# Patient Record
Sex: MALE | Employment: OTHER | URBAN - METROPOLITAN AREA
[De-identification: names, ages, dates, MRNs, and addresses within clinical notes are randomized per-mention and may not be internally consistent; named-entity substitution may affect disease eponyms.]

---

## 2017-01-04 ENCOUNTER — HOSPITAL ENCOUNTER (OUTPATIENT)
Dept: WOUND CARE | Age: 72
Discharge: HOME | End: 2017-01-04
Payer: MEDICARE

## 2017-01-04 PROCEDURE — 15275 SKIN SUB GRAFT FACE/NK/HF/G: CPT

## 2017-01-04 PROCEDURE — 15276 SKIN SUB GRAFT F/N/HF/G ADDL: CPT

## 2017-01-10 PROCEDURE — 97602 WOUND(S) CARE NON-SELECTIVE: CPT

## 2017-01-18 PROCEDURE — 97597 DBRDMT OPN WND 1ST 20 CM/<: CPT

## 2017-01-18 PROCEDURE — 15271 SKIN SUB GRAFT TRNK/ARM/LEG: CPT

## 2017-01-24 PROCEDURE — 99213 OFFICE O/P EST LOW 20 MIN: CPT

## 2017-01-24 PROCEDURE — 97602 WOUND(S) CARE NON-SELECTIVE: CPT

## 2017-02-01 ENCOUNTER — HOSPITAL ENCOUNTER (OUTPATIENT)
Dept: WOUND CARE | Age: 72
Discharge: HOME | End: 2017-02-01
Payer: MEDICARE

## 2017-02-01 PROCEDURE — 15272 SKIN SUB GRAFT T/A/L ADD-ON: CPT

## 2017-02-01 PROCEDURE — 15271 SKIN SUB GRAFT TRNK/ARM/LEG: CPT

## 2017-02-07 PROCEDURE — 97602 WOUND(S) CARE NON-SELECTIVE: CPT

## 2017-02-21 PROCEDURE — 97602 WOUND(S) CARE NON-SELECTIVE: CPT

## 2017-03-01 ENCOUNTER — HOSPITAL ENCOUNTER (OUTPATIENT)
Dept: WOUND CARE | Age: 72
Discharge: HOME | End: 2017-03-01
Payer: MEDICARE

## 2017-03-01 PROCEDURE — 11042 DBRDMT SUBQ TIS 1ST 20SQCM/<: CPT

## 2017-03-01 PROCEDURE — 15272 SKIN SUB GRAFT T/A/L ADD-ON: CPT

## 2017-03-01 PROCEDURE — 15271 SKIN SUB GRAFT TRNK/ARM/LEG: CPT

## 2017-03-01 PROCEDURE — 99214 OFFICE O/P EST MOD 30 MIN: CPT

## 2017-03-07 PROCEDURE — 97602 WOUND(S) CARE NON-SELECTIVE: CPT

## 2017-03-15 PROCEDURE — 11042 DBRDMT SUBQ TIS 1ST 20SQCM/<: CPT

## 2017-03-21 PROCEDURE — 97602 WOUND(S) CARE NON-SELECTIVE: CPT

## 2017-04-06 ENCOUNTER — HOSPITAL ENCOUNTER (OUTPATIENT)
Dept: WOUND CARE | Age: 72
Discharge: HOME | End: 2017-04-06
Payer: MEDICARE

## 2017-04-06 PROCEDURE — 97602 WOUND(S) CARE NON-SELECTIVE: CPT

## 2017-04-14 PROCEDURE — 97602 WOUND(S) CARE NON-SELECTIVE: CPT

## 2017-04-19 PROCEDURE — 99212 OFFICE O/P EST SF 10 MIN: CPT

## 2017-04-19 PROCEDURE — 29580 STRAPPING UNNA BOOT: CPT

## 2017-04-19 PROCEDURE — 97597 DBRDMT OPN WND 1ST 20 CM/<: CPT

## 2017-04-19 PROCEDURE — 97598 DBRDMT OPN WND ADDL 20CM/<: CPT

## 2017-04-24 PROCEDURE — 97602 WOUND(S) CARE NON-SELECTIVE: CPT

## 2017-05-03 ENCOUNTER — HOSPITAL ENCOUNTER (OUTPATIENT)
Dept: WOUND CARE | Age: 72
Discharge: HOME | End: 2017-05-03
Payer: MEDICARE

## 2017-05-03 PROCEDURE — 11042 DBRDMT SUBQ TIS 1ST 20SQCM/<: CPT

## 2017-05-09 ENCOUNTER — OFFICE VISIT (OUTPATIENT)
Dept: VASCULAR SURGERY | Age: 72
End: 2017-05-09

## 2017-05-09 VITALS
HEART RATE: 90 BPM | RESPIRATION RATE: 20 BRPM | WEIGHT: 150 LBS | BODY MASS INDEX: 19.25 KG/M2 | DIASTOLIC BLOOD PRESSURE: 58 MMHG | HEIGHT: 74 IN | SYSTOLIC BLOOD PRESSURE: 110 MMHG

## 2017-05-09 DIAGNOSIS — I25.119 CORONARY ARTERY DISEASE INVOLVING NATIVE CORONARY ARTERY OF NATIVE HEART WITH ANGINA PECTORIS (HCC): ICD-10-CM

## 2017-05-09 DIAGNOSIS — L97.911 LEG ULCER, RIGHT, LIMITED TO BREAKDOWN OF SKIN (HCC): ICD-10-CM

## 2017-05-09 DIAGNOSIS — I87.2 VENOUS REFLUX: ICD-10-CM

## 2017-05-09 DIAGNOSIS — L97.921 LEG ULCER, LEFT, LIMITED TO BREAKDOWN OF SKIN (HCC): ICD-10-CM

## 2017-05-09 DIAGNOSIS — I73.9 PAD (PERIPHERAL ARTERY DISEASE) (HCC): Primary | ICD-10-CM

## 2017-05-09 PROBLEM — L97.929 LEG ULCER, LEFT (HCC): Status: ACTIVE | Noted: 2017-05-09

## 2017-05-09 PROBLEM — L97.909 LEG ULCER (HCC): Status: ACTIVE | Noted: 2017-05-09

## 2017-05-09 PROCEDURE — 29581 APPL MULTLAYER CMPRN SYS LEG: CPT

## 2017-05-09 RX ORDER — CYANOCOBALAMIN 1000 UG/ML
1000 INJECTION, SOLUTION INTRAMUSCULAR; SUBCUTANEOUS
COMMUNITY
End: 2018-05-16

## 2017-05-09 RX ORDER — POTASSIUM CITRATE 10 MEQ/1
TABLET, EXTENDED RELEASE ORAL
COMMUNITY
End: 2017-05-19

## 2017-05-09 RX ORDER — INSULIN ASPART 100 [IU]/ML
5 INJECTION, SOLUTION INTRAVENOUS; SUBCUTANEOUS DAILY
COMMUNITY

## 2017-05-09 RX ORDER — ALBUTEROL SULFATE 90 UG/1
AEROSOL, METERED RESPIRATORY (INHALATION)
COMMUNITY
End: 2018-05-16

## 2017-05-09 RX ORDER — METFORMIN HYDROCHLORIDE 1000 MG/1
1000 TABLET ORAL 2 TIMES DAILY WITH MEALS
COMMUNITY

## 2017-05-09 RX ORDER — LISINOPRIL 2.5 MG/1
TABLET ORAL DAILY
COMMUNITY

## 2017-05-09 RX ORDER — ATORVASTATIN CALCIUM 20 MG/1
TABLET, FILM COATED ORAL
COMMUNITY

## 2017-05-09 RX ORDER — CLOPIDOGREL BISULFATE 75 MG/1
75 TABLET ORAL DAILY
COMMUNITY

## 2017-05-09 RX ORDER — PRIMIDONE 50 MG/1
TABLET ORAL 3 TIMES DAILY
Status: ON HOLD | COMMUNITY
End: 2017-05-22

## 2017-05-09 NOTE — PROGRESS NOTES
Kenny Jameson    Chief Complaint   Patient presents with    Wound Check       History and Physical    Mr. Teodoro Nunez is a 66year old gentleman referred to our office for bilateral nonhealing leg wounds. The patient is being followed by Dr. Carol Ann Cervantes and the wound care team.  Previously, Mr. Teodoro Nunez was being seen by the previous vascular surgeons at [facility] and he has undergone a right SFA atherectomy in 2015. He has not had any additional procedures since that time on his arteries and has not had any imaging done since 2016. He has also undergone a venous ablation in the past.  I am not sure which leg this was done one. In talking to Mr. Hough, it sounds like his left foot wounds had almost healed previously but it does not appear than he has ever truly been wound free since at least 2015. He also states that when he wakes up in the morning his right foot aches. He does not experience this pain while he is in bed reading his books. He has not tried sleeping in a recliner. He has Unna boots and gets them changed weekly. Past Medical History:   Diagnosis Date    Chronic obstructive pulmonary disease ([address]     Diabetes ([address]     GERD (gastroesophageal reflux disease)     Hypercholesterolemia     Hypertension     Thyroid disease      Patient Active Problem List   Diagnosis Code    PAD (peripheral artery disease) ([facility]) I73.9    Venous reflux I87.2    Leg ulcer, left ([address] L97.929    Leg ulcer ([address] L97.909     Past Surgical History:   Procedure Laterality Date    ABDOMEN SURGERY PROC UNLISTED      HX COLONOSCOPY      HX HEENT      HX ORTHOPAEDIC      HX UROLOGICAL      VASCULAR SURGERY PROCEDURE UNLIST       Current Outpatient Prescriptions   Medication Sig Dispense Refill    insulin aspart (NOVOLOG FLEXPEN) 100 unit/mL inpn by SubCUTAneous route.  metFORMIN (GLUCOPHAGE) 1,000 mg tablet Take 1,000 mg by mouth two (2) times daily (with meals).       atorvastatin (LIPITOR) 20 mg tablet Take  by mouth daily.  lisinopril (PRINIVIL, ZESTRIL) 2.5 mg tablet Take  by mouth daily.  albuterol (PROAIR HFA) 90 mcg/actuation inhaler Take  by inhalation.  clopidogrel (PLAVIX) 75 mg tab Take  by mouth.  primidone (MYSOLINE) 50 mg tablet Take  by mouth three (3) times daily.  IRON PS CMPLX/VIT B12/FA (FERREX 150 FORTE PO) Take  by mouth.  potassium citrate (UROCIT-K10) 10 mEq (1,080 mg) TbER Take  by mouth.  cyanocobalamin (VITAMIN B-12) 1,000 mcg/mL injection 1,000 mcg by IntraMUSCular route once.  aspirin 81 mg tablet Take 81 mg by mouth daily.  insulin glargine (LANTUS) 100 unit/mL injection 68 Units by SubCUTAneous route nightly.  esomeprazole (NEXIUM) 40 mg capsule Take  by mouth daily.  furosemide (LASIX) 40 mg tablet Take 40 mg by mouth daily.  diltiazem (TIAZAC) 360 mg SR capsule Take 360 mg by mouth daily.  tiotropium (SPIRIVA WITH HANDIHALER) 18 mcg inhalation capsule Take 1 Cap by inhalation daily.  cilostazol (PLETAL) 100 mg tablet Take 100 mg by mouth two (2) times a day.  loratadine (CLARITIN) 10 mg tablet Take 10 mg by mouth daily.  fluticasone (FLONASE) 50 mcg/actuation nasal spray 2 Sprays by Both Nostrils route daily.  traMADol (ULTRAM) 50 mg tablet Take 50 mg by mouth two (2) times a day.  gabapentin (NEURONTIN) 300 mg capsule Take 300 mg by mouth three (3) times daily.  multivitamin (ONE A DAY) tablet Take 1 Tab by mouth daily.  LACTASE ENZYME PO Take 1 Cap by mouth daily.  levothyroxine (SYNTHROID) 25 mcg tablet Take 100 mcg by mouth Daily (before breakfast).  fluticasone-salmeterol (ADVAIR DISKUS) 250-50 mcg/dose diskus inhaler Take 1 Puff by inhalation every twelve (12) hours.  betamethasone dipropionate (DIPROSONE) 0.05 % topical cream Apply  to affected area two (2) times a day.       glyBURIDE-metFORMIN (GLUCOVANCE) 5-500 mg per tablet Take 2 Tabs by mouth two (2) times daily (with meals). Breakfast and dinner only      losartan (COZAAR) 100 mg tablet Take 100 mg by mouth daily.  meloxicam (MOBIC) 15 mg tablet Take 15 mg by mouth daily.  potassium chloride (KLOR-CON) 20 mEq packet Take 20 mEq by mouth daily.  tolterodine (DETROL) 2 mg tablet Take 2 mg by mouth nightly.  vit B Cmplx 3-FA-Vit C-Biotin (NEPHRO TEN RX) 1- mg-mg-mcg tablet Take 1 Tab by mouth daily.  B.infantis-B.ani-B.long-B.bifi (PROBIOTIC 4X) 10-15 mg TbEC Take 1 Cap by mouth daily (with breakfast).  omega-3 fatty acids-vitamin e (FISH OIL) 1,000 mg cap Take 1 Cap by mouth two (2) times a day.  glucosamine (GLUCOSAMINE RELIEF) 1,000 mg Tab Take 2 Caps by mouth daily (with breakfast).  metoprolol-XL (TOPROL XL) 25 mg XL tablet Take 12.5 mg by mouth daily.  simvastatin (ZOCOR) 10 mg tablet Take 10 mg by mouth nightly.  nicotinic acid (NIACIN) 500 mg tablet Take 500 mg by mouth Daily (before breakfast). Allergies   Allergen Reactions    Latex Contact Dermatitis    Neosporin [Neomycin-Bacitracin-Polymyxin] Rash     Social History     Social History    Marital status:      Spouse name: N/A    Number of children: N/A    Years of education: N/A     Occupational History    Not on file. Social History Main Topics    Smoking status: Former Smoker     Types: Cigarettes     Quit date: 1/1/1997    Smokeless tobacco: Never Used    Alcohol use 0.6 oz/week     1 Glasses of wine per week    Drug use: No    Sexual activity: Not on file     Other Topics Concern    Not on file     Social History Narrative      Family History   Problem Relation Age of Onset    Cancer Mother     Diabetes Father     Heart Disease Sister     Cancer Brother     Diabetes Brother     Hypertension Brother        Review of Systems    Review of Systems   Constitutional: Positive for malaise/fatigue. Negative for chills, diaphoresis, fever and weight loss.    HENT: Negative for hearing loss and sore throat. Eyes: Negative for blurred vision, photophobia and redness. Respiratory: Negative for cough, hemoptysis, shortness of breath and wheezing. Cardiovascular: Positive for leg swelling. Negative for chest pain, palpitations and orthopnea. Gastrointestinal: Negative for abdominal pain, blood in stool, constipation, diarrhea, heartburn, nausea and vomiting. Genitourinary: Negative for dysuria, frequency, hematuria and urgency. Musculoskeletal: Positive for falls. Negative for back pain and myalgias. Skin: Negative for itching and rash. Neurological: Positive for weakness. Negative for dizziness, speech change, focal weakness and headaches. Endo/Heme/Allergies: Does not bruise/bleed easily. Psychiatric/Behavioral: Negative for depression and suicidal ideas. Physical Exam:    Visit Vitals    /58    Pulse 90    Resp 20    Ht 6' 2\" (1.88 m)    Wt 150 lb (68 kg)    BMI 19.26 kg/m2      Physical Examination: General appearance - oriented to person, place, and time  Mental status - normal mood, behavior, speech, dress, motor activity, and thought processes  Eyes - sclera anicteric, left eye normal, right eye normal  Ears - right ear normal, left ear normal  Nose - normal and patent, no erythema, discharge or polyps  Mouth - mucous membranes moist, pharynx normal without lesions  Neck - supple, no significant adenopathy  Lymphatics - no palpable lymphadenopathy  Chest - clear to auscultation, no wheezes, rales or rhonchi, symmetric air entry  Heart - normal rate and regular rhythm  Abdomen - soft, nontender, nondistended, no masses or organomegaly  Extremities - Faint femoral pulse bilaterally, faint left popliteal pulse, unable to palpate right popliteal pulse. Biphasic left popliteal signal with monophasic right popliteal signal.  Bilateral lower extremities in Unna boots. Impression and Plan:    ICD-10-CM ICD-9-CM    1.  PAD (peripheral artery disease) ([facility]) I73.9 443.9 DUPLEX LOWER EXT ARTERY BILAT   2. Venous reflux I87.2 459.81    3. Leg ulcer, left, limited to breakdown of skin L97.921 707.10 DUPLEX LOWER EXT ARTERY BILAT   4. Leg ulcer, right, limited to breakdown of skin L97.911 707.10 DUPLEX LOWER EXT ARTERY BILAT   5. Coronary artery disease involving native coronary artery of native heart with angina pectoris ([facility]) I25.119 414.01      413.9      Orders Placed This Encounter    DUPLEX LOWER EXT ARTERY BILAT    insulin aspart (NOVOLOG FLEXPEN) 100 unit/mL inpn    metFORMIN (GLUCOPHAGE) 1,000 mg tablet    atorvastatin (LIPITOR) 20 mg tablet    lisinopril (PRINIVIL, ZESTRIL) 2.5 mg tablet    albuterol (PROAIR HFA) 90 mcg/actuation inhaler    clopidogrel (PLAVIX) 75 mg tab    primidone (MYSOLINE) 50 mg tablet    IRON PS CMPLX/VIT B12/FA (FERREX 150 FORTE PO)    potassium citrate (UROCIT-K10) 10 mEq (1,080 mg) TbER    cyanocobalamin (VITAMIN B-12) 1,000 mcg/mL injection     I told Mr. Hough he may have both venous and arterial disease. I will obtain bilateral arterial duplex studies with plans on a diagnostic angiogram as soon as feasible. I will start with his right leg given his possible rest pain. We will then take a look at the left. Follow-up Disposition:  Return in about 3 weeks (around 5/30/2017) for post angiogram.      The treatment plan was reviewed with the patient in detail. The patient voiced understanding of this plan and all questions and concerns were addressed. The patient agrees with this plan. We discussed the signs and symptoms that would require earlier attention or intervention. The patient was given educational material related to his/her visit and the patient has voiced understanding of the material.     I appreciate the opportunity to participate in the care of your patient. I will be sure to keep you informed of any subsequent changes in the treatment plan.   If you have any questions or concerns, please feel free to contact me. Chrissy Ann MD    PLEASE NOTE:  This document has been produced using voice recognition software. Unrecognized errors in transcription may be present.

## 2017-05-09 NOTE — MR AVS SNAPSHOT
Visit Information Date & Time Provider Department Dept. Phone Encounter #  
 5/9/2017  1:15 PM Khushboo Loza MD [facility] [address] [id number] Upcoming Health Maintenance Date Due DTaP/Tdap/Td series (1 - Tdap) 5/16/1964 FOBT Q 1 YEAR AGE 50-75 5/16/1993 ZOSTER VACCINE AGE 60> 5/16/2003 GLAUCOMA SCREENING Q2Y 5/16/2008 Pneumococcal 65+ Low/Medium Risk (1 of 2 - PCV13) 5/16/2008 MEDICARE YEARLY EXAM 5/16/2008 INFLUENZA AGE 9 TO ADULT 8/1/2017 Allergies as of 5/9/2017  Review Complete On: 5/9/2017 By: Sue Elkins RN Severity Noted Reaction Type Reactions Latex  05/28/2013    Contact Dermatitis Neosporin [Neomycin-bacitracin-polymyxin] High 05/09/2017   Systemic Rash Current Immunizations  Never Reviewed No immunizations on file. Not reviewed this visit You Were Diagnosed With   
  
 Codes Comments PAD (peripheral artery disease) ([facility])    -  Primary ICD-10-CM: I73.9 ICD-9-CM: 443.9 Venous reflux     ICD-10-CM: I87.2 ICD-9-CM: 459.81 Leg ulcer, left, limited to breakdown of skin     ICD-10-CM: L97.921 ICD-9-CM: 707.10 Leg ulcer, right, limited to breakdown of skin     ICD-10-CM: L97.911 ICD-9-CM: 707.10 Vitals BP Pulse Resp Height(growth percentile) Weight(growth percentile) BMI  
 110/58 90 20 6' 2\" (1.88 m) 150 lb (68 kg) 19.26 kg/m2 Smoking Status Former Smoker Vitals History BMI and BSA Data Body Mass Index Body Surface Area  
 19.26 kg/m 2 1.88 m 2 Your Updated Medication List  
  
   
This list is accurate as of: 5/9/17  3:25 PM.  Always use your most recent med list.  
  
  
  
  
 Alexandra Winsted 250-50 mcg/dose diskus inhaler Generic drug:  fluticasone-salmeterol Take 1 Puff by inhalation every twelve (12) hours. aspirin 81 mg tablet Take 81 mg by mouth daily. atorvastatin 20 mg tablet Commonly known as:  LIPITOR Take  by mouth daily. betamethasone dipropionate 0.05 % topical cream  
Commonly known as:  Kathleen [place] Apply  to affected area two (2) times a day. cilostazol 100 mg tablet Commonly known as:  PLETAL Take 100 mg by mouth two (2) times a day. CLARITIN 10 mg tablet Generic drug:  loratadine Take 10 mg by mouth daily. DETROL 2 mg tablet Generic drug:  tolterodine Take 2 mg by mouth nightly. FERREX 150 FORTE PO Take  by mouth. FISH OIL 1,000 mg Cap Generic drug:  omega-3 fatty acids-vitamin e Take 1 Cap by mouth two (2) times a day. FLONASE 50 mcg/actuation nasal spray Generic drug:  fluticasone 2 Sprays by Both Nostrils route daily. gabapentin 300 mg capsule Commonly known as:  NEURONTIN Take 300 mg by mouth three (3) times daily. GLUCOSAMINE RELIEF 1,000 mg Tab Generic drug:  glucosamine Take 2 Caps by mouth daily (with breakfast). GLUCOVANCE 5-500 mg per tablet Generic drug:  glyBURIDE-metFORMIN Take 2 Tabs by mouth two (2) times daily (with meals). Breakfast and dinner only KLOR-CON 20 mEq packet Generic drug:  potassium chloride Take 20 mEq by mouth daily. LACTASE ENZYME PO Take 1 Cap by mouth daily. LANTUS 100 unit/mL injection Generic drug:  insulin glargine 68 Units by SubCUTAneous route nightly. LASIX 40 mg tablet Generic drug:  furosemide Take 40 mg by mouth daily. levothyroxine 25 mcg tablet Commonly known as:  SYNTHROID Take 100 mcg by mouth Daily (before breakfast). lisinopril 2.5 mg tablet Commonly known as:  Roxianne Daughters Take  by mouth daily. losartan 100 mg tablet Commonly known as:  COZAAR Take 100 mg by mouth daily. metFORMIN 1,000 mg tablet Commonly known as:  GLUCOPHAGE Take 1,000 mg by mouth two (2) times daily (with meals). MOBIC 15 mg tablet Generic drug:  meloxicam  
Take 15 mg by mouth daily. multivitamin tablet Commonly known as:  ONE A DAY Take 1 Tab by mouth daily. NexIUM 40 mg capsule Generic drug:  esomeprazole Take  by mouth daily. nicotinic acid 500 mg tablet Commonly known as:  NIACIN Take 500 mg by mouth Daily (before breakfast). NovoLOG Flexpen 100 unit/mL Inpn Generic drug:  insulin aspart  
by SubCUTAneous route. PLAVIX 75 mg Tab Generic drug:  clopidogrel Take  by mouth.  
  
 potassium citrate 10 mEq (1,080 mg) Healy Audelia Commonly known as:  [place] Take  by mouth.  
  
 primidone 50 mg tablet Commonly known as: MYSOLINE Take  by mouth three (3) times daily. PROAIR HFA 90 mcg/actuation inhaler Generic drug:  albuterol Take  by inhalation. PROBIOTIC 4X 10-15 mg Tbec Generic drug:  B.infantis-B.ani-B.long-B.bifi Take 1 Cap by mouth daily (with breakfast). simvastatin 10 mg tablet Commonly known as:  ZOCOR Take 10 mg by mouth nightly. SPIRIVA WITH HANDIHALER 18 mcg inhalation capsule Generic drug:  tiotropium Take 1 Cap by inhalation daily. TIAZAC 360 mg SR capsule Generic drug:  dilTIAZem Take 360 mg by mouth daily. TOPROL XL 25 mg XL tablet Generic drug:  metoprolol succinate Take 12.5 mg by mouth daily. traMADol 50 mg tablet Commonly known as:  ULTRAM  
Take 50 mg by mouth two (2) times a day. vit B Cmplx 3-FA-Vit C-Biotin 1- mg-mg-mcg tablet Commonly known as:  NEPHRO TEN RX Take 1 Tab by mouth daily. VITAMIN B-12 1,000 mcg/mL injection Generic drug:  cyanocobalamin  
1,000 mcg by IntraMUSCular route once. To-Do List   
 05/17/2017 Imaging:  DUPLEX LOWER EXT ARTERY BILAT   
  
 05/17/2017 11:00 AM  
  Appointment with [facility] at [facility] [address] [place] [address] ([phone]) There are no restrictions for this study. The patient can eat breakfast and take their medications.   This study requires a written physician's order which may be either hand-carried by the patient or faxed to Central Scheduling at [phone]. Introducing [facility]! [place] introduces [employer] patient portal. Now you can access parts of your medical record, email your doctor's office, and request medication refills online. 1. In your internet browser, go to [email or web address] 2. Click on the First Time User? Click Here link in the Sign In box. You will see the New Member Sign Up page. 3. Enter your [employer] Access Code exactly as it appears below. You will not need to use this code after youve completed the sign-up process. If you do not sign up before the expiration date, you must request a new code. · [employer] Access Code: [email or web address] Expires: 6/28/2017  3:00 PM 
 
4. Enter the last four digits of your Social Security Number (xxxx) and Date of Birth (mm/dd/yyyy) as indicated and click Submit. You will be taken to the next sign-up page. 5. Create a [employer] ID. This will be your [employer] login ID and cannot be changed, so think of one that is secure and easy to remember. 6. Create a [employer] password. You can change your password at any time. 7. Enter your Password Reset Question and Answer. This can be used at a later time if you forget your password. 8. Enter your e-mail address. You will receive e-mail notification when new information is available in [address] 9. Click Sign Up. You can now view and download portions of your medical record. 10. Click the Download Summary menu link to download a portable copy of your medical information. If you have questions, please visit the Frequently Asked Questions section of the [employer] website. Remember, [employer] is NOT to be used for urgent needs. For medical emergencies, dial 911. Now available from your iPhone and Android! Please provide this summary of care documentation to your next provider. Your primary care clinician is listed as Claudio Lara. If you have any questions after today's visit, please call [phone].

## 2017-05-15 NOTE — PROGRESS NOTES
PT aware of NPO status. PT aware of need to hold anticoagulants per protocol. PT aware of potential for sedation administration and need for  at discharge. PT aware of arrival time pre procedure. Pt states no questions at this time.

## 2017-05-17 ENCOUNTER — HOSPITAL ENCOUNTER (OUTPATIENT)
Dept: VASCULAR SURGERY | Age: 72
Discharge: HOME | End: 2017-05-17
Attending: SURGERY
Payer: MEDICARE

## 2017-05-17 DIAGNOSIS — L97.921 LEG ULCER, LEFT, LIMITED TO BREAKDOWN OF SKIN (HCC): ICD-10-CM

## 2017-05-17 DIAGNOSIS — I73.9 PAD (PERIPHERAL ARTERY DISEASE) (HCC): ICD-10-CM

## 2017-05-17 DIAGNOSIS — L97.911 LEG ULCER, RIGHT, LIMITED TO BREAKDOWN OF SKIN (HCC): ICD-10-CM

## 2017-05-17 PROCEDURE — 93925 LOWER EXTREMITY STUDY: CPT

## 2017-05-17 PROCEDURE — 11042 DBRDMT SUBQ TIS 1ST 20SQCM/<: CPT

## 2017-05-17 NOTE — PROCEDURES
[facility]  *** FINAL REPORT ***    Name: Sallie Espinoza  MRN: [id number]    Outpatient  : 16 May 1945  HIS Order #: [id number]  [address] [facility] Visit #: [id number]  Date: 17 May 2017    TYPE OF TEST: Extremity Arterial Duplex    REASON FOR TEST  Extremity ulceration (right side)                            Right                     Left  Artery               PSV   Finding             PSV   Finding  ------------------  -----  ---------------    -----  ---------------  External iliac:  Common femoral:     289.0                     274.0  Profunda femoris:   166.0                     207.0  Proximal SFA:       209.0                     218.0  Mid SFA:             94.0  >75% stenosis      101.0  >75% stenosis  Distal SFA:         334.0                     390.0  Popliteal:          111.0                      86.0  Anterior tibial:     42.0                      68.0  Posterior tibial:    84.0                      88.0    Pressures               Right     Left               -----     -----     Brachial:           DP:           PT:            SUSANNAH:            Toe: INTERPRETATION/FINDINGS  Duplex images were obtained using 2-D gray scale, color flow, and  spectral Doppler analysis. Right leg :  1. Greater than 75% stenosis of the superficial femoral artery at mid  to distal segment of the vessel. Left leg :  1. Greater than 75% stenosis of the distal superficial femoral artery. Bilateral superficial femoral arteries are highly calcified    ADDITIONAL COMMENTS    I have personally reviewed the data relevant to the interpretation of  this  study.     TECHNOLOGIST: Neeta Sampson  Signed: 2017 04:55 PM    PHYSICIAN: Antonio Orellana MD  Signed: 2017 03:06 PM

## 2017-05-19 ENCOUNTER — APPOINTMENT (OUTPATIENT)
Dept: GENERAL RADIOLOGY | Age: 72
End: 2017-05-19
Attending: PHYSICIAN ASSISTANT
Payer: MEDICARE

## 2017-05-19 ENCOUNTER — HOSPITAL ENCOUNTER (EMERGENCY)
Age: 72
Discharge: HOME | End: 2017-05-19
Attending: EMERGENCY MEDICINE | Admitting: EMERGENCY MEDICINE
Payer: MEDICARE

## 2017-05-19 VITALS
BODY MASS INDEX: 20.53 KG/M2 | SYSTOLIC BLOOD PRESSURE: 143 MMHG | TEMPERATURE: 98.3 F | DIASTOLIC BLOOD PRESSURE: 65 MMHG | RESPIRATION RATE: 16 BRPM | OXYGEN SATURATION: 97 % | HEART RATE: 99 BPM | WEIGHT: 160 LBS | HEIGHT: 74 IN

## 2017-05-19 DIAGNOSIS — M25.572 ACUTE LEFT ANKLE PAIN: Primary | ICD-10-CM

## 2017-05-19 PROCEDURE — 99282 EMERGENCY DEPT VISIT SF MDM: CPT

## 2017-05-19 PROCEDURE — 73610 X-RAY EXAM OF ANKLE: CPT

## 2017-05-19 RX ORDER — CEPHALEXIN 500 MG/1
500 CAPSULE ORAL 4 TIMES DAILY
Qty: 28 CAP | Refills: 0 | Status: SHIPPED | OUTPATIENT
Start: 2017-05-19 | End: 2017-05-26

## 2017-05-19 RX ORDER — LIDOCAINE HYDROCHLORIDE 10 MG/ML
10 INJECTION INFILTRATION; PERINEURAL
Status: DISCONTINUED | OUTPATIENT
Start: 2017-05-19 | End: 2017-05-19 | Stop reason: HOSPADM

## 2017-05-19 NOTE — ED PROVIDER NOTES
HPI Comments: 5:25 PM    Elizabeth Matias is a 74 y.o. male with pertinent PMHx of DM (with diabetic ulcers x last 10 years), HTN and thyroid disease presenting ambulatory to the ED c/o 4/10 lateral left ankle pain x 3 days. Pt states that he was showering 5 days ago when he tripped as he was sitting down on the shower seat. Pt states \"it pushed my ankle up against the shower door\". Pt states that his left ankle hurt momentarily after the injury, but that the pain did not become intense until 3 days ago. Pt notes that he has not frequently put weight on his left ankle. Pt states that he called his PCP, who advised him to come into the ED. Pt notes that he is currently being followed by wound care, due to diabetic ulcers. Pt states that he had an arterial ultrasound of her left leg 3 days ago, which did not show a DVT. Pt specifically denies any fever/chills, SOB or chest pain. PCP: Corinne Matthew MD  Social Hx: - tobacco use, + alcohol use, - illicit drug use    There are no other complaints, changes, or physical findings at this time. The history is provided by the patient. No  was used.         Past Medical History:   Diagnosis Date    Acid reflux     Anemia     CAD (coronary artery disease)     Chronic obstructive pulmonary disease (HCC)     Diabetes (HCC)     GERD (gastroesophageal reflux disease)     H/O seasonal allergies     Hypercholesterolemia     Hypertension     Ill-defined condition     hand tremors    Thyroid disease        Past Surgical History:   Procedure Laterality Date    ABDOMEN SURGERY PROC UNLISTED      HX COLONOSCOPY      HX HEENT      HX ORTHOPAEDIC      HX UROLOGICAL      VASCULAR SURGERY PROCEDURE UNLIST           Family History:   Problem Relation Age of Onset    Cancer Mother     Diabetes Father     Heart Disease Sister     Cancer Brother     Diabetes Brother     Hypertension Brother        Social History     Social History    Marital status:      Spouse name: N/A    Number of children: N/A    Years of education: N/A     Occupational History    Not on file. Social History Main Topics    Smoking status: Former Smoker     Types: Cigarettes     Quit date: 1/1/1997    Smokeless tobacco: Never Used    Alcohol use 0.6 oz/week     1 Glasses of wine per week    Drug use: No    Sexual activity: Not on file     Other Topics Concern    Not on file     Social History Narrative         ALLERGIES: Latex and Neosporin [neomycin-bacitracin-polymyxin]    Review of Systems   Constitutional: Negative for chills and fever. Respiratory: Negative for shortness of breath. Cardiovascular: Negative for chest pain. Musculoskeletal: Positive for arthralgias (left ankle). All other systems reviewed and are negative. Vitals:    05/19/17 1720 05/19/17 1849   BP: 152/56 143/65   Pulse: (!) 105 99   Resp: 16 16   Temp: 98.3 °F (36.8 °C)    SpO2: 96% 97%   Weight: 72.6 kg (160 lb)    Height: 6' 2\" (1.88 m)             Physical Exam   Constitutional: He is oriented to person, place, and time. He appears well-developed and well-nourished. No distress. Alert, well appearing, NAD, non toxic    HENT:   Head: Normocephalic and atraumatic. Neck: Normal range of motion. Neck supple. Cardiovascular: Normal rate, regular rhythm, normal heart sounds and intact distal pulses. No murmur heard. Pulmonary/Chest: Effort normal and breath sounds normal. No respiratory distress. He has no wheezes. He has no rales. Abdominal: Soft. Bowel sounds are normal. There is no tenderness. Musculoskeletal:        Left ankle: Tenderness. Lateral malleolus tenderness found. No posterior TFL, no head of 5th metatarsal and no proximal fibula tenderness found.  Achilles tendon normal.   Bandages applied by wound clinic to BLE  LLE:  TTP over the lateral malleolus  Dressing removed, chronic erythema c/w baseline per pt and wife, slight swelling over the lateral malleolus pulses 2+ for DP and PT, brisk cap refill    Neurological: He is alert and oriented to person, place, and time. Skin: There is erythema. Psychiatric: He has a normal mood and affect. Judgment normal.   Nursing note and vitals reviewed. RESULTS:    PULSE OXIMETRY NOTE:  Pulse-ox is 96% on room air  Interpretation: NML       XR ANKLE LT MIN 3 V    (Results Pending)      6:55 PM  RADIOLOGY FINDINGS  Left ankle X-ray shows no acute process. Arthritic changes. Pending review by Radiologist  Recorded by Jose Gutierrez, ED Scribe, as dictated by Stephan Martinez PA-C. Labs Reviewed - No data to display    No results found for this or any previous visit (from the past 12 hour(s)). MDM  Number of Diagnoses or Management Options  Acute left ankle pain:   Diagnosis management comments: Recent injury to ankle, chronic wounds to BLE followed by wound clinic, suspect contusion, pt has had arterial US on leg since onset of pain that was normal, pt family reports erythema appears baseline but ? Increased swelling over the lateral malleolus, will cover for possible cellulitis with Keflex and close f/u with wound clinic and and Dr. Bucky Hilario and/or Complexity of Data Reviewed  Tests in the radiology section of CPT®: ordered and reviewed (XR left ankle)  Review and summarize past medical records: yes  Independent visualization of images, tracings, or specimens: yes (XR left ankle)      ED Course     Medications - No data to display     Procedures  Progress Note:  7:17 PM  Discussed pt's hx and available diagnostic and imaging results with Araceli Araiza MD, the pt's attending. Reviewed care plans and Araceli Araiza MD is in agreement with the plan of care. .  Written by Cat Carter, ED Scribe, as dictated by Stephan Martinez PA-C. Jonah Mckeon DISCHARGE NOTE:  7:24 PM   [address] [place] [address] Mariola's  results have been reviewed with him.   He has been counseled regarding his diagnosis, treatment, and plan.  He verbally conveys understanding and agreement of the signs, symptoms, diagnosis, treatment and prognosis and additionally agrees to follow up as discussed. He also agrees with the care-plan and conveys that all of his questions have been answered. I have also provided discharge instructions for him that include: educational information regarding their diagnosis and treatment, and list of reasons why they would want to return to the ED prior to their follow-up appointment, should his condition change. CLINICAL IMPRESSION:    1. Acute left ankle pain        PLAN:  1. D/C Home  2. Discharge Medication List as of 5/19/2017  7:23 PM      START taking these medications    Details   cephALEXin (KEFLEX) 500 mg capsule Take 1 Cap by mouth four (4) times daily for 7 days. , Normal, Disp-28 Cap, R-0         CONTINUE these medications which have NOT CHANGED    Details   iron aspgly,[id number]-Ca-suc (FERREX 150 FORTE PLUS) 169-82-07-0 mg-mg-mcg-mg cap cap Take 1 Cap by mouth daily. , Historical Med      insulin aspart (NOVOLOG FLEXPEN) 100 unit/mL inpn by SubCUTAneous route., Historical Med      metFORMIN (GLUCOPHAGE) 1,000 mg tablet Take 1,000 mg by mouth two (2) times daily (with meals). , Historical Med      atorvastatin (LIPITOR) 20 mg tablet Take  by mouth daily. , Historical Med      lisinopril (PRINIVIL, ZESTRIL) 2.5 mg tablet Take  by mouth daily. , Historical Med      albuterol (PROAIR HFA) 90 mcg/actuation inhaler Take  by inhalation. , Historical Med      clopidogrel (PLAVIX) 75 mg tab Take  by mouth., Historical Med      primidone (MYSOLINE) 50 mg tablet Take  by mouth three (3) times daily. , Historical Med      cyanocobalamin (VITAMIN B-12) 1,000 mcg/mL injection 1,000 mcg by IntraMUSCular route once., Historical Med      aspirin 81 mg tablet Take 81 mg by mouth daily. , Historical Med      insulin glargine (LANTUS) 100 unit/mL injection 68 Units by SubCUTAneous route nightly., Historical Med esomeprazole (NEXIUM) 40 mg capsule Take  by mouth daily. , Historical Med      furosemide (LASIX) 40 mg tablet Take 40 mg by mouth daily. , Historical Med      diltiazem (TIAZAC) 360 mg SR capsule Take 360 mg by mouth daily. , Historical Med      tiotropium (SPIRIVA WITH HANDIHALER) 18 mcg inhalation capsule Take 1 Cap by inhalation daily. , Historical Med      potassium chloride (KLOR-CON) 20 mEq packet Take 20 mEq by mouth daily. , Historical Med      tolterodine (DETROL) 2 mg tablet Take 2 mg by mouth nightly., Historical Med      loratadine (CLARITIN) 10 mg tablet Take 10 mg by mouth daily. , Historical Med      fluticasone (FLONASE) 50 mcg/actuation nasal spray 2 Sprays by Both Nostrils route daily. , Historical Med      traMADol (ULTRAM) 50 mg tablet Take 50 mg by mouth two (2) times a day., Historical Med      gabapentin (NEURONTIN) 300 mg capsule Take 300 mg by mouth three (3) times daily. , Historical Med      multivitamin (ONE A DAY) tablet Take 1 Tab by mouth daily. , Historical Med      LACTASE ENZYME PO Take 1 Cap by mouth daily. , Historical Med      simvastatin (ZOCOR) 10 mg tablet Take 10 mg by mouth nightly.  , Historical Med      levothyroxine (SYNTHROID) 25 mcg tablet Take 100 mcg by mouth Daily (before breakfast). , Historical Med      fluticasone-salmeterol (ADVAIR DISKUS) 250-50 mcg/dose diskus inhaler Take 1 Puff by inhalation every twelve (12) hours. , Historical Med           3. Follow-up Information     Follow up With Details Comments Jamil Davenport MD Schedule an appointment as soon as possible for a visit in 3 days for PCP follow up [address] [facility] [address] [facility] EMERGENCY DEPT  As needed, If symptoms worsen [address] [place] [id number]  [phone]         Attestation: This note is prepared by Gordon Buerger. [facility], acting as Scribe for Cathy Bowser.     Johnnie Lamb PA-C: The scribe's documentation has been prepared under my direction and personally reviewed by me in its entirety. I confirm that the note above accurately reflects all work, treatment, procedures, and medical decision making performed by me.

## 2017-05-19 NOTE — ED TRIAGE NOTES
C/o left ankle pain since Tuesday, states he fell in the shower last Sunday, denies other injuries or hitting his head, states he called 911 to help him get up but wasn't transferred to hospital, states his left ankle started hurting him on Tuesday, more painful when he stands. Pt [facility].

## 2017-05-19 NOTE — DISCHARGE INSTRUCTIONS
Cellulitis: Care Instructions  Your Care Instructions    Cellulitis is a skin infection. It often occurs after a break in the skin from a scrape, cut, bite, or puncture, or after a rash. The doctor has checked you carefully, but problems can develop later. If you notice any problems or new symptoms, get medical treatment right away. Follow-up care is a key part of your treatment and safety. Be sure to make and go to all appointments, and call your doctor if you are having problems. It's also a good idea to know your test results and keep a list of the medicines you take. How can you care for yourself at home? · Take your antibiotics as directed. Do not stop taking them just because you feel better. You need to take the full course of antibiotics. · Prop up the infected area on pillows to reduce pain and swelling. Try to keep the area above the level of your heart as often as you can. · If your doctor told you how to care for your wound, follow your doctor's instructions. If you did not get instructions, follow this general advice:  ¨ Wash the wound with clean water 2 times a day. Don't use hydrogen peroxide or alcohol, which can slow healing. ¨ You may cover the wound with a thin layer of petroleum jelly, such as Vaseline, and a nonstick bandage. ¨ Apply more petroleum jelly and replace the bandage as needed. · Be safe with medicines. Take pain medicines exactly as directed. ¨ If the doctor gave you a prescription medicine for pain, take it as prescribed. ¨ If you are not taking a prescription pain medicine, ask your doctor if you can take an over-the-counter medicine. To prevent cellulitis in the future  · Try to prevent cuts, scrapes, or other injuries to your skin. Cellulitis most often occurs where there is a break in the skin. · If you get a scrape, cut, mild burn, or bite, wash the wound with clean water as soon as you can to help avoid infection.  Don't use hydrogen peroxide or alcohol, which can slow healing. · If you have swelling in your legs (edema), support stockings and good skin care may help prevent leg sores and cellulitis. · Take care of your feet, especially if you have diabetes or other conditions that increase the risk of infection. Wear shoes and socks. Do not go barefoot. If you have athlete's foot or other skin problems on your feet, talk to your doctor about how to treat them. When should you call for help? Call your doctor now or seek immediate medical care if:  · You have signs that your infection is getting worse, such as:  ¨ Increased pain, swelling, warmth, or redness. ¨ Red streaks leading from the area. ¨ Pus draining from the area. ¨ A fever. · You get a rash. Watch closely for changes in your health, and be sure to contact your doctor if:  · You are not getting better after 1 day (24 hours). · You do not get better as expected. Where can you learn more? Go to http://mariella-kenyon.info/. Farhad Perry in the search box to learn more about \"Cellulitis: Care Instructions. \"  Current as of: October 13, 2016  Content Version: 11.2  © 5209-1312 [employer]. Care instructions adapted under license by [employer] (which disclaims liability or warranty for this information). If you have questions about a medical condition or this instruction, always ask your healthcare professional. [place] [address] any warranty or liability for your use of this information. Ankle Sprain: Care Instructions  Your Care Instructions    An ankle sprain can happen when you twist your ankle. The ligaments that support the ankle can get stretched and torn. Often the ankle is swollen and painful.   Ankle sprains may take from several weeks to several months to heal. Usually, the more pain and swelling you have, the more severe your ankle sprain is and the longer it will take to heal. You can heal faster and regain strength in your ankle with good home treatment. It is very important to give your ankle time to heal completely, so that you do not easily hurt your ankle again. Follow-up care is a key part of your treatment and safety. Be sure to make and go to all appointments, and call your doctor if you are having problems. It's also a good idea to know your test results and keep a list of the medicines you take. How can you care for yourself at home? · Prop up your foot on pillows as much as possible for the next 3 days. Try to keep your ankle above the level of your heart. This will help reduce the swelling. · Follow your doctor's directions for wearing a splint or elastic bandage. Wrapping the ankle may help reduce or prevent swelling. · Your doctor may give you a splint, a brace, an air stirrup, or another form of ankle support to protect your ankle until it is healed. Wear it as directed while your ankle is healing. Do not remove it unless your doctor tells you to. After your ankle has healed, ask your doctor whether you should wear the brace when you exercise. · Put ice or cold packs on your injured ankle for 10 to 20 minutes at a time. Try to do this every 1 to 2 hours for the next 3 days (when you are awake) or until the swelling goes down. Put a thin cloth between the ice and your skin. · You may need to use crutches until you can walk without pain. If you do use crutches, try to bear some weight on your injured ankle if you can do so without pain. This helps the ankle heal.  · Take pain medicines exactly as directed. ¨ If the doctor gave you a prescription medicine for pain, take it as prescribed. ¨ If you are not taking a prescription pain medicine, ask your doctor if you can take an over-the-counter medicine. · If you have been given ankle exercises to do at home, do them exactly as instructed. These can promote healing and help prevent lasting weakness. When should you call for help?   Call your doctor now or seek immediate medical care if:  · Your pain is getting worse. · Your swelling is getting worse. · Your splint feels too tight or you are unable to loosen it. Watch closely for changes in your health, and be sure to contact your doctor if:  · You are not getting better after 1 week. Where can you learn more? Go to http://mariella-kenyon.info/. Enter V110 in the search box to learn more about \"Ankle Sprain: Care Instructions. \"  Current as of: May 23, 2016  Content Version: 11.2  © 0316-4803 [employer]. Care instructions adapted under license by [employer] (which disclaims liability or warranty for this information). If you have questions about a medical condition or this instruction, always ask your healthcare professional. [address] any warranty or liability for your use of this information.

## 2017-05-22 ENCOUNTER — HOSPITAL ENCOUNTER (OUTPATIENT)
Dept: INTERVENTIONAL RADIOLOGY/VASCULAR | Age: 72
Discharge: HOME | End: 2017-05-22
Attending: SURGERY | Admitting: SURGERY
Payer: MEDICARE

## 2017-05-22 VITALS
TEMPERATURE: 97.7 F | SYSTOLIC BLOOD PRESSURE: 128 MMHG | WEIGHT: 160 LBS | DIASTOLIC BLOOD PRESSURE: 64 MMHG | HEIGHT: 74 IN | RESPIRATION RATE: 16 BRPM | OXYGEN SATURATION: 94 % | BODY MASS INDEX: 20.53 KG/M2 | HEART RATE: 96 BPM

## 2017-05-22 DIAGNOSIS — I73.9 PAD (PERIPHERAL ARTERY DISEASE) (HCC): ICD-10-CM

## 2017-05-22 LAB
ANION GAP BLD CALC-SCNC: 8 MMOL/L (ref 3–18)
BASOPHILS # BLD AUTO: 0.1 K/UL (ref 0–0.06)
BASOPHILS # BLD: 1 % (ref 0–2)
BUN SERPL-MCNC: 8 MG/DL (ref 7–18)
BUN/CREAT SERPL: 11 (ref 12–20)
CALCIUM SERPL-MCNC: 8.4 MG/DL (ref 8.5–10.1)
CHLORIDE SERPL-SCNC: 96 MMOL/L (ref 100–108)
CO2 SERPL-SCNC: 29 MMOL/L (ref 21–32)
CREAT SERPL-MCNC: 0.73 MG/DL (ref 0.6–1.3)
DIFFERENTIAL METHOD BLD: ABNORMAL
EOSINOPHIL # BLD: 1.2 K/UL (ref 0–0.4)
EOSINOPHIL NFR BLD: 19 % (ref 0–5)
ERYTHROCYTE [DISTWIDTH] IN BLOOD BY AUTOMATED COUNT: 15.2 % (ref 11.6–14.5)
GLUCOSE SERPL-MCNC: 128 MG/DL (ref 74–99)
HCT VFR BLD AUTO: 31.6 % (ref 36–48)
HGB BLD-MCNC: 10.9 G/DL (ref 13–16)
INR PPP: 1.1 (ref 0.8–1.2)
LYMPHOCYTES # BLD AUTO: 17 % (ref 21–52)
LYMPHOCYTES # BLD: 1 K/UL (ref 0.9–3.6)
MCH RBC QN AUTO: 29.9 PG (ref 24–34)
MCHC RBC AUTO-ENTMCNC: 34.5 G/DL (ref 31–37)
MCV RBC AUTO: 86.6 FL (ref 74–97)
MONOCYTES # BLD: 0.5 K/UL (ref 0.05–1.2)
MONOCYTES NFR BLD AUTO: 8 % (ref 3–10)
NEUTS SEG # BLD: 3.5 K/UL (ref 1.8–8)
NEUTS SEG NFR BLD AUTO: 55 % (ref 40–73)
PLATELET # BLD AUTO: 179 K/UL (ref 135–420)
PMV BLD AUTO: 8.9 FL (ref 9.2–11.8)
POTASSIUM SERPL-SCNC: 3.9 MMOL/L (ref 3.5–5.5)
PROTHROMBIN TIME: 14.2 SEC (ref 11.5–15.2)
RBC # BLD AUTO: 3.65 M/UL (ref 4.7–5.5)
SODIUM SERPL-SCNC: 133 MMOL/L (ref 136–145)
WBC # BLD AUTO: 6.3 K/UL (ref 4.6–13.2)

## 2017-05-22 PROCEDURE — 80048 BASIC METABOLIC PNL TOTAL CA: CPT | Performed by: SURGERY

## 2017-05-22 PROCEDURE — C1769 GUIDE WIRE: HCPCS

## 2017-05-22 PROCEDURE — 74011000250 HC RX REV CODE- 250: Performed by: SURGERY

## 2017-05-22 PROCEDURE — 99152 MOD SED SAME PHYS/QHP 5/>YRS: CPT

## 2017-05-22 PROCEDURE — 97602 WOUND(S) CARE NON-SELECTIVE: CPT

## 2017-05-22 PROCEDURE — 74011250636 HC RX REV CODE- 250/636: Performed by: SURGERY

## 2017-05-22 PROCEDURE — 75625 CONTRAST EXAM ABDOMINL AORTA: CPT

## 2017-05-22 PROCEDURE — 85610 PROTHROMBIN TIME: CPT | Performed by: SURGERY

## 2017-05-22 PROCEDURE — 99153 MOD SED SAME PHYS/QHP EA: CPT

## 2017-05-22 PROCEDURE — 74011636320 HC RX REV CODE- 636/320: Performed by: SURGERY

## 2017-05-22 PROCEDURE — 85025 COMPLETE CBC W/AUTO DIFF WBC: CPT | Performed by: SURGERY

## 2017-05-22 RX ORDER — CILOSTAZOL 100 MG/1
100 TABLET ORAL
Status: ON HOLD | COMMUNITY
End: 2017-11-05 | Stop reason: DRUGHIGH

## 2017-05-22 RX ORDER — LIDOCAINE HYDROCHLORIDE 10 MG/ML
1-10 INJECTION, SOLUTION EPIDURAL; INFILTRATION; INTRACAUDAL; PERINEURAL
Status: COMPLETED | OUTPATIENT
Start: 2017-05-22 | End: 2017-05-22

## 2017-05-22 RX ORDER — SODIUM CHLORIDE 9 MG/ML
25 INJECTION, SOLUTION INTRAVENOUS CONTINUOUS
Status: DISCONTINUED | OUTPATIENT
Start: 2017-05-22 | End: 2017-05-22 | Stop reason: HOSPADM

## 2017-05-22 RX ORDER — POTASSIUM CITRATE 10 MEQ/1
10 TABLET, EXTENDED RELEASE ORAL 2 TIMES DAILY
COMMUNITY

## 2017-05-22 RX ORDER — HEPARIN SODIUM 200 [USP'U]/100ML
1000 INJECTION, SOLUTION INTRAVENOUS
Status: COMPLETED | OUTPATIENT
Start: 2017-05-22 | End: 2017-05-22

## 2017-05-22 RX ORDER — ACETAMINOPHEN 500 MG
500 TABLET ORAL
COMMUNITY
End: 2018-05-16

## 2017-05-22 RX ORDER — OXYCODONE AND ACETAMINOPHEN 5; 325 MG/1; MG/1
1 TABLET ORAL
COMMUNITY
End: 2017-11-07

## 2017-05-22 RX ORDER — FLUMAZENIL 0.1 MG/ML
0.2 INJECTION INTRAVENOUS
Status: DISCONTINUED | OUTPATIENT
Start: 2017-05-22 | End: 2017-05-22 | Stop reason: HOSPADM

## 2017-05-22 RX ORDER — NALOXONE HYDROCHLORIDE 0.4 MG/ML
0.2 INJECTION, SOLUTION INTRAMUSCULAR; INTRAVENOUS; SUBCUTANEOUS AS NEEDED
Status: DISCONTINUED | OUTPATIENT
Start: 2017-05-22 | End: 2017-05-22 | Stop reason: HOSPADM

## 2017-05-22 RX ORDER — HEPARIN SODIUM 1000 [USP'U]/ML
10000 INJECTION, SOLUTION INTRAVENOUS; SUBCUTANEOUS
Status: DISCONTINUED | OUTPATIENT
Start: 2017-05-22 | End: 2017-05-22 | Stop reason: HOSPADM

## 2017-05-22 RX ORDER — MIDAZOLAM HYDROCHLORIDE 1 MG/ML
.5-4 INJECTION, SOLUTION INTRAMUSCULAR; INTRAVENOUS
Status: DISCONTINUED | OUTPATIENT
Start: 2017-05-22 | End: 2017-05-22 | Stop reason: HOSPADM

## 2017-05-22 RX ORDER — FENTANYL CITRATE 50 UG/ML
25-200 INJECTION, SOLUTION INTRAMUSCULAR; INTRAVENOUS
Status: DISCONTINUED | OUTPATIENT
Start: 2017-05-22 | End: 2017-05-22 | Stop reason: HOSPADM

## 2017-05-22 RX ADMIN — FENTANYL CITRATE 25 MCG: 50 INJECTION, SOLUTION INTRAMUSCULAR; INTRAVENOUS at 08:23

## 2017-05-22 RX ADMIN — SODIUM CHLORIDE 25 ML/HR: 900 INJECTION, SOLUTION INTRAVENOUS at 07:27

## 2017-05-22 RX ADMIN — MIDAZOLAM HYDROCHLORIDE 0.5 MG: 1 INJECTION, SOLUTION INTRAMUSCULAR; INTRAVENOUS at 08:44

## 2017-05-22 RX ADMIN — IOPAMIDOL 75 ML: 510 INJECTION, SOLUTION INTRAVASCULAR at 08:15

## 2017-05-22 RX ADMIN — LIDOCAINE HYDROCHLORIDE 3 ML: 10 INJECTION, SOLUTION EPIDURAL; INFILTRATION; INTRACAUDAL; PERINEURAL at 08:05

## 2017-05-22 RX ADMIN — MIDAZOLAM HYDROCHLORIDE 1 MG: 1 INJECTION, SOLUTION INTRAMUSCULAR; INTRAVENOUS at 08:00

## 2017-05-22 RX ADMIN — SODIUM CHLORIDE 25 ML/HR: 900 INJECTION, SOLUTION INTRAVENOUS at 10:12

## 2017-05-22 RX ADMIN — HEPARIN SODIUM 5000 UNITS: 1000 INJECTION, SOLUTION INTRAVENOUS; SUBCUTANEOUS at 08:25

## 2017-05-22 RX ADMIN — FENTANYL CITRATE 25 MCG: 50 INJECTION, SOLUTION INTRAMUSCULAR; INTRAVENOUS at 08:00

## 2017-05-22 RX ADMIN — HEPARIN SODIUM 2000 UNITS: 200 INJECTION, SOLUTION INTRAVENOUS at 08:06

## 2017-05-22 NOTE — PROGRESS NOTES
TRANSFER - OUT REPORT:    Verbal report given to [id number] Dai RN(name) on [address]  being transferred to Care Unit(unit) for routine progression of care       Report consisted of patients Situation, Background, Assessment and   Recommendations(SBAR). Information from the following report(s) SBAR, Kardex and MAR was reviewed with the receiving nurse. Lines:   Peripheral IV 05/22/17 Right Forearm (Active)   Site Assessment Clean, dry, & intact 5/22/2017  7:27 AM   Phlebitis Assessment 0 5/22/2017  7:27 AM   Infiltration Assessment 0 5/22/2017  7:27 AM   Dressing Status Clean, dry, & intact 5/22/2017  7:27 AM   Dressing Type Transparent 5/22/2017  7:27 AM   Hub Color/Line Status Yellow 5/22/2017  7:27 AM        Opportunity for questions and clarification was provided.       Patient transported with:   Registered Nurse

## 2017-05-22 NOTE — H&P (VIEW-ONLY)
Zakia Landon    Chief Complaint   Patient presents with    Wound Check       History and Physical    Mr. Darren Domínguez is a 66year old gentleman referred to our office for bilateral nonhealing leg wounds. The patient is being followed by Dr. Yamilka Cabello and the wound care team.  Previously, Mr. Darren Domínguez was being seen by the previous vascular surgeons at [facility] and he has undergone a right SFA atherectomy in 2015. He has not had any additional procedures since that time on his arteries and has not had any imaging done since 2016. He has also undergone a venous ablation in the past.  I am not sure which leg this was done one. In talking to Mr. Hough, it sounds like his left foot wounds had almost healed previously but it does not appear than he has ever truly been wound free since at least 2015. He also states that when he wakes up in the morning his right foot aches. He does not experience this pain while he is in bed reading his books. He has not tried sleeping in a recliner. He has Unna boots and gets them changed weekly. Past Medical History:   Diagnosis Date    Chronic obstructive pulmonary disease ([address]     Diabetes ([address]     GERD (gastroesophageal reflux disease)     Hypercholesterolemia     Hypertension     Thyroid disease      Patient Active Problem List   Diagnosis Code    PAD (peripheral artery disease) ([facility]) I73.9    Venous reflux I87.2    Leg ulcer, left ([address] L97.929    Leg ulcer ([address] L97.909     Past Surgical History:   Procedure Laterality Date    ABDOMEN SURGERY PROC UNLISTED      HX COLONOSCOPY      HX HEENT      HX ORTHOPAEDIC      HX UROLOGICAL      VASCULAR SURGERY PROCEDURE UNLIST       Current Outpatient Prescriptions   Medication Sig Dispense Refill    insulin aspart (NOVOLOG FLEXPEN) 100 unit/mL inpn by SubCUTAneous route.  metFORMIN (GLUCOPHAGE) 1,000 mg tablet Take 1,000 mg by mouth two (2) times daily (with meals).       atorvastatin (LIPITOR) 20 mg tablet Take  by mouth daily.  lisinopril (PRINIVIL, ZESTRIL) 2.5 mg tablet Take  by mouth daily.  albuterol (PROAIR HFA) 90 mcg/actuation inhaler Take  by inhalation.  clopidogrel (PLAVIX) 75 mg tab Take  by mouth.  primidone (MYSOLINE) 50 mg tablet Take  by mouth three (3) times daily.  IRON PS CMPLX/VIT B12/FA (FERREX 150 FORTE PO) Take  by mouth.  potassium citrate (UROCIT-K10) 10 mEq (1,080 mg) TbER Take  by mouth.  cyanocobalamin (VITAMIN B-12) 1,000 mcg/mL injection 1,000 mcg by IntraMUSCular route once.  aspirin 81 mg tablet Take 81 mg by mouth daily.  insulin glargine (LANTUS) 100 unit/mL injection 68 Units by SubCUTAneous route nightly.  esomeprazole (NEXIUM) 40 mg capsule Take  by mouth daily.  furosemide (LASIX) 40 mg tablet Take 40 mg by mouth daily.  diltiazem (TIAZAC) 360 mg SR capsule Take 360 mg by mouth daily.  tiotropium (SPIRIVA WITH HANDIHALER) 18 mcg inhalation capsule Take 1 Cap by inhalation daily.  cilostazol (PLETAL) 100 mg tablet Take 100 mg by mouth two (2) times a day.  loratadine (CLARITIN) 10 mg tablet Take 10 mg by mouth daily.  fluticasone (FLONASE) 50 mcg/actuation nasal spray 2 Sprays by Both Nostrils route daily.  traMADol (ULTRAM) 50 mg tablet Take 50 mg by mouth two (2) times a day.  gabapentin (NEURONTIN) 300 mg capsule Take 300 mg by mouth three (3) times daily.  multivitamin (ONE A DAY) tablet Take 1 Tab by mouth daily.  LACTASE ENZYME PO Take 1 Cap by mouth daily.  levothyroxine (SYNTHROID) 25 mcg tablet Take 100 mcg by mouth Daily (before breakfast).  fluticasone-salmeterol (ADVAIR DISKUS) 250-50 mcg/dose diskus inhaler Take 1 Puff by inhalation every twelve (12) hours.  betamethasone dipropionate (DIPROSONE) 0.05 % topical cream Apply  to affected area two (2) times a day.       glyBURIDE-metFORMIN (GLUCOVANCE) 5-500 mg per tablet Take 2 Tabs by mouth two (2) times daily (with meals). Breakfast and dinner only      losartan (COZAAR) 100 mg tablet Take 100 mg by mouth daily.  meloxicam (MOBIC) 15 mg tablet Take 15 mg by mouth daily.  potassium chloride (KLOR-CON) 20 mEq packet Take 20 mEq by mouth daily.  tolterodine (DETROL) 2 mg tablet Take 2 mg by mouth nightly.  vit B Cmplx 3-FA-Vit C-Biotin (NEPHRO TEN RX) 1- mg-mg-mcg tablet Take 1 Tab by mouth daily.  B.infantis-B.ani-B.long-B.bifi (PROBIOTIC 4X) 10-15 mg TbEC Take 1 Cap by mouth daily (with breakfast).  omega-3 fatty acids-vitamin e (FISH OIL) 1,000 mg cap Take 1 Cap by mouth two (2) times a day.  glucosamine (GLUCOSAMINE RELIEF) 1,000 mg Tab Take 2 Caps by mouth daily (with breakfast).  metoprolol-XL (TOPROL XL) 25 mg XL tablet Take 12.5 mg by mouth daily.  simvastatin (ZOCOR) 10 mg tablet Take 10 mg by mouth nightly.  nicotinic acid (NIACIN) 500 mg tablet Take 500 mg by mouth Daily (before breakfast). Allergies   Allergen Reactions    Latex Contact Dermatitis    Neosporin [Neomycin-Bacitracin-Polymyxin] Rash     Social History     Social History    Marital status:      Spouse name: N/A    Number of children: N/A    Years of education: N/A     Occupational History    Not on file. Social History Main Topics    Smoking status: Former Smoker     Types: Cigarettes     Quit date: 1/1/1997    Smokeless tobacco: Never Used    Alcohol use 0.6 oz/week     1 Glasses of wine per week    Drug use: No    Sexual activity: Not on file     Other Topics Concern    Not on file     Social History Narrative      Family History   Problem Relation Age of Onset    Cancer Mother     Diabetes Father     Heart Disease Sister     Cancer Brother     Diabetes Brother     Hypertension Brother        Review of Systems    Review of Systems   Constitutional: Positive for malaise/fatigue. Negative for chills, diaphoresis, fever and weight loss.    HENT: Negative for hearing loss and sore throat. Eyes: Negative for blurred vision, photophobia and redness. Respiratory: Negative for cough, hemoptysis, shortness of breath and wheezing. Cardiovascular: Positive for leg swelling. Negative for chest pain, palpitations and orthopnea. Gastrointestinal: Negative for abdominal pain, blood in stool, constipation, diarrhea, heartburn, nausea and vomiting. Genitourinary: Negative for dysuria, frequency, hematuria and urgency. Musculoskeletal: Positive for falls. Negative for back pain and myalgias. Skin: Negative for itching and rash. Neurological: Positive for weakness. Negative for dizziness, speech change, focal weakness and headaches. Endo/Heme/Allergies: Does not bruise/bleed easily. Psychiatric/Behavioral: Negative for depression and suicidal ideas. Physical Exam:    Visit Vitals    /58    Pulse 90    Resp 20    Ht 6' 2\" (1.88 m)    Wt 150 lb (68 kg)    BMI 19.26 kg/m2      Physical Examination: General appearance - oriented to person, place, and time  Mental status - normal mood, behavior, speech, dress, motor activity, and thought processes  Eyes - sclera anicteric, left eye normal, right eye normal  Ears - right ear normal, left ear normal  Nose - normal and patent, no erythema, discharge or polyps  Mouth - mucous membranes moist, pharynx normal without lesions  Neck - supple, no significant adenopathy  Lymphatics - no palpable lymphadenopathy  Chest - clear to auscultation, no wheezes, rales or rhonchi, symmetric air entry  Heart - normal rate and regular rhythm  Abdomen - soft, nontender, nondistended, no masses or organomegaly  Extremities - Faint femoral pulse bilaterally, faint left popliteal pulse, unable to palpate right popliteal pulse. Biphasic left popliteal signal with monophasic right popliteal signal.  Bilateral lower extremities in [employer] boots. Impression and Plan:    ICD-10-CM ICD-9-CM    1.  PAD (peripheral artery disease) ([facility]) I73.9 443.9 DUPLEX LOWER EXT ARTERY BILAT   2. Venous reflux I87.2 459.81    3. Leg ulcer, left, limited to breakdown of skin L97.921 707.10 DUPLEX LOWER EXT ARTERY BILAT   4. Leg ulcer, right, limited to breakdown of skin L97.911 707.10 DUPLEX LOWER EXT ARTERY BILAT   5. Coronary artery disease involving native coronary artery of native heart with angina pectoris ([facility]) I25.119 414.01      413.9      Orders Placed This Encounter    DUPLEX LOWER EXT ARTERY BILAT    insulin aspart (NOVOLOG FLEXPEN) 100 unit/mL inpn    metFORMIN (GLUCOPHAGE) 1,000 mg tablet    atorvastatin (LIPITOR) 20 mg tablet    lisinopril (PRINIVIL, ZESTRIL) 2.5 mg tablet    albuterol (PROAIR HFA) 90 mcg/actuation inhaler    clopidogrel (PLAVIX) 75 mg tab    primidone (MYSOLINE) 50 mg tablet    IRON PS CMPLX/VIT B12/FA (FERREX 150 FORTE PO)    potassium citrate (UROCIT-K10) 10 mEq (1,080 mg) TbER    cyanocobalamin (VITAMIN B-12) 1,000 mcg/mL injection     I told Mr. Hough he may have both venous and arterial disease. I will obtain bilateral arterial duplex studies with plans on a diagnostic angiogram as soon as feasible. I will start with his right leg given his possible rest pain. We will then take a look at the left. Follow-up Disposition:  Return in about 3 weeks (around 5/30/2017) for post angiogram.      The treatment plan was reviewed with the patient in detail. The patient voiced understanding of this plan and all questions and concerns were addressed. The patient agrees with this plan. We discussed the signs and symptoms that would require earlier attention or intervention. The patient was given educational material related to his/her visit and the patient has voiced understanding of the material.     I appreciate the opportunity to participate in the care of your patient. I will be sure to keep you informed of any subsequent changes in the treatment plan.   If you have any questions or concerns, please feel free to contact me. Lettie Aase, MD    PLEASE NOTE:  This document has been produced using voice recognition software. Unrecognized errors in transcription may be present.

## 2017-05-22 NOTE — IP AVS SNAPSHOT
[address] [address] 
[phone] Patient: Cyrus Urrutia MRN: [id number] ACP:1/32/[id number] You are allergic to the following Allergen Reactions Latex Contact Dermatitis Neosporin (Neomycin-Bacitracin-Polymyxin) Rash Recent Documentation Height Weight BMI Smoking Status 1.88 m 72.6 kg 20.54 kg/m2 Former Smoker Emergency Contacts Name Discharge Info Relation Home Work Mobile [address] [facility] A  Spouse [phone] About your hospitalization You were admitted on:  May 22, 2017 You last received care in the:  [address] You were discharged on:  May 22, 2017 Unit phone number:  [phone] Why you were hospitalized Your primary diagnosis was:  Not on File Providers Seen During Your Hospitalizations Provider Role Specialty Primary office phone Domingo Camp MD Attending Provider Vascular Surgery [phone] Your Primary Care Physician (PCP) Primary Care Physician Office Phone Office Fax Rodney Melendrez [phone] Follow-up Information Follow up With Details Comments Contact Info Domingo Camp MD   [address] [place] [address] 
[phone] Marissa Daniel MD   [facility] [address] [facility] [address] 
[phone] Domingo Camp MD Schedule an appointment as soon as possible for a visit in 2 weeks  [address] [place] [address] [address] [facility] 
[phone] Your Appointments Tuesday June 06, 2017  1:30 PM EDT Follow Up with Domingo Camp MD  
BS Vein/Vascular Spec [facility] ([address])  
 [address] [facility]  
[phone] Current Discharge Medication List  
  
CONTINUE these medications which have NOT CHANGED Dose & Instructions Dispensing Information Comments Morning Noon Evening Bedtime ADVAIR DISKUS 250-50 mcg/dose diskus inhaler Generic drug:  fluticasone-salmeterol Your last dose was: Your next dose is:    
   
   
 Dose:  1 Puff Take 1 Puff by inhalation every twelve (12) hours. Refills:  0  
     
   
   
   
  
 aspirin 81 mg tablet Your last dose was: Your next dose is:    
   
   
 Dose:  81 mg Take 81 mg by mouth daily. Refills:  0  
     
   
   
   
  
 atorvastatin 20 mg tablet Commonly known as:  LIPITOR Your last dose was: Your next dose is: Take  by mouth daily. Refills:  0  
     
   
   
   
  
 cephALEXin 500 mg capsule Commonly known as:  [place] Curly Your last dose was: Your next dose is:    
   
   
 Dose:  500 mg Take 1 Cap by mouth four (4) times daily for 7 days. Quantity:  28 Cap Refills:  0  
     
   
   
   
  
 cilostazol 100 mg tablet Commonly known as:  PLETAL Your last dose was: Your next dose is:    
   
   
 Dose:  100 mg Take 100 mg by mouth Before breakfast and dinner. Refills:  0 CLARITIN 10 mg tablet Generic drug:  loratadine Your last dose was: Your next dose is:    
   
   
 Dose:  10 mg Take 10 mg by mouth daily. Refills:  0  
     
   
   
   
  
 FLONASE 50 mcg/actuation nasal spray Generic drug:  fluticasone Your last dose was: Your next dose is:    
   
   
 Dose:  2 Spray 2 Sprays by Both Nostrils route daily. Refills:  0  
     
   
   
   
  
 gabapentin 300 mg capsule Commonly known as:  NEURONTIN Your last dose was: Your next dose is:    
   
   
 Dose:  300 mg Take 300 mg by mouth three (3) times daily. Refills:  0  
     
   
   
   
  
 iron aspgly,[id number]-Ca-suc 896-20-93-4 mg-mg-mcg-mg Cap cap Commonly known as:  FERREX [address] Your last dose was: Your next dose is: Dose:  1 Cap Take 1 Cap by mouth daily. Refills:  0 LACTASE ENZYME PO Your last dose was: Your next dose is:    
   
   
 Dose:  1 Cap Take 1 Cap by mouth daily. Refills:  0  
     
   
   
   
  
 LANTUS 100 unit/mL injection Generic drug:  insulin glargine Your last dose was: Your next dose is:    
   
   
 Dose:  68 Units 68 Units by SubCUTAneous route nightly. Refills:  0  
     
   
   
   
  
 LASIX 40 mg tablet Generic drug:  furosemide Your last dose was: Your next dose is:    
   
   
 Dose:  40 mg Take 40 mg by mouth daily. Refills:  0  
     
   
   
   
  
 levothyroxine 25 mcg tablet Commonly known as:  SYNTHROID Your last dose was: Your next dose is:    
   
   
 Dose:  100 mcg Take 100 mcg by mouth Daily (before breakfast). Refills:  0  
     
   
   
   
  
 lisinopril 2.5 mg tablet Commonly known as:  Liane Gaster Your last dose was: Your next dose is: Take  by mouth daily. Refills:  0  
     
   
   
   
  
 metFORMIN 1,000 mg tablet Commonly known as:  GLUCOPHAGE Your last dose was: Your next dose is:    
   
   
 Dose:  1000 mg Take 1,000 mg by mouth two (2) times daily (with meals). Refills:  0  
     
   
   
   
  
 multivitamin, tx-iron-ca-min 9 mg iron-400 mcg Tab tablet Commonly known as:  THERA-M w/ IRON Your last dose was: Your next dose is:    
   
   
 Dose:  1 Tab Take 1 Tab by mouth daily. Refills:  0 NexIUM 40 mg capsule Generic drug:  esomeprazole Your last dose was: Your next dose is: Take  by mouth daily. Refills:  0 NovoLOG Flexpen 100 unit/mL Inpn Generic drug:  insulin aspart Your last dose was: Your next dose is:    
   
   
 by SubCUTAneous route. Refills:  0 PERCOCET 5-325 mg per tablet Generic drug:  oxyCODONE-acetaminophen Your last dose was: Your next dose is:    
   
   
 Dose:  1 Tab Take 1 Tab by mouth every four (4) hours as needed for Pain. Refills:  0 PLAVIX 75 mg Tab Generic drug:  clopidogrel Your last dose was: Your next dose is: Take  by mouth. Refills:  0  
     
   
   
   
  
 potassium citrate 10 mEq (1,080 mg) Sherlyn Billet Commonly known as:  [place] Your last dose was: Your next dose is:    
   
   
 Dose:  10 mEq Take 10 mEq by mouth two (2) times a day. Refills:  0 PROAIR HFA 90 mcg/actuation inhaler Generic drug:  albuterol Your last dose was: Your next dose is: Take  by inhalation. Refills:  0  
     
   
   
   
  
 simvastatin 10 mg tablet Commonly known as:  ZOCOR Your last dose was: Your next dose is:    
   
   
 Dose:  10 mg Take 10 mg by mouth nightly. Refills:  0 SPIRIVA WITH HANDIHALER 18 mcg inhalation capsule Generic drug:  tiotropium Your last dose was: Your next dose is:    
   
   
 Dose:  1 Cap Take 1 Cap by inhalation daily. Refills:  0  
     
   
   
   
  
 TIAZAC 360 mg SR capsule Generic drug:  dilTIAZem Your last dose was: Your next dose is:    
   
   
 Dose:  360 mg Take 360 mg by mouth daily. Refills:  0  
     
   
   
   
  
 traMADol 50 mg tablet Commonly known as:  ULTRAM  
   
Your last dose was: Your next dose is:    
   
   
 Dose:  50 mg Take 50 mg by mouth two (2) times a day. Refills:  0  
     
   
   
   
  
 TYLENOL EXTRA STRENGTH 500 mg tablet Generic drug:  acetaminophen Your last dose was: Your next dose is:    
   
   
 Dose:  500 mg Take 500 mg by mouth every six (6) hours as needed for Pain. Refills:  0 VITAMIN B-12 1,000 mcg/mL injection Generic drug:  cyanocobalamin Your last dose was: Your next dose is:    
   
   
 Dose:  1000 mcg  
1,000 mcg by IntraMUSCular route once. Refills:  0 Discharge Instructions General Information: This test is done to evaluate circulation in the extremities. In the case of a stenosis (narrowing) of the arteries, we can sometimes fix the problem either with a balloon, stent, or a combination. If there is a stenosis that we cannot fix or if there is no problem seen on the angiography study, then you will be able to go home today. If there is something that we can fix, you will be spending the night in the hospital.  If we do find a stenosis that we are not able to correct, then the study will be forwarded to the vascular surgeon, who can take you to the operating room to improve the circulation. You will be asked to lie flat on your back for 3-6 hours after the procedure to prevent bleeding complications. Sometimes the physician will use an arterial closure device that will decrease your recovery time. If this is used, your nurse will explain the difference in recovery. We have no way to determine if we can use a closure device until the procedure is started. We will let you know when you wake up after the procedure. Home Care Instructions: You can resume your regular diet. Do not shower, bathe, swim, drink alcohol, or make any important legal decisions in the next 48 hours. You may drive after 24 hours. Do not lift anything heavier than a gallon of milk for 5 days. Watch the site carefully for signs of infection, like fever, drainage, redness, and/or swelling. If you take Glucophage (Metformin) for diabetes, do not take it for the next 48 hours. If you were asked to hold any blood thinners prior to the procedure, you may restart that medicine the day after the procedure is completed.   Recline your car seat for the ride home. Call If: You should call your Physician and/or the Radiology Nurse if you have any signs of infection like fever, drainage, redness, and/or swelling. If the puncture site should ooze, please call. Also call if you have any pain, decreased sensation, numbness, tingling, swelling, or change in color to the affected extremity. SEEK IMMEDIATE MEDICAL CARE IF YOUR PUNCTURE SITE STARTS TO BLEED. APPLY ENOUGH FIRM PRESSURE TO THE SITE WITH THE TIPS OF YOUR FINGERS TO STOP THE BLEEDING. Arterial bleeding is a medical emergency and should be evaluated immediately. PATIENT INSTRUCTIONS: 
 
 
F-face looks uneven A-arms unable to move or move unevenly S-speech slurred or non-existent T-time-call 911 as soon as signs and symptoms begin-DO NOT go Back to bed or wait to see if you get better-TIME IS BRAIN. Warning Signs of HEART ATTACK Call 911 if you have these symptoms: 
? Chest discomfort. Most heart attacks involve discomfort in the center of the chest that lasts more than a few minutes, or that goes away and comes back. It can feel like uncomfortable pressure, squeezing, fullness, or pain. ? Discomfort in other areas of the upper body. Symptoms can include pain or discomfort in one or both arms, the back, neck, jaw, or stomach. ? Shortness of breath with or without chest discomfort. ? Other signs may include breaking out in a cold sweat, nausea, or lightheadedness. Don't wait more than five minutes to call [address]! Fast action can save your life. Calling 911 is almost always the fastest way to get lifesaving treatment. Emergency Medical Services staff can begin treatment when they arrive  up to an hour sooner than if someone gets to the hospital by car. The discharge information has been reviewed with the patient.   The patient verbalized understanding. Discharge medications reviewed with the patient and appropriate educational materials and side effects teaching were provided. Peripheral Arterial Disease of the Leg: Care Instructions Your Care Instructions Peripheral arterial disease (PAD) occurs when the blood vessels (arteries) that supply blood to the legs, belly, pelvis, arms, or neck get too narrow. This reduces blood flow to that area. The legs are affected most often. Fatty buildup (plaque) in the arteries usually is the cause of PAD. This buildup is also called \"hardening\" of the arteries. Your risk of PAD increases if you smoke or have high cholesterol, high blood pressure, diabetes, or a family history of PAD. One of the main symptoms of PAD is intermittent claudication. This is a tight, aching, or squeezing pain in the calf, foot, thigh, or buttock that occurs during exercise. The pain usually gets worse during exercise and goes away when you rest. But as PAD gets worse, you may feel pain even at rest. 
Medicines and lifestyle changes may help your symptoms and lower your risk of heart attack and stroke. In some cases, surgery or other treatment is needed. It is important that you follow up with your doctor. Follow-up care is a key part of your treatment and safety. Be sure to make and go to all appointments, and call your doctor if you are having problems. It's also a good idea to know your test results and keep a list of the medicines you take. How can you care for yourself at home? · Do not smoke. Smoking can make PAD worse. If you need help quitting, talk to your doctor about stop-smoking programs and medicines. These can increase your chances of quitting for good. · Take your medicines exactly as prescribed. Call your doctor if you think you are having a problem with your medicine.  
· If you take a blood thinner, such as aspirin, be sure to get instructions about how to take your medicine safely. Blood thinners can cause serious bleeding problems. · Ask your doctor if a cardiac rehab program is right for you. Cardiac rehab can help you make lifestyle changes. In cardiac rehab, a team of health professionals provides education and support to help you make new, healthy habits. · Eat heart-healthy foods such as fruits, vegetables, whole grains, fish, lean meats, and low-fat or nonfat dairy foods. Limit sodium, sugar, and alcohol. · If your doctor recommends it, get more exercise. Walking is a good choice. Bit by bit, increase the amount you walk every day. Try for at least 30 minutes on most days of the week. · Stay at a healthy weight. Lose weight if you need to. · Take good care of your feet. ¨ Treat cuts and scrapes on your legs right away. Poor blood flow prevents (or slows) quick healing of even small cuts or scrapes. This is even more important if you have diabetes. ¨ Avoid shoes that are too tight or that rub your feet. Shoes should be comfortable and fit well. ¨ Avoid socks or stockings that are tight enough to leave elastic-band marks on your legs. Tight socks can make circulation problems worse. ¨ Keep your feet clean and moisturized to prevent drying and cracking. Place cotton or lamb's wool between your toes to prevent rubbing and to absorb moisture. ¨ If you have a sore on your leg or foot, keep it dry and cover it with a nonstick bandage until you see your doctor. When should you call for help? Call 911 anytime you think you may need emergency care. For example, call if: 
· You have symptoms of a heart attack. These may include: ¨ Chest pain or pressure, or a strange feeling in the chest. 
¨ Sweating. ¨ Shortness of breath. ¨ Nausea or vomiting. ¨ Pain, pressure, or a strange feeling in the back, neck, jaw, or upper belly or in one or both shoulders or arms. ¨ Lightheadedness or sudden weakness. ¨ A fast or irregular heartbeat. After you call 911, the  may tell you to chew 1 adult-strength or 2 to 4 low-dose aspirin. Wait for an ambulance. Do not try to drive yourself. · You have sudden, severe leg pain, and your leg is cool and pale. · You have symptoms of a stroke. These may include: 
¨ Sudden numbness, tingling, weakness, or loss of movement in your face, arm, or leg, especially on only one side of your body. ¨ Sudden vision changes. ¨ Sudden trouble speaking. ¨ Sudden confusion or trouble understanding simple statements. ¨ Sudden problems with walking or balance. ¨ A sudden, severe headache that is different from past headaches. Call your doctor now or seek immediate medical care if: 
· You have leg pain that does not go away even if you rest. 
· Your leg pain changes or gets worse. For example, if you have more pain with normal activity or the same pain with decreased activity, you should call. · You have an open sore on your leg or foot that is infected. Signs of infection include: 
¨ Increased pain, swelling, warmth, or redness. ¨ Red streaks leading from the sore. ¨ Pus draining from the sore. ¨ A fever. Watch closely for changes in your health, and be sure to contact your doctor if you have any problems. Where can you learn more? Go to http://mariella-kenyon.info/. Enter [id number] [id number] in the search box to learn more about \"Peripheral Arterial Disease of the Leg: Care Instructions. \" Current as of: July 19, 2016 Content Version: 11.2 © 0559-1079 [employer]. Care instructions adapted under license by [employer] (which disclaims liability or warranty for this information). If you have questions about a medical condition or this instruction, always ask your healthcare professional. [place] [address] any warranty or liability for your use of this information. Discharge Orders None Introducing [facility] SERVICES! [address] [facility] introduces Sprout patient portal. Now you can access parts of your medical record, email your doctor's office, and request medication refills online. 1. In your internet browser, go to [email or web address] 2. Click on the First Time User? Click Here link in the Sign In box. You will see the New Member Sign Up page. 3. Enter your Sprout Access Code exactly as it appears below. You will not need to use this code after youve completed the sign-up process. If you do not sign up before the expiration date, you must request a new code. · Sprout Access Code: [email or web address] Expires: 6/28/2017  3:00 PM 
 
4. Enter the last four digits of your Social Security Number (xxxx) and Date of Birth (mm/dd/yyyy) as indicated and click Submit. You will be taken to the next sign-up page. 5. Create a Sprout ID. This will be your Sprout login ID and cannot be changed, so think of one that is secure and easy to remember. 6. Create a Sprout password. You can change your password at any time. 7. Enter your Password Reset Question and Answer. This can be used at a later time if you forget your password. 8. Enter your e-mail address. You will receive e-mail notification when new information is available in [address] 9. Click Sign Up. You can now view and download portions of your medical record. 10. Click the Download Summary menu link to download a portable copy of your medical information. If you have questions, please visit the Frequently Asked Questions section of the Sprout website. Remember, Sprout is NOT to be used for urgent needs. For medical emergencies, dial 911. Now available from your iPhone and Android! General Information Please provide this summary of care documentation to your next provider. Patient Signature:  ____________________________________________________________ Date:  ____________________________________________________________  
  
Silverio Breath Provider Signature:  ____________________________________________________________ Date:  ____________________________________________________________

## 2017-05-22 NOTE — BRIEF OP NOTE
BRIEF OPERATIVE NOTE    Date of Procedure: 5/22/2017  Preoperative Diagnosis: Nonhealing ulcer bilateral lower extremities, peripheral arterial disease  Postoperative Diagnosis: Nonhealing ulcer bilateral lower extremities, peripheral arterial disease    Procedure:  Aortogram, bilateral lower extremity runoff, atherectomy of right fem-pop, PTA   Surgeon: Alexander Deng MD  Anesthesia: Moderate sedation   Estimated Blood Loss: Minimal  Specimens: None  Findings: High grade fem-pop stenosis, satisfactory appearance after atherectomy and angioplasty   Complications: None  Implants: * No surgery found *

## 2017-05-22 NOTE — INTERVAL H&P NOTE
H&P Update:  [address] was seen and examined. History and physical has been reviewed. The patient has been examined.  There have been no significant clinical changes since the completion of the originally dated History and Physical.    Signed By: Ruiz Douglas MD     May 22, 2017 7:21 AM

## 2017-05-22 NOTE — DISCHARGE INSTRUCTIONS
General Information: This test is done to evaluate circulation in the extremities. In the case of a stenosis (narrowing) of the arteries, we can sometimes fix the problem either with a balloon, stent, or a combination. If there is a stenosis that we cannot fix or if there is no problem seen on the angiography study, then you will be able to go home today. If there is something that we can fix, you will be spending the night in the hospital.  If we do find a stenosis that we are not able to correct, then the study will be forwarded to the vascular surgeon, who can take you to the operating room to improve the circulation. You will be asked to lie flat on your back for 3-6 hours after the procedure to prevent bleeding complications. Sometimes the physician will use an arterial closure device that will decrease your recovery time. If this is used, your nurse will explain the difference in recovery. We have no way to determine if we can use a closure device until the procedure is started. We will let you know when you wake up after the procedure. Home Care Instructions: You can resume your regular diet. Do not shower, bathe, swim, drink alcohol, or make any important legal decisions in the next 48 hours. You may drive after 24 hours. Do not lift anything heavier than a gallon of milk for 5 days. Watch the site carefully for signs of infection, like fever, drainage, redness, and/or swelling. If you take Glucophage (Metformin) for diabetes, do not take it for the next 48 hours. If you were asked to hold any blood thinners prior to the procedure, you may restart that medicine the day after the procedure is completed. Recline your car seat for the ride home. Call If: You should call your Physician and/or the Radiology Nurse if you have any signs of infection like fever, drainage, redness, and/or swelling. If the puncture site should ooze, please call.   Also call if you have any pain, decreased sensation, numbness, tingling, swelling, or change in color to the affected extremity. SEEK IMMEDIATE MEDICAL CARE IF YOUR PUNCTURE SITE STARTS TO BLEED. APPLY ENOUGH FIRM PRESSURE TO THE SITE WITH THE TIPS OF YOUR FINGERS TO STOP THE BLEEDING. Arterial bleeding is a medical emergency and should be evaluated immediately. PATIENT INSTRUCTIONS:    After general anesthesia or intravenous sedation, for 24 hours or while taking prescription Narcotics:  · Limit your activities  · Do not drive and operate hazardous machinery  · Do not make important personal or business decisions  · Do  not drink alcoholic beverages  · If you have not urinated within 8 hours after discharge, please contact your surgeon on call. Report the following to your surgeon:  · Excessive pain, swelling, redness or odor of or around the surgical area  · Temperature over 100.5  · Nausea and vomiting lasting longer than 4 hours or if unable to take medications  · Any signs of decreased circulation or nerve impairment to extremity: change in color, persistent  numbness, tingling, coldness or increase pain  · Any questions      Recommended activity: Activity as tolerated and no driving for today,       *  Please give a list of your current medications to your Primary Care Provider. *  Please update this list whenever your medications are discontinued, doses are      changed, or new medications (including over-the-counter products) are added. *  Please carry medication information at all times in case of emergency situations. These are general instructions for a healthy lifestyle:    No smoking/ No tobacco products/ Avoid exposure to second hand smoke    Surgeon General's Warning:  Quitting smoking now greatly reduces serious risk to your health.     Obesity, smoking, and sedentary lifestyle greatly increases your risk for illness    A healthy diet, regular physical exercise & weight monitoring are important for maintaining a healthy lifestyle    You may be retaining fluid if you have a history of heart failure or if you experience any of the following symptoms:  Weight gain of 3 pounds or more overnight or 5 pounds in a week, increased swelling in our hands or feet or shortness of breath while lying flat in bed. Please call your doctor as soon as you notice any of these symptoms; do not wait until your next office visit. Recognize signs and symptoms of STROKE:    F-face looks uneven    A-arms unable to move or move unevenly    S-speech slurred or non-existent    T-time-call 911 as soon as signs and symptoms begin-DO NOT go       Back to bed or wait to see if you get better-TIME IS BRAIN. Warning Signs of HEART ATTACK     Call 911 if you have these symptoms:   Chest discomfort. Most heart attacks involve discomfort in the center of the chest that lasts more than a few minutes, or that goes away and comes back. It can feel like uncomfortable pressure, squeezing, fullness, or pain.  Discomfort in other areas of the upper body. Symptoms can include pain or discomfort in one or both arms, the back, neck, jaw, or stomach.  Shortness of breath with or without chest discomfort.  Other signs may include breaking out in a cold sweat, nausea, or lightheadedness. Don't wait more than five minutes to call 911 - MINUTES MATTER! Fast action can save your life. Calling 911 is almost always the fastest way to get lifesaving treatment. Emergency Medical Services staff can begin treatment when they arrive -- up to an hour sooner than if someone gets to the hospital by car. The discharge information has been reviewed with the patient. The patient verbalized understanding. Discharge medications reviewed with the patient and appropriate educational materials and side effects teaching were provided.        Peripheral Arterial Disease of the Leg: Care Instructions  Your Care Instructions  Peripheral arterial disease (PAD) occurs when the blood vessels (arteries) that supply blood to the legs, belly, pelvis, arms, or neck get too narrow. This reduces blood flow to that area. The legs are affected most often. Fatty buildup (plaque) in the arteries usually is the cause of PAD. This buildup is also called \"hardening\" of the arteries. Your risk of PAD increases if you smoke or have high cholesterol, high blood pressure, diabetes, or a family history of PAD. One of the main symptoms of PAD is intermittent claudication. This is a tight, aching, or squeezing pain in the calf, foot, thigh, or buttock that occurs during exercise. The pain usually gets worse during exercise and goes away when you rest. But as PAD gets worse, you may feel pain even at rest.  Medicines and lifestyle changes may help your symptoms and lower your risk of heart attack and stroke. In some cases, surgery or other treatment is needed. It is important that you follow up with your doctor. Follow-up care is a key part of your treatment and safety. Be sure to make and go to all appointments, and call your doctor if you are having problems. It's also a good idea to know your test results and keep a list of the medicines you take. How can you care for yourself at home? · Do not smoke. Smoking can make PAD worse. If you need help quitting, talk to your doctor about stop-smoking programs and medicines. These can increase your chances of quitting for good. · Take your medicines exactly as prescribed. Call your doctor if you think you are having a problem with your medicine. · If you take a blood thinner, such as aspirin, be sure to get instructions about how to take your medicine safely. Blood thinners can cause serious bleeding problems. · Ask your doctor if a cardiac rehab program is right for you. Cardiac rehab can help you make lifestyle changes.  In cardiac rehab, a team of health professionals provides education and support to help you make new, healthy habits. · Eat heart-healthy foods such as fruits, vegetables, whole grains, fish, lean meats, and low-fat or nonfat dairy foods. Limit sodium, sugar, and alcohol. · If your doctor recommends it, get more exercise. Walking is a good choice. Bit by bit, increase the amount you walk every day. Try for at least 30 minutes on most days of the week. · Stay at a healthy weight. Lose weight if you need to. · Take good care of your feet. ¨ Treat cuts and scrapes on your legs right away. Poor blood flow prevents (or slows) quick healing of even small cuts or scrapes. This is even more important if you have diabetes. ¨ Avoid shoes that are too tight or that rub your feet. Shoes should be comfortable and fit well. ¨ Avoid socks or stockings that are tight enough to leave elastic-band marks on your legs. Tight socks can make circulation problems worse. ¨ Keep your feet clean and moisturized to prevent drying and cracking. Place cotton or lamb's wool between your toes to prevent rubbing and to absorb moisture. ¨ If you have a sore on your leg or foot, keep it dry and cover it with a nonstick bandage until you see your doctor. When should you call for help? Call 911 anytime you think you may need emergency care. For example, call if:  · You have symptoms of a heart attack. These may include:  ¨ Chest pain or pressure, or a strange feeling in the chest.  ¨ Sweating. ¨ Shortness of breath. ¨ Nausea or vomiting. ¨ Pain, pressure, or a strange feeling in the back, neck, jaw, or upper belly or in one or both shoulders or arms. ¨ Lightheadedness or sudden weakness. ¨ A fast or irregular heartbeat. After you call 911, the  may tell you to chew 1 adult-strength or 2 to 4 low-dose aspirin. Wait for an ambulance. Do not try to drive yourself. · You have sudden, severe leg pain, and your leg is cool and pale. · You have symptoms of a stroke.  These may include:  ¨ Sudden numbness, tingling, weakness, or loss of movement in your face, arm, or leg, especially on only one side of your body. ¨ Sudden vision changes. ¨ Sudden trouble speaking. ¨ Sudden confusion or trouble understanding simple statements. ¨ Sudden problems with walking or balance. ¨ A sudden, severe headache that is different from past headaches. Call your doctor now or seek immediate medical care if:  · You have leg pain that does not go away even if you rest.  · Your leg pain changes or gets worse. For example, if you have more pain with normal activity or the same pain with decreased activity, you should call. · You have an open sore on your leg or foot that is infected. Signs of infection include:  ¨ Increased pain, swelling, warmth, or redness. ¨ Red streaks leading from the sore. ¨ Pus draining from the sore. ¨ A fever. Watch closely for changes in your health, and be sure to contact your doctor if you have any problems. Where can you learn more? Go to http://mariella-kenyon.info/. Enter [id number] [id number] in the search box to learn more about \"Peripheral Arterial Disease of the Leg: Care Instructions. \"  Current as of: July 19, 2016  Content Version: 11.2  © 5794-0418 [employer]. Care instructions adapted under license by [employer] (which disclaims liability or warranty for this information). If you have questions about a medical condition or this instruction, always ask your healthcare professional. [place] [address] any warranty or liability for your use of this information.

## 2017-05-22 NOTE — WOUND CARE
Wound care down to see patient to change dressing on bilateral lower legs post angiogram. Wound care to continue to monitor as needed. Patient is a current patient of the wound care clinic.

## 2017-05-22 NOTE — PROGRESS NOTES
Pt assisted to get dressed and up to wheelchair,  discharged in care of wife, denies any pain or distress at time of discharge,  Arm bands removed and shredded

## 2017-05-24 NOTE — OP NOTES
[address] [place] [address]  OPERATIVE REPORT    Name:  Jocelyne Naranjo  MR#:  [id number]  :  1945  Account #:  [de-identified]  Date of Adm:  2017  Date of Surgery:  2017      PREOPERATIVE DIAGNOSIS: Bilateral peripheral arterial disease  with nonhealing right lower extremity wound and left lower extremity  wound. POSTOPERATIVE DIAGNOSIS: Bilateral peripheral arterial disease  with nonhealing right lower extremity wound and left lower extremity  wound. PROCEDURES PERFORMED:  1. Ultrasound-guided percutaneous access of left common femoral  artery. 2. Aortogram with bilateral lower extremity runoff. 3. Third order catheterization of right SFA. 4. Right lower extremity angiogram with radiologic interpretation. 5. Directional atherectomy of right femoral-popliteal.  6. Balloon angioplasty of right femoral-popliteal with a 6 mm drug  coated balloon. SURGEON: Galo Dorantes MD.    ANESTHESIA: Moderate sedation with local.    PACKS AND DRAINS: None. IMPLANTS: None. SPECIMENS REMOVED: None. COMPLICATIONS: None. ESTIMATED BLOOD LOSS: Minimal    CONDITION: Returned to recovery stable. FINDINGS: High-grade stenosis within the bilateral fem-pop region  with the right being greater than 75%. Satisfactory appearance after  balloon angioplasty. INDICATIONS FOR PROCEDURE: The patient is a 57-year-old  gentleman with a history of bilateral lower extremity ulcers. The patient  had previously undergone an angioplasty by an outside facility and the  patient returned to our office with nonhealing wounds at that time. The  patient underwent noninvasive vascular lab studies which showed a  high-grade stenosis within the fem-pop region. Given these findings,  decision was made to take the patient to the catheterization suite for a  diagnostic angiogram and possible intervention.  Informed consent was  obtained after risks and benefits of the procedure were explained  including bleeding, infection, MI, arterial perforation amongst others. DESCRIPTION OF PROCEDURE: On 05/22/2017, the patient was brought to the  catheterization suite, identified by name and ID bracelet by myself and  entire operative team. Once this was done, placed on the  catheterization table in supine position, and appropriate depth of  anesthesia obtained. The patient was prepped and draped and time-  out performed. At this point, using ultrasound, the left common femoral  artery was interrogated. It was pulsatile and patent and appeared to be  appropriate site for access. Ultrasound-guided percutaneous access of  the left common femoral artery was then obtained. A Bentson wire  followed by a 6-Zambian sheath was then advanced elevated the wire. The catheter was then placed into the abdominal aorta. We then  performed an aortogram with bilateral lower extremity runoff. This  showed the above findings. We then decided to treat. A 6-Zambian  sheath was then advanced across the aortic bifurcation. The patient  was heparinized appropriately. We then used the TurboHawk device to  atherectomize the fem-pop region, followed by a 6 mm drug coated  balloon. Prior to doing the atherectomy and angioplasty we did place a  7 mm spider wire distal in the popliteal artery. After this was completed  with satisfactory appearance of the fem-pop artery we removed the  spider wire through a TrailBlazer catheter and then removed the  sheath across the aortic bifurcation. We performed a left groin shot  which showed satisfactory placement to the common femoral arteries. We then used a 6-Zambian Angio-Seal to close the puncture site. Prior  to performing atherectomy the patient was heparinized appropriately. At the end of the procedure I was present and scrubbed for the entire  procedure.         MD Karis Randall / Elpidio  D:  05/24/2017   08:31  T:  05/24/2017   09:35  Job #:  [id number]

## 2017-05-31 PROCEDURE — 11042 DBRDMT SUBQ TIS 1ST 20SQCM/<: CPT

## 2017-06-06 ENCOUNTER — OFFICE VISIT (OUTPATIENT)
Dept: VASCULAR SURGERY | Age: 72
End: 2017-06-06

## 2017-06-06 ENCOUNTER — APPOINTMENT (OUTPATIENT)
Dept: WOUND CARE | Age: 72
End: 2017-06-06

## 2017-06-06 VITALS
WEIGHT: 160 LBS | BODY MASS INDEX: 20.53 KG/M2 | HEIGHT: 74 IN | HEART RATE: 72 BPM | RESPIRATION RATE: 20 BRPM | SYSTOLIC BLOOD PRESSURE: 108 MMHG | DIASTOLIC BLOOD PRESSURE: 62 MMHG

## 2017-06-06 DIAGNOSIS — L97.911 LEG ULCER, RIGHT, LIMITED TO BREAKDOWN OF SKIN (HCC): ICD-10-CM

## 2017-06-06 DIAGNOSIS — L97.921 LEG ULCER, LEFT, LIMITED TO BREAKDOWN OF SKIN (HCC): ICD-10-CM

## 2017-06-06 DIAGNOSIS — I73.9 PAD (PERIPHERAL ARTERY DISEASE) (HCC): Primary | ICD-10-CM

## 2017-06-07 NOTE — PROGRESS NOTES
PT aware of NPO status. NPO after MN night prior to procedure. Pt is not to stop Plavix/aspirin per MD preference. PT aware of potential for sedation administration and need for  at discharge. PT aware of arrival time pre procedure. Ar[facility] at [facility] pt registration at 10am for scheduled procedure to occur at 1100. Pt states no questions at this time. Gave pt [facility] rad rn phone number [phone].

## 2017-06-07 NOTE — PROGRESS NOTES
Everton Soni    Chief Complaint   Patient presents with    Surgical Follow-up       History and Physical    Mr. Zuleyka Madrigal returns to our office after undergoing a right leg angiogram and angioplasty for a non healing right leg ulcer. He has no new complaints. He states he has noticed the improved blood flow in his right leg and that the wound care nurses noted a change in his leg ulcer on the right with the improved blood flow. Past Medical History:   Diagnosis Date    Acid reflux     Anemia     CAD (coronary artery disease)     Chronic obstructive pulmonary disease (HCC)     Diabetes ([facility])     GERD (gastroesophageal reflux disease)     H/O seasonal allergies     Hypercholesterolemia     Hypertension     Ill-defined condition     hand tremors    Thyroid disease      Patient Active Problem List   Diagnosis Code    PAD (peripheral artery disease) ([facility]) I73.9    Venous reflux I87.2    Leg ulcer, left ([facility]) L97.929    Leg ulcer ([address] L97.909     Past Surgical History:   Procedure Laterality Date    ABDOMEN SURGERY PROC UNLISTED      HX COLONOSCOPY      HX HEENT      HX ORTHOPAEDIC      HX UROLOGICAL      VASCULAR SURGERY PROCEDURE UNLIST       Current Outpatient Prescriptions   Medication Sig Dispense Refill    oxyCODONE-acetaminophen (PERCOCET) 5-325 mg per tablet Take 1 Tab by mouth every four (4) hours as needed for Pain.  potassium citrate (UROCIT-K10) 10 mEq (1,080 mg) TbER Take 10 mEq by mouth two (2) times a day.  multivitamin, tx-iron-ca-min (THERA-M W/ IRON) 9 mg iron-400 mcg tab tablet Take 1 Tab by mouth daily.  acetaminophen (TYLENOL EXTRA STRENGTH) 500 mg tablet Take 500 mg by mouth every six (6) hours as needed for Pain.  cilostazol (PLETAL) 100 mg tablet Take 100 mg by mouth Before breakfast and dinner.  iron aspgly,nk-U-D14-FA-Ca-suc (FERREX 150 FORTE PLUS) 058-76-03-9 mg-mg-mcg-mg cap cap Take 1 Cap by mouth daily.       insulin aspart (NOVOLOG FLEXPEN) 100 unit/mL inpn by SubCUTAneous route.  metFORMIN (GLUCOPHAGE) 1,000 mg tablet Take 1,000 mg by mouth two (2) times daily (with meals).  atorvastatin (LIPITOR) 20 mg tablet Take  by mouth daily.  lisinopril (PRINIVIL, ZESTRIL) 2.5 mg tablet Take  by mouth daily.  albuterol (PROAIR HFA) 90 mcg/actuation inhaler Take  by inhalation.  clopidogrel (PLAVIX) 75 mg tab Take  by mouth.  cyanocobalamin (VITAMIN B-12) 1,000 mcg/mL injection 1,000 mcg by IntraMUSCular route once.  aspirin 81 mg tablet Take 81 mg by mouth daily.  insulin glargine (LANTUS) 100 unit/mL injection 68 Units by SubCUTAneous route nightly.  esomeprazole (NEXIUM) 40 mg capsule Take  by mouth daily.  furosemide (LASIX) 40 mg tablet Take 40 mg by mouth daily.  diltiazem (TIAZAC) 360 mg SR capsule Take 360 mg by mouth daily.  tiotropium (SPIRIVA WITH HANDIHALER) 18 mcg inhalation capsule Take 1 Cap by inhalation daily.  loratadine (CLARITIN) 10 mg tablet Take 10 mg by mouth daily.  fluticasone (FLONASE) 50 mcg/actuation nasal spray 2 Sprays by Both Nostrils route daily.  traMADol (ULTRAM) 50 mg tablet Take 50 mg by mouth two (2) times a day.  gabapentin (NEURONTIN) 300 mg capsule Take 300 mg by mouth three (3) times daily.  LACTASE ENZYME PO Take 1 Cap by mouth daily.  simvastatin (ZOCOR) 10 mg tablet Take 10 mg by mouth nightly.  levothyroxine (SYNTHROID) 25 mcg tablet Take 100 mcg by mouth Daily (before breakfast).  fluticasone-salmeterol (ADVAIR DISKUS) 250-50 mcg/dose diskus inhaler Take 1 Puff by inhalation every twelve (12) hours. Allergies   Allergen Reactions    Latex Contact Dermatitis    Neosporin [Neomycin-Bacitracin-Polymyxin] Rash     Social History     Social History    Marital status:      Spouse name: N/A    Number of children: N/A    Years of education: N/A     Occupational History    Not on file.      Social History Main Topics    Smoking status: Former Smoker     Types: Cigarettes     Quit date: 1/1/1997    Smokeless tobacco: Never Used    Alcohol use 0.6 oz/week     1 Glasses of wine per week    Drug use: No    Sexual activity: Not on file     Other Topics Concern    Not on file     Social History Narrative      Family History   Problem Relation Age of Onset    Cancer Mother     Diabetes Father     Heart Disease Sister     Cancer Brother     Diabetes Brother     Hypertension Brother        Review of Systems    Review of Systems   Constitutional: Negative for chills, diaphoresis, fever, malaise/fatigue and weight loss. HENT: Negative for hearing loss and sore throat. Eyes: Negative for blurred vision, photophobia and redness. Respiratory: Negative for cough, hemoptysis, shortness of breath and wheezing. Cardiovascular: Positive for leg swelling. Negative for chest pain, palpitations and orthopnea. Gastrointestinal: Negative for abdominal pain, blood in stool, constipation, diarrhea, heartburn, nausea and vomiting. Genitourinary: Negative for dysuria, frequency, hematuria and urgency. Musculoskeletal: Negative for back pain and myalgias. Skin: Negative for itching and rash. Neurological: Positive for weakness. Negative for dizziness, speech change, focal weakness and headaches. Endo/Heme/Allergies: Does not bruise/bleed easily. Psychiatric/Behavioral: Negative for depression and suicidal ideas.             Physical Exam:    Visit Vitals    /62    Pulse 72    Resp 20    Ht 6' 2\" (1.88 m)    Wt 160 lb (72.6 kg)    BMI 20.54 kg/m2      Physical Examination: General appearance - alert, well appearing, and in no distress  Mental status - alert, oriented to person, place, and time  Eyes - sclera anicteric, left eye normal, right eye normal  Ears - right ear normal, left ear normal  Nose - normal and patent, no erythema, discharge or polyps  Mouth - mucous membranes moist, pharynx normal without lesions  Neck - supple, no significant adenopathy  Lymphatics - no palpable lymphadenopathy  Chest - clear to auscultation, no wheezes, rales or rhonchi, symmetric air entry  Heart - normal rate and regular rhythm  Abdomen - soft, nontender, nondistended, no masses or organomegaly  Extremities - Bilateral lower extremities in Unna boot. Faint palpable right popliteal pulse. Doppler left popliteal signal      Impression and Plan:    ICD-10-CM ICD-9-CM    1. PAD (peripheral artery disease) ([facility]) I73.9 443.9 IR ANGIO EXT LOWER RT   2. Leg ulcer, left, limited to breakdown of skin L97.921 707.10    3. Leg ulcer, right, limited to breakdown of skin L97.911 707.10 IR ANGIO EXT LOWER RT     I am pleased Mr. Nan Cavazos has benefited from his right leg angiogram.  We will perform a left leg angiogram on Friday and then we will obtain ABIs to document his improved flow. Follow-up Disposition:  Return in about 4 weeks (around 7/4/2017) for post procedure, Vascular labs. The treatment plan was reviewed with the patient in detail. The patient voiced understanding of this plan and all questions and concerns were addressed. The patient agrees with this plan. We discussed the signs and symptoms that would require earlier attention or intervention. The patient was given educational material related to his/her visit and the patient has voiced understanding of the material.     I appreciate the opportunity to participate in the care of your patient. I will be sure to keep you informed of any subsequent changes in the treatment plan. If you have any questions or concerns, please feel free to contact me. Ruiz Douglas MD    PLEASE NOTE:  This document has been produced using voice recognition software. Unrecognized errors in transcription may be present.

## 2017-06-09 ENCOUNTER — HOSPITAL ENCOUNTER (OUTPATIENT)
Dept: INTERVENTIONAL RADIOLOGY/VASCULAR | Age: 72
Discharge: HOME | End: 2017-06-09
Attending: SURGERY | Admitting: SURGERY
Payer: MEDICARE

## 2017-06-09 VITALS
HEIGHT: 74 IN | WEIGHT: 160 LBS | SYSTOLIC BLOOD PRESSURE: 112 MMHG | TEMPERATURE: 97.7 F | OXYGEN SATURATION: 95 % | BODY MASS INDEX: 20.53 KG/M2 | HEART RATE: 85 BPM | DIASTOLIC BLOOD PRESSURE: 53 MMHG | RESPIRATION RATE: 18 BRPM

## 2017-06-09 DIAGNOSIS — I73.9 CLAUDICATION (HCC): ICD-10-CM

## 2017-06-09 LAB
ANION GAP BLD CALC-SCNC: 5 MMOL/L (ref 3–18)
BASOPHILS # BLD AUTO: 0 K/UL (ref 0–0.06)
BASOPHILS # BLD: 0 % (ref 0–2)
BUN SERPL-MCNC: 7 MG/DL (ref 7–18)
BUN/CREAT SERPL: 10 (ref 12–20)
CALCIUM SERPL-MCNC: 8.6 MG/DL (ref 8.5–10.1)
CHLORIDE SERPL-SCNC: 100 MMOL/L (ref 100–108)
CO2 SERPL-SCNC: 31 MMOL/L (ref 21–32)
CREAT SERPL-MCNC: 0.68 MG/DL (ref 0.6–1.3)
DIFFERENTIAL METHOD BLD: ABNORMAL
EOSINOPHIL # BLD: 1 K/UL (ref 0–0.4)
EOSINOPHIL NFR BLD: 22 % (ref 0–5)
ERYTHROCYTE [DISTWIDTH] IN BLOOD BY AUTOMATED COUNT: 15.4 % (ref 11.6–14.5)
GLUCOSE SERPL-MCNC: 146 MG/DL (ref 74–99)
HCT VFR BLD AUTO: 34.9 % (ref 36–48)
HGB BLD-MCNC: 11.8 G/DL (ref 13–16)
INR PPP: 1.1 (ref 0.8–1.2)
LYMPHOCYTES # BLD AUTO: 18 % (ref 21–52)
LYMPHOCYTES # BLD: 0.8 K/UL (ref 0.9–3.6)
MCH RBC QN AUTO: 29.6 PG (ref 24–34)
MCHC RBC AUTO-ENTMCNC: 33.8 G/DL (ref 31–37)
MCV RBC AUTO: 87.5 FL (ref 74–97)
MONOCYTES # BLD: 0.4 K/UL (ref 0.05–1.2)
MONOCYTES NFR BLD AUTO: 9 % (ref 3–10)
NEUTS SEG # BLD: 2.5 K/UL (ref 1.8–8)
NEUTS SEG NFR BLD AUTO: 51 % (ref 40–73)
PLATELET # BLD AUTO: 138 K/UL (ref 135–420)
PMV BLD AUTO: 9.2 FL (ref 9.2–11.8)
POTASSIUM SERPL-SCNC: 3.8 MMOL/L (ref 3.5–5.5)
PROTHROMBIN TIME: 13.6 SEC (ref 11.5–15.2)
RBC # BLD AUTO: 3.99 M/UL (ref 4.7–5.5)
RBC MORPH BLD: ABNORMAL
SODIUM SERPL-SCNC: 136 MMOL/L (ref 136–145)
WBC # BLD AUTO: 4.7 K/UL (ref 4.6–13.2)

## 2017-06-09 PROCEDURE — 99152 MOD SED SAME PHYS/QHP 5/>YRS: CPT

## 2017-06-09 PROCEDURE — 85025 COMPLETE CBC W/AUTO DIFF WBC: CPT | Performed by: SURGERY

## 2017-06-09 PROCEDURE — 37225 HC ARTHERC FEMPOP UNI +/-PTA: CPT

## 2017-06-09 PROCEDURE — 97602 WOUND(S) CARE NON-SELECTIVE: CPT

## 2017-06-09 PROCEDURE — 85610 PROTHROMBIN TIME: CPT | Performed by: SURGERY

## 2017-06-09 PROCEDURE — 74011636320 HC RX REV CODE- 636/320: Performed by: SURGERY

## 2017-06-09 PROCEDURE — 75710 ARTERY X-RAYS ARM/LEG: CPT

## 2017-06-09 PROCEDURE — 99153 MOD SED SAME PHYS/QHP EA: CPT

## 2017-06-09 PROCEDURE — 74011250636 HC RX REV CODE- 250/636: Performed by: SURGERY

## 2017-06-09 PROCEDURE — 80048 BASIC METABOLIC PNL TOTAL CA: CPT | Performed by: SURGERY

## 2017-06-09 PROCEDURE — 75625 CONTRAST EXAM ABDOMINL AORTA: CPT

## 2017-06-09 PROCEDURE — 74011000250 HC RX REV CODE- 250: Performed by: SURGERY

## 2017-06-09 RX ORDER — SODIUM CHLORIDE 0.9 % (FLUSH) 0.9 %
5-10 SYRINGE (ML) INJECTION EVERY 8 HOURS
Status: DISCONTINUED | OUTPATIENT
Start: 2017-06-09 | End: 2017-06-09 | Stop reason: HOSPADM

## 2017-06-09 RX ORDER — SODIUM CHLORIDE 9 MG/ML
25 INJECTION, SOLUTION INTRAVENOUS CONTINUOUS
Status: DISCONTINUED | OUTPATIENT
Start: 2017-06-09 | End: 2017-06-09 | Stop reason: HOSPADM

## 2017-06-09 RX ORDER — FLUMAZENIL 0.1 MG/ML
0.2 INJECTION INTRAVENOUS
Status: DISCONTINUED | OUTPATIENT
Start: 2017-06-09 | End: 2017-06-09 | Stop reason: HOSPADM

## 2017-06-09 RX ORDER — ONDANSETRON 2 MG/ML
4 INJECTION INTRAMUSCULAR; INTRAVENOUS
Status: DISCONTINUED | OUTPATIENT
Start: 2017-06-09 | End: 2017-06-09 | Stop reason: HOSPADM

## 2017-06-09 RX ORDER — NALOXONE HYDROCHLORIDE 0.4 MG/ML
0.1 INJECTION, SOLUTION INTRAMUSCULAR; INTRAVENOUS; SUBCUTANEOUS AS NEEDED
Status: DISCONTINUED | OUTPATIENT
Start: 2017-06-09 | End: 2017-06-09 | Stop reason: HOSPADM

## 2017-06-09 RX ORDER — MIDAZOLAM HYDROCHLORIDE 1 MG/ML
1-4 INJECTION, SOLUTION INTRAMUSCULAR; INTRAVENOUS
Status: DISCONTINUED | OUTPATIENT
Start: 2017-06-09 | End: 2017-06-09 | Stop reason: HOSPADM

## 2017-06-09 RX ORDER — SODIUM CHLORIDE 0.9 % (FLUSH) 0.9 %
5-10 SYRINGE (ML) INJECTION AS NEEDED
Status: DISCONTINUED | OUTPATIENT
Start: 2017-06-09 | End: 2017-06-09 | Stop reason: HOSPADM

## 2017-06-09 RX ORDER — HEPARIN SODIUM 200 [USP'U]/100ML
1000 INJECTION, SOLUTION INTRAVENOUS
Status: COMPLETED | OUTPATIENT
Start: 2017-06-09 | End: 2017-06-09

## 2017-06-09 RX ORDER — FENTANYL CITRATE 50 UG/ML
25-200 INJECTION, SOLUTION INTRAMUSCULAR; INTRAVENOUS
Status: DISCONTINUED | OUTPATIENT
Start: 2017-06-09 | End: 2017-06-09 | Stop reason: HOSPADM

## 2017-06-09 RX ORDER — HEPARIN SODIUM 1000 [USP'U]/ML
10000 INJECTION, SOLUTION INTRAVENOUS; SUBCUTANEOUS
Status: DISCONTINUED | OUTPATIENT
Start: 2017-06-09 | End: 2017-06-09 | Stop reason: HOSPADM

## 2017-06-09 RX ORDER — MORPHINE SULFATE 2 MG/ML
2 INJECTION, SOLUTION INTRAMUSCULAR; INTRAVENOUS
Status: DISCONTINUED | OUTPATIENT
Start: 2017-06-09 | End: 2017-06-09 | Stop reason: HOSPADM

## 2017-06-09 RX ORDER — LIDOCAINE HYDROCHLORIDE 10 MG/ML
1-10 INJECTION, SOLUTION EPIDURAL; INFILTRATION; INTRACAUDAL; PERINEURAL
Status: COMPLETED | OUTPATIENT
Start: 2017-06-09 | End: 2017-06-09

## 2017-06-09 RX ADMIN — MIDAZOLAM HYDROCHLORIDE 0.5 MG: 1 INJECTION, SOLUTION INTRAMUSCULAR; INTRAVENOUS at 11:42

## 2017-06-09 RX ADMIN — IOPAMIDOL 115 ML: 510 INJECTION, SOLUTION INTRAVASCULAR at 11:46

## 2017-06-09 RX ADMIN — FENTANYL CITRATE 25 MCG: 50 INJECTION, SOLUTION INTRAMUSCULAR; INTRAVENOUS at 11:34

## 2017-06-09 RX ADMIN — LIDOCAINE HYDROCHLORIDE 10 ML: 10 INJECTION, SOLUTION EPIDURAL; INFILTRATION; INTRACAUDAL; PERINEURAL at 11:34

## 2017-06-09 RX ADMIN — MIDAZOLAM HYDROCHLORIDE 1 MG: 1 INJECTION, SOLUTION INTRAMUSCULAR; INTRAVENOUS at 11:34

## 2017-06-09 RX ADMIN — SODIUM CHLORIDE 25 ML/HR: 900 INJECTION, SOLUTION INTRAVENOUS at 09:53

## 2017-06-09 RX ADMIN — HEPARIN SODIUM 5000 UNITS: 1000 INJECTION, SOLUTION INTRAVENOUS; SUBCUTANEOUS at 11:56

## 2017-06-09 RX ADMIN — Medication 2000 UNITS: at 11:33

## 2017-06-09 RX ADMIN — FENTANYL CITRATE 25 MCG: 50 INJECTION, SOLUTION INTRAMUSCULAR; INTRAVENOUS at 11:42

## 2017-06-09 NOTE — PROGRESS NOTES
Pt discharged to home under care of spouse after uneventful recovery on care unit. Denies any pain or concerns. Patient armband removed and shredded.

## 2017-06-09 NOTE — DISCHARGE INSTRUCTIONS
Sedation for a Medical Procedure: Care Instructions  Your Care Instructions  For a minor procedure or surgery, you will get a sedative to help you relax. This drug will make you sleepy. It is usually given in a vein (by IV). A shot may also be used to numb the area. If you had local anesthesia, you may feel some pain and discomfort as it wears off. If you have pain, don't be afraid to say so. Pain medicine works better if you take it before the pain gets bad. Common side effects from sedation include:  · Feeling sleepy. (Your doctors and nurses will make sure you are not too sleepy to go home.)  · Nausea and vomiting. This usually does not last long. · Feeling tired. Follow-up care is a key part of your treatment and safety. Be sure to make and go to all appointments, and call your doctor if you are having problems. It's also a good idea to know your test results and keep a list of the medicines you take. How can you care for yourself at home? Activity  · Don't do anything for 24 hours that requires attention to detail. It takes time for the medicine effects to completely wear off. · For your safety, you should not drive or operate any machinery that could be dangerous until the medicine wears off and you can think clearly and react easily. · Rest when you feel tired. Getting enough sleep will help you recover. Diet  · You can eat your normal diet, unless your doctor gives you other instructions. If your stomach is upset, try clear liquids and bland, low-fat foods like plain toast or rice. · Drink plenty of fluids (unless your doctor tells you not to). · Don't drink alcohol for 24 hours. Medicines  · Be safe with medicines. Read and follow all instructions on the label. ¨ If the doctor gave you a prescription medicine for pain, take it as prescribed. ¨ If you are not taking a prescription pain medicine, ask your doctor if you can take an over-the-counter medicine.   · If you think your pain medicine is making you sick to your stomach:  ¨ Take your medicine after meals (unless your doctor has told you not to). ¨ Ask your doctor for a different pain medicine. When should you call for help? Call 911 anytime you think you may need emergency care. For example, call if:  · You have severe trouble breathing. · You passed out (lost consciousness). Call your doctor now or seek immediate medical care if:  · You have trouble breathing. · You have ongoing or worsening nausea or vomiting. · You have a fever. · You have a new or worse headache. · The medicine is not wearing off and you can't think clearly. Watch closely for changes in your health, and be sure to contact your doctor if:  · You do not get better as expected. Where can you learn more? Go to http://mariella-kenyon.info/. Enter T330 in the search box to learn more about \"Sedation for a Medical Procedure: Care Instructions. \"  Current as of: August 14, 2016  Content Version: 11.2  © 8612-2824 [employer]. Care instructions adapted under license by [employer] (which disclaims liability or warranty for this information). If you have questions about a medical condition or this instruction, always ask your healthcare professional. [place] [address] any warranty or liability for your use of this information. Angiogram: What to Expect at Home  Your Recovery  An angiogram is an X-ray test that uses dye and a camera to take pictures of the blood flow in an artery or a vein. The doctor inserted a thin, flexible tube (catheter) into a blood vessel in your groin. In some cases, the catheter is placed in a blood vessel in the arm. An angiogram is done for many reasons. For example, you may have an angiogram to find the source of bleeding, such as an ulcer. Or it may be done to look for blocked blood vessels in your lungs.   After an angiogram, your groin or arm may have a bruise and feel sore for a day or two. You can do light activities around the house but nothing strenuous for several days. Your doctor may give you specific instructions on when you can do your normal activities again, such as driving and going back to work. This care sheet gives you a general idea about how long it will take for you to recover. But each person recovers at a different pace. Follow the steps below to feel better as quickly as possible. How can you care for yourself at home? Activity  · Do not do strenuous exercise and do not lift, pull, or push anything heavy until your doctor says it is okay. This may be for a day or two. You can walk around the house and do light activity, such as cooking. · You may shower 24 to 48 hours after the procedure, if your doctor okays it. Pat the incision dry. Do not take a bath for 1 week, or until your doctor tells you it is okay. · If the catheter was placed in your groin, try not to walk up stairs for the first couple of days. · If the catheter was placed in your arm near your wrist, do not bend your wrist deeply for the first couple of days. Be careful using your hand to get into and out of a chair or bed. · If your doctor recommends it, get more exercise. Walking is a good choice. Bit by bit, increase the amount you walk every day. Try for at least 30 minutes on most days of the week. Diet  · Drink plenty of fluids to help your body flush out the dye. If you have kidney, heart, or liver disease and have to limit fluids, talk with your doctor before you increase the amount of fluids you drink. · You can eat your normal diet. If your stomach is upset, try bland, low-fat foods like plain rice, broiled chicken, toast, and yogurt. Medicines  · Be safe with medicines. Read and follow all instructions on the label. ¨ If the doctor gave you a prescription medicine for pain, take it as prescribed.   ¨ If you are not taking a prescription pain medicine, ask your doctor if you can take an over-the-counter medicine. · If you take blood thinners, such as warfarin (Coumadin), clopidogrel (Plavix), or aspirin, be sure to talk to your doctor. He or she will tell you if and when to start taking those medicines again. Make sure that you understand exactly what your doctor wants you to do. · Your doctor will tell you if and when you can restart your medicines. He or she will also give you instructions about taking any new medicines. Care of the catheter site  · You will have a dressing over the cut (incision). A dressing helps the incision heal and protects it. Your doctor will tell you how to take care of this. · Put ice or a cold pack on the area for 10 to 20 minutes at a time to help with soreness or swelling. Put a thin cloth between the ice and your skin. Follow-up care is a key part of your treatment and safety. Be sure to make and go to all appointments, and call your doctor if you are having problems. It's also a good idea to know your test results and keep a list of the medicines you take. When should you call for help? Call 911 anytime you think you may need emergency care. For example, call if:  · You passed out (lost consciousness). · You have severe trouble breathing. · You have sudden chest pain and shortness of breath, or you cough up blood. Call your doctor now or seek immediate medical care if:  · You are bleeding from the area where the catheter was put in your artery. · You have a fast-growing, painful lump at the catheter site. · You have signs of infection, such as:  ¨ Increased pain, swelling, warmth, or redness. ¨ Red streaks leading from the incision. ¨ Pus draining from the incision. ¨ A fever. Watch closely for any changes in your health, and be sure to contact your doctor if:  · You don't get better as expected. Where can you learn more? Go to http://mariella-kenyon.info/.   Enter X513 in the search box to learn more about \"Angiogram: What to Expect at Home. \"  Current as of: October 14, 2016  Content Version: 11.2  © 7477-7905 [employer]. Care instructions adapted under license by [employer] (which disclaims liability or warranty for this information). If you have questions about a medical condition or this instruction, always ask your healthcare professional. [place] [address] any warranty or liability for your use of this information. DISCHARGE SUMMARY from Nurse    The following personal items are in your possession at time of discharge:       Visual Aid: Glasses  Hearing Aids/Status: At bedside                         PATIENT INSTRUCTIONS:    After general anesthesia or intravenous sedation, for 24 hours or while taking prescription Narcotics:  · Limit your activities  · Do not drive and operate hazardous machinery  · Do not make important personal or business decisions  · Do  not drink alcoholic beverages  · If you have not urinated within 8 hours after discharge, please contact your surgeon on call. Report the following to your surgeon:  · Excessive pain, swelling, redness or odor of or around the surgical area  · Temperature over 100.5  · Nausea and vomiting lasting longer than 4 hours or if unable to take medications  · Any signs of decreased circulation or nerve impairment to extremity: change in color, persistent  numbness, tingling, coldness or increase pain  · Any questions        What to do at Home:  Recommended activity: Activity as tolerated and no driving for today. *  Please give a list of your current medications to your Primary Care Provider. *  Please update this list whenever your medications are discontinued, doses are      changed, or new medications (including over-the-counter products) are added. *  Please carry medication information at all times in case of emergency situations.           These are general instructions for a healthy lifestyle:    No smoking/ No tobacco products/ Avoid exposure to second hand smoke    Surgeon General's Warning:  Quitting smoking now greatly reduces serious risk to your health. Obesity, smoking, and sedentary lifestyle greatly increases your risk for illness    A healthy diet, regular physical exercise & weight monitoring are important for maintaining a healthy lifestyle    You may be retaining fluid if you have a history of heart failure or if you experience any of the following symptoms:  Weight gain of 3 pounds or more overnight or 5 pounds in a week, increased swelling in our hands or feet or shortness of breath while lying flat in bed. Please call your doctor as soon as you notice any of these symptoms; do not wait until your next office visit. Recognize signs and symptoms of STROKE:    F-face looks uneven    A-arms unable to move or move unevenly    S-speech slurred or non-existent    T-time-call 911 as soon as signs and symptoms begin-DO NOT go       Back to bed or wait to see if you get better-TIME IS BRAIN. Warning Signs of HEART ATTACK     Call 911 if you have these symptoms:   Chest discomfort. Most heart attacks involve discomfort in the center of the chest that lasts more than a few minutes, or that goes away and comes back. It can feel like uncomfortable pressure, squeezing, fullness, or pain.  Discomfort in other areas of the upper body. Symptoms can include pain or discomfort in one or both arms, the back, neck, jaw, or stomach.  Shortness of breath with or without chest discomfort.  Other signs may include breaking out in a cold sweat, nausea, or lightheadedness. Don't wait more than five minutes to call 911 - MINUTES MATTER! Fast action can save your life. Calling 911 is almost always the fastest way to get lifesaving treatment. Emergency Medical Services staff can begin treatment when they arrive -- up to an hour sooner than if someone gets to the hospital by car.        The discharge information has been reviewed with the patient and spouse. The patient and spouse verbalized understanding. Discharge medications reviewed with the patient and spouse and appropriate educational materials and side effects teaching were provided.

## 2017-06-09 NOTE — PROGRESS NOTES
TRANSFER - OUT REPORT:    Verbal report given to [address]Gu[address] Osorio Dwight Dai  RN on [facility]  being transferred to Heart Care(unit) for routine post - op       Report consisted of patients Situation, Background, Assessment and   Recommendations(SBAR). Information from the following report(s) SBAR, Kardex and MAR was reviewed with the receiving nurse. Lines:   Peripheral IV 06/09/17 Left Wrist (Active)   Site Assessment Clean, dry, & intact 6/9/2017  9:53 AM   Phlebitis Assessment 0 6/9/2017  9:53 AM   Infiltration Assessment 0 6/9/2017  9:53 AM   Dressing Status Clean, dry, & intact 6/9/2017  9:53 AM   Dressing Type Transparent 6/9/2017  9:53 AM   Hub Color/Line Status Blue 6/9/2017  9:53 AM   Action Taken Blood drawn 6/9/2017  9:53 AM   Alcohol Cap Used Yes 6/9/2017  9:53 AM        Opportunity for questions and clarification was provided. Pulses present by signal  post procedure taken by Dr. Mahesh Trinidad. Patient transported with:   Registered Nurse [address] [place] [address] giving report.

## 2017-06-09 NOTE — INTERVAL H&P NOTE
H&P Update:  Nathalie Morales was seen and examined. History and physical has been reviewed. The patient has been examined.  There have been no significant clinical changes since the completion of the originally dated History and Physical.    Signed By: Delmi Carreno MD     June 9, 2017 10:51 AM

## 2017-06-09 NOTE — WOUND CARE
Pt seen by wound care for dressing change to BLE as pt was scheduled to be seen in the clinic today at 2:30. Coban, soft roll, adaptic, promogran, mepitel one and absorbent dressing applied in non-compressive manner. Pt to follow up with clinic next week for dressing change.

## 2017-06-09 NOTE — H&P (VIEW-ONLY)
Crystal Choi    Chief Complaint   Patient presents with    Surgical Follow-up       History and Physical    Mr. Danie Hickey returns to our office after undergoing a right leg angiogram and angioplasty for a non healing right leg ulcer. He has no new complaints. He states he has noticed the improved blood flow in his right leg and that the wound care nurses noted a change in his leg ulcer on the right with the improved blood flow. Past Medical History:   Diagnosis Date    Acid reflux     Anemia     CAD (coronary artery disease)     Chronic obstructive pulmonary disease (HCC)     Diabetes [facility])     GERD (gastroesophageal reflux disease)     H/O seasonal allergies     Hypercholesterolemia     Hypertension     Ill-defined condition     hand tremors    Thyroid disease      Patient Active Problem List   Diagnosis Code    PAD (peripheral artery disease) ([facility]) I73.9    Venous reflux I87.2    Leg ulcer, left ([facility]) L97.929    Leg ulcer ([address] L97.909     Past Surgical History:   Procedure Laterality Date    ABDOMEN SURGERY PROC UNLISTED      HX COLONOSCOPY      HX HEENT      HX ORTHOPAEDIC      HX UROLOGICAL      VASCULAR SURGERY PROCEDURE UNLIST       Current Outpatient Prescriptions   Medication Sig Dispense Refill    oxyCODONE-acetaminophen (PERCOCET) 5-325 mg per tablet Take 1 Tab by mouth every four (4) hours as needed for Pain.  potassium citrate (UROCIT-K10) 10 mEq (1,080 mg) TbER Take 10 mEq by mouth two (2) times a day.  multivitamin, tx-iron-ca-min (THERA-M W/ IRON) 9 mg iron-400 mcg tab tablet Take 1 Tab by mouth daily.  acetaminophen (TYLENOL EXTRA STRENGTH) 500 mg tablet Take 500 mg by mouth every six (6) hours as needed for Pain.  cilostazol (PLETAL) 100 mg tablet Take 100 mg by mouth Before breakfast and dinner.  iron aspgly,xe-K-J78-FA-Ca-suc (FERREX 150 FORTE PLUS) 817-39-46-7 mg-mg-mcg-mg cap cap Take 1 Cap by mouth daily.       insulin aspart (NOVOLOG FLEXPEN) 100 unit/mL inpn by SubCUTAneous route.  metFORMIN (GLUCOPHAGE) 1,000 mg tablet Take 1,000 mg by mouth two (2) times daily (with meals).  atorvastatin (LIPITOR) 20 mg tablet Take  by mouth daily.  lisinopril (PRINIVIL, ZESTRIL) 2.5 mg tablet Take  by mouth daily.  albuterol (PROAIR HFA) 90 mcg/actuation inhaler Take  by inhalation.  clopidogrel (PLAVIX) 75 mg tab Take  by mouth.  cyanocobalamin (VITAMIN B-12) 1,000 mcg/mL injection 1,000 mcg by IntraMUSCular route once.  aspirin 81 mg tablet Take 81 mg by mouth daily.  insulin glargine (LANTUS) 100 unit/mL injection 68 Units by SubCUTAneous route nightly.  esomeprazole (NEXIUM) 40 mg capsule Take  by mouth daily.  furosemide (LASIX) 40 mg tablet Take 40 mg by mouth daily.  diltiazem (TIAZAC) 360 mg SR capsule Take 360 mg by mouth daily.  tiotropium (SPIRIVA WITH HANDIHALER) 18 mcg inhalation capsule Take 1 Cap by inhalation daily.  loratadine (CLARITIN) 10 mg tablet Take 10 mg by mouth daily.  fluticasone (FLONASE) 50 mcg/actuation nasal spray 2 Sprays by Both Nostrils route daily.  traMADol (ULTRAM) 50 mg tablet Take 50 mg by mouth two (2) times a day.  gabapentin (NEURONTIN) 300 mg capsule Take 300 mg by mouth three (3) times daily.  LACTASE ENZYME PO Take 1 Cap by mouth daily.  simvastatin (ZOCOR) 10 mg tablet Take 10 mg by mouth nightly.  levothyroxine (SYNTHROID) 25 mcg tablet Take 100 mcg by mouth Daily (before breakfast).  fluticasone-salmeterol (ADVAIR DISKUS) 250-50 mcg/dose diskus inhaler Take 1 Puff by inhalation every twelve (12) hours. Allergies   Allergen Reactions    Latex Contact Dermatitis    Neosporin [Neomycin-Bacitracin-Polymyxin] Rash     Social History     Social History    Marital status:      Spouse name: N/A    Number of children: N/A    Years of education: N/A     Occupational History    Not on file.      Social History Main Topics    Smoking status: Former Smoker     Types: Cigarettes     Quit date: 1/1/1997    Smokeless tobacco: Never Used    Alcohol use 0.6 oz/week     1 Glasses of wine per week    Drug use: No    Sexual activity: Not on file     Other Topics Concern    Not on file     Social History Narrative      Family History   Problem Relation Age of Onset    Cancer Mother     Diabetes Father     Heart Disease Sister     Cancer Brother     Diabetes Brother     Hypertension Brother        Review of Systems    Review of Systems   Constitutional: Negative for chills, diaphoresis, fever, malaise/fatigue and weight loss. HENT: Negative for hearing loss and sore throat. Eyes: Negative for blurred vision, photophobia and redness. Respiratory: Negative for cough, hemoptysis, shortness of breath and wheezing. Cardiovascular: Positive for leg swelling. Negative for chest pain, palpitations and orthopnea. Gastrointestinal: Negative for abdominal pain, blood in stool, constipation, diarrhea, heartburn, nausea and vomiting. Genitourinary: Negative for dysuria, frequency, hematuria and urgency. Musculoskeletal: Negative for back pain and myalgias. Skin: Negative for itching and rash. Neurological: Positive for weakness. Negative for dizziness, speech change, focal weakness and headaches. Endo/Heme/Allergies: Does not bruise/bleed easily. Psychiatric/Behavioral: Negative for depression and suicidal ideas.             Physical Exam:    Visit Vitals    /62    Pulse 72    Resp 20    Ht 6' 2\" (1.88 m)    Wt 160 lb (72.6 kg)    BMI 20.54 kg/m2      Physical Examination: General appearance - alert, well appearing, and in no distress  Mental status - alert, oriented to person, place, and time  Eyes - sclera anicteric, left eye normal, right eye normal  Ears - right ear normal, left ear normal  Nose - normal and patent, no erythema, discharge or polyps  Mouth - mucous membranes moist, pharynx normal without lesions  Neck - supple, no significant adenopathy  Lymphatics - no palpable lymphadenopathy  Chest - clear to auscultation, no wheezes, rales or rhonchi, symmetric air entry  Heart - normal rate and regular rhythm  Abdomen - soft, nontender, nondistended, no masses or organomegaly  Extremities - Bilateral lower extremities in Unna boot. Faint palpable right popliteal pulse. Doppler left popliteal signal      Impression and Plan:    ICD-10-CM ICD-9-CM    1. PAD (peripheral artery disease) ([facility]) I73.9 443.9 IR ANGIO EXT LOWER RT   2. Leg ulcer, left, limited to breakdown of skin L97.921 707.10    3. Leg ulcer, right, limited to breakdown of skin L97.911 707.10 IR ANGIO EXT LOWER RT     I am pleased Mr. Brian Goins has benefited from his right leg angiogram.  We will perform a left leg angiogram on Friday and then we will obtain ABIs to document his improved flow. Follow-up Disposition:  Return in about 4 weeks (around 7/4/2017) for post procedure, Vascular labs. The treatment plan was reviewed with the patient in detail. The patient voiced understanding of this plan and all questions and concerns were addressed. The patient agrees with this plan. We discussed the signs and symptoms that would require earlier attention or intervention. The patient was given educational material related to his/her visit and the patient has voiced understanding of the material.     I appreciate the opportunity to participate in the care of your patient. I will be sure to keep you informed of any subsequent changes in the treatment plan. If you have any questions or concerns, please feel free to contact me. Lynn Crawford MD    PLEASE NOTE:  This document has been produced using voice recognition software. Unrecognized errors in transcription may be present.

## 2017-06-09 NOTE — IP AVS SNAPSHOT
[address] [address] 
[phone] Patient: Randy Schuler MRN: [id number] BVZ:[id number] You are allergic to the following Allergen Reactions Latex Contact Dermatitis Pt states he is NOT allergic to latex. Neosporin (Neomycin-Bacitracin-Polymyxin) Rash Recent Documentation Height Weight BMI Smoking Status 1.88 m 72.6 kg 20.54 kg/m2 Former Smoker Emergency Contacts Name Discharge Info Relation Home Work Mobile Batsheva Hough DISCHARGE CAREGIVER [3] Spouse [3] [id number] About your hospitalization You were admitted on:  June 9, 2017 You last received care in the:  [address] You were discharged on:  June 9, 2017 Unit phone number:  [phone] Why you were hospitalized Your primary diagnosis was:  Not on File Providers Seen During Your Hospitalizations Provider Role Specialty Primary office phone Ramana Sahni MD Attending Provider Vascular Surgery [phone] Your Primary Care Physician (PCP) Primary Care Physician Office Phone Office Fax Itz Beasley [phone] Follow-up Information Follow up With Details Comments Contact Info Corinne Matthew MD   [facility] [address] [facility] [id number] [phone] Ramana Sahni MD Schedule an appointment as soon as possible for a visit in 2 weeks  [address] [place] [address] [address] [facility] 
[phone] Your Appointments Tuesday June 27, 2017 11:45 AM EDT Follow Up with Ramana Sahni MD  
BS Vein/Vascular Spec THE [facility] ([address])  
 One [facility] [address] [facility]  
[phone] Current Discharge Medication List  
  
CONTINUE these medications which have NOT CHANGED Dose & Instructions Dispensing Information Comments Morning Noon Evening Bedtime ADVAIR DISKUS 250-50 mcg/dose diskus inhaler Generic drug:  fluticasone-salmeterol Your last dose was: Your next dose is:    
   
   
 Dose:  1 Puff Take 1 Puff by inhalation every twelve (12) hours. Refills:  0  
     
   
   
   
  
 aspirin 81 mg tablet Your last dose was: Your next dose is:    
   
   
 Dose:  81 mg Take 81 mg by mouth daily. Refills:  0  
     
   
   
   
  
 atorvastatin 20 mg tablet Commonly known as:  LIPITOR Your last dose was: Your next dose is: Take  by mouth daily. Refills:  0  
     
   
   
   
  
 cilostazol 100 mg tablet Commonly known as:  PLETAL Your last dose was: Your next dose is:    
   
   
 Dose:  100 mg Take 100 mg by mouth Before breakfast and dinner. Refills:  0 CLARITIN 10 mg tablet Generic drug:  loratadine Your last dose was: Your next dose is:    
   
   
 Dose:  10 mg Take 10 mg by mouth daily. Refills:  0  
     
   
   
   
  
 FLONASE 50 mcg/actuation nasal spray Generic drug:  fluticasone Your last dose was: Your next dose is:    
   
   
 Dose:  2 Spray 2 Sprays by Both Nostrils route daily. Refills:  0  
     
   
   
   
  
 gabapentin 300 mg capsule Commonly known as:  NEURONTIN Your last dose was: Your next dose is:    
   
   
 Dose:  300 mg Take 300 mg by mouth three (3) times daily. Refills:  0  
     
   
   
   
  
 iron aspgly,[id number]-[id number]-Ca-suc 199-88-75-1 mg-mg-mcg-mg Cap cap Commonly known as:  FERREX [address] Your last dose was: Your next dose is:    
   
   
 Dose:  1 Cap Take 1 Cap by mouth daily. Refills:  0 LACTASE ENZYME PO Your last dose was: Your next dose is:    
   
   
 Dose:  1 Cap Take 1 Cap by mouth daily. Refills:  0 LANTUS 100 unit/mL injection Generic drug:  insulin glargine Your last dose was: Your next dose is:    
   
   
 Dose:  20 Units 20 Units by SubCUTAneous route nightly. Refills:  0  
     
   
   
   
  
 LASIX 40 mg tablet Generic drug:  furosemide Your last dose was: Your next dose is:    
   
   
 Dose:  40 mg Take 40 mg by mouth daily. Refills:  0  
     
   
   
   
  
 levothyroxine 25 mcg tablet Commonly known as:  SYNTHROID Your last dose was: Your next dose is:    
   
   
 Dose:  100 mcg Take 100 mcg by mouth Daily (before breakfast). Refills:  0  
     
   
   
   
  
 lisinopril 2.5 mg tablet Commonly known as:  Freddy Headings Your last dose was: Your next dose is: Take  by mouth daily. Refills:  0  
     
   
   
   
  
 metFORMIN 1,000 mg tablet Commonly known as:  GLUCOPHAGE Your last dose was: Your next dose is:    
   
   
 Dose:  1000 mg Take 1,000 mg by mouth two (2) times daily (with meals). Refills:  0  
     
   
   
   
  
 multivitamin, tx-iron-ca-min 9 mg iron-400 mcg Tab tablet Commonly known as:  THERA-M w/ IRON Your last dose was: Your next dose is:    
   
   
 Dose:  1 Tab Take 1 Tab by mouth daily. Refills:  0 NexIUM 40 mg capsule Generic drug:  esomeprazole Your last dose was: Your next dose is: Take  by mouth daily. Refills:  0 NovoLOG Flexpen 100 unit/mL Inpn Generic drug:  insulin aspart Your last dose was: Your next dose is:    
   
   
 by SubCUTAneous route Before breakfast, lunch, and dinner. Sliding scale Refills:  0 PERCOCET 5-325 mg per tablet Generic drug:  oxyCODONE-acetaminophen Your last dose was: Your next dose is:    
   
   
 Dose:  1 Tab Take 1 Tab by mouth every four (4) hours as needed for Pain. Refills:  0 PLAVIX 75 mg Tab Generic drug:  clopidogrel Your last dose was: Your next dose is: Take  by mouth. Refills:  0  
     
   
   
   
  
 potassium citrate 10 mEq (1,080 mg) Kelley Arana Commonly known as:  [place] Your last dose was: Your next dose is:    
   
   
 Dose:  10 mEq Take 10 mEq by mouth two (2) times a day. Refills:  0 PROAIR HFA 90 mcg/actuation inhaler Generic drug:  albuterol Your last dose was: Your next dose is: Take  by inhalation. Refills:  0  
     
   
   
   
  
 simvastatin 10 mg tablet Commonly known as:  ZOCOR Your last dose was: Your next dose is:    
   
   
 Dose:  10 mg Take 10 mg by mouth nightly. Refills:  0 SPIRIVA WITH HANDIHALER 18 mcg inhalation capsule Generic drug:  tiotropium Your last dose was: Your next dose is:    
   
   
 Dose:  1 Cap Take 1 Cap by inhalation daily. Refills:  0  
     
   
   
   
  
 TIAZAC 360 mg SR capsule Generic drug:  dilTIAZem Your last dose was: Your next dose is:    
   
   
 Dose:  360 mg Take 360 mg by mouth daily. Refills:  0  
     
   
   
   
  
 traMADol 50 mg tablet Commonly known as:  ULTRAM  
   
Your last dose was: Your next dose is:    
   
   
 Dose:  50 mg Take 50 mg by mouth daily. Refills:  0  
     
   
   
   
  
 TYLENOL EXTRA STRENGTH 500 mg tablet Generic drug:  acetaminophen Your last dose was: Your next dose is:    
   
   
 Dose:  500 mg Take 500 mg by mouth every six (6) hours as needed for Pain. Refills:  0  
     
   
   
   
  
 VITAMIN B-12 1,000 mcg/mL injection Generic drug:  cyanocobalamin Your last dose was: Your next dose is:    
   
   
 Dose:  1000 mcg 1,000 mcg by IntraMUSCular route every thirty (30) days. Refills:  0 Discharge Instructions Sedation for a Medical Procedure: Care Instructions Your Care Instructions For a minor procedure or surgery, you will get a sedative to help you relax. This drug will make you sleepy. It is usually given in a vein (by IV). A shot may also be used to numb the area. If you had local anesthesia, you may feel some pain and discomfort as it wears off. If you have pain, don't be afraid to say so. Pain medicine works better if you take it before the pain gets bad. Common side effects from sedation include: · Feeling sleepy. (Your doctors and nurses will make sure you are not too sleepy to go home.) · Nausea and vomiting. This usually does not last long. · Feeling tired. Follow-up care is a key part of your treatment and safety. Be sure to make and go to all appointments, and call your doctor if you are having problems. It's also a good idea to know your test results and keep a list of the medicines you take. How can you care for yourself at home? Activity · Don't do anything for 24 hours that requires attention to detail. It takes time for the medicine effects to completely wear off. · For your safety, you should not drive or operate any machinery that could be dangerous until the medicine wears off and you can think clearly and react easily. · Rest when you feel tired. Getting enough sleep will help you recover. Diet · You can eat your normal diet, unless your doctor gives you other instructions. If your stomach is upset, try clear liquids and bland, low-fat foods like plain toast or rice. · Drink plenty of fluids (unless your doctor tells you not to). · Don't drink alcohol for 24 hours. Medicines · Be safe with medicines. Read and follow all instructions on the label. ¨ If the doctor gave you a prescription medicine for pain, take it as prescribed. ¨ If you are not taking a prescription pain medicine, ask your doctor if you can take an over-the-counter medicine. · If you think your pain medicine is making you sick to your stomach: 
¨ Take your medicine after meals (unless your doctor has told you not to). ¨ Ask your doctor for a different pain medicine. When should you call for help? Call 911 anytime you think you may need emergency care. For example, call if: 
· You have severe trouble breathing. · You passed out (lost consciousness). Call your doctor now or seek immediate medical care if: 
· You have trouble breathing. · You have ongoing or worsening nausea or vomiting. · You have a fever. · You have a new or worse headache. · The medicine is not wearing off and you can't think clearly. Watch closely for changes in your health, and be sure to contact your doctor if: 
· You do not get better as expected. Where can you learn more? Go to http://mariella-kenyon.info/. Enter Q493 in the search box to learn more about \"Sedation for a Medical Procedure: Care Instructions. \" Current as of: August 14, 2016 Content Version: 11.2 © 3861-2918 [employer]. Care instructions adapted under license by [employer] (which disclaims liability or warranty for this information). If you have questions about a medical condition or this instruction, always ask your healthcare professional. [address] any warranty or liability for your use of this information. Angiogram: What to Expect at [facility] Your Recovery An angiogram is an X-ray test that uses dye and a camera to take pictures of the blood flow in an artery or a vein. The doctor inserted a thin, flexible tube (catheter) into a blood vessel in your groin. In some cases, the catheter is placed in a blood vessel in the arm. An angiogram is done for many reasons.  For example, you may have an angiogram to find the source of bleeding, such as an ulcer. Or it may be done to look for blocked blood vessels in your lungs. After an angiogram, your groin or arm may have a bruise and feel sore for a day or two. You can do light activities around the house but nothing strenuous for several days. Your doctor may give you specific instructions on when you can do your normal activities again, such as driving and going back to work. This care sheet gives you a general idea about how long it will take for you to recover. But each person recovers at a different pace. Follow the steps below to feel better as quickly as possible. How can you care for yourself at home? Activity · Do not do strenuous exercise and do not lift, pull, or push anything heavy until your doctor says it is okay. This may be for a day or two. You can walk around the house and do light activity, such as cooking. · You may shower 24 to 48 hours after the procedure, if your doctor okays it. Pat the incision dry. Do not take a bath for 1 week, or until your doctor tells you it is okay. · If the catheter was placed in your groin, try not to walk up stairs for the first couple of days. · If the catheter was placed in your arm near your wrist, do not bend your wrist deeply for the first couple of days. Be careful using your hand to get into and out of a chair or bed. · If your doctor recommends it, get more exercise. Walking is a good choice. Bit by bit, increase the amount you walk every day. Try for at least 30 minutes on most days of the week. Diet · Drink plenty of fluids to help your body flush out the dye. If you have kidney, heart, or liver disease and have to limit fluids, talk with your doctor before you increase the amount of fluids you drink. · You can eat your normal diet. If your stomach is upset, try bland, low-fat foods like plain rice, broiled chicken, toast, and yogurt. Medicines · Be safe with medicines. Read and follow all instructions on the label. ¨ If the doctor gave you a prescription medicine for pain, take it as prescribed. ¨ If you are not taking a prescription pain medicine, ask your doctor if you can take an over-the-counter medicine. · If you take blood thinners, such as warfarin (Coumadin), clopidogrel (Plavix), or aspirin, be sure to talk to your doctor. He or she will tell you if and when to start taking those medicines again. Make sure that you understand exactly what your doctor wants you to do. · Your doctor will tell you if and when you can restart your medicines. He or she will also give you instructions about taking any new medicines. Care of the catheter site · You will have a dressing over the cut (incision). A dressing helps the incision heal and protects it. Your doctor will tell you how to take care of this. · Put ice or a cold pack on the area for 10 to 20 minutes at a time to help with soreness or swelling. Put a thin cloth between the ice and your skin. Follow-up care is a key part of your treatment and safety. Be sure to make and go to all appointments, and call your doctor if you are having problems. It's also a good idea to know your test results and keep a list of the medicines you take. When should you call for help? Call 911 anytime you think you may need emergency care. For example, call if: 
· You passed out (lost consciousness). · You have severe trouble breathing. · You have sudden chest pain and shortness of breath, or you cough up blood. Call your doctor now or seek immediate medical care if: 
· You are bleeding from the area where the catheter was put in your artery. · You have a fast-growing, painful lump at the catheter site. · You have signs of infection, such as: 
¨ Increased pain, swelling, warmth, or redness. ¨ Red streaks leading from the incision. ¨ Pus draining from the incision. ¨ A fever. Watch closely for any changes in your health, and be sure to contact your doctor if: 
· You don't get better as expected. Where can you learn more? Go to http://mariella-kenyon.info/. Enter L576 in the search box to learn more about \"Angiogram: What to Expect at Home. \" Current as of: October 14, 2016 Content Version: 11.2 © 0638-5558 [employer]. Care instructions adapted under license by [employer] (which disclaims liability or warranty for this information). If you have questions about a medical condition or this instruction, always ask your healthcare professional. [place] [address] any warranty or liability for your use of this information. DISCHARGE SUMMARY from Nurse The following personal items are in your possession at time of discharge: 
 
  
Visual Aid: Glasses Hearing Aids/Status: At bedside PATIENT INSTRUCTIONS: 
 
After general anesthesia or intravenous sedation, for 24 hours or while taking prescription Narcotics: · Limit your activities · Do not drive and operate hazardous machinery · Do not make important personal or business decisions · Do  not drink alcoholic beverages · If you have not urinated within 8 hours after discharge, please contact your surgeon on call. Report the following to your surgeon: 
· Excessive pain, swelling, redness or odor of or around the surgical area · Temperature over 100.5 · Nausea and vomiting lasting longer than 4 hours or if unable to take medications · Any signs of decreased circulation or nerve impairment to extremity: change in color, persistent  numbness, tingling, coldness or increase pain · Any questions What to do at Home: 
Recommended activity: Activity as tolerated and no driving for today. *  Please give a list of your current medications to your Primary Care Provider.  
 
*  Please update this list whenever your medications are discontinued, doses are 
    changed, or new medications (including over-the-counter products) are added. *  Please carry medication information at all times in case of emergency situations. These are general instructions for a healthy lifestyle: No smoking/ No tobacco products/ Avoid exposure to second hand smoke Surgeon General's Warning:  Quitting smoking now greatly reduces serious risk to your health. Obesity, smoking, and sedentary lifestyle greatly increases your risk for illness A healthy diet, regular physical exercise & weight monitoring are important for maintaining a healthy lifestyle You may be retaining fluid if you have a history of heart failure or if you experience any of the following symptoms:  Weight gain of 3 pounds or more overnight or 5 pounds in a week, increased swelling in our hands or feet or shortness of breath while lying flat in bed. Please call your doctor as soon as you notice any of these symptoms; do not wait until your next office visit. Recognize signs and symptoms of STROKE: 
 
F-face looks uneven A-arms unable to move or move unevenly S-speech slurred or non-existent T-time-call 911 as soon as signs and symptoms begin-DO NOT go Back to bed or wait to see if you get better-TIME IS BRAIN. Warning Signs of HEART ATTACK Call 911 if you have these symptoms: 
? Chest discomfort. Most heart attacks involve discomfort in the center of the chest that lasts more than a few minutes, or that goes away and comes back. It can feel like uncomfortable pressure, squeezing, fullness, or pain. ? Discomfort in other areas of the upper body. Symptoms can include pain or discomfort in one or both arms, the back, neck, jaw, or stomach. ? Shortness of breath with or without chest discomfort. ? Other signs may include breaking out in a cold sweat, nausea, or lightheadedness. Don't wait more than five minutes to call [address]!  Fast action can save your life. Calling 911 is almost always the fastest way to get lifesaving treatment. Emergency Medical Services staff can begin treatment when they arrive  up to an hour sooner than if someone gets to the hospital by car. The discharge information has been reviewed with the patient and spouse. The patient and spouse verbalized understanding. Discharge medications reviewed with the patient and spouse and appropriate educational materials and side effects teaching were provided. Discharge Orders None Introducing [facility]! Brigittelara Jeramy introduces [employer] patient portal. Now you can access parts of your medical record, email your doctor's office, and request medication refills online. 1. In your internet browser, go to [email or web address] 2. Click on the First Time User? Click Here link in the Sign In box. You will see the New Member Sign Up page. 3. Enter your [employer] Access Code exactly as it appears below. You will not need to use this code after youve completed the sign-up process. If you do not sign up before the expiration date, you must request a new code. · [employer] Access Code: [email or web address] Expires: 6/28/2017  3:00 PM 
 
4. Enter the last four digits of your Social Security Number (xxxx) and Date of Birth (mm/dd/yyyy) as indicated and click Submit. You will be taken to the next sign-up page. 5. Create a [employer] ID. This will be your [employer] login ID and cannot be changed, so think of one that is secure and easy to remember. 6. Create a [employer] password. You can change your password at any time. 7. Enter your Password Reset Question and Answer. This can be used at a later time if you forget your password. 8. Enter your e-mail address. You will receive e-mail notification when new information is available in [address] 9. Click Sign Up. You can now view and download portions of your medical record. 10. Click the Download Summary menu link to download a portable copy of your medical information. If you have questions, please visit the Frequently Asked Questions section of the [employer] website. Remember, [employer] is NOT to be used for urgent needs. For medical emergencies, dial 911. Now available from your iPhone and Android! General Information Please provide this summary of care documentation to your next provider. Patient Signature:  ____________________________________________________________ Date:  ____________________________________________________________  
  
Arna Star Provider Signature:  ____________________________________________________________ Date:  ____________________________________________________________

## 2017-06-12 NOTE — OP NOTES
[address] [place] [address]  OPERATIVE REPORT    Name:  Charles Youngblood  MR#:  [id number]  :  1945  Account #:  [de-identified]  Date of Adm:  2017  Date of Surgery:  2017      PREOPERATIVE DIAGNOSIS: Nonhealing left lower extremity wound  with peripheral arterial disease. POSTOPERATIVE DIAGNOSIS: Nonhealing left lower extremity  wound with peripheral arterial disease, with superficial femoral artery  stenosis. PROCEDURES PERFORMED  1. Ultrasound-guided percutaneous access of right common femoral  artery. 2. Nonselective catheterization of abdominal aorta. 3. Aortogram.  4. Second order catheterization of left common femoral artery. 5. Left lower extremity runoff with radiologic interpretation. 6. Directional atherectomy of left femoral-popliteal with TurboHawk  directional balloon. 7. Angioplasty of left femoral-popliteal with a 6 mm drug-coated  balloon. SURGEON: Winter Guerrero MD    ESTIMATED BLOOD LOSS: Minimal    ANESTHESIA: Moderate sedation with local.    PACKS AND DRAINS: None. IMPLANTS: None. SPECIMENS REMOVED: None. COMPLICATIONS: None. CONDITION: Stable. FINDINGS: High-grade stenosis of the left fem-pop involving the distal  SFA to the above-knee popliteal artery. Satisfactory appearance and  resolution of stenosis after atherectomy and angioplasty. INDICATION FOR PROCEDURE: The patient a 70-year-old  gentleman who was referred to our office from [address] [place] [address] for  bilateral lower extremity wounds. Patient previously had undergone a  right lower extremity angiogram and presented for a planned left lower  extremity intervention. Informed consent was obtained. After explaining  risks, and benefits, of the procedure including bleeding risk, infection,  MI, stroke, and death, informed consent was obtained.     PROCEDURE IN DETAIL: On 2017, the patient was identified by name and ID bracelet  by myself the angio team. Once this was done, the patient placed on the  catheterization table in supine position. After appropriate depth of  anesthesia obtained, the patient prepped and draped and time-out  performed. At this point, using 1% lidocaine, the right groin was  anesthetized, and using ultrasound, the right femoral artery was  interrogated. It was pulsatile and patent and appeared to be  appropriate site for access. Percutaneous access of the right common  femoral artery was then obtained. Using a [employer] wire, the wire was  then advanced into the abdominal aorta, followed by a 6-Bulgarian  sheath. I then performed aortogram. This showed a less than 50%  stenosis of the left common iliac artery with no hemodynamically significant stenosis noted in  either iliac artery. Given these findings, we then crossed the aortic  bifurcation, performed left lower extremity runoff. This showed a high-  grade stenosis of the left fem-pop with 3-vessel runoff to the foot. We then used a long  6-Bulgarian sheath to cross across the aortic bifurcation, heparinized the  patient appropriately, we then treated the fem-pop disease with a  directional atherectomy device, followed by a 6 mm drug-coated  balloon. Completion venogram showed satisfactory appearance of our  vessels. Of note, prior to performing atherectomy, we did advance a  Spider wire distally into the popliteal artery to prevent an embolus from  going down to the runoff. Happy with the results, we then recaptured  the Spider device with a TrailBlazer and then we performed a right  groin shot, which showed satisfactory placement to the right common  femoral artery. A 6-Bulgarian Angio-Seal was used to close the puncture  site. I was present  and scrubbed for the entire procedure.         MD Abby Perdue / Darren Mao  D:  06/11/2017   20:25  T:  06/11/2017   23:03  Job #:  [id number]

## 2017-06-13 DIAGNOSIS — M79.89 LEG SWELLING: Primary | ICD-10-CM

## 2017-06-13 NOTE — PROGRESS NOTES
Type of procedure: Venous duplex reflux study     Weight in appropriate: WNL     Name of prescriber: Dr. Brian Garber    Date and time order was received: 6/13/17 @1015am

## 2017-06-15 ENCOUNTER — HOSPITAL ENCOUNTER (OUTPATIENT)
Dept: WOUND CARE | Age: 72
Discharge: HOME | End: 2017-06-15
Payer: MEDICARE

## 2017-06-15 PROCEDURE — 29581 APPL MULTLAYER CMPRN SYS LEG: CPT

## 2017-06-21 PROCEDURE — 97602 WOUND(S) CARE NON-SELECTIVE: CPT

## 2017-06-21 PROCEDURE — 11042 DBRDMT SUBQ TIS 1ST 20SQCM/<: CPT

## 2017-06-21 PROCEDURE — 99213 OFFICE O/P EST LOW 20 MIN: CPT

## 2017-06-27 ENCOUNTER — OFFICE VISIT (OUTPATIENT)
Dept: VASCULAR SURGERY | Age: 72
End: 2017-06-27

## 2017-06-27 VITALS
BODY MASS INDEX: 20.53 KG/M2 | HEART RATE: 96 BPM | RESPIRATION RATE: 16 BRPM | WEIGHT: 160 LBS | DIASTOLIC BLOOD PRESSURE: 66 MMHG | HEIGHT: 74 IN | SYSTOLIC BLOOD PRESSURE: 120 MMHG

## 2017-06-27 DIAGNOSIS — I73.9 PAD (PERIPHERAL ARTERY DISEASE) (HCC): ICD-10-CM

## 2017-06-27 DIAGNOSIS — I87.2 VENOUS REFLUX: ICD-10-CM

## 2017-06-27 DIAGNOSIS — L97.921 LEG ULCER, LEFT, LIMITED TO BREAKDOWN OF SKIN (HCC): Primary | ICD-10-CM

## 2017-06-27 PROCEDURE — 29581 APPL MULTLAYER CMPRN SYS LEG: CPT

## 2017-06-27 NOTE — PROGRESS NOTES
Sharron Orozco is a 74 y.o. male      1. Have you been to the ER, urgent care clinic or hospitalized since your last visit? NO.     2. Have you seen or consulted any other health care providers outside of the [address] [place] [address] since your last visit (Include any pap smears or colon screening)? Yes urology at [facility] for cath change . Hematology and oncology on [facility] [id number] for b12 shot    Patient states that he isn't allergic to latex.

## 2017-06-28 NOTE — PROGRESS NOTES
[facility]    Chief Complaint   Patient presents with    Surgical Follow-up       History and Physical    Mr. Steven Bustos returns to our office for follow up after undergoing a left leg angiogram with a non healing wound. He states that since his angiograms his wounds have been improving. He has no new complaints. Past Medical History:   Diagnosis Date    Acid reflux     Anemia     CAD (coronary artery disease)     Chronic obstructive pulmonary disease (HCC)     Diabetes (HCC)     GERD (gastroesophageal reflux disease)     H/O seasonal allergies     Hypercholesterolemia     Hypertension     Ill-defined condition     hand tremors    Sleep apnea     pt states he has not used bipap \"in years\".  Thyroid disease      Patient Active Problem List   Diagnosis Code    PAD (peripheral artery disease) ([facility] I73.9    Venous reflux I87.2    Leg ulcer, left ([address] L97.929    Leg ulcer ([facility] L97.909     Past Surgical History:   Procedure Laterality Date    ABDOMEN SURGERY PROC UNLISTED  1997    Vikas-en-Y    ABDOMEN SURGERY PROC UNLISTED  2000    abdominal hernia repair/mesh    HX COLONOSCOPY      HX HEENT      cataracts removed right eye    HX ORTHOPAEDIC  1982    cervical laminectomy    HX UROLOGICAL  12/2015    suprapubic catheter in place    VASCULAR SURGERY PROCEDURE UNLIST       Current Outpatient Prescriptions   Medication Sig Dispense Refill    oxyCODONE-acetaminophen (PERCOCET) 5-325 mg per tablet Take 1 Tab by mouth every four (4) hours as needed for Pain.  potassium citrate (UROCIT-K10) 10 mEq (1,080 mg) TbER Take 10 mEq by mouth two (2) times a day.  multivitamin, tx-iron-ca-min (THERA-M W/ IRON) 9 mg iron-400 mcg tab tablet Take 1 Tab by mouth daily.  acetaminophen (TYLENOL EXTRA STRENGTH) 500 mg tablet Take 500 mg by mouth every six (6) hours as needed for Pain.  cilostazol (PLETAL) 100 mg tablet Take 100 mg by mouth Before breakfast and dinner.       iron aspgly,on-T-Q47-FA-Ca-suc (FERREX 150 FORTE PLUS) 925-42-99-2 mg-mg-mcg-mg cap cap Take 1 Cap by mouth daily.  insulin aspart (NOVOLOG FLEXPEN) 100 unit/mL inpn by SubCUTAneous route Before breakfast, lunch, and dinner. Sliding scale      metFORMIN (GLUCOPHAGE) 1,000 mg tablet Take 1,000 mg by mouth two (2) times daily (with meals).  atorvastatin (LIPITOR) 20 mg tablet Take  by mouth daily.  lisinopril (PRINIVIL, ZESTRIL) 2.5 mg tablet Take  by mouth daily.  albuterol (PROAIR HFA) 90 mcg/actuation inhaler Take  by inhalation.  clopidogrel (PLAVIX) 75 mg tab Take  by mouth.  cyanocobalamin (VITAMIN B-12) 1,000 mcg/mL injection 1,000 mcg by IntraMUSCular route every thirty (30) days.  aspirin 81 mg tablet Take 81 mg by mouth daily.  insulin glargine (LANTUS) 100 unit/mL injection 20 Units by SubCUTAneous route nightly.  esomeprazole (NEXIUM) 40 mg capsule Take  by mouth daily.  furosemide (LASIX) 40 mg tablet Take 40 mg by mouth daily.  diltiazem (TIAZAC) 360 mg SR capsule Take 360 mg by mouth daily.  tiotropium (SPIRIVA WITH HANDIHALER) 18 mcg inhalation capsule Take 1 Cap by inhalation daily.  loratadine (CLARITIN) 10 mg tablet Take 10 mg by mouth daily.  fluticasone (FLONASE) 50 mcg/actuation nasal spray 2 Sprays by Both Nostrils route daily.  traMADol (ULTRAM) 50 mg tablet Take 50 mg by mouth daily.  gabapentin (NEURONTIN) 300 mg capsule Take 300 mg by mouth three (3) times daily.  LACTASE ENZYME PO Take 1 Cap by mouth daily.  simvastatin (ZOCOR) 10 mg tablet Take 10 mg by mouth nightly.  levothyroxine (SYNTHROID) 25 mcg tablet Take 100 mcg by mouth Daily (before breakfast).  fluticasone-salmeterol (ADVAIR DISKUS) 250-50 mcg/dose diskus inhaler Take 1 Puff by inhalation every twelve (12) hours. Allergies   Allergen Reactions    Latex Contact Dermatitis     Pt states he is NOT allergic to latex.     Neosporin [Neomycin-Bacitracin-Polymyxin] Rash     Social History     Social History    Marital status:      Spouse name: N/A    Number of children: N/A    Years of education: N/A     Occupational History    Not on file. Social History Main Topics    Smoking status: Former Smoker     Types: Cigarettes     Quit date: 1/1/1997    Smokeless tobacco: Never Used    Alcohol use 0.6 oz/week     1 Glasses of wine per week    Drug use: No    Sexual activity: Not on file     Other Topics Concern    Not on file     Social History Narrative      Family History   Problem Relation Age of Onset    Cancer Mother     Diabetes Father     Heart Disease Sister     Cancer Brother     Diabetes Brother     Hypertension Brother        Review of Systems    Review of Systems   Constitutional: Negative for chills, diaphoresis, fever, malaise/fatigue and weight loss. HENT: Negative for hearing loss and sore throat. Eyes: Negative for blurred vision, photophobia and redness. Respiratory: Negative for cough, hemoptysis, shortness of breath and wheezing. Cardiovascular: Positive for leg swelling. Negative for chest pain, palpitations and orthopnea. Gastrointestinal: Negative for abdominal pain, blood in stool, constipation, diarrhea, heartburn, nausea and vomiting. Genitourinary: Negative for dysuria, frequency, hematuria and urgency. Musculoskeletal: Negative for back pain and myalgias. Skin: Negative for itching and rash. Neurological: Positive for weakness. Negative for dizziness, speech change, focal weakness and headaches. Endo/Heme/Allergies: Does not bruise/bleed easily. Psychiatric/Behavioral: Negative for depression and suicidal ideas.             Physical Exam:    Visit Vitals    /66 (BP 1 Location: Right arm, BP Patient Position: Sitting)    Pulse 96    Resp 16    Ht 6' 2\" (1.88 m)    Wt 160 lb (72.6 kg)    BMI 20.54 kg/m2      Physical Examination: General appearance - alert, well appearing, and in no distress  Mental status - alert, oriented to person, place, and time  Eyes - sclera anicteric, left eye normal, right eye normal  Ears - right ear normal, left ear normal  Nose - normal and patent, no erythema, discharge or polyps  Mouth - mucous membranes moist, pharynx normal without lesions  Extremities - Bilateral lower extremities with Unna boots. Toes warm and well perfused. Impression and Plan:    ICD-10-CM ICD-9-CM    1. Leg ulcer, left, limited to breakdown of skin L97.921 707.10 SUSANNAH WBI LTD SINGLE LEVEL AMB   2. Venous reflux I87.2 459.81 DUPLEX LOWER EXT VENOUS BILAT AMB   3. PAD (peripheral artery disease) (HCC) I73.9 443.9 SUSANNAH WBI LTD SINGLE LEVEL AMB      LOWER EXT ART PVR MULT LEVEL SEG PRESSURES     Orders Placed This Encounter    DUPLEX LOWER EXT VENOUS BILAT AMB (Reflux)    SUSANNAH BILATERAL    LOWER EXT ART PVR MULT LEVEL SEG PRESSURES     I told Mr. Hough that I am pleased he has seen an improvement in his wounds. I would like to re evaluate his circulation with repeat ABIs and I want to evaluate him for venous reflux with a reflux study. We will have of these completed and we will see him in 3 weeks. Follow-up Disposition:  Return in about 3 weeks (around 7/18/2017) for Vascular labs. The treatment plan was reviewed with the patient in detail. The patient voiced understanding of this plan and all questions and concerns were addressed. The patient agrees with this plan. We discussed the signs and symptoms that would require earlier attention or intervention. The patient was given educational material related to his/her visit and the patient has voiced understanding of the material.     I appreciate the opportunity to participate in the care of your patient. I will be sure to keep you informed of any subsequent changes in the treatment plan. If you have any questions or concerns, please feel free to contact me.   Magnolia Kim, MD    PLEASE NOTE:  This document has been produced using voice recognition software. Unrecognized errors in transcription may be present.

## 2017-07-05 ENCOUNTER — OFFICE VISIT (OUTPATIENT)
Dept: VASCULAR SURGERY | Age: 72
End: 2017-07-05

## 2017-07-05 DIAGNOSIS — M79.89 LEG SWELLING: ICD-10-CM

## 2017-07-05 DIAGNOSIS — L97.911 LEG ULCER, RIGHT, LIMITED TO BREAKDOWN OF SKIN (HCC): ICD-10-CM

## 2017-07-05 DIAGNOSIS — L97.921 LEG ULCER, LEFT, LIMITED TO BREAKDOWN OF SKIN (HCC): Primary | ICD-10-CM

## 2017-07-05 NOTE — PROGRESS NOTES
[facility]          Chart reviewed for the following:   Greg OATES LPN, have reviewed the medications and updated the Allergic reactions for [address] performed immediately prior to start of procedure:   Greg OATES LPN, have performed the following reviews on [address] prior to the start of the procedure:            * Patient was identified by name and date of birth   * Agreement that [facility] boot removed and reapplied   * Procedure site verified   * Patient was positioned for comfort  * Verbal consent was given by patient     Time: 11:00      Date of procedure: 7/5/2017    Procedure performed by:  [facility] NURSE    How tolerated by patient: tolerated the procedure well with no complications    Comments:  Patient being seen for wound care after ultrasound completed. Cleansed bilateral lower extremities with wound cleanser and gauze, patient tolerated well. Patient has two small open areas to right medial ankle and one small open area to anterior lower leg, granulating with small amount ss drainage, surrounding tissue WNL but slightly dry. Left lateral ankle has small open area, granulating with small amount ss drainage, surrounding tissue WNL and slightly dry. Applied adaptic gauze to open areas, ABD, unna boot and coban, patient tolerated well. Patient will follow up with wound care clinic.

## 2017-07-05 NOTE — PROCEDURES
[facility]   *** FINAL REPORT ***    Name: Mike Thapa  MRN: [id number]       Outpatient  : 16 May 1945  HIS Order #: [id number]  [id number] [facility] Visit #: [id number]  Date: 2017    TYPE OF TEST: Peripheral Venous Testing    REASON FOR TEST  Edema, Ulcer of skin of lower ext    Right Leg:-  Deep venous thrombosis:           No  Superficial venous thrombosis:    Yes  Deep venous insufficiency:        Yes  Superficial venous insufficiency: Yes    Left Leg:-  Deep venous thrombosis:           No  Superficial venous thrombosis:    Not examined  Deep venous insufficiency:        Yes  Superficial venous insufficiency: No    Abnormal Valve Closure Times (seconds):    Right Common Femoral: 2.5    Left Common Femoral:  1.5    Left Femoral:         3.9    Vein Mapping:    Diam.   Depth  (mm)    Right Great Saphenous Vein:    High Thigh:    Mid Thigh:    Low Thigh:    Knee:    High Calf:    Low Calf: Ankle:    Right Small Saphenous Vein:    SPJ:    Mid Calf: Ankle:    Giacomini:  Accessory saph.:  Car :  Healy :    Left Great Saphenous Vein:    High Thigh:    Mid Thigh:    Low Thigh:    Knee:    High Calf:    Low Calf: Ankle:    Left Small Saphenous Vein:    SPJ:    Mid Calf: Ankle:    Giacomini:  Accessory saph.:  Car :  Healy :      INTERPRETATION/FINDINGS  Duplex images were obtained using 2-D gray scale, color flow and  spectral doppler analysis. The reflux exam was performed in the reverse   trendelenburg position. Bilateral reflux:  1. No evidence of deep vein thrombosis in the common femoral, proximal   deep femoral, femoral, popliteal, posterior tibial and peroneal veins   bilaterally. 2. Deep reflux in the common femoral veins bilaterally and in the left   femoral vein. 3. History of bilateral great saphenous vein ablation with reflux at  the right sapheno-femoral junction and reflux in an accessory vein  which divides in the distal thigh.   4. Chronic near occlusive thrombus in the right small saphenous vein  without reflux. 5. Left great saphenous vein reflux at the sapheno-femoral junction  with nonvisualization of the vein in the thigh. Accessory vein  identified without reflux in the thigh. 6. Left small saphenous vein could not be assessed due to positioning  limitations. 7. Monophasic right posterior tibial artery flow and biphasic left  posterior tibial artery flow. 8. See worksheet. ADDITIONAL COMMENTS    I have personally reviewed the data relevant to the interpretation of  this  study. TECHNOLOGIST: Omayra Jasso RVT, RDMS  Signed: 07/05/2017 12:37 PM    PHYSICIAN: Brock Martin MD  Signed: 07/05/2017 02:27 PM

## 2017-07-11 ENCOUNTER — OFFICE VISIT (OUTPATIENT)
Dept: VASCULAR SURGERY | Age: 72
End: 2017-07-11

## 2017-07-11 ENCOUNTER — HOSPITAL ENCOUNTER (OUTPATIENT)
Dept: VASCULAR SURGERY | Age: 72
Discharge: HOME | End: 2017-07-11
Attending: SURGERY
Payer: MEDICARE

## 2017-07-11 ENCOUNTER — HOSPITAL ENCOUNTER (OUTPATIENT)
Dept: WOUND CARE | Age: 72
Discharge: HOME | End: 2017-07-11
Payer: MEDICARE

## 2017-07-11 VITALS
BODY MASS INDEX: 20.53 KG/M2 | DIASTOLIC BLOOD PRESSURE: 58 MMHG | WEIGHT: 160 LBS | HEIGHT: 74 IN | SYSTOLIC BLOOD PRESSURE: 130 MMHG | HEART RATE: 88 BPM | RESPIRATION RATE: 22 BRPM

## 2017-07-11 DIAGNOSIS — I73.9 PAD (PERIPHERAL ARTERY DISEASE) (HCC): ICD-10-CM

## 2017-07-11 DIAGNOSIS — L97.911 LEG ULCER, RIGHT, LIMITED TO BREAKDOWN OF SKIN (HCC): ICD-10-CM

## 2017-07-11 DIAGNOSIS — I87.2 VENOUS REFLUX: ICD-10-CM

## 2017-07-11 DIAGNOSIS — L97.921 LEG ULCER, LEFT, LIMITED TO BREAKDOWN OF SKIN (HCC): Primary | ICD-10-CM

## 2017-07-11 PROCEDURE — 29581 APPL MULTLAYER CMPRN SYS LEG: CPT

## 2017-07-11 NOTE — PROCEDURES
[facility]  *** FINAL REPORT ***    Name: Ross Najera  MRN: [id number]    Outpatient  : 16 May 1945  HIS Order #: [id number]  [id number] [facility] Visit #: [id number]  Date: 2017    TYPE OF TEST: Peripheral Arterial Testing    REASON FOR TEST  Extremity ulceration (both sides)    Right Leg  Segmentals: Abnormal                     mmHg  Brachial         119  High thigh  Low thigh          0  Calf  Posterior tibial  68  Dorsalis pedis    52  Peroneal  Metatarsal  Toe pressure      51  Doppler:    Abnormal  Ankle/Brachial: 0.57    Site of occlusive disease:-  tibioperoneal segment and the digits    Left Leg  Segmentals: Normal                     mmHg  Brachial         117  High thigh  Low thigh  Calf  Posterior tibial 120  Dorsalis pedis   107  Peroneal  Metatarsal  Toe pressure      89  Doppler:    Normal  Ankle/Brachial: 1.01    INTERPRETATION/FINDINGS  Physiologic testing was performed using continuous wave doppler and  segmental pressures. 1. Moderate peripheral arterial disease indicated at rest in the right   leg. 2. Disease is located in the tibioperoneal segment and the digits on  the right side. 3. No evidence of significant peripheral arterial disease at rest in  the left leg. 4. The right ankle/brachial index is 0.57 and the left ankle/brachial  index is 1.01.    ADDITIONAL COMMENTS    I have personally reviewed the data relevant to the interpretation of  this  study. TECHNOLOGIST: Ellen Echevarria  Signed: 2017 09:26 AM    PHYSICIAN: Jus Stein MD  Signed: 2017 02:38 PM

## 2017-07-12 NOTE — PROGRESS NOTES
Shaan Denise . Chief Complaint   Patient presents with    Wound Check       History and Physical    Mr. Stormy Singh returns to our office today for continued management of his lower extremity wounds. Mr. Stormy Singh states that his recent wound care appointments after his most recent angiogram have been encouraging as he has noticed improvements in his wounds. He is anxious to hear the results of his studies. Past Medical History:   Diagnosis Date    Acid reflux     Anemia     CAD (coronary artery disease)     Chronic obstructive pulmonary disease (HCC)     Diabetes (HCC)     GERD (gastroesophageal reflux disease)     H/O seasonal allergies     Hypercholesterolemia     Hypertension     Ill-defined condition     hand tremors    Sleep apnea     pt states he has not used bipap \"in years\".  Thyroid disease      Patient Active Problem List   Diagnosis Code    PAD (peripheral artery disease) ([address] I73.9    Venous reflux I87.2    Leg ulcer, left ([address] L97.929    Leg ulcer ([facility] [address] L97.909     Past Surgical History:   Procedure Laterality Date    ABDOMEN SURGERY PROC UNLISTED  1997    Vikas-en-Y    ABDOMEN SURGERY PROC UNLISTED  2000    abdominal hernia repair/mesh    HX COLONOSCOPY      HX HEENT      cataracts removed right eye    HX ORTHOPAEDIC  1982    cervical laminectomy    HX UROLOGICAL  12/2015    suprapubic catheter in place    VASCULAR SURGERY PROCEDURE UNLIST       Current Outpatient Prescriptions   Medication Sig Dispense Refill    oxyCODONE-acetaminophen (PERCOCET) 5-325 mg per tablet Take 1 Tab by mouth every four (4) hours as needed for Pain.  potassium citrate (UROCIT-K10) 10 mEq (1,080 mg) TbER Take 10 mEq by mouth two (2) times a day.  multivitamin, tx-iron-ca-min (THERA-M W/ IRON) 9 mg iron-400 mcg tab tablet Take 1 Tab by mouth daily.  acetaminophen (TYLENOL EXTRA STRENGTH) 500 mg tablet Take 500 mg by mouth every six (6) hours as needed for Pain.       cilostazol (PLETAL) 100 mg tablet Take 100 mg by mouth Before breakfast and dinner.  iron aspgly,ku-X-K36-FA-Ca-suc (FERREX 150 FORTE PLUS) 947-32-29-5 mg-mg-mcg-mg cap cap Take 1 Cap by mouth daily.  insulin aspart (NOVOLOG FLEXPEN) 100 unit/mL inpn by SubCUTAneous route Before breakfast, lunch, and dinner. Sliding scale      metFORMIN (GLUCOPHAGE) 1,000 mg tablet Take 1,000 mg by mouth two (2) times daily (with meals).  atorvastatin (LIPITOR) 20 mg tablet Take  by mouth daily.  lisinopril (PRINIVIL, ZESTRIL) 2.5 mg tablet Take  by mouth daily.  albuterol (PROAIR HFA) 90 mcg/actuation inhaler Take  by inhalation.  clopidogrel (PLAVIX) 75 mg tab Take  by mouth.  cyanocobalamin (VITAMIN B-12) 1,000 mcg/mL injection 1,000 mcg by IntraMUSCular route every thirty (30) days.  aspirin 81 mg tablet Take 81 mg by mouth daily.  insulin glargine (LANTUS) 100 unit/mL injection 20 Units by SubCUTAneous route nightly.  esomeprazole (NEXIUM) 40 mg capsule Take  by mouth daily.  furosemide (LASIX) 40 mg tablet Take 40 mg by mouth daily.  diltiazem (TIAZAC) 360 mg SR capsule Take 360 mg by mouth daily.  tiotropium (SPIRIVA WITH HANDIHALER) 18 mcg inhalation capsule Take 1 Cap by inhalation daily.  loratadine (CLARITIN) 10 mg tablet Take 10 mg by mouth daily.  fluticasone (FLONASE) 50 mcg/actuation nasal spray 2 Sprays by Both Nostrils route daily.  traMADol (ULTRAM) 50 mg tablet Take 50 mg by mouth daily.  gabapentin (NEURONTIN) 300 mg capsule Take 300 mg by mouth three (3) times daily.  LACTASE ENZYME PO Take 1 Cap by mouth daily.  simvastatin (ZOCOR) 10 mg tablet Take 10 mg by mouth nightly.  levothyroxine (SYNTHROID) 25 mcg tablet Take 100 mcg by mouth Daily (before breakfast).  fluticasone-salmeterol (ADVAIR DISKUS) 250-50 mcg/dose diskus inhaler Take 1 Puff by inhalation every twelve (12) hours.          Allergies Allergen Reactions    Latex Contact Dermatitis     Pt states he is NOT allergic to latex.  Neosporin [Neomycin-Bacitracin-Polymyxin] Rash     Social History     Social History    Marital status:      Spouse name: N/A    Number of children: N/A    Years of education: N/A     Occupational History    Not on file. Social History Main Topics    Smoking status: Former Smoker     Types: Cigarettes     Quit date: 1/1/1997    Smokeless tobacco: Never Used    Alcohol use 0.6 oz/week     1 Glasses of wine per week    Drug use: No    Sexual activity: Not on file     Other Topics Concern    Not on file     Social History Narrative      Family History   Problem Relation Age of Onset    Cancer Mother     Diabetes Father     Heart Disease Sister     Cancer Brother     Diabetes Brother     Hypertension Brother        Review of Systems    Review of Systems   Constitutional: Negative for chills, diaphoresis, fever, malaise/fatigue and weight loss. HENT: Negative for hearing loss and sore throat. Eyes: Negative for blurred vision, photophobia and redness. Respiratory: Negative for cough, hemoptysis, shortness of breath and wheezing. Cardiovascular: Positive for leg swelling. Negative for chest pain, palpitations and orthopnea. Gastrointestinal: Negative for abdominal pain, blood in stool, constipation, diarrhea, heartburn, nausea and vomiting. Genitourinary: Negative for dysuria, frequency, hematuria and urgency. Musculoskeletal: Negative for back pain and myalgias. Skin: Negative for itching and rash. Neurological: Positive for weakness. Negative for dizziness, speech change, focal weakness and headaches. Endo/Heme/Allergies: Does not bruise/bleed easily. Psychiatric/Behavioral: Negative for depression and suicidal ideas.             Physical Exam:    Visit Vitals    /58    Pulse 88    Resp 22    Ht 6' 2\" (1.88 m)    Wt 160 lb (72.6 kg)    BMI 20.54 kg/m2 Physical Examination: General appearance - alert, well appearing, and in no distress  Mental status - alert, oriented to person, place, and time  Eyes - sclera anicteric, left eye normal, right eye normal  Ears - right ear normal, left ear normal  Nose - normal and patent, no erythema, discharge or polyps  Mouth - mucous membranes moist, pharynx normal without lesions  Extremities - Bilateral lower extremities in Unna boots. Warm and well perfused. No significant edema above Unna boots. Impression and Plan:    ICD-10-CM ICD-9-CM    1. Leg ulcer, left, limited to breakdown of skin ([address] L97.921 707.10    2. Leg ulcer, right, limited to breakdown of skin ([address] L97.911 707.10    3. Venous reflux I87.2 459.81    4. PAD (peripheral artery disease) ([facility]) I73.9 443.9      I told Mr. Hough that I am pleased that his wounds appear to be improving. We reviewed his venous reflux studies. He has a history of bilateral GSV closures and this was confirmed with his reflux study. He does have significant deep venous reflux however. We also reviewed his ABIs. He has normal perfusion to his left leg, but he still has moderate PAD in his right leg with an SUSANNAH of 0.57. This is somewhat surprising given the angiographic images. I will evaluate these wounds in the office next week and see how they are doing. If they have improved significantly then we will continue to monitor them. If they look venous in nature we will proceed with a venogram.  If they continue to look ischemic then we will proceed with an angiogram.     Follow-up Disposition:  Return in about 1 day (around 7/12/2017) for Wound check. The treatment plan was reviewed with the patient in detail. The patient voiced understanding of this plan and all questions and concerns were addressed. The patient agrees with this plan. We discussed the signs and symptoms that would require earlier attention or intervention.      The patient was given educational material related to his/her visit and the patient has voiced understanding of the material.     I appreciate the opportunity to participate in the care of your patient. I will be sure to keep you informed of any subsequent changes in the treatment plan. If you have any questions or concerns, please feel free to contact me. Frandy Puentes MD    PLEASE NOTE:  This document has been produced using voice recognition software. Unrecognized errors in transcription may be present.

## 2017-07-19 ENCOUNTER — OFFICE VISIT (OUTPATIENT)
Dept: VASCULAR SURGERY | Age: 72
End: 2017-07-19

## 2017-07-19 VITALS
WEIGHT: 160 LBS | HEART RATE: 82 BPM | SYSTOLIC BLOOD PRESSURE: 112 MMHG | BODY MASS INDEX: 20.53 KG/M2 | DIASTOLIC BLOOD PRESSURE: 62 MMHG | RESPIRATION RATE: 22 BRPM | HEIGHT: 74 IN

## 2017-07-19 DIAGNOSIS — L97.921 LEG ULCER, LEFT, LIMITED TO BREAKDOWN OF SKIN (HCC): ICD-10-CM

## 2017-07-19 DIAGNOSIS — I73.9 PAD (PERIPHERAL ARTERY DISEASE) (HCC): Primary | ICD-10-CM

## 2017-07-19 DIAGNOSIS — L97.911 LEG ULCER, RIGHT, LIMITED TO BREAKDOWN OF SKIN (HCC): ICD-10-CM

## 2017-07-19 DIAGNOSIS — I87.2 VENOUS REFLUX: ICD-10-CM

## 2017-07-19 PROCEDURE — 29581 APPL MULTLAYER CMPRN SYS LEG: CPT

## 2017-07-19 NOTE — PROGRESS NOTES
Gena Bradley Sr. Chief Complaint   Patient presents with    Wound Check       History and Physical    Mr. Paty Spears returns to our office for continued evaluation of his bilateral chronic leg wounds. He is scheduled to see Dr. Adonay Lin in the near future as his new wound care doctor. He states that his leg wounds have been improving slowly over the past few weeks after his angiogram.  He states that over the past few days his ankle ulcers have become somewhat more painful. He has no other complaints. Past Medical History:   Diagnosis Date    Acid reflux     Anemia     CAD (coronary artery disease)     Chronic obstructive pulmonary disease (HCC)     Diabetes (HCC)     GERD (gastroesophageal reflux disease)     H/O seasonal allergies     Hypercholesterolemia     Hypertension     Ill-defined condition     hand tremors    Sleep apnea     pt states he has not used bipap \"in years\".  Thyroid disease      Patient Active Problem List   Diagnosis Code    PAD (peripheral artery disease) ([address] I73.9    Venous reflux I87.2    Leg ulcer, left ([address] L97.929    Leg ulcer ([address] L97.909     Past Surgical History:   Procedure Laterality Date    ABDOMEN SURGERY PROC UNLISTED  1997    Vikas-en-Y    ABDOMEN SURGERY PROC UNLISTED  2000    abdominal hernia repair/mesh    HX COLONOSCOPY      HX HEENT      cataracts removed right eye    HX ORTHOPAEDIC  1982    cervical laminectomy    HX UROLOGICAL  12/2015    suprapubic catheter in place    VASCULAR SURGERY PROCEDURE UNLIST       Current Outpatient Prescriptions   Medication Sig Dispense Refill    oxyCODONE-acetaminophen (PERCOCET) 5-325 mg per tablet Take 1 Tab by mouth every four (4) hours as needed for Pain.  potassium citrate (UROCIT-K10) 10 mEq (1,080 mg) TbER Take 10 mEq by mouth two (2) times a day.  multivitamin, tx-iron-ca-min (THERA-M W/ IRON) 9 mg iron-400 mcg tab tablet Take 1 Tab by mouth daily.       acetaminophen (TYLENOL EXTRA STRENGTH) 500 mg tablet Take 500 mg by mouth every six (6) hours as needed for Pain.  cilostazol (PLETAL) 100 mg tablet Take 100 mg by mouth Before breakfast and dinner.  iron aspgly,sm-O-B42-FA-Ca-suc (FERREX 150 FORTE PLUS) 006-28-40-5 mg-mg-mcg-mg cap cap Take 1 Cap by mouth daily.  insulin aspart (NOVOLOG FLEXPEN) 100 unit/mL inpn by SubCUTAneous route Before breakfast, lunch, and dinner. Sliding scale      metFORMIN (GLUCOPHAGE) 1,000 mg tablet Take 1,000 mg by mouth two (2) times daily (with meals).  atorvastatin (LIPITOR) 20 mg tablet Take  by mouth daily.  lisinopril (PRINIVIL, ZESTRIL) 2.5 mg tablet Take  by mouth daily.  albuterol (PROAIR HFA) 90 mcg/actuation inhaler Take  by inhalation.  clopidogrel (PLAVIX) 75 mg tab Take  by mouth.  cyanocobalamin (VITAMIN B-12) 1,000 mcg/mL injection 1,000 mcg by IntraMUSCular route every thirty (30) days.  aspirin 81 mg tablet Take 81 mg by mouth daily.  insulin glargine (LANTUS) 100 unit/mL injection 20 Units by SubCUTAneous route nightly.  esomeprazole (NEXIUM) 40 mg capsule Take  by mouth daily.  furosemide (LASIX) 40 mg tablet Take 40 mg by mouth daily.  diltiazem (TIAZAC) 360 mg SR capsule Take 360 mg by mouth daily.  tiotropium (SPIRIVA WITH HANDIHALER) 18 mcg inhalation capsule Take 1 Cap by inhalation daily.  loratadine (CLARITIN) 10 mg tablet Take 10 mg by mouth daily.  fluticasone (FLONASE) 50 mcg/actuation nasal spray 2 Sprays by Both Nostrils route daily.  traMADol (ULTRAM) 50 mg tablet Take 50 mg by mouth daily.  gabapentin (NEURONTIN) 300 mg capsule Take 300 mg by mouth three (3) times daily.  LACTASE ENZYME PO Take 1 Cap by mouth daily.  simvastatin (ZOCOR) 10 mg tablet Take 10 mg by mouth nightly.  levothyroxine (SYNTHROID) 25 mcg tablet Take 100 mcg by mouth Daily (before breakfast).       fluticasone-salmeterol (ADVAIR DISKUS) 250-50 mcg/dose diskus inhaler Take 1 Puff by inhalation every twelve (12) hours. Allergies   Allergen Reactions    Latex Contact Dermatitis     Pt states he is NOT allergic to latex.  Neosporin [Neomycin-Bacitracin-Polymyxin] Rash     Social History     Social History    Marital status:      Spouse name: N/A    Number of children: N/A    Years of education: N/A     Occupational History    Not on file. Social History Main Topics    Smoking status: Former Smoker     Types: Cigarettes     Quit date: 1/1/1997    Smokeless tobacco: Never Used    Alcohol use 0.6 oz/week     1 Glasses of wine per week    Drug use: No    Sexual activity: Not on file     Other Topics Concern    Not on file     Social History Narrative      Family History   Problem Relation Age of Onset    Cancer Mother     Diabetes Father     Heart Disease Sister     Cancer Brother     Diabetes Brother     Hypertension Brother        Review of Systems    Review of Systems   Constitutional: Negative for chills, diaphoresis, fever, malaise/fatigue and weight loss. HENT: Negative for hearing loss and sore throat. Eyes: Negative for blurred vision, photophobia and redness. Respiratory: Negative for cough, hemoptysis, shortness of breath and wheezing. Cardiovascular: Negative for chest pain, palpitations and orthopnea. Gastrointestinal: Negative for abdominal pain, blood in stool, constipation, diarrhea, heartburn, nausea and vomiting. Genitourinary: Negative for dysuria, frequency, hematuria and urgency. Musculoskeletal: Positive for myalgias. Negative for back pain. Skin: Negative for itching and rash. Neurological: Negative for dizziness, speech change, focal weakness, weakness and headaches. Endo/Heme/Allergies: Does not bruise/bleed easily. Psychiatric/Behavioral: Negative for depression and suicidal ideas.             Physical Exam:    Visit Vitals    /62    Pulse 82    Resp 22    Ht 6' 2\" (1.88 m)  Wt 160 lb (72.6 kg)    BMI 20.54 kg/m2      Physical Examination: General appearance - alert, well appearing, and in no distress  Mental status - alert, oriented to person, place, and time  Eyes - sclera anicteric, left eye normal, right eye normal  Ears - right ear normal, left ear normal  Nose - normal and patent, no erythema, discharge or polyps  Mouth - mucous membranes moist, pharynx normal without lesions  Extremities - Bilateral lower extremities without significant edema. Right leg with areas of erythema and superficial skin abrasions around shin. Medial ankle with two focal 2x2 cm venous stasis ulcers. Left leg with multiple areas of skin tears. Good bleeding and granulation tissue. Focal ulcer on lateral aspect of left ankle/foot. + DP pulse on left with multiphasic DP signal on right. No significant edema      Impression and Plan:    ICD-10-CM ICD-9-CM    1. PAD (peripheral artery disease) ([facility]) I73.9 443.9    2. Leg ulcer, left, limited to breakdown of skin ([address] L97.921 707.10    3. Leg ulcer, right, limited to breakdown of skin ([address] L97.911 707.10    4. Venous reflux I87.2 459.81      I told Mr. Jarvis Lomax that I am happy that his wounds are improving. I do believe his venous reflux may be playing a role in his prolonged wound healing as well as his wound drainage and skin breakdown. We will perform a venogram as soon as feasible to look for any central venous stenosis and hopefully resolve this problem for him. The treatment plan was reviewed with the patient in detail. The patient voiced understanding of this plan and all questions and concerns were addressed. The patient agrees with this plan. We discussed the signs and symptoms that would require earlier attention or intervention.      The patient was given educational material related to his/her visit and the patient has voiced understanding of the material.     I appreciate the opportunity to participate in the care of your patient. I will be sure to keep you informed of any subsequent changes in the treatment plan. If you have any questions or concerns, please feel free to contact me. Belgica Hudson MD    PLEASE NOTE:  This document has been produced using voice recognition software. Unrecognized errors in transcription may be present.

## 2017-07-31 RX ORDER — HEPARIN 100 UNIT/ML
400 SYRINGE INTRAVENOUS AS NEEDED
Status: CANCELLED | OUTPATIENT
Start: 2017-08-11

## 2017-08-01 NOTE — PROGRESS NOTES
PT aware of NPO status. PT aware of need to hold anticoagulants per protocol. Pt on plavix but states office told him to continue med. Pt states was instructed to take hold metformin 24 hr pre and 48 hr post procedure. PT aware of potential for sedation administration and need for  at discharge. PT aware of arrival time pre procedure. Arrive @ 10:30 for 1200 appt. Pt states no questions at this time.

## 2017-08-02 ENCOUNTER — HOSPITAL ENCOUNTER (OUTPATIENT)
Dept: WOUND CARE | Age: 72
Discharge: HOME | End: 2017-08-02
Payer: MEDICARE

## 2017-08-02 PROCEDURE — 29581 APPL MULTLAYER CMPRN SYS LEG: CPT

## 2017-08-09 PROCEDURE — 97602 WOUND(S) CARE NON-SELECTIVE: CPT

## 2017-08-11 ENCOUNTER — HOSPITAL ENCOUNTER (OUTPATIENT)
Dept: INTERVENTIONAL RADIOLOGY/VASCULAR | Age: 72
Discharge: HOME | End: 2017-08-11
Attending: SURGERY | Admitting: SURGERY
Payer: MEDICARE

## 2017-08-11 VITALS
SYSTOLIC BLOOD PRESSURE: 102 MMHG | WEIGHT: 146.1 LBS | DIASTOLIC BLOOD PRESSURE: 84 MMHG | RESPIRATION RATE: 16 BRPM | HEIGHT: 74 IN | HEART RATE: 99 BPM | TEMPERATURE: 97.8 F | BODY MASS INDEX: 18.75 KG/M2 | OXYGEN SATURATION: 97 %

## 2017-08-11 DIAGNOSIS — I73.9 PAD (PERIPHERAL ARTERY DISEASE) (HCC): ICD-10-CM

## 2017-08-11 LAB
ANION GAP BLD CALC-SCNC: 6 MMOL/L (ref 3–18)
BASOPHILS # BLD AUTO: 0.1 K/UL (ref 0–0.06)
BASOPHILS # BLD: 1 % (ref 0–2)
BUN SERPL-MCNC: 6 MG/DL (ref 7–18)
BUN/CREAT SERPL: 9 (ref 12–20)
CALCIUM SERPL-MCNC: 8.8 MG/DL (ref 8.5–10.1)
CHLORIDE SERPL-SCNC: 100 MMOL/L (ref 100–108)
CO2 SERPL-SCNC: 31 MMOL/L (ref 21–32)
CREAT SERPL-MCNC: 0.69 MG/DL (ref 0.6–1.3)
DIFFERENTIAL METHOD BLD: ABNORMAL
EOSINOPHIL # BLD: 1.2 K/UL (ref 0–0.4)
EOSINOPHIL NFR BLD: 21 % (ref 0–5)
ERYTHROCYTE [DISTWIDTH] IN BLOOD BY AUTOMATED COUNT: 15.5 % (ref 11.6–14.5)
GLUCOSE SERPL-MCNC: 151 MG/DL (ref 74–99)
HCT VFR BLD AUTO: 35.4 % (ref 36–48)
HGB BLD-MCNC: 12 G/DL (ref 13–16)
INR PPP: 1 (ref 0.8–1.2)
LYMPHOCYTES # BLD AUTO: 20 % (ref 21–52)
LYMPHOCYTES # BLD: 1.2 K/UL (ref 0.9–3.6)
MCH RBC QN AUTO: 29.5 PG (ref 24–34)
MCHC RBC AUTO-ENTMCNC: 33.9 G/DL (ref 31–37)
MCV RBC AUTO: 87 FL (ref 74–97)
MONOCYTES # BLD: 0.5 K/UL (ref 0.05–1.2)
MONOCYTES NFR BLD AUTO: 8 % (ref 3–10)
NEUTS SEG # BLD: 2.9 K/UL (ref 1.8–8)
NEUTS SEG NFR BLD AUTO: 50 % (ref 40–73)
PLATELET # BLD AUTO: 139 K/UL (ref 135–420)
PMV BLD AUTO: 9.4 FL (ref 9.2–11.8)
POTASSIUM SERPL-SCNC: 3.9 MMOL/L (ref 3.5–5.5)
PROTHROMBIN TIME: 12.8 SEC (ref 11.5–15.2)
RBC # BLD AUTO: 4.07 M/UL (ref 4.7–5.5)
SODIUM SERPL-SCNC: 137 MMOL/L (ref 136–145)
WBC # BLD AUTO: 5.8 K/UL (ref 4.6–13.2)

## 2017-08-11 PROCEDURE — 85025 COMPLETE CBC W/AUTO DIFF WBC: CPT | Performed by: SURGERY

## 2017-08-11 PROCEDURE — 74011636320 HC RX REV CODE- 636/320: Performed by: SURGERY

## 2017-08-11 PROCEDURE — 75822 VEIN X-RAY ARMS/LEGS: CPT

## 2017-08-11 PROCEDURE — C1876 STENT, NON-COA/NON-COV W/DEL: HCPCS

## 2017-08-11 PROCEDURE — 74011250636 HC RX REV CODE- 250/636: Performed by: SURGERY

## 2017-08-11 PROCEDURE — 74011000250 HC RX REV CODE- 250: Performed by: SURGERY

## 2017-08-11 PROCEDURE — 99153 MOD SED SAME PHYS/QHP EA: CPT

## 2017-08-11 PROCEDURE — 80048 BASIC METABOLIC PNL TOTAL CA: CPT | Performed by: SURGERY

## 2017-08-11 PROCEDURE — 85610 PROTHROMBIN TIME: CPT | Performed by: SURGERY

## 2017-08-11 PROCEDURE — 99152 MOD SED SAME PHYS/QHP 5/>YRS: CPT

## 2017-08-11 RX ORDER — NALOXONE HYDROCHLORIDE 0.4 MG/ML
0.2 INJECTION, SOLUTION INTRAMUSCULAR; INTRAVENOUS; SUBCUTANEOUS
Status: DISCONTINUED | OUTPATIENT
Start: 2017-08-11 | End: 2017-08-11 | Stop reason: HOSPADM

## 2017-08-11 RX ORDER — HEPARIN SODIUM 1000 [USP'U]/ML
10000 INJECTION, SOLUTION INTRAVENOUS; SUBCUTANEOUS
Status: DISCONTINUED | OUTPATIENT
Start: 2017-08-11 | End: 2017-08-11 | Stop reason: HOSPADM

## 2017-08-11 RX ORDER — OXYCODONE AND ACETAMINOPHEN 5; 325 MG/1; MG/1
2 TABLET ORAL
Status: DISCONTINUED | OUTPATIENT
Start: 2017-08-11 | End: 2017-08-11 | Stop reason: HOSPADM

## 2017-08-11 RX ORDER — FENTANYL CITRATE 50 UG/ML
25-200 INJECTION, SOLUTION INTRAMUSCULAR; INTRAVENOUS
Status: DISCONTINUED | OUTPATIENT
Start: 2017-08-11 | End: 2017-08-11 | Stop reason: HOSPADM

## 2017-08-11 RX ORDER — MIDAZOLAM HYDROCHLORIDE 1 MG/ML
1-4 INJECTION, SOLUTION INTRAMUSCULAR; INTRAVENOUS
Status: DISCONTINUED | OUTPATIENT
Start: 2017-08-11 | End: 2017-08-11 | Stop reason: HOSPADM

## 2017-08-11 RX ORDER — HEPARIN SODIUM 200 [USP'U]/100ML
500 INJECTION, SOLUTION INTRAVENOUS
Status: COMPLETED | OUTPATIENT
Start: 2017-08-11 | End: 2017-08-11

## 2017-08-11 RX ORDER — LIDOCAINE HYDROCHLORIDE 10 MG/ML
1-20 INJECTION INFILTRATION; PERINEURAL
Status: COMPLETED | OUTPATIENT
Start: 2017-08-11 | End: 2017-08-11

## 2017-08-11 RX ORDER — OXYCODONE AND ACETAMINOPHEN 5; 325 MG/1; MG/1
2 TABLET ORAL
Qty: 50 TAB | Refills: 0 | Status: SHIPPED | OUTPATIENT
Start: 2017-08-11 | End: 2017-09-20 | Stop reason: SDUPTHER

## 2017-08-11 RX ORDER — ONDANSETRON 2 MG/ML
4 INJECTION INTRAMUSCULAR; INTRAVENOUS
Status: DISCONTINUED | OUTPATIENT
Start: 2017-08-11 | End: 2017-08-11 | Stop reason: HOSPADM

## 2017-08-11 RX ORDER — SODIUM CHLORIDE 9 MG/ML
25 INJECTION, SOLUTION INTRAVENOUS CONTINUOUS
Status: DISCONTINUED | OUTPATIENT
Start: 2017-08-11 | End: 2017-08-11 | Stop reason: HOSPADM

## 2017-08-11 RX ORDER — FLUMAZENIL 0.1 MG/ML
0.2 INJECTION INTRAVENOUS AS NEEDED
Status: DISCONTINUED | OUTPATIENT
Start: 2017-08-11 | End: 2017-08-11 | Stop reason: HOSPADM

## 2017-08-11 RX ORDER — PHENOL/SODIUM PHENOLATE
20 AEROSOL, SPRAY (ML) MUCOUS MEMBRANE DAILY
COMMUNITY

## 2017-08-11 RX ADMIN — FENTANYL CITRATE 25 MCG: 50 INJECTION, SOLUTION INTRAMUSCULAR; INTRAVENOUS at 12:41

## 2017-08-11 RX ADMIN — SODIUM CHLORIDE 25 ML/HR: 900 INJECTION, SOLUTION INTRAVENOUS at 11:05

## 2017-08-11 RX ADMIN — FENTANYL CITRATE 25 MCG: 50 INJECTION, SOLUTION INTRAMUSCULAR; INTRAVENOUS at 13:04

## 2017-08-11 RX ADMIN — IOPAMIDOL 20 ML: 612 INJECTION, SOLUTION INTRAVENOUS at 12:40

## 2017-08-11 RX ADMIN — HEPARIN SODIUM 3000 UNITS: 1000 INJECTION, SOLUTION INTRAVENOUS; SUBCUTANEOUS at 12:56

## 2017-08-11 RX ADMIN — LIDOCAINE HYDROCHLORIDE 5 ML: 10 INJECTION, SOLUTION INFILTRATION; PERINEURAL at 12:56

## 2017-08-11 RX ADMIN — HEPARIN SODIUM 1000 UNITS: 200 INJECTION, SOLUTION INTRAVENOUS at 12:23

## 2017-08-11 RX ADMIN — MIDAZOLAM HYDROCHLORIDE 1 MG: 1 INJECTION, SOLUTION INTRAMUSCULAR; INTRAVENOUS at 12:28

## 2017-08-11 RX ADMIN — FENTANYL CITRATE 25 MCG: 50 INJECTION, SOLUTION INTRAMUSCULAR; INTRAVENOUS at 12:28

## 2017-08-11 RX ADMIN — MIDAZOLAM HYDROCHLORIDE 0.5 MG: 1 INJECTION, SOLUTION INTRAMUSCULAR; INTRAVENOUS at 13:04

## 2017-08-11 RX ADMIN — HEPARIN SODIUM 2000 UNITS: 1000 INJECTION, SOLUTION INTRAVENOUS; SUBCUTANEOUS at 13:06

## 2017-08-11 RX ADMIN — MIDAZOLAM HYDROCHLORIDE 0.5 MG: 1 INJECTION, SOLUTION INTRAMUSCULAR; INTRAVENOUS at 12:41

## 2017-08-11 NOTE — H&P (VIEW-ONLY)
Young Zaragoza . Chief Complaint   Patient presents with    Wound Check       History and Physical    Mr. Robin Trevizo returns to our office for continued evaluation of his bilateral chronic leg wounds. He is scheduled to see Dr. Boris Erazo in the near future as his new wound care doctor. He states that his leg wounds have been improving slowly over the past few weeks after his angiogram.  He states that over the past few days his ankle ulcers have become somewhat more painful. He has no other complaints. Past Medical History:   Diagnosis Date    Acid reflux     Anemia     CAD (coronary artery disease)     Chronic obstructive pulmonary disease (HCC)     Diabetes (HCC)     GERD (gastroesophageal reflux disease)     H/O seasonal allergies     Hypercholesterolemia     Hypertension     Ill-defined condition     hand tremors    Sleep apnea     pt states he has not used bipap \"in years\".  Thyroid disease      Patient Active Problem List   Diagnosis Code    PAD (peripheral artery disease) ([address] I73.9    Venous reflux I87.2    Leg ulcer, left ([address] L97.929    Leg ulcer ([address] L97.909     Past Surgical History:   Procedure Laterality Date    ABDOMEN SURGERY PROC UNLISTED  1997    Vikas-en-Y    ABDOMEN SURGERY PROC UNLISTED  2000    abdominal hernia repair/mesh    HX COLONOSCOPY      HX HEENT      cataracts removed right eye    HX ORTHOPAEDIC  1982    cervical laminectomy    HX UROLOGICAL  12/2015    suprapubic catheter in place    VASCULAR SURGERY PROCEDURE UNLIST       Current Outpatient Prescriptions   Medication Sig Dispense Refill    oxyCODONE-acetaminophen (PERCOCET) 5-325 mg per tablet Take 1 Tab by mouth every four (4) hours as needed for Pain.  potassium citrate (UROCIT-K10) 10 mEq (1,080 mg) TbER Take 10 mEq by mouth two (2) times a day.  multivitamin, tx-iron-ca-min (THERA-M W/ IRON) 9 mg iron-400 mcg tab tablet Take 1 Tab by mouth daily.       acetaminophen (TYLENOL EXTRA STRENGTH) 500 mg tablet Take 500 mg by mouth every six (6) hours as needed for Pain.  cilostazol (PLETAL) 100 mg tablet Take 100 mg by mouth Before breakfast and dinner.  iron aspgly,ux-B-K37-FA-Ca-suc (FERREX 150 FORTE PLUS) 587-49-99-5 mg-mg-mcg-mg cap cap Take 1 Cap by mouth daily.  insulin aspart (NOVOLOG FLEXPEN) 100 unit/mL inpn by SubCUTAneous route Before breakfast, lunch, and dinner. Sliding scale      metFORMIN (GLUCOPHAGE) 1,000 mg tablet Take 1,000 mg by mouth two (2) times daily (with meals).  atorvastatin (LIPITOR) 20 mg tablet Take  by mouth daily.  lisinopril (PRINIVIL, ZESTRIL) 2.5 mg tablet Take  by mouth daily.  albuterol (PROAIR HFA) 90 mcg/actuation inhaler Take  by inhalation.  clopidogrel (PLAVIX) 75 mg tab Take  by mouth.  cyanocobalamin (VITAMIN B-12) 1,000 mcg/mL injection 1,000 mcg by IntraMUSCular route every thirty (30) days.  aspirin 81 mg tablet Take 81 mg by mouth daily.  insulin glargine (LANTUS) 100 unit/mL injection 20 Units by SubCUTAneous route nightly.  esomeprazole (NEXIUM) 40 mg capsule Take  by mouth daily.  furosemide (LASIX) 40 mg tablet Take 40 mg by mouth daily.  diltiazem (TIAZAC) 360 mg SR capsule Take 360 mg by mouth daily.  tiotropium (SPIRIVA WITH HANDIHALER) 18 mcg inhalation capsule Take 1 Cap by inhalation daily.  loratadine (CLARITIN) 10 mg tablet Take 10 mg by mouth daily.  fluticasone (FLONASE) 50 mcg/actuation nasal spray 2 Sprays by Both Nostrils route daily.  traMADol (ULTRAM) 50 mg tablet Take 50 mg by mouth daily.  gabapentin (NEURONTIN) 300 mg capsule Take 300 mg by mouth three (3) times daily.  LACTASE ENZYME PO Take 1 Cap by mouth daily.  simvastatin (ZOCOR) 10 mg tablet Take 10 mg by mouth nightly.  levothyroxine (SYNTHROID) 25 mcg tablet Take 100 mcg by mouth Daily (before breakfast).       fluticasone-salmeterol (ADVAIR DISKUS) 250-50 mcg/dose diskus inhaler Take 1 Puff by inhalation every twelve (12) hours. Allergies   Allergen Reactions    Latex Contact Dermatitis     Pt states he is NOT allergic to latex.  Neosporin [Neomycin-Bacitracin-Polymyxin] Rash     Social History     Social History    Marital status:      Spouse name: N/A    Number of children: N/A    Years of education: N/A     Occupational History    Not on file. Social History Main Topics    Smoking status: Former Smoker     Types: Cigarettes     Quit date: 1/1/1997    Smokeless tobacco: Never Used    Alcohol use 0.6 oz/week     1 Glasses of wine per week    Drug use: No    Sexual activity: Not on file     Other Topics Concern    Not on file     Social History Narrative      Family History   Problem Relation Age of Onset    Cancer Mother     Diabetes Father     Heart Disease Sister     Cancer Brother     Diabetes Brother     Hypertension Brother        Review of Systems    Review of Systems   Constitutional: Negative for chills, diaphoresis, fever, malaise/fatigue and weight loss. HENT: Negative for hearing loss and sore throat. Eyes: Negative for blurred vision, photophobia and redness. Respiratory: Negative for cough, hemoptysis, shortness of breath and wheezing. Cardiovascular: Negative for chest pain, palpitations and orthopnea. Gastrointestinal: Negative for abdominal pain, blood in stool, constipation, diarrhea, heartburn, nausea and vomiting. Genitourinary: Negative for dysuria, frequency, hematuria and urgency. Musculoskeletal: Positive for myalgias. Negative for back pain. Skin: Negative for itching and rash. Neurological: Negative for dizziness, speech change, focal weakness, weakness and headaches. Endo/Heme/Allergies: Does not bruise/bleed easily. Psychiatric/Behavioral: Negative for depression and suicidal ideas.             Physical Exam:    Visit Vitals    /62    Pulse 82    Resp 22    Ht 6' 2\" (1.88 m)  Wt 160 lb (72.6 kg)    BMI 20.54 kg/m2      Physical Examination: General appearance - alert, well appearing, and in no distress  Mental status - alert, oriented to person, place, and time  Eyes - sclera anicteric, left eye normal, right eye normal  Ears - right ear normal, left ear normal  Nose - normal and patent, no erythema, discharge or polyps  Mouth - mucous membranes moist, pharynx normal without lesions  Extremities - Bilateral lower extremities without significant edema. Right leg with areas of erythema and superficial skin abrasions around shin. Medial ankle with two focal 2x2 cm venous stasis ulcers. Left leg with multiple areas of skin tears. Good bleeding and granulation tissue. Focal ulcer on lateral aspect of left ankle/foot. + DP pulse on left with multiphasic DP signal on right. No significant edema      Impression and Plan:    ICD-10-CM ICD-9-CM    1. PAD (peripheral artery disease) ([facility]) I73.9 443.9    2. Leg ulcer, left, limited to breakdown of skin ([address] L97.921 707.10    3. Leg ulcer, right, limited to breakdown of skin ([address] L97.911 707.10    4. Venous reflux I87.2 459.81      I told Mr. Paty Spears that I am happy that his wounds are improving. I do believe his venous reflux may be playing a role in his prolonged wound healing as well as his wound drainage and skin breakdown. We will perform a venogram as soon as feasible to look for any central venous stenosis and hopefully resolve this problem for him. The treatment plan was reviewed with the patient in detail. The patient voiced understanding of this plan and all questions and concerns were addressed. The patient agrees with this plan. We discussed the signs and symptoms that would require earlier attention or intervention.      The patient was given educational material related to his/her visit and the patient has voiced understanding of the material.     I appreciate the opportunity to participate in the care of your patient. I will be sure to keep you informed of any subsequent changes in the treatment plan. If you have any questions or concerns, please feel free to contact me. Frandy Puentes MD    PLEASE NOTE:  This document has been produced using voice recognition software. Unrecognized errors in transcription may be present.

## 2017-08-11 NOTE — PROGRESS NOTES
TRANSFER - OUT REPORT:    Verbal report given to [id number]ai RN on [facility]  being transferred to Heart Care(unit) for ordered procedure       Report consisted of patients Situation, Background, Assessment and   Recommendations(SBAR). Information from the following report(s) SBAR, Kardex and MAR was reviewed with the receiving nurse. Lines:       Opportunity for questions and clarification was provided.       Patient transported with:   Registered Nurse

## 2017-08-11 NOTE — INTERVAL H&P NOTE
H&P Update:  Maximiliano Cabrera was seen and examined. History and physical has been reviewed. The patient has been examined.  There have been no significant clinical changes since the completion of the originally dated History and Physical.    Signed By: Michael Gordon MD     August 11, 2017 12:25 PM

## 2017-08-11 NOTE — BRIEF OP NOTE
BRIEF OPERATIVE NOTE    Date of Procedure: 8/11/2017  Preoperative Diagnosis: Right leg venous stasis ulcer, bilateral lower extremity wounds  Postoperative Diagnosis: Same   Procedure: Venocavogram, bilateral venogram, IVUS of IVC bilatearl CIV, EIV and femoral vein, PTA and stent of R CIV  Anesthesia: Moderate sedation with local  Estimated Blood Loss: Minimal  Specimens: * Cannot find log *   Findings: Stenotic CIV from external compression 2/2 arterial compression.   Satisfactory appearance after angioplasty and stent   Complications: None  Implants[facility]

## 2017-08-11 NOTE — DISCHARGE INSTRUCTIONS
[address] Venogram Discharge Instructions    General Information:   A venogram is a procedure that allows the interventional radiologist to take pictures of the blood flow in the vascular system. A radiology contrast (or dye) is injected into the veins so that the condition of the veins and valves may be visualized. This procedure can be used to diagnose narrowing of the veins or the presence of a blood clot in the deep veins of the body. During this process, a stent, filter or a special intravenous line could be placed. Home Care Instructions: You can resume your regular diet. Do not shower, bathe, swim, drink alcohol, or make any important legal decisions in the next 48 hours. Do not lift anything heavier than a gallon of milk for 5 days. If you take Glucophage (metformin) for diabetes, do not take it for the next 48 hours. If you were asked to hold any blood thinners prior to the procedure, you may restart that medicine the day after the procedure is completed. Recline your car seat for the ride home. Call If:   You should call your Physician and/or the Radiology Nurse if you have any signs of infection; fever, drainage, redness, and/or swelling. If the puncture site should ooze, please call. Also call if you have any pain, decreased sensation, numbness, tingling, swelling, or change in color to the affected extremity. SEEK IMMEDIATE MEDICAL CARE IF YOUR PUNCTURE SITE STARTS TO BLEED. APPLY ENOUGH FIRM PRESSURE TO THE SITE WITH THE TIPS OF YOUR FINGERS TO STOP THE BLEEDING. Follow-Up Instructions:  Please see your ordering doctor as he/she has requested. Sedation for a Medical Procedure: Care Instructions  Your Care Instructions  For a minor procedure or surgery, you will get a sedative to help you relax. This drug will make you sleepy. It is usually given in a vein (by IV). A shot may also be used to numb the area.   If you had local anesthesia, you may feel some pain and discomfort as it wears off. If you have pain, don't be afraid to say so. Pain medicine works better if you take it before the pain gets bad. Common side effects from sedation include:  · Feeling sleepy. (Your doctors and nurses will make sure you are not too sleepy to go home.)  · Nausea and vomiting. This usually does not last long. · Feeling tired. Follow-up care is a key part of your treatment and safety. Be sure to make and go to all appointments, and call your doctor if you are having problems. It's also a good idea to know your test results and keep a list of the medicines you take. How can you care for yourself at home? Activity  · Don't do anything for 24 hours that requires attention to detail. It takes time for the medicine effects to completely wear off. · For your safety, you should not drive or operate any machinery that could be dangerous until the medicine wears off and you can think clearly and react easily. · Rest when you feel tired. Getting enough sleep will help you recover. Diet  · You can eat your normal diet, unless your doctor gives you other instructions. If your stomach is upset, try clear liquids and bland, low-fat foods like plain toast or rice. · Drink plenty of fluids (unless your doctor tells you not to). · Don't drink alcohol for 24 hours. Medicines  · Be safe with medicines. Read and follow all instructions on the label. ¨ If the doctor gave you a prescription medicine for pain, take it as prescribed. ¨ If you are not taking a prescription pain medicine, ask your doctor if you can take an over-the-counter medicine. · If you think your pain medicine is making you sick to your stomach:  ¨ Take your medicine after meals (unless your doctor has told you not to). ¨ Ask your doctor for a different pain medicine. When should you call for help? Call 911 anytime you think you may need emergency care.  For example, call if:  · You have severe trouble breathing. · You passed out (lost consciousness). Call your doctor now or seek immediate medical care if:  · You have trouble breathing. · You have ongoing or worsening nausea or vomiting. · You have a fever. · You have a new or worse headache. · The medicine is not wearing off and you can't think clearly. Watch closely for changes in your health, and be sure to contact your doctor if:  · You do not get better as expected. Where can you learn more? Go to http://mariella-kenyon.info/. Enter M180 in the search box to learn more about \"Sedation for a Medical Procedure: Care Instructions. \"  Current as of: August 14, 2016  Content Version: 11.3  © 5708-5756 [employer]. Care instructions adapted under license by [employer] (which disclaims liability or warranty for this information). If you have questions about a medical condition or this instruction, always ask your healthcare professional. [place] [address] any warranty or liability for your use of this information. DISCHARGE SUMMARY from Nurse    The following personal items are in your possession at time of discharge:       Visual Aid: None     PATIENT INSTRUCTIONS:    After general anesthesia or intravenous sedation, for 24 hours or while taking prescription Narcotics:  · Limit your activities  · Do not drive and operate hazardous machinery  · Do not make important personal or business decisions  · Do  not drink alcoholic beverages  · If you have not urinated within 8 hours after discharge, please contact your surgeon on call.     Report the following to your surgeon:  · Excessive pain, swelling, redness or odor of or around the surgical area  · Temperature over 100.5  · Nausea and vomiting lasting longer than 4 hours or if unable to take medications  · Any signs of decreased circulation or nerve impairment to extremity: change in color, persistent  numbness, tingling, coldness or increase pain  · Any questions        What to do at Home:  Recommended activity: Activity as tolerated and no driving for today. *  Please give a list of your current medications to your Primary Care Provider. *  Please update this list whenever your medications are discontinued, doses are      changed, or new medications (including over-the-counter products) are added. *  Please carry medication information at all times in case of emergency situations. These are general instructions for a healthy lifestyle:    No smoking/ No tobacco products/ Avoid exposure to second hand smoke    Surgeon General's Warning:  Quitting smoking now greatly reduces serious risk to your health. Obesity, smoking, and sedentary lifestyle greatly increases your risk for illness    A healthy diet, regular physical exercise & weight monitoring are important for maintaining a healthy lifestyle    You may be retaining fluid if you have a history of heart failure or if you experience any of the following symptoms:  Weight gain of 3 pounds or more overnight or 5 pounds in a week, increased swelling in our hands or feet or shortness of breath while lying flat in bed. Please call your doctor as soon as you notice any of these symptoms; do not wait until your next office visit. Recognize signs and symptoms of STROKE:    F-face looks uneven    A-arms unable to move or move unevenly    S-speech slurred or non-existent    T-time-call 911 as soon as signs and symptoms begin-DO NOT go       Back to bed or wait to see if you get better-TIME IS BRAIN. Warning Signs of HEART ATTACK     Call 911 if you have these symptoms:   Chest discomfort. Most heart attacks involve discomfort in the center of the chest that lasts more than a few minutes, or that goes away and comes back. It can feel like uncomfortable pressure, squeezing, fullness, or pain.  Discomfort in other areas of the upper body.  Symptoms can include pain or discomfort in one or both arms, the back, neck, jaw, or stomach.  Shortness of breath with or without chest discomfort.  Other signs may include breaking out in a cold sweat, nausea, or lightheadedness. Don't wait more than five minutes to call 911 - MINUTES MATTER! Fast action can save your life. Calling 911 is almost always the fastest way to get lifesaving treatment. Emergency Medical Services staff can begin treatment when they arrive -- up to an hour sooner than if someone gets to the hospital by car. The discharge information has been reviewed with the patient and spouse. The patient and spouse verbalized understanding. Discharge medications reviewed with the patient and spouse and appropriate educational materials and side effects teaching were provided.

## 2017-08-12 NOTE — OP NOTES
[address] [place] [address]  OPERATIVE REPORT    Name:  Tim Hendrix  MR#:  [id number]  :  1945  Account #:  [de-identified]  Date of Adm:  2017  Date of Surgery:  2017      PREOPERATIVE DIAGNOSES  1. Bilateral lower extremity wounds. 2. Bilateral deep venous reflux. POSTOPERATIVE DIAGNOSES  1. Bilateral lower extremity wounds. 2. Bilateral deep venous reflux. PROCEDURES PERFORMED  1. Ultrasound-guided percutaneous access of bilateral femoral veins. 2. Nonselective catheterization of the inferior vena cava. 3. Bilateral venacavogram with venogram.  4. Bilateral intravascular ultrasound of the inferior vena cava, bilateral  common iliac, external iliac veins and femoral veins. 5. Balloon angioplasty of right common iliac vein and external iliac vein  with 8 mm balloon. 6. Angioplasty of the right common iliac, external iliac vein; stent of the  right common and external iliac vein with an 18 x 90 Wallstent. SURGEON: Yolanda Irving MD    ANESTHESIA: Moderate sedation with local.    PACKS AND DRAINS: None. IMPLANTS: An 18 x 90 right common iliac vein Wallstent. ESTIMATED BLOOD LOSS: Minimal    SPECIMENS REMOVED: None    CONDITION: To recovery stable. FINDINGS: High-grade stenosis and compression of the common iliac  vein with the common iliac artery. Satisfactory appearance after  angioplasty and stent. INDICATIONS FOR PROCEDURE: The patient is a 68-year-old  gentleman who was referred to our office from the wound care center  for bilateral chronic lower extremity wounds. The patient had previously  undergone arteriogram with satisfactory improvement in bilateral lower  extremity circulation. The patient also underwent a venous duplex  study that showed significant bilateral venous reflux with the right being  significantly worse than the left. On exam, he did have a medial leg  ulcer over the right ankle that was consistent with a venous stasis  ulcer.  Given these findings, decision was made to take the patient to  the catheterization suite for a diagnostic venogram, possible  intervention. Informed consent was obtained. DESCRIPTION OF PROCEDURE: On 08/11/2017, the patient was  brought to the catheterization suite, identified by name, ID bracelet by  myself and entire team. Once this was done, the patient was placed on  the catheterization table in supine position, and appropriate depth of  anesthesia obtained. The patient was prepped and draped and time-  out performed. At this point, using ultrasound, percutaneous access of the right  femoral vein was obtained after 1% lidocaine used to anesthetize the  right groin. Then advanced a Flores wire, followed by an 8-Turkmen  sheath, repeated the same procedure for the left femoral vein. Once  this was completed, we then performed bilateral venograms, this  showed no evidence of any pelvic collaterals and no significant  compression on the venogram. However, on intravascular ultrasound,  we did note a compression of the right common iliac vein with a large  venous collateral coming off the right common iliac vein. Given these  findings, decision was made to treat. We then heparinized the patient  appropriately and upsized our right femoral vein sheath for a 10-  Turkmen sheath. We used an 18 mm balloon to angioplasty the common  iliac and external iliac veins on the right. We then deployed an 18 x 90  Wallstent in the common iliac vein on the right. Completion venogram  showed satisfactory appearance of the stent. We had a good  appearance on our IVUS catheter after angioplasty and stenting. Happy with our results, we then removed the catheter and sheath and  held manual pressure for hemostasis bilaterally at the end of the  procedure. I was present and scrubbed for the entire procedure.         MD Anton Man MP  D:  08/11/2017   20:12  T:  08/11/2017   21:40  Job #:  [id number]

## 2017-08-15 PROCEDURE — 29581 APPL MULTLAYER CMPRN SYS LEG: CPT

## 2017-08-22 PROCEDURE — 97602 WOUND(S) CARE NON-SELECTIVE: CPT

## 2017-08-31 PROCEDURE — 29581 APPL MULTLAYER CMPRN SYS LEG: CPT

## 2017-08-31 PROCEDURE — 99214 OFFICE O/P EST MOD 30 MIN: CPT

## 2017-09-06 ENCOUNTER — HOSPITAL ENCOUNTER (OUTPATIENT)
Dept: WOUND CARE | Age: 72
Discharge: HOME | End: 2017-09-06
Payer: MEDICARE

## 2017-09-06 PROCEDURE — 97602 WOUND(S) CARE NON-SELECTIVE: CPT

## 2017-09-13 ENCOUNTER — OFFICE VISIT (OUTPATIENT)
Dept: VASCULAR SURGERY | Age: 72
End: 2017-09-13

## 2017-09-13 VITALS
RESPIRATION RATE: 20 BRPM | BODY MASS INDEX: 18.74 KG/M2 | DIASTOLIC BLOOD PRESSURE: 62 MMHG | SYSTOLIC BLOOD PRESSURE: 138 MMHG | HEART RATE: 88 BPM | WEIGHT: 146 LBS | HEIGHT: 74 IN

## 2017-09-13 DIAGNOSIS — I87.2 VENOUS REFLUX: ICD-10-CM

## 2017-09-13 DIAGNOSIS — L97.921 LEG ULCER, LEFT, LIMITED TO BREAKDOWN OF SKIN (HCC): ICD-10-CM

## 2017-09-13 DIAGNOSIS — L97.911 LEG ULCER, RIGHT, LIMITED TO BREAKDOWN OF SKIN (HCC): ICD-10-CM

## 2017-09-13 DIAGNOSIS — I73.9 PAD (PERIPHERAL ARTERY DISEASE) (HCC): Primary | ICD-10-CM

## 2017-09-13 PROCEDURE — 97602 WOUND(S) CARE NON-SELECTIVE: CPT

## 2017-09-13 NOTE — MR AVS SNAPSHOT
Visit Information Date & Time Provider Department Dept. Phone Encounter #  
 9/13/2017  9:45 AM Richy Mena MD [facility] [address] [id number] Your Appointments 10/4/2017 10:00 AM  
Follow Up with Richy Mena MD  
BS Vein/Vascular Spec THE [facility] ([address]) Appt Note: 3 week FU no Studies [place] [address] Upcoming Health Maintenance Date Due DTaP/Tdap/Td series (1 - Tdap) 5/16/1964 FOBT Q 1 YEAR AGE 50-75 5/16/1993 ZOSTER VACCINE AGE 60> 3/16/2003 GLAUCOMA SCREENING Q2Y 5/16/2008 Pneumococcal 65+ Low/Medium Risk (1 of 2 - PCV13) 5/16/2008 MEDICARE YEARLY EXAM 5/16/2008 INFLUENZA AGE 9 TO ADULT 8/1/2017 Allergies as of 9/13/2017  Review Complete On: 9/13/2017 By: Tata Buenrostro RN Severity Noted Reaction Type Reactions Latex  05/28/2013    Contact Dermatitis Pt states he is NOT allergic to latex. Neosporin [Neomycin-bacitracin-polymyxin] High 05/09/2017   Systemic Rash Current Immunizations  Never Reviewed No immunizations on file. Not reviewed this visit You Were Diagnosed With   
  
 Codes Comments PAD (peripheral artery disease) (HCC)    -  Primary ICD-10-CM: I73.9 ICD-9-CM: 443. 9 Vitals BP Pulse Resp Height(growth percentile) Weight(growth percentile) BMI  
 138/62 88 20 6' 2\" (1.88 m) 146 lb (66.2 kg) 18.75 kg/m2 Smoking Status Former Smoker Vitals History BMI and BSA Data Body Mass Index Body Surface Area 18.75 kg/m 2 1.86 m 2 Preferred Pharmacy Pharmacy Name Phone [id number] - [place], [address] [phone] Your Updated Medication List  
  
   
This list is accurate as of: 9/13/17 10:39 AM.  Always use your most recent med list.  
  
  
  
  
 ADVAIR DISKUS 250-50 mcg/dose diskus inhaler Generic drug:  fluticasone-salmeterol Take 1 Puff by inhalation every twelve (12) hours. aspirin 81 mg tablet Take 81 mg by mouth daily. atorvastatin 20 mg tablet Commonly known as:  LIPITOR Take  by mouth daily. cilostazol 100 mg tablet Commonly known as:  PLETAL Take 100 mg by mouth Before breakfast and dinner. CLARITIN 10 mg tablet Generic drug:  loratadine Take 10 mg by mouth daily. FLONASE 50 mcg/actuation nasal spray Generic drug:  fluticasone 2 Sprays by Both Nostrils route daily. gabapentin 300 mg capsule Commonly known as:  NEURONTIN Take 300 mg by mouth two (2) times a day. One in the am and 2 at hs  
  
 iron aspgly,on-W-O14-FA-Ca-suc 150-60-25-1 mg-mg-mcg-mg Cap cap Commonly known as:  FERREX [address] Take 1 Cap by mouth daily. LACTASE ENZYME PO Take 1 Cap by mouth daily. LANTUS 100 unit/mL injection Generic drug:  insulin glargine 20 Units by SubCUTAneous route nightly. LASIX 40 mg tablet Generic drug:  furosemide Take 40 mg by mouth daily. levothyroxine 25 mcg tablet Commonly known as:  SYNTHROID Take 100 mcg by mouth Daily (before breakfast). lisinopril 2.5 mg tablet Commonly known as:  Helon Estrin Take  by mouth daily. metFORMIN 1,000 mg tablet Commonly known as:  GLUCOPHAGE Take 1,000 mg by mouth two (2) times daily (with meals). multivitamin, tx-iron-ca-min 9 mg iron-400 mcg Tab tablet Commonly known as:  THERA-M w/ IRON Take 1 Tab by mouth daily. NovoLOG Flexpen 100 unit/mL Inpn Generic drug:  insulin aspart  
by SubCUTAneous route Before breakfast, lunch, and dinner. Sliding scale Omeprazole delayed release 20 mg tablet Commonly known as:  PRILOSEC D/R Take 20 mg by mouth daily. * PERCOCET 5-325 mg per tablet Generic drug:  oxyCODONE-acetaminophen Take 1 Tab by mouth every four (4) hours as needed for Pain. * oxyCODONE-acetaminophen 5-325 mg per tablet Commonly known as:  PERCOCET Take 2 Tabs by mouth every six (6) hours as needed. Max Daily Amount: 8 Tabs. PLAVIX 75 mg Tab Generic drug:  clopidogrel Take  by mouth.  
  
 potassium citrate 10 mEq (1,080 mg) Yasmani Asa Commonly known as:  [place] Take 10 mEq by mouth two (2) times a day. PROAIR HFA 90 mcg/actuation inhaler Generic drug:  albuterol Take  by inhalation. SPIRIVA WITH HANDIHALER 18 mcg inhalation capsule Generic drug:  tiotropium Take 1 Cap by inhalation daily. TIAZAC 360 mg SR capsule Generic drug:  dilTIAZem Take 360 mg by mouth daily. traMADol 50 mg tablet Commonly known as:  ULTRAM  
Take 50 mg by mouth daily. TYLENOL EXTRA STRENGTH 500 mg tablet Generic drug:  acetaminophen Take 500 mg by mouth every six (6) hours as needed for Pain. VITAMIN B-12 1,000 mcg/mL injection Generic drug:  cyanocobalamin  
1,000 mcg by IntraMUSCular route every thirty (30) days. * Notice: This list has 2 medication(s) that are the same as other medications prescribed for you. Read the directions carefully, and ask your doctor or other care provider to review them with you. To-Do List   
 09/20/2017 Imaging:  XR FOOT RT MIN 3 V Introducing [facility] & HEALTH SERVICES! Blaine Fragoso introduces Urgent.ly patient portal. Now you can access parts of your medical record, email your doctor's office, and request medication refills online. 1. In your internet browser, go to [email or web address] 2. Click on the First Time User? Click Here link in the Sign In box. You will see the New Member Sign Up page. 3. Enter your Urgent.ly Access Code exactly as it appears below. You will not need to use this code after youve completed the sign-up process.  If you do not sign up before the expiration date, you must request a new code. · Catalyze Access Code: [email or web address] Expires: 10/3/2017  9:03 AM 
 
4. Enter the last four digits of your Social Security Number (xxxx) and Date of Birth (mm/dd/yyyy) as indicated and click Submit. You will be taken to the next sign-up page. 5. Create a Catalyze ID. This will be your Catalyze login ID and cannot be changed, so think of one that is secure and easy to remember. 6. Create a Catalyze password. You can change your password at any time. 7. Enter your Password Reset Question and Answer. This can be used at a later time if you forget your password. 8. Enter your e-mail address. You will receive e-mail notification when new information is available in [address] 9. Click Sign Up. You can now view and download portions of your medical record. 10. Click the Download Summary menu link to download a portable copy of your medical information. If you have questions, please visit the Frequently Asked Questions section of the Catalyze website. Remember, Catalyze is NOT to be used for urgent needs. For medical emergencies, dial 911. Now available from your iPhone and Android! Please provide this summary of care documentation to your next provider. Your primary care clinician is listed as Alejandra Almaguer. If you have any questions after today's visit, please call [phone].

## 2017-09-14 NOTE — PROGRESS NOTES
Naty Kebede Sr. Chief Complaint   Patient presents with    Wound Check    Surgical Follow-up       History and Physical    Mr. Yogi Davis returns to our office for continued management of his bilateral lower extremity wounds. He states that his leg continue to feel better after his venous stenting and arterial angioplasty. He states that he does still have the ulcers on his ankles bilaterally and has developed a new wound under his right fifth toe. He denies fevers or chills and does still wear compression wraps for a 1 week periods at a time. Past Medical History:   Diagnosis Date    Acid reflux     Anemia     CAD (coronary artery disease)     Chronic obstructive pulmonary disease (HCC)     Diabetes (HCC)     GERD (gastroesophageal reflux disease)     H/O seasonal allergies     Hypercholesterolemia     Hypertension     Ill-defined condition     hand tremors    Sleep apnea     pt states he has not used bipap \"in years\".  Thyroid disease      Patient Active Problem List   Diagnosis Code    PAD (peripheral artery disease) ([facility] I73.9    Venous reflux I87.2    Leg ulcer, left ([facility] L97.929    Leg ulcer ([facility] [address] L97.909     Past Surgical History:   Procedure Laterality Date    ABDOMEN SURGERY PROC UNLISTED  1997    Vikas-en-Y    ABDOMEN SURGERY PROC UNLISTED  2000    abdominal hernia repair/mesh    HX COLONOSCOPY      HX HEENT      cataracts removed right eye    HX ORTHOPAEDIC  1982    cervical laminectomy    HX UROLOGICAL  12/2015    suprapubic catheter in place    VASCULAR SURGERY PROCEDURE UNLIST       Current Outpatient Prescriptions   Medication Sig Dispense Refill    Omeprazole delayed release (PRILOSEC D/R) 20 mg tablet Take 20 mg by mouth daily.  oxyCODONE-acetaminophen (PERCOCET) 5-325 mg per tablet Take 2 Tabs by mouth every six (6) hours as needed. Max Daily Amount: 8 Tabs.  50 Tab 0    oxyCODONE-acetaminophen (PERCOCET) 5-325 mg per tablet Take 1 Tab by mouth every four (4) hours as needed for Pain.  potassium citrate (UROCIT-K10) 10 mEq (1,080 mg) TbER Take 10 mEq by mouth two (2) times a day.  multivitamin, tx-iron-ca-min (THERA-M W/ IRON) 9 mg iron-400 mcg tab tablet Take 1 Tab by mouth daily.  acetaminophen (TYLENOL EXTRA STRENGTH) 500 mg tablet Take 500 mg by mouth every six (6) hours as needed for Pain.  cilostazol (PLETAL) 100 mg tablet Take 100 mg by mouth Before breakfast and dinner.  iron aspgly,cv-R-K71-FA-Ca-suc (FERREX 150 FORTE PLUS) 374-03-27-9 mg-mg-mcg-mg cap cap Take 1 Cap by mouth daily.  insulin aspart (NOVOLOG FLEXPEN) 100 unit/mL inpn by SubCUTAneous route Before breakfast, lunch, and dinner. Sliding scale      metFORMIN (GLUCOPHAGE) 1,000 mg tablet Take 1,000 mg by mouth two (2) times daily (with meals).  atorvastatin (LIPITOR) 20 mg tablet Take  by mouth daily.  lisinopril (PRINIVIL, ZESTRIL) 2.5 mg tablet Take  by mouth daily.  albuterol (PROAIR HFA) 90 mcg/actuation inhaler Take  by inhalation.  clopidogrel (PLAVIX) 75 mg tab Take  by mouth.  cyanocobalamin (VITAMIN B-12) 1,000 mcg/mL injection 1,000 mcg by IntraMUSCular route every thirty (30) days.  aspirin 81 mg tablet Take 81 mg by mouth daily.  insulin glargine (LANTUS) 100 unit/mL injection 20 Units by SubCUTAneous route nightly.  furosemide (LASIX) 40 mg tablet Take 40 mg by mouth daily.  diltiazem (TIAZAC) 360 mg SR capsule Take 360 mg by mouth daily.  tiotropium (SPIRIVA WITH HANDIHALER) 18 mcg inhalation capsule Take 1 Cap by inhalation daily.  loratadine (CLARITIN) 10 mg tablet Take 10 mg by mouth daily.  fluticasone (FLONASE) 50 mcg/actuation nasal spray 2 Sprays by Both Nostrils route daily.  traMADol (ULTRAM) 50 mg tablet Take 50 mg by mouth daily.  gabapentin (NEURONTIN) 300 mg capsule Take 300 mg by mouth two (2) times a day.  One in the am and 2 at hs      LACTASE ENZYME PO Take 1 Cap by mouth daily.  levothyroxine (SYNTHROID) 25 mcg tablet Take 100 mcg by mouth Daily (before breakfast).  fluticasone-salmeterol (ADVAIR DISKUS) 250-50 mcg/dose diskus inhaler Take 1 Puff by inhalation every twelve (12) hours. Allergies   Allergen Reactions    Latex Contact Dermatitis     Pt states he is NOT allergic to latex.  Neosporin [Neomycin-Bacitracin-Polymyxin] Rash     Social History     Social History    Marital status:      Spouse name: N/A    Number of children: N/A    Years of education: N/A     Occupational History    Not on file. Social History Main Topics    Smoking status: Former Smoker     Types: Cigarettes     Quit date: 1/1/1997    Smokeless tobacco: Never Used    Alcohol use 0.6 oz/week     1 Glasses of wine per week    Drug use: No    Sexual activity: Not on file     Other Topics Concern    Not on file     Social History Narrative      Family History   Problem Relation Age of Onset    Cancer Mother     Diabetes Father     Heart Disease Sister     Cancer Brother     Diabetes Brother     Hypertension Brother        Review of Systems    ROS         Physical Exam:    Visit Vitals    /62    Pulse 88    Resp 20    Ht 6' 2\" (1.88 m)    Wt 146 lb (66.2 kg)    BMI 18.75 kg/m2      Physical Examination: General appearance - alert, well appearing, and in no distress  Mental status - alert, oriented to person, place, and time  Eyes - sclera anicteric, left eye normal, right eye normal  Ears - right ear normal, left ear normal  Nose - normal and patent, no erythema, discharge or polyps  Mouth - mucous membranes moist, pharynx normal without lesions  Extremities - Multiphasic DP/PT signal bilaterally. Bilateral skin tears and erythema around areas where compression wrap was in place. On up to the knee and above the skin appears normal.  Bilateral ankle ulcers with exposed deep tissue with granulation tissue. No signs of infection.   Planter right foot ulcer does not probe to bone      Impression and Plan:    ICD-10-CM ICD-9-CM    1. PAD (peripheral artery disease) ([facility]) I73.9 443.9 XR FOOT RT MIN 3 V   2. Venous reflux I87.2 459.81    3. Leg ulcer, left, limited to breakdown of skin ([address] L97.921 707.10    4. Leg ulcer, right, limited to breakdown of skin ([address] L97.911 707.10      Orders Placed This Encounter    XR FOOT RT MIN 3 V     I told Mr. Hough that I would like to give his skin a break. I have asked the wound care center to leave him out of his wraps for the next week to see him his skin recovers some. We will also order a right foot xray to look for any signs of osteomyelitis. Finally, we will see him in 2 weeks to see if he needs debridement and grafts. Follow-up Disposition:  Return in about 2 weeks (around 9/27/2017) for surgery talk, Symptom check, Wound check. The treatment plan was reviewed with the patient in detail. The patient voiced understanding of this plan and all questions and concerns were addressed. The patient agrees with this plan. We discussed the signs and symptoms that would require earlier attention or intervention. The patient was given educational material related to his/her visit and the patient has voiced understanding of the material.     I appreciate the opportunity to participate in the care of your patient. I will be sure to keep you informed of any subsequent changes in the treatment plan. If you have any questions or concerns, please feel free to contact me. Ara Libman, MD    PLEASE NOTE:  This document has been produced using voice recognition software. Unrecognized errors in transcription may be present.

## 2017-09-20 ENCOUNTER — HOSPITAL ENCOUNTER (OUTPATIENT)
Dept: GENERAL RADIOLOGY | Age: 72
Discharge: HOME | End: 2017-09-20
Payer: MEDICARE

## 2017-09-20 DIAGNOSIS — I73.9 PAD (PERIPHERAL ARTERY DISEASE) (HCC): Primary | ICD-10-CM

## 2017-09-20 DIAGNOSIS — I73.9 PAD (PERIPHERAL ARTERY DISEASE) (HCC): ICD-10-CM

## 2017-09-20 DIAGNOSIS — L97.921 LEG ULCER, LEFT, LIMITED TO BREAKDOWN OF SKIN (HCC): ICD-10-CM

## 2017-09-20 PROCEDURE — 73630 X-RAY EXAM OF FOOT: CPT

## 2017-09-20 PROCEDURE — 97602 WOUND(S) CARE NON-SELECTIVE: CPT

## 2017-09-20 RX ORDER — OXYCODONE AND ACETAMINOPHEN 5; 325 MG/1; MG/1
2 TABLET ORAL
Qty: 50 TAB | Refills: 0 | Status: SHIPPED | OUTPATIENT
Start: 2017-09-20 | End: 2017-11-07

## 2017-09-20 NOTE — TELEPHONE ENCOUNTER
Order for Percocet 5-325mg 1 every 4 hours prn pain #50/0placed per Verbal order from Dr. Radha Villarreal. Pt verbalized understanding . Patient presented with bilateral leg ulcers and states his pain is 9/10 and hasn't slept for 1 week. Patient legs red and inflamed , oozing and bleeding. Patient is seeing wound care today, discussed with Dr. Radha Villarreal and wound care changed back to wraps until seen in 3 weeks.

## 2017-09-28 PROCEDURE — 97602 WOUND(S) CARE NON-SELECTIVE: CPT

## 2017-10-04 ENCOUNTER — OFFICE VISIT (OUTPATIENT)
Dept: VASCULAR SURGERY | Age: 72
End: 2017-10-04

## 2017-10-04 ENCOUNTER — HOSPITAL ENCOUNTER (OUTPATIENT)
Dept: WOUND CARE | Age: 72
Discharge: HOME | End: 2017-10-04
Payer: MEDICARE

## 2017-10-04 VITALS
HEIGHT: 74 IN | RESPIRATION RATE: 18 BRPM | DIASTOLIC BLOOD PRESSURE: 60 MMHG | BODY MASS INDEX: 18.74 KG/M2 | HEART RATE: 80 BPM | WEIGHT: 146 LBS | SYSTOLIC BLOOD PRESSURE: 110 MMHG

## 2017-10-04 DIAGNOSIS — L97.412 DIABETIC ULCER OF RIGHT MIDFOOT ASSOCIATED WITH TYPE 2 DIABETES MELLITUS, WITH FAT LAYER EXPOSED (HCC): Primary | ICD-10-CM

## 2017-10-04 DIAGNOSIS — L97.911 LEG ULCER, RIGHT, LIMITED TO BREAKDOWN OF SKIN (HCC): ICD-10-CM

## 2017-10-04 DIAGNOSIS — L97.921 LEG ULCER, LEFT, LIMITED TO BREAKDOWN OF SKIN (HCC): ICD-10-CM

## 2017-10-04 DIAGNOSIS — I87.2 VENOUS REFLUX: ICD-10-CM

## 2017-10-04 DIAGNOSIS — E11.621 DIABETIC ULCER OF RIGHT MIDFOOT ASSOCIATED WITH TYPE 2 DIABETES MELLITUS, WITH FAT LAYER EXPOSED (HCC): Primary | ICD-10-CM

## 2017-10-04 DIAGNOSIS — I73.9 PAD (PERIPHERAL ARTERY DISEASE) (HCC): ICD-10-CM

## 2017-10-04 PROCEDURE — 97602 WOUND(S) CARE NON-SELECTIVE: CPT

## 2017-10-04 NOTE — MR AVS SNAPSHOT
Visit Information Date & Time Provider Department Dept. Phone Encounter #  
 10/4/2017 10:00 AM Mel Cushing, MD [facility] [address] [id number] Your Appointments 10/18/2017 10:00 AM  
Follow Up with Mel Cushing, MD  
[facility] Appt Note: 2 week FU no studies [place] [address] Upcoming Health Maintenance Date Due DTaP/Tdap/Td series (1 - Tdap) 5/16/1964 FOBT Q 1 YEAR AGE 50-75 5/16/1993 ZOSTER VACCINE AGE 60> 3/16/2003 GLAUCOMA SCREENING Q2Y 5/16/2008 Pneumococcal 65+ Low/Medium Risk (1 of 2 - PCV13) 5/16/2008 MEDICARE YEARLY EXAM 5/16/2008 INFLUENZA AGE 9 TO ADULT 8/1/2017 Allergies as of 10/4/2017  Review Complete On: 10/4/2017 By: Pk Colon RN Severity Noted Reaction Type Reactions Latex  05/28/2013    Contact Dermatitis Pt states he is NOT allergic to latex. Neosporin [Neomycin-bacitracin-polymyxin] High 05/09/2017   Systemic Rash Current Immunizations  Never Reviewed No immunizations on file. Not reviewed this visit You Were Diagnosed With   
  
 Codes Comments Diabetic ulcer of right midfoot associated with type 2 diabetes mellitus, with fat layer exposed ([facility] [address]    -  Primary ICD-10-CM: E11.621, N64.645 ICD-9-CM: 250.80, 707.14 Vitals BP Pulse Resp Height(growth percentile) Weight(growth percentile) BMI  
 110/60 (BP 1 Location: Left arm, BP Patient Position: Sitting) 80 18 6' 2\" (1.88 m) 146 lb (66.2 kg) 18.75 kg/m2 Smoking Status Former Smoker BMI and BSA Data Body Mass Index Body Surface Area 18.75 kg/m 2 1.86 m 2 Preferred Pharmacy Pharmacy Name Phone [id number] - [facility], [address] [phone] Your Updated Medication List  
 This list is accurate as of: 10/4/17 10:03 AM.  Always use your most recent med list.  
  
  
  
  
 ADVAIR DISKUS 250-50 mcg/dose diskus inhaler Generic drug:  fluticasone-salmeterol Take 1 Puff by inhalation every twelve (12) hours. aspirin 81 mg tablet Take 81 mg by mouth daily. atorvastatin 20 mg tablet Commonly known as:  LIPITOR Take  by mouth daily. cilostazol 100 mg tablet Commonly known as:  PLETAL Take 100 mg by mouth Before breakfast and dinner. CLARITIN 10 mg tablet Generic drug:  loratadine Take 10 mg by mouth daily. FLONASE 50 mcg/actuation nasal spray Generic drug:  fluticasone 2 Sprays by Both Nostrils route daily. gabapentin 300 mg capsule Commonly known as:  NEURONTIN Take 300 mg by mouth two (2) times a day. One in the am and 2 at hs  
  
 iron aspgly,ay-Q-C58-FA-Ca-suc 150-60-25-1 mg-mg-mcg-mg Cap cap Commonly known as:  FERREX [address] Take 1 Cap by mouth daily. LACTASE ENZYME PO Take 1 Cap by mouth daily. LANTUS 100 unit/mL injection Generic drug:  insulin glargine 20 Units by SubCUTAneous route nightly. LASIX 40 mg tablet Generic drug:  furosemide Take 40 mg by mouth daily. levothyroxine 25 mcg tablet Commonly known as:  SYNTHROID Take 100 mcg by mouth Daily (before breakfast). lisinopril 2.5 mg tablet Commonly known as:  Michaelyn Colville Take  by mouth daily. metFORMIN 1,000 mg tablet Commonly known as:  GLUCOPHAGE Take 1,000 mg by mouth two (2) times daily (with meals). multivitamin, tx-iron-ca-min 9 mg iron-400 mcg Tab tablet Commonly known as:  THERA-M w/ IRON Take 1 Tab by mouth daily. NovoLOG Flexpen 100 unit/mL Inpn Generic drug:  insulin aspart  
by SubCUTAneous route Before breakfast, lunch, and dinner. Sliding scale Omeprazole delayed release 20 mg tablet Commonly known as:  PRILOSEC D/R Take 20 mg by mouth daily. * PERCOCET 5-325 mg per tablet Generic drug:  oxyCODONE-acetaminophen Take 1 Tab by mouth every four (4) hours as needed for Pain. * oxyCODONE-acetaminophen 5-325 mg per tablet Commonly known as:  PERCOCET Take 2 Tabs by mouth every six (6) hours as needed. Max Daily Amount: 8 Tabs. PLAVIX 75 mg Tab Generic drug:  clopidogrel Take  by mouth.  
  
 potassium citrate 10 mEq (1,080 mg) Inder Carne Commonly known as:  [place] Take 10 mEq by mouth two (2) times a day. PROAIR HFA 90 mcg/actuation inhaler Generic drug:  albuterol Take  by inhalation. SPIRIVA WITH HANDIHALER 18 mcg inhalation capsule Generic drug:  tiotropium Take 1 Cap by inhalation daily. TIAZAC 360 mg SR capsule Generic drug:  dilTIAZem Take 360 mg by mouth daily. traMADol 50 mg tablet Commonly known as:  ULTRAM  
Take 50 mg by mouth daily. TYLENOL EXTRA STRENGTH 500 mg tablet Generic drug:  acetaminophen Take 500 mg by mouth every six (6) hours as needed for Pain. VITAMIN B-12 1,000 mcg/mL injection Generic drug:  cyanocobalamin  
1,000 mcg by IntraMUSCular route every thirty (30) days. * Notice: This list has 2 medication(s) that are the same as other medications prescribed for you. Read the directions carefully, and ask your doctor or other care provider to review them with you. We Performed the Following REFERRAL TO PODIATRY [REF90 Custom] Comments:  
 Please evaluate for diabetic foot ulcer Referral Information Referral ID Referred By Referred To  
  
 [id number] DPM   
   [address] Phone: [phone] Fax: [phone] Visits Status Start Date End Date 1 New Request 10/4/17 10/4/18 If your referral has a status of pending review or denied, additional information will be sent to support the outcome of this decision. Introducing [facility] & HEALTH SERVICES! Dear Boo Fischer: Thank you for requesting a BizAnytime account. Our records indicate that you already have an active BizAnytime account. You can access your account anytime at [email or web address] Did you know that you can access your hospital and ER discharge instructions at any time in BizAnytime? You can also review all of your test results from your hospital stay or ER visit. Additional Information If you have questions, please visit the Frequently Asked Questions section of the BizAnytime website at [email or web address] Remember, BizAn[employer]ime is NOT to be used for urgent needs. For medical emergencies, dial 911. Now available from your iPhone and Android! Please provide this summary of care documentation to your next provider. Your primary care clinician is listed as Massie Hodgkins. If you have any questions after today's visit, please call [phone].

## 2017-10-05 NOTE — PROGRESS NOTES
Génesis De Oliveira Sr. Chief Complaint   Patient presents with    Wound Check    Leg Pain       History and Physical    Mr. Fred Higgins returns to our office for continued follow up of his bilateral leg wounds. He has no new complaints. He has been going to wound care for Ascade changes and has been tolerating his wound care. Past Medical History:   Diagnosis Date    Acid reflux     Anemia     CAD (coronary artery disease)     Chronic obstructive pulmonary disease (HCC)     Diabetes (HCC)     GERD (gastroesophageal reflux disease)     H/O seasonal allergies     Hypercholesterolemia     Hypertension     Ill-defined condition     hand tremors    Sleep apnea     pt states he has not used bipap \"in years\".  Thyroid disease      Patient Active Problem List   Diagnosis Code    PAD (peripheral artery disease) ([facility]) I73.9    Venous reflux I87.2    Leg ulcer, left ([address] L97.929    Leg ulcer ([address] L97.909    Diabetic ulcer of right midfoot associated with type 2 diabetes mellitus, with fat layer exposed ([address] E11.621, L97.412     Past Surgical History:   Procedure Laterality Date    ABDOMEN SURGERY PROC UNLISTED  1997    Vikas-en-Y    ABDOMEN SURGERY PROC UNLISTED  2000    abdominal hernia repair/mesh    HX COLONOSCOPY      HX HEENT      cataracts removed right eye    HX ORTHOPAEDIC  1982    cervical laminectomy    HX UROLOGICAL  12/2015    suprapubic catheter in place    VASCULAR SURGERY PROCEDURE UNLIST       Current Outpatient Prescriptions   Medication Sig Dispense Refill    oxyCODONE-acetaminophen (PERCOCET) 5-325 mg per tablet Take 2 Tabs by mouth every six (6) hours as needed. Max Daily Amount: 8 Tabs. 50 Tab 0    Omeprazole delayed release (PRILOSEC D/R) 20 mg tablet Take 20 mg by mouth daily.  oxyCODONE-acetaminophen (PERCOCET) 5-325 mg per tablet Take 1 Tab by mouth every four (4) hours as needed for Pain.       potassium citrate (UROCIT-K10) 10 mEq (1,080 mg) TbER Take 10 mEq by mouth two (2) times a day.  multivitamin, tx-iron-ca-min (THERA-M W/ IRON) 9 mg iron-400 mcg tab tablet Take 1 Tab by mouth daily.  acetaminophen (TYLENOL EXTRA STRENGTH) 500 mg tablet Take 500 mg by mouth every six (6) hours as needed for Pain.  cilostazol (PLETAL) 100 mg tablet Take 100 mg by mouth Before breakfast and dinner.  iron aspgly,to-C-P68-FA-Ca-suc (FERREX 150 FORTE PLUS) 804-26-81-4 mg-mg-mcg-mg cap cap Take 1 Cap by mouth daily.  insulin aspart (NOVOLOG FLEXPEN) 100 unit/mL inpn by SubCUTAneous route Before breakfast, lunch, and dinner. Sliding scale      metFORMIN (GLUCOPHAGE) 1,000 mg tablet Take 1,000 mg by mouth two (2) times daily (with meals).  atorvastatin (LIPITOR) 20 mg tablet Take  by mouth daily.  lisinopril (PRINIVIL, ZESTRIL) 2.5 mg tablet Take  by mouth daily.  albuterol (PROAIR HFA) 90 mcg/actuation inhaler Take  by inhalation.  clopidogrel (PLAVIX) 75 mg tab Take  by mouth.  cyanocobalamin (VITAMIN B-12) 1,000 mcg/mL injection 1,000 mcg by IntraMUSCular route every thirty (30) days.  aspirin 81 mg tablet Take 81 mg by mouth daily.  insulin glargine (LANTUS) 100 unit/mL injection 20 Units by SubCUTAneous route nightly.  furosemide (LASIX) 40 mg tablet Take 40 mg by mouth daily.  diltiazem (TIAZAC) 360 mg SR capsule Take 360 mg by mouth daily.  tiotropium (SPIRIVA WITH HANDIHALER) 18 mcg inhalation capsule Take 1 Cap by inhalation daily.  loratadine (CLARITIN) 10 mg tablet Take 10 mg by mouth daily.  fluticasone (FLONASE) 50 mcg/actuation nasal spray 2 Sprays by Both Nostrils route daily.  traMADol (ULTRAM) 50 mg tablet Take 50 mg by mouth daily.  gabapentin (NEURONTIN) 300 mg capsule Take 300 mg by mouth two (2) times a day. One in the am and 2 at hs      LACTASE ENZYME PO Take 1 Cap by mouth daily.       levothyroxine (SYNTHROID) 25 mcg tablet Take 100 mcg by mouth Daily (before breakfast).  fluticasone-salmeterol (ADVAIR DISKUS) 250-50 mcg/dose diskus inhaler Take 1 Puff by inhalation every twelve (12) hours. Allergies   Allergen Reactions    Latex Contact Dermatitis     Pt states he is NOT allergic to latex.  Neosporin [Neomycin-Bacitracin-Polymyxin] Rash     Social History     Social History    Marital status:      Spouse name: N/A    Number of children: N/A    Years of education: N/A     Occupational History    Not on file. Social History Main Topics    Smoking status: Former Smoker     Types: Cigarettes     Quit date: 1/1/1997    Smokeless tobacco: Never Used    Alcohol use 0.6 oz/week     1 Glasses of wine per week    Drug use: No    Sexual activity: Not on file     Other Topics Concern    Not on file     Social History Narrative      Family History   Problem Relation Age of Onset    Cancer Mother     Diabetes Father     Heart Disease Sister     Cancer Brother     Diabetes Brother     Hypertension Brother        Review of Systems    Review of Systems   Constitutional: Negative for chills, diaphoresis, fever, malaise/fatigue and weight loss. HENT: Negative for hearing loss and sore throat. Eyes: Negative for blurred vision, photophobia and redness. Respiratory: Negative for cough, hemoptysis, shortness of breath and wheezing. Cardiovascular: Negative for chest pain, palpitations and orthopnea. Gastrointestinal: Negative for abdominal pain, blood in stool, constipation, diarrhea, heartburn, nausea and vomiting. Genitourinary: Negative for dysuria, frequency, hematuria and urgency. Musculoskeletal: Negative for back pain and myalgias. Skin: Negative for itching and rash. Neurological: Positive for sensory change. Negative for dizziness, speech change, focal weakness, weakness and headaches. Endo/Heme/Allergies: Does not bruise/bleed easily. Psychiatric/Behavioral: Negative for depression and suicidal ideas. Physical Exam:    Visit Vitals    /60 (BP 1 Location: Left arm, BP Patient Position: Sitting)    Pulse 80    Resp 18    Ht 6' 2\" (1.88 m)    Wt 146 lb (66.2 kg)    BMI 18.75 kg/m2      Physical Examination: General appearance - alert, well appearing, and in no distress  Mental status - alert, oriented to person, place, and time  Eyes - sclera anicteric, left eye normal, right eye normal  Ears - right ear normal, left ear normal  Nose - normal and patent, no erythema, discharge or polyps  Mouth - mucous membranes moist, pharynx normal without lesions  Extremities - Right foot plantar ulcer stable without obvious tracking or drainage. Left medial ankle ulcer decreased in size. Bilateral superficial skin tears on the shins with inflamed skin throughout. Impression and Plan:    ICD-10-CM ICD-9-CM    1. Diabetic ulcer of right midfoot associated with type 2 diabetes mellitus, with fat layer exposed ([address] E11.621 250.80 REFERRAL TO PODIATRY    L97.412 707.14    2. Leg ulcer, left, limited to breakdown of skin ([address] L97.921 707.10    3. Leg ulcer, right, limited to breakdown of skin ([address] L97.911 707.10    4. Venous reflux I87.2 459.81    5. PAD (peripheral artery disease) ([facility]) I73.9 443.9      Orders Placed This Encounter    REFERRAL TO PODIATRY     I told Mr. Nya Johnson that his xray does not show any evidence of osteomyelitis. I still believe he needs to be followed by a podiatrist for his foot care. We will refer him to a podiatrist of his choice. For now, we will continue with Unna boots to protect his legs, but once his skins heals again I believe he needs to take the Unna boot off to allow his skin to breathe. Follow-up Disposition:  Return in about 2 weeks (around 10/18/2017) for Wound check. The treatment plan was reviewed with the patient in detail. The patient voiced understanding of this plan and all questions and concerns were addressed. The patient agrees with this plan. We discussed the signs and symptoms that would require earlier attention or intervention. The patient was given educational material related to his/her visit and the patient has voiced understanding of the material.     I appreciate the opportunity to participate in the care of your patient. I will be sure to keep you informed of any subsequent changes in the treatment plan. If you have any questions or concerns, please feel free to contact me. Imelda Herrera MD    PLEASE NOTE:  This document has been produced using voice recognition software. Unrecognized errors in transcription may be present.

## 2017-10-13 PROCEDURE — 29581 APPL MULTLAYER CMPRN SYS LEG: CPT

## 2017-10-18 ENCOUNTER — OFFICE VISIT (OUTPATIENT)
Dept: VASCULAR SURGERY | Age: 72
End: 2017-10-18

## 2017-10-18 VITALS
WEIGHT: 146 LBS | SYSTOLIC BLOOD PRESSURE: 118 MMHG | HEIGHT: 74 IN | DIASTOLIC BLOOD PRESSURE: 62 MMHG | RESPIRATION RATE: 18 BRPM | HEART RATE: 74 BPM | BODY MASS INDEX: 18.74 KG/M2

## 2017-10-18 DIAGNOSIS — I87.2 VENOUS REFLUX: Primary | ICD-10-CM

## 2017-10-18 DIAGNOSIS — I73.9 PAD (PERIPHERAL ARTERY DISEASE) (HCC): ICD-10-CM

## 2017-10-18 PROCEDURE — 97602 WOUND(S) CARE NON-SELECTIVE: CPT

## 2017-10-18 NOTE — MR AVS SNAPSHOT
Visit Information Date & Time Provider Department Dept. Phone Encounter #  
 10/18/2017 10:00 AM William Cortes MD [facility] [address] [id number] Your Appointments 12/20/2017 10:00 AM  
Follow Up with William Cortes MD  
[facility] ([address]) Appt Note: 2 month FU without Studies [address]  
  
   
 [facility] Up[facility] Health Maintenance Date Due HEMOGLOBIN A1C Q6M 1945 LIPID PANEL Q1 1945 FOOT EXAM Q1 5/16/1953 MICROALBUMIN Q1 5/16/1953 EYE EXAM RETINAL OR DILATED Q1 5/16/1953 DTaP/Tdap/Td series (1 - Tdap) 5/16/1964 FOBT Q 1 YEAR AGE 50-75 5/16/1993 ZOSTER VACCINE AGE 60> 3/16/2003 GLAUCOMA SCREENING Q2Y 5/16/2008 Pneumococcal 65+ Low/Medium Risk (1 of 2 - PCV13) 5/16/2008 MEDICARE YEARLY EXAM 5/16/2008 INFLUENZA AGE 9 TO ADULT 8/1/2017 Allergies as of 10/18/2017  Review Complete On: 10/18/2017 By: Maine De Leon RN Severity Noted Reaction Type Reactions Latex  05/28/2013    Contact Dermatitis Pt states he is NOT allergic to latex. Neosporin [Neomycin-bacitracin-polymyxin] High 05/09/2017   Systemic Rash Current Immunizations  Never Reviewed No immunizations on file. Not reviewed this visit Vitals BP Pulse Resp Height(growth percentile) Weight(growth percentile) BMI  
 118/62 (BP 1 Location: Left arm, BP Patient Position: Sitting) 74 18 6' 2\" (1.88 m) 146 lb (66.2 kg) 18.75 kg/m2 Smoking Status Former Smoker BMI and BSA Data Body Mass Index Body Surface Area 18.75 kg/m 2 1.86 m 2 Preferred Pharmacy Pharmacy Name Phone [id number] - [place], [address] [phone] Your Updated Medication List  
  
   
 This list is accurate as of: 10/18/17 10:13 AM.  Always use your most recent med list.  
  
  
  
  
 ADVAIR DISKUS 250-50 mcg/dose diskus inhaler Generic drug:  fluticasone-salmeterol Take 1 Puff by inhalation every twelve (12) hours. aspirin 81 mg tablet Take 81 mg by mouth daily. atorvastatin 20 mg tablet Commonly known as:  LIPITOR Take  by mouth daily. cilostazol 100 mg tablet Commonly known as:  PLETAL Take 100 mg by mouth Before breakfast and dinner. CLARITIN 10 mg tablet Generic drug:  loratadine Take 10 mg by mouth daily. FLONASE 50 mcg/actuation nasal spray Generic drug:  fluticasone 2 Sprays by Both Nostrils route daily. gabapentin 300 mg capsule Commonly known as:  NEURONTIN Take 300 mg by mouth two (2) times a day. One in the am and 2 at hs  
  
 iron aspgly,vm-C-J01-FA-Ca-suc 150-60-25-1 mg-mg-mcg-mg Cap cap Commonly known as:  FERREX [address] Take 1 Cap by mouth daily. LACTASE ENZYME PO Take 1 Cap by mouth daily. LANTUS 100 unit/mL injection Generic drug:  insulin glargine 20 Units by SubCUTAneous route nightly. LASIX 40 mg tablet Generic drug:  furosemide Take 40 mg by mouth daily. levothyroxine 25 mcg tablet Commonly known as:  SYNTHROID Take 100 mcg by mouth Daily (before breakfast). lisinopril 2.5 mg tablet Commonly known as:  [place] Take  by mouth daily. metFORMIN 1,000 mg tablet Commonly known as:  GLUCOPHAGE Take 1,000 mg by mouth two (2) times daily (with meals). multivitamin, tx-iron-ca-min 9 mg iron-400 mcg Tab tablet Commonly known as:  THERA-M w/ IRON Take 1 Tab by mouth daily. NovoLOG Flexpen 100 unit/mL Inpn Generic drug:  insulin aspart  
by SubCUTAneous route Before breakfast, lunch, and dinner. Sliding scale Omeprazole delayed release 20 mg tablet Commonly known as:  PRILOSEC D/R Take 20 mg by mouth daily. * PERCOCET 5-325 mg per tablet Generic drug:  oxyCODONE-acetaminophen Take 1 Tab by mouth every four (4) hours as needed for Pain. * oxyCODONE-acetaminophen 5-325 mg per tablet Commonly known as:  PERCOCET Take 2 Tabs by mouth every six (6) hours as needed. Max Daily Amount: 8 Tabs. PLAVIX 75 mg Tab Generic drug:  clopidogrel Take  by mouth.  
  
 potassium citrate 10 mEq (1,080 mg) Vimal Donato Commonly known as:  [place] Take 10 mEq by mouth two (2) times a day. PROAIR HFA 90 mcg/actuation inhaler Generic drug:  albuterol Take  by inhalation. SPIRIVA WITH HANDIHALER 18 mcg inhalation capsule Generic drug:  tiotropium Take 1 Cap by inhalation daily. TIAZAC 360 mg SR capsule Generic drug:  dilTIAZem Take 360 mg by mouth daily. traMADol 50 mg tablet Commonly known as:  ULTRAM  
Take 50 mg by mouth daily. TYLENOL EXTRA STRENGTH 500 mg tablet Generic drug:  acetaminophen Take 500 mg by mouth every six (6) hours as needed for Pain. VITAMIN B-12 1,000 mcg/mL injection Generic drug:  cyanocobalamin  
1,000 mcg by IntraMUSCular route every thirty (30) days. * Notice: This list has 2 medication(s) that are the same as other medications prescribed for you. Read the directions carefully, and ask your doctor or other care provider to review them with you. Introducing [facility]! Dear Amador Mistry: Thank you for requesting a [employer] account. Our records indicate that you already have an active [employer] account. You can access your account anytime at [email or web address] Did you know that you can access your hospital and ER discharge instructions at any time in [employer]? You can also review all of your test results from your hospital stay or ER visit. Additional Information If you have questions, please visit the Frequently Asked Questions section of the [facility] website at [email or web address] Remember, [facility] is NOT to be used for urgent needs. For medical emergencies, dial 911. Now available from your iPhone and Android! Please provide this summary of care documentation to your next provider. Your primary care clinician is listed as Alycia Grabiely. If you have any questions after today's visit, please call [phone].

## 2017-10-19 NOTE — PROGRESS NOTES
Isacc Soto . Chief Complaint   Patient presents with    Wound Check    Swelling       History and Physical    Mr. Vaughn Puentes returns to our office for continued follow up of his bilateral leg wounds. He has no complaints and states his leg wounds are healing well. Past Medical History:   Diagnosis Date    Acid reflux     Anemia     CAD (coronary artery disease)     Chronic obstructive pulmonary disease (HCC)     Diabetes (HCC)     GERD (gastroesophageal reflux disease)     H/O seasonal allergies     Hypercholesterolemia     Hypertension     Ill-defined condition     hand tremors    Sleep apnea     pt states he has not used bipap \"in years\".  Thyroid disease      Patient Active Problem List   Diagnosis Code    PAD (peripheral artery disease) ([facility]) I73.9    Venous reflux I87.2    Leg ulcer, left ([address] L97.929    Leg ulcer ([address] L97.909    Diabetic ulcer of right midfoot associated with type 2 diabetes mellitus, with fat layer exposed ([address] E11.621, L97.412     Past Surgical History:   Procedure Laterality Date    ABDOMEN SURGERY PROC UNLISTED  1997    Vikas-en-Y    ABDOMEN SURGERY PROC UNLISTED  2000    abdominal hernia repair/mesh    HX COLONOSCOPY      HX HEENT      cataracts removed right eye    HX ORTHOPAEDIC  1982    cervical laminectomy    HX UROLOGICAL  12/2015    suprapubic catheter in place    VASCULAR SURGERY PROCEDURE UNLIST       Current Outpatient Prescriptions   Medication Sig Dispense Refill    oxyCODONE-acetaminophen (PERCOCET) 5-325 mg per tablet Take 2 Tabs by mouth every six (6) hours as needed. Max Daily Amount: 8 Tabs. 50 Tab 0    Omeprazole delayed release (PRILOSEC D/R) 20 mg tablet Take 20 mg by mouth daily.  oxyCODONE-acetaminophen (PERCOCET) 5-325 mg per tablet Take 1 Tab by mouth every four (4) hours as needed for Pain.  potassium citrate (UROCIT-K10) 10 mEq (1,080 mg) TbER Take 10 mEq by mouth two (2) times a day.       multivitamin, tx-iron-ca-min (THERA-M W/ IRON) 9 mg iron-400 mcg tab tablet Take 1 Tab by mouth daily.  acetaminophen (TYLENOL EXTRA STRENGTH) 500 mg tablet Take 500 mg by mouth every six (6) hours as needed for Pain.  cilostazol (PLETAL) 100 mg tablet Take 100 mg by mouth Before breakfast and dinner.  iron aspgly,sm-I-Y30-FA-Ca-suc (FERREX 150 FORTE PLUS) 274-82-79-6 mg-mg-mcg-mg cap cap Take 1 Cap by mouth daily.  insulin aspart (NOVOLOG FLEXPEN) 100 unit/mL inpn by SubCUTAneous route Before breakfast, lunch, and dinner. Sliding scale      metFORMIN (GLUCOPHAGE) 1,000 mg tablet Take 1,000 mg by mouth two (2) times daily (with meals).  atorvastatin (LIPITOR) 20 mg tablet Take  by mouth daily.  lisinopril (PRINIVIL, ZESTRIL) 2.5 mg tablet Take  by mouth daily.  albuterol (PROAIR HFA) 90 mcg/actuation inhaler Take  by inhalation.  clopidogrel (PLAVIX) 75 mg tab Take  by mouth.  cyanocobalamin (VITAMIN B-12) 1,000 mcg/mL injection 1,000 mcg by IntraMUSCular route every thirty (30) days.  aspirin 81 mg tablet Take 81 mg by mouth daily.  insulin glargine (LANTUS) 100 unit/mL injection 20 Units by SubCUTAneous route nightly.  furosemide (LASIX) 40 mg tablet Take 40 mg by mouth daily.  diltiazem (TIAZAC) 360 mg SR capsule Take 360 mg by mouth daily.  tiotropium (SPIRIVA WITH HANDIHALER) 18 mcg inhalation capsule Take 1 Cap by inhalation daily.  loratadine (CLARITIN) 10 mg tablet Take 10 mg by mouth daily.  fluticasone (FLONASE) 50 mcg/actuation nasal spray 2 Sprays by Both Nostrils route daily.  traMADol (ULTRAM) 50 mg tablet Take 50 mg by mouth daily.  gabapentin (NEURONTIN) 300 mg capsule Take 300 mg by mouth two (2) times a day. One in the am and 2 at hs      LACTASE ENZYME PO Take 1 Cap by mouth daily.  levothyroxine (SYNTHROID) 25 mcg tablet Take 100 mcg by mouth Daily (before breakfast).       fluticasone-salmeterol (ADVAIR DISKUS) 250-50 mcg/dose diskus inhaler Take 1 Puff by inhalation every twelve (12) hours. Allergies   Allergen Reactions    Latex Contact Dermatitis     Pt states he is NOT allergic to latex.  Neosporin [Neomycin-Bacitracin-Polymyxin] Rash     Social History     Social History    Marital status:      Spouse name: N/A    Number of children: N/A    Years of education: N/A     Occupational History    Not on file. Social History Main Topics    Smoking status: Former Smoker     Types: Cigarettes     Quit date: 1/1/1997    Smokeless tobacco: Never Used    Alcohol use 0.6 oz/week     1 Glasses of wine per week    Drug use: No    Sexual activity: Not on file     Other Topics Concern    Not on file     Social History Narrative      Family History   Problem Relation Age of Onset    Cancer Mother     Diabetes Father     Heart Disease Sister     Cancer Brother     Diabetes Brother     Hypertension Brother        Review of Systems    Review of Systems   Constitutional: Negative for chills, diaphoresis, fever, malaise/fatigue and weight loss. HENT: Negative for hearing loss and sore throat. Eyes: Negative for blurred vision, photophobia and redness. Respiratory: Negative for cough, hemoptysis, shortness of breath and wheezing. Cardiovascular: Negative for chest pain, palpitations and orthopnea. Gastrointestinal: Negative for abdominal pain, blood in stool, constipation, diarrhea, heartburn, nausea and vomiting. Genitourinary: Negative for dysuria, frequency, hematuria and urgency. Musculoskeletal: Positive for joint pain. Negative for back pain and myalgias. Skin: Negative for itching and rash. Neurological: Negative for dizziness, speech change, focal weakness, weakness and headaches. Endo/Heme/Allergies: Does not bruise/bleed easily. Psychiatric/Behavioral: Negative for depression and suicidal ideas.             Physical Exam:    Visit Vitals    /62 (BP 1 Location: Left arm, BP Patient Position: Sitting)    Pulse 74    Resp 18    Ht 6' 2\" (1.88 m)    Wt 146 lb (66.2 kg)    BMI 18.75 kg/m2      Physical Examination: General appearance - alert, well appearing, and in no distress  Mental status - alert, oriented to person, place, and time  Eyes - sclera anicteric, left eye normal, right eye normal  Ears - right ear normal, left ear normal  Nose - normal and patent, no erythema, discharge or polyps  Mouth - mucous membranes moist, pharynx normal without lesions  Neck - supple, no significant adenopathy  Extremities - Bilateral leg wounds healing well with no open sores. Decreased erythema on bilateral shins. No visible drainage. Right lateral plantar foot wound and lateral left heel wound without tunneling. Impression and Plan:    ICD-10-CM ICD-9-CM    1. Venous reflux I87.2 459.81    2. PAD (peripheral artery disease) ([facility]) I73.9 443.9      I am pleased with the improvement Mr. Ana Thornton has made with his leg wounds. We will check on him again in 3 months. Mr. Ana Thornton will continue with the wound care clinic and Dr. Ardath Hodgkins office. Follow-up Disposition:  Return in about 3 months (around 1/18/2018) for Symptom check, Wound check. The treatment plan was reviewed with the patient in detail. The patient voiced understanding of this plan and all questions and concerns were addressed. The patient agrees with this plan. We discussed the signs and symptoms that would require earlier attention or intervention. The patient was given educational material related to his/her visit and the patient has voiced understanding of the material.     I appreciate the opportunity to participate in the care of your patient. I will be sure to keep you informed of any subsequent changes in the treatment plan. If you have any questions or concerns, please feel free to contact me.   Tiffanie Acevedo MD    PLEASE NOTE:  This document has been produced using voice recognition software. Unrecognized errors in transcription may be present.

## 2017-10-27 PROCEDURE — 15271 SKIN SUB GRAFT TRNK/ARM/LEG: CPT

## 2017-11-03 ENCOUNTER — HOSPITAL ENCOUNTER (OUTPATIENT)
Dept: WOUND CARE | Age: 72
Discharge: HOME | End: 2017-11-03
Payer: MEDICARE

## 2017-11-03 PROCEDURE — 97602 WOUND(S) CARE NON-SELECTIVE: CPT

## 2017-11-04 ENCOUNTER — HOSPITAL ENCOUNTER (INPATIENT)
Age: 72
LOS: 3 days | Discharge: HOME | DRG: 698 | End: 2017-11-07
Attending: EMERGENCY MEDICINE | Admitting: INTERNAL MEDICINE
Payer: MEDICARE

## 2017-11-04 ENCOUNTER — APPOINTMENT (OUTPATIENT)
Dept: CT IMAGING | Age: 72
DRG: 698 | End: 2017-11-04
Attending: EMERGENCY MEDICINE
Payer: MEDICARE

## 2017-11-04 ENCOUNTER — APPOINTMENT (OUTPATIENT)
Dept: GENERAL RADIOLOGY | Age: 72
DRG: 698 | End: 2017-11-04
Attending: EMERGENCY MEDICINE
Payer: MEDICARE

## 2017-11-04 DIAGNOSIS — T83.511A URINARY TRACT INFECTION ASSOCIATED WITH INDWELLING URETHRAL CATHETER, INITIAL ENCOUNTER (HCC): ICD-10-CM

## 2017-11-04 DIAGNOSIS — N39.0 URINARY TRACT INFECTION ASSOCIATED WITH INDWELLING URETHRAL CATHETER, INITIAL ENCOUNTER (HCC): ICD-10-CM

## 2017-11-04 DIAGNOSIS — A41.9 SEPSIS, DUE TO UNSPECIFIED ORGANISM: Primary | ICD-10-CM

## 2017-11-04 PROBLEM — J44.9 COPD (CHRONIC OBSTRUCTIVE PULMONARY DISEASE) (HCC): Status: ACTIVE | Noted: 2017-11-04

## 2017-11-04 PROBLEM — I25.10 CAD (CORONARY ARTERY DISEASE): Status: ACTIVE | Noted: 2017-11-04

## 2017-11-04 PROBLEM — I10 HTN (HYPERTENSION): Status: ACTIVE | Noted: 2017-11-04

## 2017-11-04 LAB
ALBUMIN SERPL-MCNC: 3.2 G/DL (ref 3.4–5)
ALBUMIN/GLOB SERPL: 0.7 {RATIO} (ref 0.8–1.7)
ALP SERPL-CCNC: 83 U/L (ref 45–117)
ALT SERPL-CCNC: 24 U/L (ref 16–61)
ANION GAP SERPL CALC-SCNC: 13 MMOL/L (ref 3–18)
APPEARANCE UR: ABNORMAL
AST SERPL-CCNC: 17 U/L (ref 15–37)
BACTERIA URNS QL MICRO: ABNORMAL /HPF
BASOPHILS # BLD: 0 K/UL (ref 0–0.06)
BASOPHILS NFR BLD: 0 % (ref 0–2)
BILIRUB SERPL-MCNC: 0.4 MG/DL (ref 0.2–1)
BILIRUB UR QL: NEGATIVE
BUN SERPL-MCNC: 11 MG/DL (ref 7–18)
BUN/CREAT SERPL: 12 (ref 12–20)
CALCIUM SERPL-MCNC: 8.8 MG/DL (ref 8.5–10.1)
CHLORIDE SERPL-SCNC: 93 MMOL/L (ref 100–108)
CK MB CFR SERPL CALC: ABNORMAL % (ref 0–4)
CK MB SERPL-MCNC: <1 NG/ML (ref 5–25)
CK SERPL-CCNC: 26 U/L (ref 39–308)
CO2 SERPL-SCNC: 26 MMOL/L (ref 21–32)
COLOR UR: YELLOW
CREAT SERPL-MCNC: 0.95 MG/DL (ref 0.6–1.3)
DIFFERENTIAL METHOD BLD: ABNORMAL
EOSINOPHIL # BLD: 0.2 K/UL (ref 0–0.4)
EOSINOPHIL NFR BLD: 2 % (ref 0–5)
EPITH CASTS URNS QL MICRO: ABNORMAL /LPF (ref 0–5)
ERYTHROCYTE [DISTWIDTH] IN BLOOD BY AUTOMATED COUNT: 15.6 % (ref 11.6–14.5)
FLUAV AG NPH QL IA: NEGATIVE
FLUBV AG NOSE QL IA: NEGATIVE
GLOBULIN SER CALC-MCNC: 4.4 G/DL (ref 2–4)
GLUCOSE SERPL-MCNC: 281 MG/DL (ref 74–99)
GLUCOSE UR STRIP.AUTO-MCNC: >1000 MG/DL
HCT VFR BLD AUTO: 35.1 % (ref 36–48)
HGB BLD-MCNC: 11.8 G/DL (ref 13–16)
HGB UR QL STRIP: ABNORMAL
KETONES UR QL STRIP.AUTO: ABNORMAL MG/DL
LACTATE BLD-SCNC: 5.5 MMOL/L (ref 0.4–2)
LACTATE SERPL-SCNC: 5.7 MMOL/L (ref 0.4–2)
LEUKOCYTE ESTERASE UR QL STRIP.AUTO: ABNORMAL
LYMPHOCYTES # BLD: 0.7 K/UL (ref 0.9–3.6)
LYMPHOCYTES NFR BLD: 10 % (ref 21–52)
MAGNESIUM SERPL-MCNC: 1.5 MG/DL (ref 1.6–2.6)
MCH RBC QN AUTO: 29.5 PG (ref 24–34)
MCHC RBC AUTO-ENTMCNC: 33.6 G/DL (ref 31–37)
MCV RBC AUTO: 87.8 FL (ref 74–97)
MONOCYTES # BLD: 0.4 K/UL (ref 0.05–1.2)
MONOCYTES NFR BLD: 6 % (ref 3–10)
NEUTS SEG # BLD: 5.6 K/UL (ref 1.8–8)
NEUTS SEG NFR BLD: 82 % (ref 40–73)
NITRITE UR QL STRIP.AUTO: POSITIVE
PH UR STRIP: 5 [PH] (ref 5–8)
PLATELET # BLD AUTO: 111 K/UL (ref 135–420)
PMV BLD AUTO: 9.7 FL (ref 9.2–11.8)
POTASSIUM SERPL-SCNC: 3.4 MMOL/L (ref 3.5–5.5)
PROT SERPL-MCNC: 7.6 G/DL (ref 6.4–8.2)
PROT UR STRIP-MCNC: NEGATIVE MG/DL
RBC # BLD AUTO: 4 M/UL (ref 4.7–5.5)
RBC #/AREA URNS HPF: NEGATIVE /HPF (ref 0–5)
SODIUM SERPL-SCNC: 132 MMOL/L (ref 136–145)
SP GR UR REFRACTOMETRY: 1.02 (ref 1–1.03)
TROPONIN I SERPL-MCNC: <0.02 NG/ML (ref 0–0.06)
UROBILINOGEN UR QL STRIP.AUTO: 1 EU/DL (ref 0.2–1)
WBC # BLD AUTO: 6.9 K/UL (ref 4.6–13.2)
WBC URNS QL MICRO: ABNORMAL /HPF (ref 0–5)

## 2017-11-04 PROCEDURE — 71010 XR CHEST PORT: CPT

## 2017-11-04 PROCEDURE — 80053 COMPREHEN METABOLIC PANEL: CPT | Performed by: EMERGENCY MEDICINE

## 2017-11-04 PROCEDURE — 82550 ASSAY OF CK (CPK): CPT | Performed by: EMERGENCY MEDICINE

## 2017-11-04 PROCEDURE — 96365 THER/PROPH/DIAG IV INF INIT: CPT

## 2017-11-04 PROCEDURE — 84443 ASSAY THYROID STIM HORMONE: CPT | Performed by: INTERNAL MEDICINE

## 2017-11-04 PROCEDURE — 96361 HYDRATE IV INFUSION ADD-ON: CPT

## 2017-11-04 PROCEDURE — 83605 ASSAY OF LACTIC ACID: CPT | Performed by: EMERGENCY MEDICINE

## 2017-11-04 PROCEDURE — 99285 EMERGENCY DEPT VISIT HI MDM: CPT

## 2017-11-04 PROCEDURE — 70450 CT HEAD/BRAIN W/O DYE: CPT

## 2017-11-04 PROCEDURE — 83605 ASSAY OF LACTIC ACID: CPT

## 2017-11-04 PROCEDURE — 65660000000 HC RM CCU STEPDOWN

## 2017-11-04 PROCEDURE — 83735 ASSAY OF MAGNESIUM: CPT | Performed by: EMERGENCY MEDICINE

## 2017-11-04 PROCEDURE — 74011000258 HC RX REV CODE- 258: Performed by: EMERGENCY MEDICINE

## 2017-11-04 PROCEDURE — 74011250636 HC RX REV CODE- 250/636: Performed by: EMERGENCY MEDICINE

## 2017-11-04 PROCEDURE — 81001 URINALYSIS AUTO W/SCOPE: CPT | Performed by: EMERGENCY MEDICINE

## 2017-11-04 PROCEDURE — 87040 BLOOD CULTURE FOR BACTERIA: CPT | Performed by: EMERGENCY MEDICINE

## 2017-11-04 PROCEDURE — 87086 URINE CULTURE/COLONY COUNT: CPT | Performed by: EMERGENCY MEDICINE

## 2017-11-04 PROCEDURE — 85025 COMPLETE CBC W/AUTO DIFF WBC: CPT | Performed by: EMERGENCY MEDICINE

## 2017-11-04 PROCEDURE — 93005 ELECTROCARDIOGRAM TRACING: CPT

## 2017-11-04 PROCEDURE — 87804 INFLUENZA ASSAY W/OPTIC: CPT | Performed by: EMERGENCY MEDICINE

## 2017-11-04 RX ORDER — DEXTROSE 50 % IN WATER (D50W) INTRAVENOUS SYRINGE
25-50 AS NEEDED
Status: DISCONTINUED | OUTPATIENT
Start: 2017-11-04 | End: 2017-11-07 | Stop reason: HOSPADM

## 2017-11-04 RX ORDER — IPRATROPIUM BROMIDE AND ALBUTEROL SULFATE 2.5; .5 MG/3ML; MG/3ML
3 SOLUTION RESPIRATORY (INHALATION)
Status: DISCONTINUED | OUTPATIENT
Start: 2017-11-04 | End: 2017-11-07 | Stop reason: HOSPADM

## 2017-11-04 RX ORDER — MAGNESIUM SULFATE 100 %
4 CRYSTALS MISCELLANEOUS AS NEEDED
Status: DISCONTINUED | OUTPATIENT
Start: 2017-11-04 | End: 2017-11-07 | Stop reason: HOSPADM

## 2017-11-04 RX ORDER — SODIUM CHLORIDE 0.9 % (FLUSH) 0.9 %
5-10 SYRINGE (ML) INJECTION AS NEEDED
Status: DISCONTINUED | OUTPATIENT
Start: 2017-11-04 | End: 2017-11-07 | Stop reason: HOSPADM

## 2017-11-04 RX ORDER — INSULIN LISPRO 100 [IU]/ML
INJECTION, SOLUTION INTRAVENOUS; SUBCUTANEOUS
Status: DISCONTINUED | OUTPATIENT
Start: 2017-11-05 | End: 2017-11-07 | Stop reason: HOSPADM

## 2017-11-04 RX ORDER — SODIUM CHLORIDE 9 MG/ML
1055 INJECTION, SOLUTION INTRAVENOUS
Status: COMPLETED | OUTPATIENT
Start: 2017-11-04 | End: 2017-11-04

## 2017-11-04 RX ADMIN — SODIUM CHLORIDE 1055 ML: 900 INJECTION, SOLUTION INTRAVENOUS at 21:31

## 2017-11-04 RX ADMIN — CEFTRIAXONE 1 G: 1 INJECTION, POWDER, FOR SOLUTION INTRAMUSCULAR; INTRAVENOUS at 20:25

## 2017-11-04 RX ADMIN — SODIUM CHLORIDE 1000 ML: 900 INJECTION, SOLUTION INTRAVENOUS at 20:24

## 2017-11-04 NOTE — IP AVS SNAPSHOT
Naseem Miller 
 
 
 [address] [facility] [address] 
[phone] Patient: Karyna Brar Sr. MRN: [id number] ADT:7/11/9022 My Medications STOP taking these medications   
 cilostazol 100 mg tablet Commonly known as:  PLETAL  
   
  
 oxyCODONE-acetaminophen 5-325 mg per tablet Commonly known as:  PERCOCET PERCOCET 5-325 mg per tablet Generic drug:  oxyCODONE-acetaminophen TAKE these medications as instructed Instructions Each Dose to Equal  
 Morning Noon Evening Bedtime ADVAIR DISKUS 250-50 mcg/dose diskus inhaler Generic drug:  fluticasone-salmeterol Your last dose was: Your next dose is: Take 1 Puff by inhalation every twelve (12) hours. 1 Puff  
    
   
   
   
  
 amoxicillin-clavulanate 875-125 mg per tablet Commonly known as:  AUGMENTIN Your last dose was: Your next dose is: Take 1 Tab by mouth two (2) times a day. 1 Tab  
    
   
   
   
  
 aspirin 81 mg tablet Your last dose was: Your next dose is: Take 81 mg by mouth daily. 81 mg  
    
   
   
   
  
 atorvastatin 20 mg tablet Commonly known as:  LIPITOR Your last dose was: Your next dose is: Take  by mouth daily. CLARITIN 10 mg tablet Generic drug:  loratadine Your last dose was: Your next dose is: Take 10 mg by mouth daily. 10 mg  
    
   
   
   
  
 FLONASE 50 mcg/actuation nasal spray Generic drug:  fluticasone Your last dose was: Your next dose is: 2 Sprays by Both Nostrils route daily. 2 Spray  
    
   
   
   
  
 gabapentin 300 mg capsule Commonly known as:  NEURONTIN Your last dose was: Your next dose is: Take 300 mg by mouth two (2) times a day.  One in the am and 2 at hs  
 300 mg  
    
   
   
   
  
 iron aspgly,[id number]-Ca-[id number] mg-mg-mcg-mg Cap cap Commonly known as:  FERREX [address] Your last dose was: Your next dose is: Take 1 Cap by mouth daily. 1 Cap LACTASE ENZYME PO Your last dose was: Your next dose is: Take 1 Cap by mouth daily. 1 Cap LANTUS 100 unit/mL injection Generic drug:  insulin glargine Your last dose was: Your next dose is:    
   
   
 20 Units by SubCUTAneous route nightly. 20 Units LASIX 40 mg tablet Generic drug:  furosemide Your last dose was: Your next dose is: Take 40 mg by mouth daily. 40 mg  
    
   
   
   
  
 levothyroxine 25 mcg tablet Commonly known as:  SYNTHROID Your last dose was: Your next dose is: Take 100 mcg by mouth Daily (before breakfast). 100 mcg  
    
   
   
   
  
 lisinopril 2.5 mg tablet Commonly known as:  Ora Bee Your last dose was: Your next dose is: Take  by mouth daily. metFORMIN 1,000 mg tablet Commonly known as:  GLUCOPHAGE Your last dose was: Your next dose is: Take 1,000 mg by mouth two (2) times daily (with meals). 1000 mg  
    
   
   
   
  
 multivitamin, tx-iron-ca-min 9 mg iron-400 mcg Tab tablet Commonly known as:  THERA-M w/ IRON Your last dose was: Your next dose is: Take 1 Tab by mouth daily. 1 Tab NovoLOG Flexpen 100 unit/mL Inpn Generic drug:  insulin aspart Your last dose was: Your next dose is:    
   
   
 by SubCUTAneous route Before breakfast, lunch, and dinner. Sliding scale Omeprazole delayed release 20 mg tablet Commonly known as:  PRILOSEC D/R Your last dose was: Your next dose is: Take 20 mg by mouth daily. 20 mg  
    
   
   
   
  
 PLAVIX 75 mg Tab Generic drug:  clopidogrel Your last dose was: Your next dose is: Take  by mouth.  
     
   
   
   
  
 potassium citrate 10 mEq (1,080 mg) [place] Commonly known as:  [place] Your last dose was: Your next dose is: Take 10 mEq by mouth two (2) times a day. 10 mEq PROAIR HFA 90 mcg/actuation inhaler Generic drug:  albuterol Your last dose was: Your next dose is: Take  by inhalation. SPIRIVA WITH HANDIHALER 18 mcg inhalation capsule Generic drug:  tiotropium Your last dose was: Your next dose is: Take 1 Cap by inhalation daily. 1 Cap TIAZAC 360 mg SR capsule Generic drug:  dilTIAZem Your last dose was: Your next dose is: Take 360 mg by mouth daily. 360 mg  
    
   
   
   
  
 traMADol 50 mg tablet Commonly known as:  ULTRAM  
   
Your last dose was: Your next dose is: Take 50 mg by mouth daily. 50 mg  
    
   
   
   
  
 TYLENOL EXTRA STRENGTH 500 mg tablet Generic drug:  acetaminophen Your last dose was: Your next dose is: Take 500 mg by mouth every six (6) hours as needed for Pain. 500 mg  
    
   
   
   
  
 VITAMIN B-12 1,000 mcg/mL injection Generic drug:  cyanocobalamin Your last dose was: Your next dose is:    
   
   
 1,000 mcg by IntraMUSCular route every thirty (30) days. 1000 mcg Where to Get Your Medications Information on where to get these meds will be given to you by the nurse or doctor. ! Ask your nurse or doctor about these medications  
  amoxicillin-clavulanate 875-125 mg per tablet

## 2017-11-04 NOTE — ED PROVIDER NOTES
[address]  EMERGENCY DEPARTMENT HISTORY AND PHYSICAL EXAM       Date: 11/4/2017   Patient Name: Eloise Florian Sr. YOB: 1945  Medical Record Number: [id number]    History of Presenting Illness     Chief Complaint   Patient presents with    Fatigue        History Provided By:  patient    Additional History:   7:44 PM    Becca Bautista is a 74 y.o. male with PMHx of DM, COPD, GERD, HTN, anemia, CAD, and A-fib presenting ambulatory to the ED c/o fatigue, onset this AM upon waking up. Associated symptoms include decreased appetite and generalized weakness. Pt states that he did not have enough strength to get up out of bed this morning. Pt ambulates with use of a Roller. Pt states current episode was similar to previous hypoglycemic attack. Pt drank orange juice to great relief. Pt also at some muffins, Ensure, and chicken noodle soup. Initial check this morning was 123, this afternoon was 260. Current medications include insulin and Metformin; COPD is regulated albuterol, Advair, and Spiriva; anemia is regulated with iron pills and Vitamin B-12 injections. Last BM was 2 days ago. Of note, pt is being seen at wound care clinic for leg ulcers. Pt also c/o pressure sores on buttock. Pt does not have O2 at home. Pt denies any history of blood transfusion. Pt specifically denies any fever, chill, n/v/d, constipation, or any other sxs or complaints at this time.     Primary Care Provider: Zoey Farr MD   Specialist:    Past History     Past Medical History:   Past Medical History:   Diagnosis Date    Acid reflux     Anemia     Atrial fibrillation ([facility] [address]     CAD (coronary artery disease)     Chronic obstructive pulmonary disease (HCC)     Diabetes ([facility] [address]     GERD (gastroesophageal reflux disease)     H/O seasonal allergies     Hypercholesterolemia     Hypertension     Ill-defined condition     hand tremors    Sleep apnea     pt states he has not used bipap \"in years\".  Thyroid disease         Past Surgical History:   Past Surgical History:   Procedure Laterality Date    ABDOMEN SURGERY PROC UNLISTED  1997    Vikas-en-Y    ABDOMEN SURGERY PROC UNLISTED  2000    abdominal hernia repair/mesh    HX COLONOSCOPY      HX HEENT      cataracts removed right eye    HX ORTHOPAEDIC  1982    cervical laminectomy    HX UROLOGICAL  12/2015    suprapubic catheter in place    VASCULAR SURGERY PROCEDURE UNLIST          Family History:   Family History   Problem Relation Age of Onset    Cancer Mother     Diabetes Father     Heart Disease Sister     Cancer Brother     Diabetes Brother     Hypertension Brother         Social History:   Social History   Substance Use Topics    Smoking status: Former Smoker     Types: Cigarettes     Quit date: 1/1/1997    Smokeless tobacco: Never Used    Alcohol use 0.6 oz/week     1 Glasses of wine per week        Allergies: Allergies   Allergen Reactions    Latex Contact Dermatitis     Pt states he is NOT allergic to latex.  Neosporin [Neomycin-Bacitracin-Polymyxin] Rash        Review of Systems   Review of Systems   Constitutional: Positive for appetite change (decreased) and fatigue. Negative for chills and fever. Gastrointestinal: Negative for diarrhea, nausea and vomiting. Skin:        (+) pressure sore on buttock   Neurological: Positive for weakness. All other systems reviewed and are negative. Physical Exam  Vitals:    11/04/17 2200 11/04/17 2215 11/04/17 2230 11/04/17 2245   BP: (!) 109/39 (!) 106/35 (!) 110/37 119/40   Pulse: 96 93 94 (!) 102   Resp: 16 16 16 15   Temp:       SpO2: 96% 97% 96% 95%   Weight:       Height:           Physical Exam   Constitutional: He is oriented to person, place, and time. He appears well-developed. He appears ill (chronically). No distress. Looks dehydrated. HENT:   Head: Normocephalic and atraumatic. Lips slightly dry.    Eyes: Pupils are equal, round, and reactive to light.   Sunken eyes. Neck: Neck supple. Cardiovascular: Regular rhythm, S1 normal, S2 normal and normal heart sounds. Tachycardia present. Pulmonary/Chest: Breath sounds normal. No respiratory distress. He has no wheezes. He has no rhonchi. He has no rales. He exhibits no tenderness. Abdominal: Soft. Normal appearance and bowel sounds are normal. He exhibits no distension and no mass. There is no tenderness. There is no guarding. Genitourinary:   Genitourinary Comments: Suprapubic catheter with more than 50 cc of urine in bag. Musculoskeletal: Normal range of motion. He exhibits no edema or tenderness. Has Unna boot on both legs. Neurological: He is alert and oriented to person, place, and time. No cranial nerve deficit. Cranial nerves 2-12 intact. Generalized weakness. Skin: No rash noted. Has stage 1 decubitus ulcers on both buttocks. Psychiatric: He has a normal mood and affect. His behavior is normal. Thought content normal.   Nursing note and vitals reviewed.        Diagnostic Study Results     Labs -      Recent Results (from the past 12 hour(s))   EKG, 12 LEAD, INITIAL    Collection Time: 11/04/17  7:20 PM   Result Value Ref Range    Ventricular Rate 121 BPM    Atrial Rate 121 BPM    P-R Interval 160 ms    QRS Duration 112 ms    Q-T Interval 326 ms    QTC Calculation (Bezet) 462 ms    Calculated P Axis 62 degrees    Calculated R Axis -31 degrees    Calculated T Axis 101 degrees    Diagnosis       Sinus tachycardia with premature supraventricular complexes  Possible Left atrial enlargement  Left axis deviation  Incomplete right bundle branch block  Septal infarct , age undetermined  ST & T wave abnormality, consider lateral ischemia  Abnormal ECG  No previous ECGs available     CBC WITH AUTOMATED DIFF    Collection Time: 11/04/17  7:23 PM   Result Value Ref Range    WBC 6.9 4.6 - 13.2 K/uL    RBC 4.00 (L) 4.70 - 5.50 M/uL    HGB 11.8 (L) 13.0 - 16.0 g/dL    HCT 35.1 (L) 36.0 - 48.0 % MCV 87.8 74.0 - 97.0 FL    MCH 29.5 24.0 - 34.0 PG    MCHC 33.6 31.0 - 37.0 g/dL    RDW 15.6 (H) 11.6 - 14.5 %    PLATELET 862 (L) 184 - 420 K/uL    MPV 9.7 9.2 - 11.8 FL    NEUTROPHILS 82 (H) 40 - 73 %    LYMPHOCYTES 10 (L) 21 - 52 %    MONOCYTES 6 3 - 10 %    EOSINOPHILS 2 0 - 5 %    BASOPHILS 0 0 - 2 %    ABS. NEUTROPHILS 5.6 1.8 - 8.0 K/UL    ABS. LYMPHOCYTES 0.7 (L) 0.9 - 3.6 K/UL    ABS. MONOCYTES 0.4 0.05 - 1.2 K/UL    ABS. EOSINOPHILS 0.2 0.0 - 0.4 K/UL    ABS. BASOPHILS 0.0 0.0 - 0.06 K/UL    DF AUTOMATED     METABOLIC PANEL, COMPREHENSIVE    Collection Time: 11/04/17  7:23 PM   Result Value Ref Range    Sodium 132 (L) 136 - 145 mmol/L    Potassium 3.4 (L) 3.5 - 5.5 mmol/L    Chloride 93 (L) 100 - 108 mmol/L    CO2 26 21 - 32 mmol/L    Anion gap 13 3.0 - 18 mmol/L    Glucose 281 (H) 74 - 99 mg/dL    BUN 11 7.0 - 18 MG/DL    Creatinine 0.95 0.6 - 1.3 MG/DL    BUN/Creatinine ratio 12 12 - 20      GFR est AA >60 >60 ml/min/1.73m2    GFR est non-AA >60 >60 ml/min/1.73m2    Calcium 8.8 8.5 - 10.1 MG/DL    Bilirubin, total 0.4 0.2 - 1.0 MG/DL    ALT (SGPT) 24 16 - 61 U/L    AST (SGOT) 17 15 - 37 U/L    Alk.  phosphatase 83 45 - 117 U/L    Protein, total 7.6 6.4 - 8.2 g/dL    Albumin 3.2 (L) 3.4 - 5.0 g/dL    Globulin 4.4 (H) 2.0 - 4.0 g/dL    A-G Ratio 0.7 (L) 0.8 - 1.7     CARDIAC PANEL,(CK, CKMB & TROPONIN)    Collection Time: 11/04/17  7:23 PM   Result Value Ref Range    CK 26 (L) 39 - 308 U/L    CK - MB <1.0 <3.6 ng/ml    CK-MB Index  0.0 - 4.0 %     CALCULATION NOT PERFORMED WHEN RESULT IS BELOW LINEAR LIMIT    Troponin-I, Qt. <0.02 0.00 - 0.06 NG/ML   LACTIC ACID    Collection Time: 11/04/17  7:23 PM   Result Value Ref Range    Lactic acid 5.7 (HH) 0.4 - 2.0 MMOL/L   MAGNESIUM    Collection Time: 11/04/17  7:23 PM   Result Value Ref Range    Magnesium 1.5 (L) 1.6 - 2.6 mg/dL   POC LACTIC ACID    Collection Time: 11/04/17  7:40 PM   Result Value Ref Range    Lactic Acid (POC) 5.5 (HH) 0.4 - 2.0 mmol/L INFLUENZA A & B AG (RAPID TEST)    Collection Time: 11/04/17  8:08 PM   Result Value Ref Range    Influenza A Antigen NEGATIVE  NEG      Influenza B Antigen NEGATIVE  NEG     URINALYSIS W/ RFLX MICROSCOPIC    Collection Time: 11/04/17  8:15 PM   Result Value Ref Range    Color YELLOW      Appearance CLOUDY      Specific gravity 1.016 1.005 - 1.030      pH (UA) 5.0 5.0 - 8.0      Protein NEGATIVE  NEG mg/dL    Glucose >1000 (A) NEG mg/dL    Ketone TRACE (A) NEG mg/dL    Bilirubin NEGATIVE  NEG      Blood TRACE (A) NEG      Urobilinogen 1.0 0.2 - 1.0 EU/dL    Nitrites POSITIVE (A) NEG      Leukocyte Esterase LARGE (A) NEG     URINE MICROSCOPIC ONLY    Collection Time: 11/04/17  8:15 PM   Result Value Ref Range    WBC 30 to 35 0 - 5 /hpf    RBC NEGATIVE  0 - 5 /hpf    Epithelial cells FEW 0 - 5 /lpf    Bacteria 3+ (A) NEG /hpf       Radiologic Studies -   CT HEAD WO CONT   Final Result   IMPRESSION:     No acute intracranial abnormalities. As read by the radiologist.   XR CHEST PORT    (Results Pending)      12:13 AM  RADIOLOGY FINDINGS  Chest X-ray shows NAP  Pending review by Radiologist  Recorded by SANDRA Guaman, as dictated by Lino Dias MD    Medical Decision Making   I am the first provider for this patient. I reviewed the vital signs, available nursing notes, past medical history, past surgical history, family history and social history. INITIAL CLINICAL IMPRESSION and PLANS:  The patient presents with the primary complaint(s) of: fatigue. The presentation, to include historical aspects and clinical findings are consistent with the DX of fatigue. However, other possible DX's to consider as primary, associated with, or exacerbated by include:    1. Medication reaction  2. Dehydration  3. Sepsis  4. UTI  5. PNA  6.  MI  7. CVA    Considering the above, my initial management plan to evaluate and therapeutic interventions include the following and as noted in the orders:    1. Labs:   2. Imaging:   3. EKG    Recorded by SANDRA Loera, as dictated by Zak Persaud MD.      Vital Signs-Reviewed the patient's vital signs. Patient Vitals for the past 12 hrs:   Temp Pulse Resp BP SpO2   11/04/17 2245 - (!) 102 15 119/40 95 %   11/04/17 2230 - 94 16 (!) 110/37 96 %   11/04/17 2215 - 93 16 (!) 106/35 97 %   11/04/17 2200 - 96 16 (!) 109/39 96 %   11/04/17 2130 - 97 17 (!) 119/32 97 %   11/04/17 2115 - (!) 101 23 (!) 109/31 99 %   11/04/17 2103 - (!) 101 22 (!) 111/34 99 %   11/04/17 2100 - 98 17 (!) 108/32 98 %   11/04/17 2045 - (!) 102 19 (!) 108/34 95 %   11/04/17 2030 - (!) 112 21 (!) 120/97 94 %   11/04/17 2021 - (!) 113 20 120/52 98 %   11/04/17 2015 - (!) 117 20 120/52 94 %   11/04/17 2000 - (!) 115 23 112/46 96 %   11/04/17 1945 - (!) 120 19 129/46 96 %   11/04/17 1930 - (!) 120 22 128/43 96 %   11/04/17 1921 99.5 °F (37.5 °C) (!) 120 28 (!) 128/36 97 %       Pulse Oximetry Analysis - Normal 97% on RA. No intervention needed. Cardiac Monitor:   Time: 8:03 PM  Rate: 114 bpm  Rhythm: Sinus Tachycardia      EKG interpretation: (Preliminary)  7:20 PM  Sinus tachycardia with premature supraventricular complexes; 121 bpm; septal infarct, age undetermined, ST & T wave abnormalities, consider lateral ischemia  EKG read by Zak Persaud MD at 7:20 PM     Old Medical Records: Nursing notes. Procedures:   PROCEDURE NOTE - RECTAL EXAM:   7:53 PM  Performed by: Zak Persaud MD  Rectal exam performed. Black stool was collected. Stool was Hemoccult tested, and found to be heme Negative. The procedure took 1-15 minutes, and pt tolerated well. Written by Lorraine Oneil ED Scriblara, as dictated by Zak Persaud MD.    ED Course:     7:44 PM   Initial assessment performed. The patients presenting problems have been discussed, and they are in agreement with the care plan formulated and outlined with them.   I have encouraged them to ask questions as they arise throughout their visit. CONSULT NOTE:   10:29 PM  Susy De La Rosa MD spoke with Renella Holter, MD   Specialty: Hospitalist  Discussed pt's hx, disposition, and available diagnostic and imaging results. Reviewed care plans. Consulting physician agrees with plans as outlined. Agrees to admit. Written by Arron Padron ED Scribe, as dictated by Susy De La Rosa MD.    SEPSIS ASSESSMENT NOTE:   11:13 PM  Radha Ramirez MD has seen and assessed the patient as follows:    Capillary Refill:normal/brisk  Cardiopulmonary Evaluation:   Lung Sounds: clear   Cardiac Sounds: regular and rhythm ______  Peripheral Pulses: present  Skin Exam: pink    Visit Vitals    /40    Pulse (!) 102    Temp 99.5 °F (37.5 °C)    Resp 15    Ht 6' 2\" (1.88 m)    Wt 68.5 kg (151 lb)    SpO2 95%    BMI 19.39 kg/m2       Written by Arron Padron ED Scribe, as dictated by Susy De La Rosa MD    Medications Given in the ED:  Medications   sodium chloride (NS) flush 5-10 mL (not administered)   sodium chloride 0.9 % bolus infusion 1,000 mL (0 mL IntraVENous IV Completed 11/4/17 2102)   cefTRIAXone (ROCEPHIN) 1 g in 0.9% sodium chloride (MBP/ADV) 50 mL MBP (0 g IntraVENous IV Completed 11/4/17 2102)   0.9% sodium chloride infusion 1,055 mL (0 mL IntraVENous IV Completed 11/4/17 2223)        ADMISSION NOTE:  11:04 PM  Patient is being admitted to the hospital by Renella Holter, MD. The results of their tests and reasons for their admission have been discussed with them and/or available family. They convey agreement and understanding for the need to be admitted and for their admission diagnosis. Written by Arron Padron ED Scribe, as dictated by Susy De La Rosa MD.    CONDITIONS ON ADMISSION:  Deep Vein Thrombosis is not present at the time of admission. Thrombosis is not present at the time of admission. Urinary Tract Infection is present at the time of admission. Sepsis is present at the time of admission.  Pneumonia is not present at the time of admission. MRSA is not present at the time of admission. Wound infection is not present at the time of admission. Pressure Ulcer is not present at the time of admission. CLINICAL IMPRESSION    1. Sepsis, due to unspecified organism ([facility] [address]    2. Urinary tract infection associated with indwelling urethral catheter, initial encounter ([facility]        PLAN: ADMIT TO ICU    11:04 PM  I have spent 45 minutes of critical care time involved in lab review, consultations with specialist, family decision-making, and documentation. During this entire length of time I was immediately available to the patient. Critical Care: The reason for providing this level of medical care for this critically ill patient was due a critical illness that impaired one or more vital organ systems such that there was a high probability of imminent or life threatening deterioration in the patients condition. This care involved high complexity decision making to assess, manipulate, and support vital system functions, to treat this degreee vital organ system failure and to prevent further life threatening deterioration of the patients condition. _______________________________   Attestations:     SCRIBE ATTESTATION:  This note is prepared by Kasey Serrato, acting as Scribe for Levy Olsen MD.    PROVIDER ATTESTATION:  Levy Olsen MD: The scribe's documentation has been prepared under my direction and personally reviewed by me in its entirety.  I confirm that the note above accurately reflects all work, treatment, procedures, and medical decision making performed by me.   _______________________________

## 2017-11-04 NOTE — IP AVS SNAPSHOT
[address] [address] 
[phone] Patient: Mia Li Sr. MRN: [id number] NWP:[id number] About your hospitalization You were admitted on:  November 4, 2017 You last received care in the:  [address] [place] [address] You were discharged on:  November 7, 2017 Why you were hospitalized Your primary diagnosis was:  Sepsis (Hcc) Your diagnoses also included:  Pad (Peripheral Artery Disease) (Hcc), Venous Reflux, Leg Ulcer, Left (Hcc), Diabetic Ulcer Of Right Midfoot Associated With Type 2 Diabetes Mellitus, With Fat Layer Exposed (Hcc), Cad (Coronary Artery Disease), Copd (Chronic Obstructive Pulmonary Disease) (Hcc), Htn (Hypertension), Uti (Urinary Tract Infection) Due To Urinary Indwelling Sher Catheter (Hcc), Paf (Paroxysmal Atrial Fibrillation) (Hcc), Leg Ulcer (Hcc) Things You Need To Do (next 8 weeks) Follow up with Sheron Camp MD  
Patient's wife will schedule a follow-up appointment. Phone:  [phone] Where:  [facility] [employer] [facility] [address] Schedule an appointment with Sheron Camp MD as soon as possible for a visit in 1 week(s) Phone:  [phone] Where:  [facility] [employer] [employer] [facility] [address] Friday Nov 10, 2017 Follow up with [facility] OP WOUND CARE Follow-up appointment @ 10:00 a.m. Phone:  [phone] Where:  [address], [address] [place] [id number] Monday Nov 20, 2017 Follow up with Joshua Diaz MD  
Follow-up appointment @ 10:00 a.m. Phone:  [phone] Where:  [address], [address], [address] Wednesday Dec 20, 2017 Follow Up with Joshua Diaz MD at 10:00 AM  
Where:  [facility] ([facility]) Discharge Orders None A check jasson indicates which time of day the medication should be taken. My Medications STOP taking these medications   
 cilostazol 100 mg tablet Commonly known as:  PLETAL  
   
  
 oxyCODONE-acetaminophen 5-325 mg per tablet Commonly known as:  PERCOCET PERCOCET 5-325 mg per tablet Generic drug:  oxyCODONE-acetaminophen TAKE these medications as instructed Instructions Each Dose to Equal  
 Morning Noon Evening Bedtime ADVAIR DISKUS 250-50 mcg/dose diskus inhaler Generic drug:  fluticasone-salmeterol Your last dose was: Your next dose is: Take 1 Puff by inhalation every twelve (12) hours. 1 Puff  
    
   
   
   
  
 amoxicillin-clavulanate 875-125 mg per tablet Commonly known as:  AUGMENTIN Your last dose was: Your next dose is: Take 1 Tab by mouth two (2) times a day. 1 Tab  
    
   
   
   
  
 aspirin 81 mg tablet Your last dose was: Your next dose is: Take 81 mg by mouth daily. 81 mg  
    
   
   
   
  
 atorvastatin 20 mg tablet Commonly known as:  LIPITOR Your last dose was: Your next dose is: Take  by mouth daily. CLARITIN 10 mg tablet Generic drug:  loratadine Your last dose was: Your next dose is: Take 10 mg by mouth daily. 10 mg  
    
   
   
   
  
 FLONASE 50 mcg/actuation nasal spray Generic drug:  fluticasone Your last dose was: Your next dose is: 2 Sprays by Both Nostrils route daily. 2 Spray  
    
   
   
   
  
 gabapentin 300 mg capsule Commonly known as:  NEURONTIN Your last dose was: Your next dose is: Take 300 mg by mouth two (2) times a day. One in the am and 2 at hs  
 300 mg  
    
   
   
   
  
 iron aspgly,[id number]-S-[id number]-FA-Ca-suc 150-60-25-1 mg-mg-mcg-mg Cap cap Commonly known as:  FERREX [address] Your last dose was: Your next dose is: Take 1 Cap by mouth daily. 1 Cap LACTASE ENZYME PO Your last dose was: Your next dose is: Take 1 Cap by mouth daily. 1 Cap LANTUS 100 unit/mL injection Generic drug:  insulin glargine Your last dose was: Your next dose is:    
   
   
 20 Units by SubCUTAneous route nightly. 20 Units LASIX 40 mg tablet Generic drug:  furosemide Your last dose was: Your next dose is: Take 40 mg by mouth daily. 40 mg  
    
   
   
   
  
 levothyroxine 25 mcg tablet Commonly known as:  SYNTHROID Your last dose was: Your next dose is: Take 100 mcg by mouth Daily (before breakfast). 100 mcg  
    
   
   
   
  
 lisinopril 2.5 mg tablet Commonly known as:  Mollie Ripa Your last dose was: Your next dose is: Take  by mouth daily. metFORMIN 1,000 mg tablet Commonly known as:  GLUCOPHAGE Your last dose was: Your next dose is: Take 1,000 mg by mouth two (2) times daily (with meals). 1000 mg  
    
   
   
   
  
 multivitamin, tx-iron-ca-min 9 mg iron-400 mcg Tab tablet Commonly known as:  THERA-M w/ IRON Your last dose was: Your next dose is: Take 1 Tab by mouth daily. 1 Tab NovoLOG Flexpen 100 unit/mL Inpn Generic drug:  insulin aspart Your last dose was: Your next dose is:    
   
   
 by SubCUTAneous route Before breakfast, lunch, and dinner. Sliding scale Omeprazole delayed release 20 mg tablet Commonly known as:  PRILOSEC D/R Your last dose was: Your next dose is: Take 20 mg by mouth daily. 20 mg  
    
   
   
   
  
 PLAVIX 75 mg Tab Generic drug:  clopidogrel Your last dose was: Your next dose is: Take  by mouth.  
     
   
   
   
  
 potassium citrate 10 mEq (1,080 mg) Vimal Donato Commonly known as:  [place] Your last dose was: Your next dose is: Take 10 mEq by mouth two (2) times a day. 10 mEq PROAIR HFA 90 mcg/actuation inhaler Generic drug:  albuterol Your last dose was: Your next dose is: Take  by inhalation. SPIRIVA WITH HANDIHALER 18 mcg inhalation capsule Generic drug:  tiotropium Your last dose was: Your next dose is: Take 1 Cap by inhalation daily. 1 Cap TIAZAC 360 mg SR capsule Generic drug:  dilTIAZem Your last dose was: Your next dose is: Take 360 mg by mouth daily. 360 mg  
    
   
   
   
  
 traMADol 50 mg tablet Commonly known as:  ULTRAM  
   
Your last dose was: Your next dose is: Take 50 mg by mouth daily. 50 mg  
    
   
   
   
  
 TYLENOL EXTRA STRENGTH 500 mg tablet Generic drug:  acetaminophen Your last dose was: Your next dose is: Take 500 mg by mouth every six (6) hours as needed for Pain. 500 mg  
    
   
   
   
  
 VITAMIN B-12 1,000 mcg/mL injection Generic drug:  cyanocobalamin Your last dose was: Your next dose is:    
   
   
 1,000 mcg by IntraMUSCular route every thirty (30) days. 1000 mcg Where to Get Your Medications Information on where to get these meds will be given to you by the nurse or doctor. ! Ask your nurse or doctor about these medications  
  amoxicillin-clavulanate 875-125 mg per tablet Discharge Instructions Lab Results Component Value Date/Time  Hemoglobin A1c 7.4 11/06/2017 04:04 AM  
 
 
This lab test reflects that your blood sugar has been slightly elevated over the past 3 months and should be evaluated by your primary care provider. An A1C of 5.7-6.4% meets the criteria for pre-diabetes; an A1C of 6.5% or higher meets the criteria for diabetes. This lab test reflects that your blood sugar averaged  162 mg/dL over the past 3 months. It is important to follow up with your provider on a routine basis to continue to evaluate your blood sugar and discuss any necessary changes in treatment. Introducing [facility] SERVICES! Dear Linda Schwarz: Thank you for requesting a [employer] account. Our records indicate that you already have an active [employer] account. You can access your account anytime at [email or web address] Did you know that you can access your hospital and ER discharge instructions at any time in [employer]? You can also review all of your test results from your hospital stay or ER visit. Additional Information If you have questions, please visit the Frequently Asked Questions section of the [employer] website at [email or web address] Remember, [employer] is NOT to be used for urgent needs. For medical emergencies, dial 911. Now available from your iPhone and Android! Unresulted Labs-Please follow up with your PCP about these lab tests Order Current Status CULTURE, BLOOD Preliminary result Providers Seen During Your Hospitalization Provider Specialty Primary office phone Martha Tellez MD Emergency Medicine [phone] Iona Alpers, MD Internal Medicine [phone] Your Primary Care Physician (PCP) Primary Care Physician Office Phone Office Fax Brian Carvalho [phone] You are allergic to the following Allergen Reactions Latex Contact Dermatitis Pt states he is NOT allergic to latex. Neosporin (Neomycin-Bacitracin-Polymyxin) Rash Recent Documentation Height Weight BMI Smoking Status 1.88 m 67.1 kg 18.99 kg/m2 Former Smoker Emergency Contacts Name Discharge Info Relation Home Work Mobile Batsheva Hough DISCHARGE CAREGIVER [3] Spouse [3] [phone] Patient Belongings The following personal items are in your possession at time of discharge: 
  Dental Appliances: None  Visual Aid: Glasses, At bedside   Hearing Aids/Status: Functioning  Home Medications: None   Jewelry: None  Clothing: None    Other Valuables: Wheelchair Please provide this summary of care documentation to your next provider. Signatures-by signing, you are acknowledging that this After Visit Summary has been reviewed with you and you have received a copy. Patient Signature:  ____________________________________________________________ Date:  ____________________________________________________________  
  
[facility] Provider Signature:  ____________________________________________________________ Date:  ____________________________________________________________

## 2017-11-05 PROBLEM — I48.0 PAF (PAROXYSMAL ATRIAL FIBRILLATION) (HCC): Status: ACTIVE | Noted: 2017-11-05

## 2017-11-05 LAB
ALBUMIN SERPL-MCNC: 2.6 G/DL (ref 3.4–5)
ALBUMIN/GLOB SERPL: 0.7 {RATIO} (ref 0.8–1.7)
ALP SERPL-CCNC: 63 U/L (ref 45–117)
ALT SERPL-CCNC: 18 U/L (ref 16–61)
ANION GAP SERPL CALC-SCNC: 8 MMOL/L (ref 3–18)
APTT PPP: 45.2 SEC (ref 23–36.4)
AST SERPL-CCNC: 13 U/L (ref 15–37)
BILIRUB SERPL-MCNC: 0.3 MG/DL (ref 0.2–1)
BUN SERPL-MCNC: 8 MG/DL (ref 7–18)
BUN/CREAT SERPL: 12 (ref 12–20)
CALCIUM SERPL-MCNC: 8.2 MG/DL (ref 8.5–10.1)
CHLORIDE SERPL-SCNC: 102 MMOL/L (ref 100–108)
CO2 SERPL-SCNC: 28 MMOL/L (ref 21–32)
CREAT SERPL-MCNC: 0.69 MG/DL (ref 0.6–1.3)
ERYTHROCYTE [DISTWIDTH] IN BLOOD BY AUTOMATED COUNT: 15.6 % (ref 11.6–14.5)
GLOBULIN SER CALC-MCNC: 3.8 G/DL (ref 2–4)
GLUCOSE BLD STRIP.AUTO-MCNC: 184 MG/DL (ref 70–110)
GLUCOSE BLD STRIP.AUTO-MCNC: 198 MG/DL (ref 70–110)
GLUCOSE BLD STRIP.AUTO-MCNC: 215 MG/DL (ref 70–110)
GLUCOSE BLD STRIP.AUTO-MCNC: 225 MG/DL (ref 70–110)
GLUCOSE BLD STRIP.AUTO-MCNC: 289 MG/DL (ref 70–110)
GLUCOSE SERPL-MCNC: 291 MG/DL (ref 74–99)
HCT VFR BLD AUTO: 31.7 % (ref 36–48)
HGB BLD-MCNC: 10.7 G/DL (ref 13–16)
INR PPP: 1.2 (ref 0.8–1.2)
LACTATE SERPL-SCNC: 2.9 MMOL/L (ref 0.4–2)
LACTATE SERPL-SCNC: 4.3 MMOL/L (ref 0.4–2)
MAGNESIUM SERPL-MCNC: 1.4 MG/DL (ref 1.6–2.6)
MAGNESIUM SERPL-MCNC: 2.2 MG/DL (ref 1.6–2.6)
MCH RBC QN AUTO: 29.6 PG (ref 24–34)
MCHC RBC AUTO-ENTMCNC: 33.8 G/DL (ref 31–37)
MCV RBC AUTO: 87.6 FL (ref 74–97)
PLATELET # BLD AUTO: 82 K/UL (ref 135–420)
PMV BLD AUTO: 9.6 FL (ref 9.2–11.8)
POTASSIUM SERPL-SCNC: 3.7 MMOL/L (ref 3.5–5.5)
POTASSIUM SERPL-SCNC: 4.1 MMOL/L (ref 3.5–5.5)
PROT SERPL-MCNC: 6.4 G/DL (ref 6.4–8.2)
PROTHROMBIN TIME: 15.1 SEC (ref 11.5–15.2)
RBC # BLD AUTO: 3.62 M/UL (ref 4.7–5.5)
SODIUM SERPL-SCNC: 138 MMOL/L (ref 136–145)
TSH SERPL DL<=0.05 MIU/L-ACNC: 0.9 UIU/ML (ref 0.36–3.74)
WBC # BLD AUTO: 2.9 K/UL (ref 4.6–13.2)

## 2017-11-05 PROCEDURE — 74011250636 HC RX REV CODE- 250/636: Performed by: INTERNAL MEDICINE

## 2017-11-05 PROCEDURE — 74011636637 HC RX REV CODE- 636/637: Performed by: INTERNAL MEDICINE

## 2017-11-05 PROCEDURE — 36415 COLL VENOUS BLD VENIPUNCTURE: CPT | Performed by: INTERNAL MEDICINE

## 2017-11-05 PROCEDURE — 85610 PROTHROMBIN TIME: CPT | Performed by: INTERNAL MEDICINE

## 2017-11-05 PROCEDURE — 85027 COMPLETE CBC AUTOMATED: CPT | Performed by: INTERNAL MEDICINE

## 2017-11-05 PROCEDURE — 84132 ASSAY OF SERUM POTASSIUM: CPT | Performed by: INTERNAL MEDICINE

## 2017-11-05 PROCEDURE — 85730 THROMBOPLASTIN TIME PARTIAL: CPT | Performed by: INTERNAL MEDICINE

## 2017-11-05 PROCEDURE — 74011250637 HC RX REV CODE- 250/637: Performed by: INTERNAL MEDICINE

## 2017-11-05 PROCEDURE — 65660000000 HC RM CCU STEPDOWN

## 2017-11-05 PROCEDURE — 82962 GLUCOSE BLOOD TEST: CPT

## 2017-11-05 PROCEDURE — 74011000258 HC RX REV CODE- 258: Performed by: INTERNAL MEDICINE

## 2017-11-05 PROCEDURE — 83605 ASSAY OF LACTIC ACID: CPT | Performed by: INTERNAL MEDICINE

## 2017-11-05 PROCEDURE — 83735 ASSAY OF MAGNESIUM: CPT | Performed by: INTERNAL MEDICINE

## 2017-11-05 RX ORDER — FUROSEMIDE 40 MG/1
40 TABLET ORAL DAILY
Status: DISCONTINUED | OUTPATIENT
Start: 2017-11-05 | End: 2017-11-05

## 2017-11-05 RX ORDER — POTASSIUM CHLORIDE 20 MEQ/1
20 TABLET, EXTENDED RELEASE ORAL ONCE
Status: COMPLETED | OUTPATIENT
Start: 2017-11-05 | End: 2017-11-05

## 2017-11-05 RX ORDER — UREA 10 %
1 LOTION (ML) TOPICAL DAILY
Status: DISCONTINUED | OUTPATIENT
Start: 2017-11-05 | End: 2017-11-07 | Stop reason: HOSPADM

## 2017-11-05 RX ORDER — CILOSTAZOL 50 MG/1
100 TABLET ORAL
Status: DISCONTINUED | OUTPATIENT
Start: 2017-11-05 | End: 2017-11-07 | Stop reason: HOSPADM

## 2017-11-05 RX ORDER — INSULIN GLARGINE 100 [IU]/ML
20 INJECTION, SOLUTION SUBCUTANEOUS
Status: DISCONTINUED | OUTPATIENT
Start: 2017-11-05 | End: 2017-11-06

## 2017-11-05 RX ORDER — HEPARIN SODIUM 5000 [USP'U]/ML
5000 INJECTION, SOLUTION INTRAVENOUS; SUBCUTANEOUS EVERY 8 HOURS
Status: DISCONTINUED | OUTPATIENT
Start: 2017-11-05 | End: 2017-11-07 | Stop reason: HOSPADM

## 2017-11-05 RX ORDER — FLUTICASONE FUROATE AND VILANTEROL 100; 25 UG/1; UG/1
1 POWDER RESPIRATORY (INHALATION) DAILY
Status: DISCONTINUED | OUTPATIENT
Start: 2017-11-05 | End: 2017-11-07 | Stop reason: HOSPADM

## 2017-11-05 RX ORDER — DILTIAZEM HYDROCHLORIDE 300 MG/1
300 CAPSULE, COATED, EXTENDED RELEASE ORAL DAILY
Status: DISCONTINUED | OUTPATIENT
Start: 2017-11-05 | End: 2017-11-05

## 2017-11-05 RX ORDER — METFORMIN HYDROCHLORIDE 500 MG/1
1000 TABLET ORAL 2 TIMES DAILY WITH MEALS
Status: DISCONTINUED | OUTPATIENT
Start: 2017-11-05 | End: 2017-11-05

## 2017-11-05 RX ORDER — CYANOCOBALAMIN 1000 UG/ML
1000 INJECTION, SOLUTION INTRAMUSCULAR; SUBCUTANEOUS
Status: DISCONTINUED | OUTPATIENT
Start: 2017-11-05 | End: 2017-11-07 | Stop reason: HOSPADM

## 2017-11-05 RX ORDER — LEVOTHYROXINE SODIUM 100 UG/1
100 TABLET ORAL
Status: DISCONTINUED | OUTPATIENT
Start: 2017-11-05 | End: 2017-11-07 | Stop reason: HOSPADM

## 2017-11-05 RX ORDER — MAGNESIUM SULFATE 1 G/100ML
1 INJECTION INTRAVENOUS ONCE
Status: COMPLETED | OUTPATIENT
Start: 2017-11-05 | End: 2017-11-05

## 2017-11-05 RX ORDER — ALBUTEROL SULFATE 90 UG/1
1 AEROSOL, METERED RESPIRATORY (INHALATION)
Status: DISCONTINUED | OUTPATIENT
Start: 2017-11-05 | End: 2017-11-07 | Stop reason: HOSPADM

## 2017-11-05 RX ORDER — OMEPRAZOLE 20 MG/1
20 CAPSULE, DELAYED RELEASE ORAL DAILY
Status: DISCONTINUED | OUTPATIENT
Start: 2017-11-05 | End: 2017-11-07 | Stop reason: HOSPADM

## 2017-11-05 RX ORDER — MAGNESIUM SULFATE HEPTAHYDRATE 40 MG/ML
2 INJECTION, SOLUTION INTRAVENOUS ONCE
Status: COMPLETED | OUTPATIENT
Start: 2017-11-05 | End: 2017-11-05

## 2017-11-05 RX ORDER — CILOSTAZOL 100 MG/1
100 TABLET ORAL
Status: DISCONTINUED | OUTPATIENT
Start: 2017-11-05 | End: 2017-11-05

## 2017-11-05 RX ORDER — DILTIAZEM HYDROCHLORIDE 180 MG/1
360 CAPSULE, COATED, EXTENDED RELEASE ORAL DAILY
Status: DISCONTINUED | OUTPATIENT
Start: 2017-11-05 | End: 2017-11-07 | Stop reason: HOSPADM

## 2017-11-05 RX ORDER — LORATADINE 10 MG/1
10 TABLET ORAL DAILY
Status: DISCONTINUED | OUTPATIENT
Start: 2017-11-05 | End: 2017-11-07 | Stop reason: HOSPADM

## 2017-11-05 RX ORDER — GABAPENTIN 300 MG/1
300 CAPSULE ORAL 2 TIMES DAILY
Status: DISCONTINUED | OUTPATIENT
Start: 2017-11-05 | End: 2017-11-06

## 2017-11-05 RX ORDER — SODIUM CHLORIDE 9 MG/ML
100 INJECTION, SOLUTION INTRAVENOUS CONTINUOUS
Status: DISCONTINUED | OUTPATIENT
Start: 2017-11-05 | End: 2017-11-06

## 2017-11-05 RX ORDER — LISINOPRIL 5 MG/1
10 TABLET ORAL DAILY
Status: DISCONTINUED | OUTPATIENT
Start: 2017-11-05 | End: 2017-11-07 | Stop reason: HOSPADM

## 2017-11-05 RX ORDER — OXYCODONE AND ACETAMINOPHEN 5; 325 MG/1; MG/1
1 TABLET ORAL
Status: DISCONTINUED | OUTPATIENT
Start: 2017-11-05 | End: 2017-11-07 | Stop reason: HOSPADM

## 2017-11-05 RX ORDER — ASPIRIN 81 MG/1
81 TABLET ORAL DAILY
Status: DISCONTINUED | OUTPATIENT
Start: 2017-11-05 | End: 2017-11-07 | Stop reason: HOSPADM

## 2017-11-05 RX ORDER — ATORVASTATIN CALCIUM 20 MG/1
20 TABLET, FILM COATED ORAL DAILY
Status: DISCONTINUED | OUTPATIENT
Start: 2017-11-05 | End: 2017-11-07 | Stop reason: HOSPADM

## 2017-11-05 RX ORDER — TRAMADOL HYDROCHLORIDE 50 MG/1
50 TABLET ORAL DAILY
Status: DISCONTINUED | OUTPATIENT
Start: 2017-11-05 | End: 2017-11-07 | Stop reason: HOSPADM

## 2017-11-05 RX ORDER — CLOPIDOGREL BISULFATE 75 MG/1
75 TABLET ORAL DAILY
Status: DISCONTINUED | OUTPATIENT
Start: 2017-11-05 | End: 2017-11-07 | Stop reason: HOSPADM

## 2017-11-05 RX ORDER — FLUTICASONE PROPIONATE 50 MCG
2 SPRAY, SUSPENSION (ML) NASAL DAILY
Status: DISCONTINUED | OUTPATIENT
Start: 2017-11-05 | End: 2017-11-07 | Stop reason: HOSPADM

## 2017-11-05 RX ADMIN — CILOSTAZOL 100 MG: 50 TABLET ORAL at 07:33

## 2017-11-05 RX ADMIN — HEPARIN SODIUM 5000 UNITS: 5000 INJECTION, SOLUTION INTRAVENOUS; SUBCUTANEOUS at 01:03

## 2017-11-05 RX ADMIN — MAGNESIUM SULFATE HEPTAHYDRATE 1 G: 1 INJECTION, SOLUTION INTRAVENOUS at 06:19

## 2017-11-05 RX ADMIN — INSULIN GLARGINE 20 UNITS: 100 INJECTION, SOLUTION SUBCUTANEOUS at 21:59

## 2017-11-05 RX ADMIN — LISINOPRIL 10 MG: 5 TABLET ORAL at 09:51

## 2017-11-05 RX ADMIN — OMEPRAZOLE 20 MG: 20 CAPSULE, DELAYED RELEASE ORAL at 09:50

## 2017-11-05 RX ADMIN — FUROSEMIDE 40 MG: 40 TABLET ORAL at 09:51

## 2017-11-05 RX ADMIN — INSULIN GLARGINE 20 UNITS: 100 INJECTION, SOLUTION SUBCUTANEOUS at 01:03

## 2017-11-05 RX ADMIN — HEPARIN SODIUM 5000 UNITS: 5000 INJECTION, SOLUTION INTRAVENOUS; SUBCUTANEOUS at 09:51

## 2017-11-05 RX ADMIN — LEVOTHYROXINE SODIUM 100 MCG: 100 TABLET ORAL at 07:33

## 2017-11-05 RX ADMIN — GABAPENTIN 300 MG: 300 CAPSULE ORAL at 21:57

## 2017-11-05 RX ADMIN — CILOSTAZOL 100 MG: 50 TABLET ORAL at 17:02

## 2017-11-05 RX ADMIN — ASPIRIN 81 MG: 81 TABLET, COATED ORAL at 09:51

## 2017-11-05 RX ADMIN — POTASSIUM CHLORIDE 20 MEQ: 20 TABLET, EXTENDED RELEASE ORAL at 05:16

## 2017-11-05 RX ADMIN — LORATADINE 10 MG: 10 TABLET ORAL at 09:51

## 2017-11-05 RX ADMIN — SODIUM CHLORIDE 100 ML/HR: 900 INJECTION, SOLUTION INTRAVENOUS at 01:40

## 2017-11-05 RX ADMIN — MAGNESIUM SULFATE HEPTAHYDRATE 2 G: 40 INJECTION, SOLUTION INTRAVENOUS at 05:16

## 2017-11-05 RX ADMIN — LACTOBACILLUS TAB 1 TABLET: TAB at 09:51

## 2017-11-05 RX ADMIN — Medication 1 TABLET: at 14:34

## 2017-11-05 RX ADMIN — VANCOMYCIN HYDROCHLORIDE 1750 MG: 10 INJECTION, POWDER, LYOPHILIZED, FOR SOLUTION INTRAVENOUS at 01:04

## 2017-11-05 RX ADMIN — INSULIN LISPRO 4 UNITS: 100 INJECTION, SOLUTION INTRAVENOUS; SUBCUTANEOUS at 06:33

## 2017-11-05 RX ADMIN — HEPARIN SODIUM 5000 UNITS: 5000 INJECTION, SOLUTION INTRAVENOUS; SUBCUTANEOUS at 17:02

## 2017-11-05 RX ADMIN — ATORVASTATIN CALCIUM 20 MG: 20 TABLET, FILM COATED ORAL at 09:50

## 2017-11-05 RX ADMIN — GABAPENTIN 300 MG: 300 CAPSULE ORAL at 09:50

## 2017-11-05 RX ADMIN — DILTIAZEM HYDROCHLORIDE 360 MG: 180 CAPSULE, EXTENDED RELEASE ORAL at 09:50

## 2017-11-05 RX ADMIN — INSULIN LISPRO 2 UNITS: 100 INJECTION, SOLUTION INTRAVENOUS; SUBCUTANEOUS at 13:15

## 2017-11-05 RX ADMIN — TRAMADOL HYDROCHLORIDE 50 MG: 50 TABLET, FILM COATED ORAL at 09:50

## 2017-11-05 RX ADMIN — INSULIN LISPRO 4 UNITS: 100 INJECTION, SOLUTION INTRAVENOUS; SUBCUTANEOUS at 21:59

## 2017-11-05 RX ADMIN — SODIUM CHLORIDE 750 MG: 900 INJECTION, SOLUTION INTRAVENOUS at 14:33

## 2017-11-05 RX ADMIN — CEFTRIAXONE SODIUM 1 G: 1 INJECTION, POWDER, FOR SOLUTION INTRAMUSCULAR; INTRAVENOUS at 21:55

## 2017-11-05 RX ADMIN — METFORMIN HYDROCHLORIDE 1000 MG: 500 TABLET, FILM COATED ORAL at 07:33

## 2017-11-05 RX ADMIN — INSULIN LISPRO 2 UNITS: 100 INJECTION, SOLUTION INTRAVENOUS; SUBCUTANEOUS at 17:02

## 2017-11-05 RX ADMIN — CLOPIDOGREL BISULFATE 75 MG: 75 TABLET ORAL at 09:51

## 2017-11-05 NOTE — PROGRESS NOTES
Pharmacy Dosing Services: Vancomycin    Consult for Vancomycin Dosing by Pharmacy by Dr. Rg Hansen provided for this 74y.o. year old male , for indication of sepsis of unknown etiolgy. Day of Therapy 1    Ht Readings from Last 1 Encounters:   11/04/17 188 cm (74\")        Wt Readings from Last 1 Encounters:   11/04/17 68.5 kg (151 lb)        Other Current Antibiotics Ceftriaxone 1 gm IV q 24 hr   Significant Cultures Pending from micro lab   Serum Creatinine Lab Results   Component Value Date/Time    Creatinine 0.95 11/04/2017 07:23 PM      Creatinine Clearance Estimated Creatinine Clearance: 66.1 mL/min (based on Cr of 0.95). BUN Lab Results   Component Value Date/Time    BUN 11 11/04/2017 07:23 PM      WBC Lab Results   Component Value Date/Time    WBC 6.9 11/04/2017 07:23 PM      H/H Lab Results   Component Value Date/Time    HGB 11.8 11/04/2017 07:23 PM      Platelets Lab Results   Component Value Date/Time    PLATELET 148 49/57/9706 07:23 PM      Temp 99.3 °F (37.4 °C)     Start Vancomycin therapy, with loading dose of 1750 (mg) at 0100 11/05/2017. Follow with maintenance dose of 750(mg) at 1300/ 11/15/2017 (time/date), every 12 hours (frequency). Dose calculated to approximate a therapeutic trough of 15-20mcg/mL. Pharmacy to follow daily and will make changes to dose and/or frequency based on clinical status. Estimated Pharmacokinetic Parameters (based on population kinetics)  Vd: 48 L (0.7 L/kg)   Aroldo: 0.054 hr-1 (T1/2 = 12.8 hrs)     Dosing Recommendations   Vancomycin dose: 750 mg IV Q12hrs (infused over 1 hr)   Estimated peak: 31.8 mcg/mL   Estimated trough: 17.6 mcg/mL   Estimated AUC:MATTHIAS: 577 mcg*hr/mL (assumed MATTHIAS 1 mcg/mL)     A/P:   1. Recommend vancomycin 750 mg IV Q12hrs (11 mg/kg)   2. Consider a vancomycin trough level prior to the 4th dose. 3. Please monitor renal function (urine output, BUN/SCr). Dose adjustments may be necessary with a significant change in renal function. 4.   vancomycin loading dose of 1750 mg to facilitate rapid attainment of target trough serum vancomycin levels.     Pharmacist Diamante Sharma, SACHID

## 2017-11-05 NOTE — ROUTINE PROCESS
0700:  Assumed care of patient from Barbara Mccullough RN. Patient resting quietly in bed, denies any pain and has no further needs at this time. 6738:  Patient sitting up in bed eating breakfast, will return with morning medications. 4987:  Dr. Lino Matias at bedside. Transfer order in for Telemetry. Called Nursing Supervisor, [facility] to update. 1309:  Meche Bragg RN from wound care called to see if patient's wounds on legs were still wrapped up. She said patient was in wound care clinic on Friday and dressings are okay until Tuesday. Updated her on mepilex applied to sacrum.

## 2017-11-05 NOTE — H&P
History & Physical    Patient: Holland Macdonald Sr. MRN: [id number]  CSN: [id number]    YOB: 1945  Age: 74 y.o. Sex: male      DOA: 11/4/2017  Primary Care Provider:  Massie Hodgkins, MD      Assessment/Plan     Patient Active Problem List   Diagnosis Code    PAD (peripheral artery disease) ([facility]) I73.9    Venous reflux I87.2    Leg ulcer, left ([facility] L97.929    Leg ulcer ([facility] L97.909    Diabetic ulcer of right midfoot associated with type 2 diabetes mellitus, with fat layer exposed ([facility] E11.621, L97.412    UTI (urinary tract infection) N39.0    Sepsis ([facility] A41.9    CAD (coronary artery disease) I25.10    COPD (chronic obstructive pulmonary disease) ([facility]) J44.9    HTN (hypertension) I10    UTI (urinary tract infection) due to urinary indwelling Sierra catheter ([facility]) T83.511A, N39.0       1. Sepsis, likely due to UTI. Has indwelling sierra   2. UTI  3. B/L LE chronic Ulcer, follows with wound care clinic and Dr Juan White   4. DM2  5. HTN  6. PAD, follows with Dr Gillespie   7. COPD  8. CAD  DNR    -admit for further care   -sepsis protocol started in ED, cultures ordered   -vanc and ceftriaxone for IV Abx for now, tailor as needed, Due to borderline low BP/MAP ~50s- closer monitoring in the ICU  -monitor urine output, had his suprapubic catheter changed about a week ago- next change at [facility] on Dec 1st. Consult urology if it becomes necessary to change it again   -repeat lactate per protocol   -accuchmichel ISS  -supportive care   -duonebs prn   -cont other home medications     Estimated length of stay : 2-3 daus     CC: lousy       HPI:     Mj Claudio is a 74 y.o. male who comes to the ed with complaints of feeling lousy. Patient states he was doing fine yesterday and went to bed after taking his pm meds. But this morning when he woke up, he felt weak and dizzy so he went back to sleep for little bit.  He woke up 2 hours later feeling the same way so ended up calling his son who helped him out of bed to the chair. He thought it was likely from low blood glucose, so he had his breakfast. Despite of eating he continued to feel lousy and ate more but his ysmptoms did not improve. No other specific symptoms. Due to this he decided to come to the ED. In the ED he was noted to have sepsis/UTI. He was started on sepsis protocol and ceftriaxone. When I saw the patient he states he felt little better after getting the IVF, but still feels little weak. He follows with Urology at [facility] for his chronic suprapubic catheter. He last had it changed about a week ago and is due to next change on Dec 1st.   No other complaints at this time. His HR has improved with IVF. Past Medical History:   Diagnosis Date    Acid reflux     Anemia     Atrial fibrillation (HCC)     CAD (coronary artery disease)     Chronic obstructive pulmonary disease (HCC)     Diabetes (HCC)     GERD (gastroesophageal reflux disease)     H/O seasonal allergies     Hypercholesterolemia     Hypertension     Ill-defined condition     hand tremors    Sleep apnea     pt states he has not used bipap \"in years\".     Thyroid disease        Past Surgical History:   Procedure Laterality Date    ABDOMEN SURGERY PROC UNLISTED  1997    Vikas-en-Y    ABDOMEN SURGERY PROC UNLISTED  2000    abdominal hernia repair/mesh    HX COLONOSCOPY      HX HEENT      cataracts removed right eye    HX ORTHOPAEDIC  1982    cervical laminectomy    HX UROLOGICAL  12/2015    suprapubic catheter in place    VASCULAR SURGERY PROCEDURE UNLIST         Family History   Problem Relation Age of Onset    Cancer Mother     Diabetes Father     Heart Disease Sister     Cancer Brother     Diabetes Brother     Hypertension Brother        Social History     Social History    Marital status:      Spouse name: N/A    Number of children: N/A    Years of education: N/A     Social History Main Topics    Smoking status: Former Smoker     Types: Cigarettes     Quit date: 1/1/1997    Smokeless tobacco: Never Used    Alcohol use 0.6 oz/week     1 Glasses of wine per week    Drug use: No    Sexual activity: Not Asked     Other Topics Concern    None     Social History Narrative       Prior to Admission medications    Medication Sig Start Date End Date Taking? Authorizing Provider   oxyCODONE-acetaminophen (PERCOCET) 5-325 mg per tablet Take 2 Tabs by mouth every six (6) hours as needed. Max Daily Amount: 8 Tabs. 9/20/17  Yes Meghan Lindsey MD   Omeprazole delayed release (PRILOSEC D/R) 20 mg tablet Take 20 mg by mouth daily. Yes Historical Provider   potassium citrate (UROCIT-K10) 10 mEq (1,080 mg) TbER Take 10 mEq by mouth two (2) times a day. Yes Historical Provider   multivitamin, tx-iron-ca-min (THERA-M W/ IRON) 9 mg iron-400 mcg tab tablet Take 1 Tab by mouth daily. Yes Historical Provider   acetaminophen (TYLENOL EXTRA STRENGTH) 500 mg tablet Take 500 mg by mouth every six (6) hours as needed for Pain. Yes Historical Provider   cilostazol (PLETAL) 100 mg tablet Take 100 mg by mouth Before breakfast and dinner. Yes Historical Provider   iron aspgly,qo-[id number]-P80-FA-Ca-suc (FERREX 150 FORTE PLUS) 233-07-36-9 mg-mg-mcg-mg cap cap Take 1 Cap by mouth daily. Yes Tera Akers MD   insulin aspart (NOVOLOG FLEXPEN) 100 unit/mL inpn by SubCUTAneous route Before breakfast, lunch, and dinner. Sliding scale   Yes Historical Provider   metFORMIN (GLUCOPHAGE) 1,000 mg tablet Take 1,000 mg by mouth two (2) times daily (with meals). Yes Historical Provider   atorvastatin (LIPITOR) 20 mg tablet Take  by mouth daily. Yes Historical Provider   lisinopril (PRINIVIL, ZESTRIL) 2.5 mg tablet Take  by mouth daily. Yes Historical Provider   albuterol (PROAIR HFA) 90 mcg/actuation inhaler Take  by inhalation. Yes Historical Provider   clopidogrel (PLAVIX) 75 mg tab Take  by mouth.    Yes Historical Provider   cyanocobalamin (VITAMIN B-12) 1,000 mcg/mL injection 1,000 mcg by IntraMUSCular route every thirty (30) days. Yes Historical Provider   aspirin 81 mg tablet Take 81 mg by mouth daily. Yes Historical Provider   insulin glargine (LANTUS) 100 unit/mL injection 20 Units by SubCUTAneous route nightly. Yes Historical Provider   furosemide (LASIX) 40 mg tablet Take 40 mg by mouth daily. Yes Historical Provider   diltiazem (TIAZAC) 360 mg SR capsule Take 360 mg by mouth daily. Yes Historical Provider   tiotropium (SPIRIVA WITH HANDIHALER) 18 mcg inhalation capsule Take 1 Cap by inhalation daily. Yes Historical Provider   loratadine (CLARITIN) 10 mg tablet Take 10 mg by mouth daily. Yes Historical Provider   fluticasone (FLONASE) 50 mcg/actuation nasal spray 2 Sprays by Both Nostrils route daily. Yes Historical Provider   traMADol (ULTRAM) 50 mg tablet Take 50 mg by mouth daily. Yes Historical Provider   gabapentin (NEURONTIN) 300 mg capsule Take 300 mg by mouth two (2) times a day. One in the am and 2 at hs   Yes Historical Provider   LACTASE ENZYME PO Take 1 Cap by mouth daily. Yes Historical Provider   levothyroxine (SYNTHROID) 25 mcg tablet Take 100 mcg by mouth Daily (before breakfast). Yes Historical Provider   fluticasone-salmeterol (ADVAIR DISKUS) 250-50 mcg/dose diskus inhaler Take 1 Puff by inhalation every twelve (12) hours. Yes Historical Provider   oxyCODONE-acetaminophen (PERCOCET) 5-325 mg per tablet Take 1 Tab by mouth every four (4) hours as needed for Pain. Historical Provider       Allergies   Allergen Reactions    Latex Contact Dermatitis     Pt states he is NOT allergic to latex.  Neosporin [Neomycin-Bacitracin-Polymyxin] Rash       Review of Systems  Gen: No fever, chills, weight loss/gain. Heent: No headache, rhinorrhea, epistaxis, ear pain, hearing loss, sinus pain, neck pain/stiffness, sore throat. Heart: No chest pain, palpitations, ISABEL, pnd, or orthopnea.    Resp: No cough, hemoptysis, wheezing and shortness of breath. GI: No nausea, vomiting, diarrhea, constipation, melena or hematochezia. : No urinary obstruction, dysuria or hematuria. Derm: No rash, new skin lesion or pruritis. Musc/skeletal: no bone or joint complains. Vasc: No edema, cyanosis or claudication. Endo: No heat/cold intolerance, no polyuria,polydipsia or polyphagia. Neuro: No unilateral weakness, numbness, tingling. No seizures. Heme: No easy bruising or bleeding. Physical Exam:     Physical Exam:  Visit Vitals    /40    Pulse (!) 102    Temp 99.5 °F (37.5 °C)    Resp 15    Ht 6' 2\" (1.88 m)    Wt 68.5 kg (151 lb)    SpO2 95%    BMI 19.39 kg/m2      O2 Device: Room air    Temp (24hrs), Av.5 °F (37.5 °C), Min:99.5 °F (37.5 °C), Max:99.5 °F (37.5 °C)             General:  Awake, cooperative, no distress. Head:  Normocephalic, without obvious abnormality, atraumatic. Eyes:  Conjunctivae/corneas clear, sclera anicteric, PERRL, EOMs intact. Nose: Nares normal. No drainage or sinus tenderness. Throat: Lips, mucosa, and tongue normal.    Neck: Supple, symmetrical, trachea midline, no adenopathy. Lungs:   Clear to auscultation bilaterally. Heart:  Regular rate and rhythm, S1, S2 normal, no murmur, click, rub or gallop. Abdomen: Soft, non-tender. Bowel sounds normal. No masses,  No organomegaly. Extremities: Extremities normal, atraumatic, no cyanosis or edema. Capillary refill normal.   Pulses: 2+ and symmetric all extremities. Skin: Skin color pink/pale/mottled/flushed, turgor normal. No rashes or lesions   Neurologic: CNII-XII intact. No focal motor or sensory deficit.   + suprapubic catheter in place without any signs of infection at the site.      Labs Reviewed:      Recent Results (from the past 24 hour(s))   EKG, 12 LEAD, INITIAL    Collection Time: 17  7:20 PM   Result Value Ref Range    Ventricular Rate 121 BPM    Atrial Rate 121 BPM    P-R Interval 160 ms    QRS Duration 112 ms Q-T Interval 326 ms    QTC Calculation (Bezet) 462 ms    Calculated P Axis 62 degrees    Calculated R Axis -31 degrees    Calculated T Axis 101 degrees    Diagnosis       Sinus tachycardia with premature supraventricular complexes  Possible Left atrial enlargement  Left axis deviation  Incomplete right bundle branch block  Septal infarct , age undetermined  ST & T wave abnormality, consider lateral ischemia  Abnormal ECG  No previous ECGs available     CBC WITH AUTOMATED DIFF    Collection Time: 11/04/17  7:23 PM   Result Value Ref Range    WBC 6.9 4.6 - 13.2 K/uL    RBC 4.00 (L) 4.70 - 5.50 M/uL    HGB 11.8 (L) 13.0 - 16.0 g/dL    HCT 35.1 (L) 36.0 - 48.0 %    MCV 87.8 74.0 - 97.0 FL    MCH 29.5 24.0 - 34.0 PG    MCHC 33.6 31.0 - 37.0 g/dL    RDW 15.6 (H) 11.6 - 14.5 %    PLATELET 363 (L) 687 - 420 K/uL    MPV 9.7 9.2 - 11.8 FL    NEUTROPHILS 82 (H) 40 - 73 %    LYMPHOCYTES 10 (L) 21 - 52 %    MONOCYTES 6 3 - 10 %    EOSINOPHILS 2 0 - 5 %    BASOPHILS 0 0 - 2 %    ABS. NEUTROPHILS 5.6 1.8 - 8.0 K/UL    ABS. LYMPHOCYTES 0.7 (L) 0.9 - 3.6 K/UL    ABS. MONOCYTES 0.4 0.05 - 1.2 K/UL    ABS. EOSINOPHILS 0.2 0.0 - 0.4 K/UL    ABS. BASOPHILS 0.0 0.0 - 0.06 K/UL    DF AUTOMATED     METABOLIC PANEL, COMPREHENSIVE    Collection Time: 11/04/17  7:23 PM   Result Value Ref Range    Sodium 132 (L) 136 - 145 mmol/L    Potassium 3.4 (L) 3.5 - 5.5 mmol/L    Chloride 93 (L) 100 - 108 mmol/L    CO2 26 21 - 32 mmol/L    Anion gap 13 3.0 - 18 mmol/L    Glucose 281 (H) 74 - 99 mg/dL    BUN 11 7.0 - 18 MG/DL    Creatinine 0.95 0.6 - 1.3 MG/DL    BUN/Creatinine ratio 12 12 - 20      GFR est AA >60 >60 ml/min/1.73m2    GFR est non-AA >60 >60 ml/min/1.73m2    Calcium 8.8 8.5 - 10.1 MG/DL    Bilirubin, total 0.4 0.2 - 1.0 MG/DL    ALT (SGPT) 24 16 - 61 U/L    AST (SGOT) 17 15 - 37 U/L    Alk.  phosphatase 83 45 - 117 U/L    Protein, total 7.6 6.4 - 8.2 g/dL    Albumin 3.2 (L) 3.4 - 5.0 g/dL    Globulin 4.4 (H) 2.0 - 4.0 g/dL    A-G Ratio 0.7 (L) 0.8 - 1.7     CARDIAC PANEL,(CK, CKMB & TROPONIN)    Collection Time: 11/04/17  7:23 PM   Result Value Ref Range    CK 26 (L) 39 - 308 U/L    CK - MB <1.0 <3.6 ng/ml    CK-MB Index  0.0 - 4.0 %     CALCULATION NOT PERFORMED WHEN RESULT IS BELOW LINEAR LIMIT    Troponin-I, Qt. <0.02 0.00 - 0.06 NG/ML   LACTIC ACID    Collection Time: 11/04/17  7:23 PM   Result Value Ref Range    Lactic acid 5.7 (HH) 0.4 - 2.0 MMOL/L   MAGNESIUM    Collection Time: 11/04/17  7:23 PM   Result Value Ref Range    Magnesium 1.5 (L) 1.6 - 2.6 mg/dL   POC LACTIC ACID    Collection Time: 11/04/17  7:40 PM   Result Value Ref Range    Lactic Acid (POC) 5.5 (HH) 0.4 - 2.0 mmol/L   INFLUENZA A & B AG (RAPID TEST)    Collection Time: 11/04/17  8:08 PM   Result Value Ref Range    Influenza A Antigen NEGATIVE  NEG      Influenza B Antigen NEGATIVE  NEG     URINALYSIS W/ RFLX MICROSCOPIC    Collection Time: 11/04/17  8:15 PM   Result Value Ref Range    Color YELLOW      Appearance CLOUDY      Specific gravity 1.016 1.005 - 1.030      pH (UA) 5.0 5.0 - 8.0      Protein NEGATIVE  NEG mg/dL    Glucose >1000 (A) NEG mg/dL    Ketone TRACE (A) NEG mg/dL    Bilirubin NEGATIVE  NEG      Blood TRACE (A) NEG      Urobilinogen 1.0 0.2 - 1.0 EU/dL    Nitrites POSITIVE (A) NEG      Leukocyte Esterase LARGE (A) NEG     URINE MICROSCOPIC ONLY    Collection Time: 11/04/17  8:15 PM   Result Value Ref Range    WBC 30 to 35 0 - 5 /hpf    RBC NEGATIVE  0 - 5 /hpf    Epithelial cells FEW 0 - 5 /lpf    Bacteria 3+ (A) NEG /hpf       Procedures/imaging: see electronic medical records for all procedures/Xrays and details which were not copied into this note but were reviewed prior to creation of Plan    50 minutes of critical care time spent in the direct evaluation and treatment of this high risk patient.        CC: Chapito Bill MD

## 2017-11-05 NOTE — ED NOTES
Pt sleeping with wife at bedside with him. Call bell within reach. VSS at this time, MAP remains in 46s, MD aware. Rails up for safety.

## 2017-11-05 NOTE — ED NOTES
Pt sitting up in bed requesting box lunch. MD ok. Tolerating well. No complaints or requests. Wife at bedside with him to assist. VSS on RA. Call bell within reach.

## 2017-11-05 NOTE — PROGRESS NOTES
Hospitalist Progress Note    Patient: Simon Tesfaye Sr. MRN: [id number]  CSN: [id number]    YOB: 1945  Age: 74 y.o. Sex: male    DOA: 11/4/2017 LOS:  LOS: 0 days                Assessment/Plan     Patient Active Problem List   Diagnosis Code    PAD (peripheral artery disease) ([facility]) I73.9    Venous reflux I87.2    Leg ulcer, left ([address] L97.929    Leg ulcer ([address] L97.909    Diabetic ulcer of right midfoot associated with type 2 diabetes mellitus, with fat layer exposed ([facility] E11.621, L97.412    Sepsis ([facility] A41.9    CAD (coronary artery disease) I25.10    COPD (chronic obstructive pulmonary disease) ([facility] J44.9    HTN (hypertension) I10    UTI (urinary tract infection) due to urinary indwelling Sierra catheter ([facility] T83.511A, N39.0    PAF (paroxysmal atrial fibrillation) ([facility]) I48.0            75 yo male with history of  COPD, PVD, DM, hypothyroidism, chronic indwelling sierra is admitted for weakness, feeling lousy. In ER found to be septic with UTI. Feels much better,   Transfer to tele. Sepsis - secondary to UTI, sepsis bundle initiated in ER. Wbc low. Lactic acid improving. BP now improved. UTI - secondary to indwelling sierra, follow blood and urine cultures. Antibiotics - ceftriaxone, vancomycin. Probiotic    DM - on lantus and SSI, hold metformin. COPD - no active wheezing, on advair and spiriva at home. Duo nebs as needed. On breo ellipta. PAD - with bilateral LE ulcers, followed by Dr. Kimberley Zaldivar and Dr. Genevieve Doll. he had bilateral angiogram and he has stent placed in right leg. continue with pletal, plavix. Local wound care. History of lymphedema - years back, now his legs are not edematous. He has been taking lasix for years secondary to lymphedema. Also reports that he may have had fluid around lungs. Will stop lasix for now. Paroxysmal A-fib - sinus rhythm. followed by Dr. Vera Mullins. on cardizem, not on anticoagulation.      Thrombocytopenia - likely secondary to sepsis, follow platelet levels. Replace magnesium    DVT prophylaxis with heparin. Functional status - he walks very little with Roller walker, has wheel chair. Lives with his wife at home. Disposition - 2-3 days      Review of systems  General: No fevers or chills. Cardiovascular: No chest pain or pressure. No palpitations. Pulmonary: No shortness of breath. Gastrointestinal: No nausea, vomiting. Physical Exam:  General: Awake, cooperative, no acute distress    HEENT: NC, Atraumatic. PERRLA, anicteric sclerae. Lungs: CTA Bilaterally. No Wheezing/Rhonchi/Rales. Heart:  Regular  rhythm,  No murmur, No Rubs, No Gallops  Abdomen: Soft, Non distended, Non tender.  +Bowel sounds, supra pubic catheter. Extremities: No c/c/e  Psych:   Not anxious or agitated. Neurologic:  No acute neurological deficit.                  Vital signs/Intake and Output:  Visit Vitals    /60    Pulse (!) 102    Temp 98.3 °F (36.8 °C)    Resp 21    Ht 6' 2\" (1.88 m)    Wt 69.1 kg (152 lb 5.4 oz)    SpO2 97%    BMI 19.56 kg/m2     Current Shift:  11/05 0701 - 11/05 1900  In: -   Out: 550 [Urine:550]  Last three shifts:  11/03 1901 - 11/05 0700  In: 503.3 [I.V.:503.3]  Out: 2701 [Urine:2700]            Labs: Results:       Chemistry Recent Labs      11/05/17 0328 11/04/17 1923   GLU  291*  281*   NA  138  132*   K  3.7  3.4*   CL  102  93*   CO2  28  26   BUN  8  11   CREA  0.69  0.95   CA  8.2*  8.8   AGAP  8  13   BUCR  12  12   AP  63  83   TP  6.4  7.6   ALB  2.6*  3.2*   GLOB  3.8  4.4*   AGRAT  0.7*  0.7*      CBC w/Diff Recent Labs      11/05/17 0328 11/04/17 1923   WBC  2.9*  6.9   RBC  3.62*  4.00*   HGB  10.7*  11.8*   HCT  31.7*  35.1*   PLT  82*  111*   GRANS   --   82*   LYMPH   --   10*   EOS   --   2      Cardiac Enzymes Recent Labs      11/04/17   1923   CPK  26*   CKND1  CALCULATION NOT PERFORMED WHEN RESULT IS BELOW LINEAR LIMIT      Coagulation Recent Labs 11/05/17 0328   PTP  15.1   INR  1.2   APTT  45.2*       Lipid Panel No results found for: CHOL, CHOLPOCT, CHOLX, CHLST, CHOLV, 314224, HDL, LDL, LDLC, DLDLP, 509507, VLDLC, VLDL, TGLX, TRIGL, TRIGP, TGLPOCT, CHHD, CHHDX   BNP No results for input(s): BNPP in the last 72 hours.    Liver Enzymes Recent Labs      11/05/17 0328   TP  6.4   ALB  2.6*   AP  63   SGOT  13*      Thyroid Studies No results found for: T4, T3U, TSH, TSHEXT, TSHEXT     Procedures/imaging: see electronic medical records for all procedures/Xrays and details which were not copied into this note but were reviewed prior to creation of Plan

## 2017-11-05 NOTE — PROGRESS NOTES
Problem: Falls - Risk of  Goal: *Absence of Falls  Document Donald Fall Risk and appropriate interventions in the flowsheet.    Outcome: Progressing Towards Goal  Fall Risk Interventions:  Mobility Interventions: Communicate number of staff needed for ambulation/transfer, Patient to call before getting OOB, Strengthening exercises (ROM-active/passive)         Medication Interventions: Evaluate medications/consider consulting pharmacy, Patient to call before getting OOB, Teach patient to arise slowly    Elimination Interventions: Call light in reach, Patient to call for help with toileting needs, Toileting schedule/hourly rounds

## 2017-11-05 NOTE — PROGRESS NOTES
0140- Received from ED via stretcher, assessment completed per flow sheet. Alert, cooperative, NAD. IVF infusing via IV pump without difficulty. Hr-96, RR- 18, NIBP-115/41, SP02-96%. 0200- Daylight savings time, clock turned back to 0100. HR-95, RR-18, NIBP-116/41, SP02-97%. 0330- NAD, resting quietly with eyes closed.

## 2017-11-05 NOTE — ED NOTES
Pt resides at home- is pretty much bed ridden - gets up w/ walker and support of his wife to get into chair - reports this am woke up too weak to get out of bed at all - decreased appetite today .

## 2017-11-05 NOTE — ED NOTES
Pt care report given to Lexus Fret - care of pt turned over to her at this time. Pt sleeping in bed w/ wife at bedside.

## 2017-11-05 NOTE — ROUTINE PROCESS
TRANSFER - OUT REPORT:    Verbal report given to PIPER Hernandez RN (name) on [facility].  being transferred to ICU(unit) for routine progression of care       Report consisted of patients Situation, Background, Assessment and   Recommendations(SBAR). MEWS score: 2 communicated with RN assuming care, all questions answered at this time. Information from the following report(s) SBAR, Kardex, ED Summary, Procedure Summary, Intake/Output, MAR, Accordion, Recent Results, Med Rec Status, Cardiac Rhythm NSR and Alarm Parameters  was reviewed with the receiving nurse. Lines:   Peripheral IV 11/04/17 Right Antecubital (Active)   Site Assessment Clean, dry, & intact 11/4/2017  7:32 PM   Phlebitis Assessment 0 11/4/2017  7:32 PM   Infiltration Assessment 0 11/4/2017  7:32 PM   Dressing Status Clean, dry, & intact 11/4/2017  7:32 PM   Dressing Type Transparent 11/4/2017  7:32 PM   Hub Color/Line Status Pink;Flushed 11/4/2017  7:32 PM        Opportunity for questions and clarification was provided.       Patient transported with:   Monitor  Registered Nurse  Tech

## 2017-11-05 NOTE — ED NOTES
Attempted to call report to ICU, was informed that RN assuming care was busy with another patient and will call back.

## 2017-11-06 LAB
ANION GAP SERPL CALC-SCNC: 6 MMOL/L (ref 3–18)
ATRIAL RATE: 121 BPM
BACTERIA SPEC CULT: NORMAL
BUN SERPL-MCNC: 7 MG/DL (ref 7–18)
BUN/CREAT SERPL: 12 (ref 12–20)
CALCIUM SERPL-MCNC: 8.2 MG/DL (ref 8.5–10.1)
CALCULATED P AXIS, ECG09: 62 DEGREES
CALCULATED R AXIS, ECG10: -31 DEGREES
CALCULATED T AXIS, ECG11: 101 DEGREES
CHLORIDE SERPL-SCNC: 102 MMOL/L (ref 100–108)
CO2 SERPL-SCNC: 30 MMOL/L (ref 21–32)
CREAT SERPL-MCNC: 0.59 MG/DL (ref 0.6–1.3)
DATE LAST DOSE: ABNORMAL
DIAGNOSIS, 93000: NORMAL
ERYTHROCYTE [DISTWIDTH] IN BLOOD BY AUTOMATED COUNT: 15.3 % (ref 11.6–14.5)
EST. AVERAGE GLUCOSE BLD GHB EST-MCNC: 166 MG/DL
GLUCOSE BLD STRIP.AUTO-MCNC: 181 MG/DL (ref 70–110)
GLUCOSE BLD STRIP.AUTO-MCNC: 204 MG/DL (ref 70–110)
GLUCOSE BLD STRIP.AUTO-MCNC: 258 MG/DL (ref 70–110)
GLUCOSE BLD STRIP.AUTO-MCNC: 281 MG/DL (ref 70–110)
GLUCOSE SERPL-MCNC: 216 MG/DL (ref 74–99)
HBA1C MFR BLD: 7.4 % (ref 4.5–5.6)
HCT VFR BLD AUTO: 32.8 % (ref 36–48)
HGB BLD-MCNC: 11 G/DL (ref 13–16)
MAGNESIUM SERPL-MCNC: 1.8 MG/DL (ref 1.6–2.6)
MCH RBC QN AUTO: 29.3 PG (ref 24–34)
MCHC RBC AUTO-ENTMCNC: 33.5 G/DL (ref 31–37)
MCV RBC AUTO: 87.5 FL (ref 74–97)
P-R INTERVAL, ECG05: 160 MS
PLATELET # BLD AUTO: 101 K/UL (ref 135–420)
PMV BLD AUTO: 9.5 FL (ref 9.2–11.8)
POTASSIUM SERPL-SCNC: 3.7 MMOL/L (ref 3.5–5.5)
Q-T INTERVAL, ECG07: 326 MS
QRS DURATION, ECG06: 112 MS
QTC CALCULATION (BEZET), ECG08: 462 MS
RBC # BLD AUTO: 3.75 M/UL (ref 4.7–5.5)
REPORTED DOSE,DOSE: ABNORMAL UNITS
REPORTED DOSE/TIME,TMG: 243
SERVICE CMNT-IMP: NORMAL
SODIUM SERPL-SCNC: 138 MMOL/L (ref 136–145)
VANCOMYCIN TROUGH SERPL-MCNC: 5.6 UG/ML (ref 10–20)
VENTRICULAR RATE, ECG03: 121 BPM
WBC # BLD AUTO: 3.4 K/UL (ref 4.6–13.2)

## 2017-11-06 PROCEDURE — 83036 HEMOGLOBIN GLYCOSYLATED A1C: CPT | Performed by: HOSPITALIST

## 2017-11-06 PROCEDURE — 36415 COLL VENOUS BLD VENIPUNCTURE: CPT | Performed by: HOSPITALIST

## 2017-11-06 PROCEDURE — 85027 COMPLETE CBC AUTOMATED: CPT | Performed by: HOSPITALIST

## 2017-11-06 PROCEDURE — 65660000000 HC RM CCU STEPDOWN

## 2017-11-06 PROCEDURE — 97116 GAIT TRAINING THERAPY: CPT

## 2017-11-06 PROCEDURE — 83735 ASSAY OF MAGNESIUM: CPT | Performed by: HOSPITALIST

## 2017-11-06 PROCEDURE — 80048 BASIC METABOLIC PNL TOTAL CA: CPT | Performed by: HOSPITALIST

## 2017-11-06 PROCEDURE — 74011250636 HC RX REV CODE- 250/636: Performed by: INTERNAL MEDICINE

## 2017-11-06 PROCEDURE — 74011000258 HC RX REV CODE- 258: Performed by: INTERNAL MEDICINE

## 2017-11-06 PROCEDURE — 97162 PT EVAL MOD COMPLEX 30 MIN: CPT

## 2017-11-06 PROCEDURE — 80202 ASSAY OF VANCOMYCIN: CPT | Performed by: INTERNAL MEDICINE

## 2017-11-06 PROCEDURE — 74011250637 HC RX REV CODE- 250/637: Performed by: INTERNAL MEDICINE

## 2017-11-06 PROCEDURE — 74011636637 HC RX REV CODE- 636/637: Performed by: HOSPITALIST

## 2017-11-06 PROCEDURE — 82962 GLUCOSE BLOOD TEST: CPT

## 2017-11-06 PROCEDURE — 74011636637 HC RX REV CODE- 636/637: Performed by: INTERNAL MEDICINE

## 2017-11-06 RX ORDER — INSULIN LISPRO 100 [IU]/ML
6 INJECTION, SOLUTION INTRAVENOUS; SUBCUTANEOUS
Status: DISCONTINUED | OUTPATIENT
Start: 2017-11-06 | End: 2017-11-07 | Stop reason: HOSPADM

## 2017-11-06 RX ORDER — INSULIN LISPRO 100 [IU]/ML
4 INJECTION, SOLUTION INTRAVENOUS; SUBCUTANEOUS
Status: DISCONTINUED | OUTPATIENT
Start: 2017-11-06 | End: 2017-11-06

## 2017-11-06 RX ORDER — INSULIN GLARGINE 100 [IU]/ML
24 INJECTION, SOLUTION SUBCUTANEOUS
Status: DISCONTINUED | OUTPATIENT
Start: 2017-11-06 | End: 2017-11-07 | Stop reason: HOSPADM

## 2017-11-06 RX ORDER — GABAPENTIN 300 MG/1
300 CAPSULE ORAL ONCE
Status: COMPLETED | OUTPATIENT
Start: 2017-11-07 | End: 2017-11-07

## 2017-11-06 RX ORDER — VANCOMYCIN/0.9 % SOD CHLORIDE 1.5G/250ML
1500 PLASTIC BAG, INJECTION (ML) INTRAVENOUS EVERY 12 HOURS
Status: DISCONTINUED | OUTPATIENT
Start: 2017-11-06 | End: 2017-11-07 | Stop reason: HOSPADM

## 2017-11-06 RX ORDER — GABAPENTIN 300 MG/1
900 CAPSULE ORAL
Status: DISCONTINUED | OUTPATIENT
Start: 2017-11-06 | End: 2017-11-07 | Stop reason: HOSPADM

## 2017-11-06 RX ADMIN — HEPARIN SODIUM 5000 UNITS: 5000 INJECTION, SOLUTION INTRAVENOUS; SUBCUTANEOUS at 09:30

## 2017-11-06 RX ADMIN — CILOSTAZOL 100 MG: 50 TABLET ORAL at 16:25

## 2017-11-06 RX ADMIN — INSULIN LISPRO 6 UNITS: 100 INJECTION, SOLUTION INTRAVENOUS; SUBCUTANEOUS at 11:30

## 2017-11-06 RX ADMIN — INSULIN LISPRO 9 UNITS: 100 INJECTION, SOLUTION INTRAVENOUS; SUBCUTANEOUS at 22:07

## 2017-11-06 RX ADMIN — INSULIN GLARGINE 24 UNITS: 100 INJECTION, SOLUTION SUBCUTANEOUS at 22:07

## 2017-11-06 RX ADMIN — LACTOBACILLUS TAB 1 TABLET: TAB at 09:25

## 2017-11-06 RX ADMIN — OMEPRAZOLE 20 MG: 20 CAPSULE, DELAYED RELEASE ORAL at 09:28

## 2017-11-06 RX ADMIN — INSULIN LISPRO 2 UNITS: 100 INJECTION, SOLUTION INTRAVENOUS; SUBCUTANEOUS at 16:59

## 2017-11-06 RX ADMIN — ASPIRIN 81 MG: 81 TABLET, COATED ORAL at 09:28

## 2017-11-06 RX ADMIN — INSULIN LISPRO 6 UNITS: 100 INJECTION, SOLUTION INTRAVENOUS; SUBCUTANEOUS at 16:58

## 2017-11-06 RX ADMIN — FLUTICASONE FUROATE AND VILANTEROL TRIFENATATE 1 PUFF: 100; 25 POWDER RESPIRATORY (INHALATION) at 09:31

## 2017-11-06 RX ADMIN — Medication 1 TABLET: at 09:29

## 2017-11-06 RX ADMIN — HEPARIN SODIUM 5000 UNITS: 5000 INJECTION, SOLUTION INTRAVENOUS; SUBCUTANEOUS at 16:58

## 2017-11-06 RX ADMIN — ATORVASTATIN CALCIUM 20 MG: 20 TABLET, FILM COATED ORAL at 09:24

## 2017-11-06 RX ADMIN — LISINOPRIL 10 MG: 5 TABLET ORAL at 09:27

## 2017-11-06 RX ADMIN — LORATADINE 10 MG: 10 TABLET ORAL at 09:27

## 2017-11-06 RX ADMIN — GABAPENTIN 300 MG: 300 CAPSULE ORAL at 09:25

## 2017-11-06 RX ADMIN — CEFTRIAXONE SODIUM 1 G: 1 INJECTION, POWDER, FOR SOLUTION INTRAMUSCULAR; INTRAVENOUS at 22:05

## 2017-11-06 RX ADMIN — SODIUM CHLORIDE 750 MG: 900 INJECTION, SOLUTION INTRAVENOUS at 02:43

## 2017-11-06 RX ADMIN — INSULIN LISPRO 4 UNITS: 100 INJECTION, SOLUTION INTRAVENOUS; SUBCUTANEOUS at 07:51

## 2017-11-06 RX ADMIN — FLUTICASONE PROPIONATE 2 SPRAY: 50 SPRAY, METERED NASAL at 09:30

## 2017-11-06 RX ADMIN — CILOSTAZOL 100 MG: 50 TABLET ORAL at 07:51

## 2017-11-06 RX ADMIN — TRAMADOL HYDROCHLORIDE 50 MG: 50 TABLET, FILM COATED ORAL at 09:28

## 2017-11-06 RX ADMIN — CLOPIDOGREL BISULFATE 75 MG: 75 TABLET ORAL at 09:25

## 2017-11-06 RX ADMIN — DILTIAZEM HYDROCHLORIDE 360 MG: 180 CAPSULE, EXTENDED RELEASE ORAL at 09:23

## 2017-11-06 RX ADMIN — GABAPENTIN 900 MG: 300 CAPSULE ORAL at 22:26

## 2017-11-06 RX ADMIN — INSULIN LISPRO 4 UNITS: 100 INJECTION, SOLUTION INTRAVENOUS; SUBCUTANEOUS at 12:26

## 2017-11-06 RX ADMIN — VANCOMYCIN HYDROCHLORIDE 1500 MG: 10 INJECTION, POWDER, LYOPHILIZED, FOR SOLUTION INTRAVENOUS at 16:19

## 2017-11-06 RX ADMIN — LEVOTHYROXINE SODIUM 100 MCG: 100 TABLET ORAL at 07:51

## 2017-11-06 RX ADMIN — HEPARIN SODIUM 5000 UNITS: 5000 INJECTION, SOLUTION INTRAVENOUS; SUBCUTANEOUS at 02:43

## 2017-11-06 NOTE — PROGRESS NOTES
Hospitalist Progress Note    Patient: Jesus Alberto Ramirez Sr. MRN: [id number]  CSN: [id number]    YOB: 1945  Age: 74 y.o.   Sex: male    DOA: 11/4/2017 LOS:  LOS: 2 days          Chief Complaint:  Sepsis with UTI        Assessment/Plan       75 yo male with history of  COPD, PVD, DM, hypothyroidism, chronic indwelling suprapubic sierra admitted for sepsis with UTI     UTI with SP catheter  Sepsis resolved  IDDM  PVD with arterial ulcers of both LE  COPD  PAF  Chronic thrombocytopenia  Weakness and debility    Does live in home with wife  Goes to wound clinic and Dr Kirstin Leiva regarding legs    PT consult  Insulin adjustments as beyond goal    Here his urine cx may be growing contaminants  He is clinically improved  Plan d/c to home tomorrow on PO abx for total 10 days       Disposition :home tomorrow      Patient Active Problem List   Diagnosis Code    PAD (peripheral artery disease) ([address] I73.9    Venous reflux I87.2    Leg ulcer, left ([address] L97.929    Leg ulcer ([address] L97.909    Diabetic ulcer of right midfoot associated with type 2 diabetes mellitus, with fat layer exposed ([address] E11.621, L97.412    Sepsis ([address] A41.9    CAD (coronary artery disease) I25.10    COPD (chronic obstructive pulmonary disease) ([address] J44.9    HTN (hypertension) I10    UTI (urinary tract infection) due to urinary indwelling Sierra catheter ([address] T83.511A, N39.0    PAF (paroxysmal atrial fibrillation) ([facility]) I48.0       Subjective:    Doing better  No complaints    Review of systems:    Constitutional: denies fevers, chills, myalgias  Respiratory: denies SOB, cough  Cardiovascular: denies chest pain, palpitations  Gastrointestinal: denies nausea, vomiting, diarrhea      Vital signs/Intake and Output:  Visit Vitals    /47 (BP 1 Location: Right arm, BP Patient Position: At rest)    Pulse 92    Temp 97.6 °F (36.4 °C)    Resp 17    Ht 6' 2\" (1.88 m)    Wt 68.5 kg (151 lb)    SpO2 96%    BMI 19.39 kg/m2 Current Shift:  11/06 0701 - 11/06 1900  In: 240 [P.O.:240]  Out: 700 [Urine:700]  Last three shifts:  11/04 1901 - 11/06 0700  In: 2316.7 [P.O.:360; I.V.:1956.7]  Out: 8201 [Urine:8200]    Exam:    General: Well developed, alert, NAD, OX3  Head/Neck: NCAT, supple, No masses, No lymphadenopathy  CVS:Regular rate and rhythm, no M/R/G, S1/S2 heard, no thrill  Lungs:Clear to auscultation bilaterally, no wheezes, rhonchi, or rales  Abdomen: Soft, Nontender, No distention, Normal Bowel sounds, No hepatomegaly  Extremities: no edema, LE wrapped  Neuro:grossly normal , follows commands  Psych:appropriate                Labs: Results:       Chemistry Recent Labs      11/06/17   0404 11/05/17 1117 11/05/17 0328 11/04/17 1923   GLU  216*   --   291*  281*   NA  138   --   138  132*   K  3.7  4.1  3.7  3.4*   CL  102   --   102  93*   CO2  30   --   28  26   BUN  7   --   8  11   CREA  0.59*   --   0.69  0.95   CA  8.2*   --   8.2*  8.8   AGAP  6   --   8  13   BUCR  12   --   12  12   AP   --    --   63  83   TP   --    --   6.4  7.6   ALB   --    --   2.6*  3.2*   GLOB   --    --   3.8  4.4*   AGRAT   --    --   0.7*  0.7*      CBC w/Diff Recent Labs      11/06/17 0404 11/05/17 0328 11/04/17 1923   WBC  3.4*  2.9*  6.9   RBC  3.75*  3.62*  4.00*   HGB  11.0*  10.7*  11.8*   HCT  32.8*  31.7*  35.1*   PLT  101*  82*  111*   GRANS   --    --   82*   LYMPH   --    --   10*   EOS   --    --   2      Cardiac Enzymes Recent Labs      11/04/17 1923   CPK  26*   CKND1  CALCULATION NOT PERFORMED WHEN RESULT IS BELOW LINEAR LIMIT      Coagulation Recent Labs      11/05/17 0328   PTP  15.1   INR  1.2   APTT  45.2*       Lipid Panel No results found for: CHOL, CHOLPOCT, CHOLX, CHLST, CHOLV, 861063, HDL, LDL, LDLC, DLDLP, 688788, VLDLC, VLDL, TGLX, TRIGL, TRIGP, TGLPOCT, CHHD, CHHDX   BNP No results for input(s): BNPP in the last 72 hours.    Liver Enzymes Recent Labs      11/05/17 0328   TP  6.4   ALB  2.6* AP  63   SGOT  13*      Thyroid Studies Lab Results   Component Value Date/Time    TSH 0.90 11/04/2017 06:23 PM        Procedures/imaging: see electronic medical records for all procedures/Xrays and details which were not copied into this note but were reviewed prior to creation of Cinthia Dwyer MD

## 2017-11-06 NOTE — PROGRESS NOTES
1925:  Received patient from ICU via bed accompanied by RN. Telemetry monitor placed on patient. 2042:  Assessment completed. See flow sheet. No complaints of pain and no needs stated. 2155:  Rounds completed. No needs stated. 2306:  Rounds completed. Patient resting in bed with eyes closed. No needs noted. 0031:  Reassessment completed. No changes from prior assessment are noted. No complaints of pain and no needs are stated. 0203:  Rounds completed. Patient resting quietly in bed with no needs stated. 1817:  Reassessment completed. No changes noted from previous assessment by this RN. Patient denies pain and no needs are stated.

## 2017-11-06 NOTE — PROGRESS NOTES
Problem: Mobility Impaired (Adult and Pediatric)  Goal: *Acute Goals and Plan of Care (Insert Text)  Physical Therapy Goals LT/ST  Initiated 11/6/2017 and to be accomplished within 2-4 day(s)  1. Patient will move from supine <> sit with S in prep for out of bed activity and change of position. 2.  Patient will perform sit<> stand with min/CGA with LRAD in prep for transfers/ambulation. 3.  Patient will transfer from bed <> chair with CGA with LRAD for time up in chair for completion of ADL activity. 4.  Patient will ambulate 50 feet with S/LRAD for improved functional mobility/safe discharge. .      Outcome: Progressing Towards Goal  physical Therapy EVALUATION    Patient: Yoselyn Ray Sr. (69 y.o. male)  Date: 11/6/2017  Primary Diagnosis: Sepsis ([address]  UTI (urinary tract infection) due to urinary indwelling Sher catheter ([address]  Sepsis ([address]  UTI (urinary tract infection)  Sepsis ([address]  UTI (urinary tract infection)  Precautions:Fall    ASSESSMENT :  Based on the objective data described below, the patient presents with decreased independence in functional mobility with regard to bed mobility, transfers, gait, balance and activity tolerance with current admission. Patient reports no pain at this time. Pt able to complete bed mobility with supervision/CGA, transfers with min A at RW and participate in GT/RW/min/CGA 30ft. Rachele slow with step to pattern; pt with slight flexion at hips/knees and tends to have flat footed gt due to bilat Unna boots in place. Pt returned to bedside and left on [facility] with all needs in reach and spouse present. Nurse Tomie Denver notified. Recommend HHPT for follow up physical therapy upon discharge to reach maximal level of independence/safety with functional mobility. Pt Education: pt educated in safety/technique during functional mobility tasks     Patient will benefit from skilled intervention to address the above impairments.   Patients rehabilitation potential is considered to be Good  Factors which may influence rehabilitation potential include:   [x]         None noted  []         Mental ability/status  []         Medical condition  []         Home/family situation and support systems  []         Safety awareness  []         Pain tolerance/management  []         Other:      PLAN :  Recommendations and Planned Interventions:  [x]           Bed Mobility Training             []    Neuromuscular Re-Education  [x]           Transfer Training                   []    Orthotic/Prosthetic Training  [x]           Gait Training                          []    Modalities  [x]           Therapeutic Exercises          []    Edema Management/Control  [x]           Therapeutic Activities            [x]    Patient and Family Training/Education  []           Other (comment):    Frequency/Duration: Patient will be followed by physical therapy 1-2 times per day to address goals. Discharge Recommendations: Home Health  Further Equipment Recommendations for Discharge: N/A     SUBJECTIVE:   Patient stated I don't walk too much.     OBJECTIVE DATA SUMMARY:     Past Medical History:   Diagnosis Date    Acid reflux     Anemia     Atrial fibrillation (HCC)     CAD (coronary artery disease)     Chronic obstructive pulmonary disease (HCC)     Diabetes (HCC)     GERD (gastroesophageal reflux disease)     H/O seasonal allergies     Hypercholesterolemia     Hypertension     Ill-defined condition     hand tremors    [facility] 11/5/2017    Sleep apnea     pt states he has not used bipap \"in years\".     Thyroid disease      Past Surgical History:   Procedure Laterality Date    ABDOMEN SURGERY PROC UNLISTED  1997    Vikas-en-Y    ABDOMEN SURGERY PROC UNLISTED  2000    abdominal hernia repair/mesh    HX COLONOSCOPY      HX HEENT      cataracts removed right eye    HX ORTHOPAEDIC  1982    cervical laminectomy    HX UROLOGICAL  12/2015    suprapubic catheter in place    VASCULAR SURGERY PROCEDURE UNLIST       Barriers to Learning/Limitations: yes;  sensory deficits-vision/hearing/speech and physical  Compensate with: Visual Cues  Prior Level of Function/Home Situation: amb short distances with RW  Home Situation  Home Environment: Private residence  # Steps to Enter: 1  Wheelchair Ramp: Yes  One/Two Story Residence: Two story  # of Interior Steps: 0 (doesnt use)  Height of Each Step (in): 0 inches  Interior Rails: Both  Lift Chair Available: Yes  Living Alone: No  Support Systems: Spouse/Significant Other/Partner  Patient Expects to be Discharged to[de-identified] Private residence  Current DME Used/Available at Home: Connee Simmering, rollator, [address] chair, Lift chair, Walker, rolling  Tub or Shower Type: Shower  Critical Behavior:  Neurologic State: Alert; Appropriate for age  Orientation Level: Oriented X4  Cognition: Follows commands; Appropriate safety awareness; Appropriate for age attention/concentration; Appropriate decision making  Safety/Judgement: Awareness of environment; Fall prevention  Psychosocial  Patient Behaviors: Calm; Cooperative  Family  Behaviors: Calm;Supportive  Needs Expressed: Educational  Purposeful Interaction: Yes  Pt Identified Daily Priority: Clinical issues (comment)  Caritas Process: Establish trust  Caring Interventions: Reassure  Reassure: Informing  Therapeutic Modalities: Humor; Intentional therapeutic touch  Skin Condition/Temp: Warm  Family  Behaviors: Calm;Supportive  Skin Integrity: Wound (add Wound LDA) (BLE ulcers followed by wound care)  Skin Integumentary  Skin Color: Appropriate for ethnicity  Skin Condition/Temp: Warm  Skin Integrity: Wound (add Wound LDA) (BLE ulcers followed by wound care)  Turgor: Epidermis thin w/ loss of subcut tissue  Hair Growth: Present  Varicosities: Absent  Strength:    Strength: Generally decreased, functional  Tone & Sensation:   Tone: Normal  Sensation:  (reports h/o peripheral neuropathy)  Range Of Motion:  AROM: Generally decreased, functional  Functional Mobility:  Bed Mobility:  Supine to Sit: Supervision  Scooting: Contact guard assistance  Transfers:  Sit to Stand: Minimum assistance; Moderate assistance; Additional time  Stand to Sit: Minimum assistance;Contact guard assistance  Bed to Chair: Minimum assistance;Contact guard assistance  Balance:   Sitting: Intact  Standing: Impaired; With support  Standing - Static: Fair  Standing - Dynamic : Fair  Ambulation/Gait Training:  Distance (ft): 30 Feet (ft)  Assistive Device: Gait belt;Walker, rolling  Ambulation - Level of Assistance: Minimal assistance;Contact guard assistance  Gait Description (WDL): Exceptions to WDL  Gait Abnormalities: Decreased step clearance; Step to gait  Base of Support: Narrowed  Speed/Rachele: Slow  Step Length: Right shortened;Left shortened  Swing Pattern: Right asymmetrical;Left asymmetrical  Interventions: Safety awareness training;Verbal cues  Pain:  Pain Scale 1: Numeric (0 - 10)  Pain Intensity 1: 0  Activity Tolerance:   fair  Please refer to the flowsheet for vital signs taken during this treatment. After treatment:   [x]         Patient left in no apparent distress sitting up on BSC attempting BM  []         Patient left in no apparent distress in bed  [x]         Call bell left within reach  [x]         Nursing notified  [x]         Caregiver present  []         Bed alarm activated    COMMUNICATION/EDUCATION:   [x]         Fall prevention education was provided and the patient/caregiver indicated understanding. [x]         Patient/family have participated as able in goal setting and plan of care. [x]         Patient/family agree to work toward stated goals and plan of care. []         Patient understands intent and goals of therapy, but is neutral about his/her participation. []         Patient is unable to participate in goal setting and plan of care.     Eval Complexity: History: HIGH Complexity :3+ comorbidities / personal factors will impact the outcome/ POC Exam:MEDIUM Complexity : 3 Standardized tests and measures addressing body structure, function, activity limitation and / or participation in recreation  Presentation: LOW Complexity : Stable, uncomplicated  Clinical Decision Making: Moderate Complexity: amb 30ft with min/CGA Overall Complexity:MEDIUM    G-Codes (GP)  Mobility  [id number] Current  CJ= 20-39%   Goal  CI= 1-19%  The severity rating is based on the functional mobility assessment.     Thank you for this referral.  Rogelio Wynne, PT   Time Calculation: 29 mins

## 2017-11-06 NOTE — PROGRESS NOTES
conducted an initial consultation and Spiritual Assessment for Jaqueline Cox, who is a 74 y.o.,male. Patients Primary Language is: [place]. According to the patients EMR Jainism Affiliation is: Restorationist. The reason the Patient came to the hospital is:   Patient Active Problem List    Diagnosis Date Noted    PAF (paroxysmal atrial fibrillation) ([facility] 11/05/2017    Sepsis ([facility] 11/04/2017    CAD (coronary artery disease) 11/04/2017    COPD (chronic obstructive pulmonary disease) ([facility] 11/04/2017    HTN (hypertension) 11/04/2017    UTI (urinary tract infection) due to urinary indwelling Sher catheter ([facility] 11/04/2017    Diabetic ulcer of right midfoot associated with type 2 diabetes mellitus, with fat layer exposed ([facility] 10/04/2017    PAD (peripheral artery disease) ([facility] 05/09/2017    Venous reflux 05/09/2017    Leg ulcer, left ([facility] 05/09/2017    Leg ulcer ([facility] 05/09/2017        The  provided the following Interventions:  Initiated a relationship of care and support. Explored issues of leola, spirituality and/or Anabaptism needs while hospitalized. Listened empathically. Provided chaplaincy education. Provided information about Spiritual Care Services. Offered prayer and assurance of continued prayers on patient's behalf. Chart reviewed. The following outcomes were achieved:  Patient shared some information about their medical narrative and spiritual journey/beliefs. Patient processed feeling about current hospitalization. Patient expressed gratitude for the 's visit. Assessment:  Patient did not indicate any spiritual or Anabaptism issues which require Spiritual Care Services interventions at this time. Patient does not have any Anabaptism/cultural needs that will affect patients preferences in health care. Plan:  Chaplains will continue to follow and will provide pastoral care on an as needed or requested basis.    recommends bedside caregivers page  on duty if patient shows signs of acute spiritual or emotional distress. KALIA Brennan. [employer]  [phone]

## 2017-11-06 NOTE — ROUTINE PROCESS
TRANSFER - OUT REPORT:    Verbal report given to Hema Ram RN  (name) on [address] [place] [address].  being transferred to Telemetry 3N (unit) for routine progression of care       Report consisted of patients Situation, Background, Assessment and   Recommendations(SBAR). Information from the following report(s) SBAR, Kardex, Procedure Summary, Intake/Output, MAR, Accordion, Recent Results and Alarm Parameters  was reviewed with the receiving nurse. Lines:   Peripheral IV 11/04/17 Right Antecubital (Active)   Site Assessment Clean, dry, & intact 11/5/2017  3:00 PM   Phlebitis Assessment 0 11/5/2017  3:00 PM   Infiltration Assessment 0 11/5/2017  3:00 PM   Dressing Status Clean, dry, & intact 11/5/2017  3:00 PM   Dressing Type Tape;Transparent 11/5/2017  3:00 PM   Hub Color/Line Status Pink; Infusing;Patent 11/5/2017  3:00 PM   Action Taken Open ports on tubing capped 11/5/2017  3:00 PM   Alcohol Cap Used Yes 11/5/2017  3:00 PM        Opportunity for questions and clarification was provided.       Patient transported with:   Registered Nurse  Tech

## 2017-11-06 NOTE — ROUTINE PROCESS
Bedside and Verbal shift change report given to Hannah Tapia RN (oncoming nurse) by RAIZA Rdz RN (offgoing nurse). Report included the following information SBAR, Kardex, Intake/Output, MAR and Recent Results.

## 2017-11-06 NOTE — PROGRESS NOTES
0730 Assumed responsibility for patient from [employer], [address] Placed patient on bedpan and advised to call when ready. Call bell within reach    Bedside shift change report given to Miguel Ángel Pozo RN (oncoming nurse) by Governor CHENTE Weller (offgoing nurse). Report included the following information SBAR, Kardex and MAR.

## 2017-11-06 NOTE — PROGRESS NOTES
Readmission Risk Assessment:     Moderate Risk and MSSP/Good Help ACO patients    RRAT Score:  13-20    Initial Assessment:  Chart reviewed, pt admitted from home for sepsis, UTI. Pt lives with wife, uses walker and wheelchair, is followed by [facility] for urology and suprapubic catheter management, Dr. Carlos Fofana follows for bilat LE wounds and uses [facility] wound clinic, who will follow him while inpt. CM will follow for needs at discharge. 1030- met with pt in room. Pt plans return home where he lives with wife. Pt has chair lift for going up and down stairs in home, has RW, rollator and wheelchair if needed. Wife drives him to appointments, PT to see pt today. Pt has been to SNF in the past and had [facility] as well, will follow for discharge planning. 1330- offered  follow up; pt declines, states he has exercises from when he had home health in the past. Declines other home health services- SN, OT as he goes to wound clinic and has home set up to aid with ADLs/IADLs. Emergency Contact:  See face sheet    Pertinent Medical Hx:   Lymphedema, CAD, COPD, DM, HTN, PAD    PCP/Specialists: Marlon      Community Services:   [facility] wound clinic, [facility] AFALLI    DME:      Lonzell Leavens, wheelchair, chair lift    Moderate Risk Care Transition Plan:  1. Evaluate for [facility], SNF, acute rehab, community care coordination of resources. 2. Involve patient/caregiver in assessment, planning, education and implement of intervention. 3. CM daily patient care huddles/interdisciplinary rounds. 4. PCP/Specialist appointment within 5  7 days made prior to discharge. 5. Facilitate transportation and logistics for follow-up appointments. 6. Medication reconciliation [address]  7. Formal handoff between hospital provider and post-acute provider to transition patient  Handoff to [address] Nurse Navigator or PCP practice.

## 2017-11-06 NOTE — ROUTINE PROCESS
TRANSFER - IN REPORT:    Verbal report received from [address], RN(name) on [facility].  being received from ICU(unit) for routine progression of care      Report consisted of patients Situation, Background, Assessment and   Recommendations(SBAR). Information from the following report(s) SBAR, Kardex, Intake/Output, MAR and Recent Results was reviewed with the receiving nurse. Opportunity for questions and clarification was provided.

## 2017-11-06 NOTE — DIABETES MGMT
NUTRITION / GLYCEMIC CONTROL PLAN OF CARE        Diabetes Management:      - recommend: modify insulin regimen to the following, A1C added to labs per protocol                        * continue current basal & add weight based mealtime dose Lantus 20 units qhs                         * monitor overnight BG trends, if AM BG are out of target tomorrow will increase increase basal insulin       - education: not indicated at this time  - goals:    *BG will be in target range non-ICU: < 140 mg/dL 11/13/17   *pt will eat at least 50% of meals offered by 11/13/17    - TDD: 32 (20 Lantus + 12 - Humalog Normal Insulin Sensitivity Corrective Coverage)  - BG range: 184-216 mg/dL  - Hypo: no  - BG in target range (non-ICU: <140; ICU<180): [] Yes  [x] No  - Steroids: no- Intake:    Patient Vitals for the past 100 hrs:   % Diet Eaten   11/05/17 2159 100 %   11/05/17 1800 75 %        Current Insulin regimen:   Lantus 20 units + - Humalog Normal Insulin Sensitivity Corrective Coverage    Home medication/insulin regimen:  Metformin 1000 mg BID  Lantus 20 units @ hs    Recent Glucose Results: Lab Results   Component Value Date/Time     (H) 11/06/2017 04:04 AM    GLUCPOC 204 (H) 11/06/2017 06:24 AM    GLUCPOC 215 (H) 11/05/2017 09:22 PM    GLUCPOC 198 (H) 11/05/2017 04:43 PM         Adequate glycemic control PTA:  [] Yes  [x] No    HbA1c: Pending        Diet:   Active Orders   Diet    DIET DIABETIC CONSISTENT CARB Regular         Crystal Butte RN, MS  Glycemic Control Team

## 2017-11-06 NOTE — DIABETES MGMT
GLYCEMIC CONTROL PROGRESS NOTE:    -discussed in rounds, known h/o T2DM controlled Metformin home regimen  -BG out of target range non-ICU: < 140 mg/dL, requiring corrective coverage  -TDD = 42 units (30 Lantus + 12 - Humalog Normal Insulin Sensitivity Corrective Coverage)  -weight based mealtime insulin qac initiated    Recent Glucose Results:   Lab Results   Component Value Date/Time     (H) 11/06/2017 04:04 AM    GLUCPOC 281 (H) 11/06/2017 12:19 PM    GLUCPOC 204 (H) 11/06/2017 06:24 AM    GLUCPOC 215 (H) 11/05/2017 09:22 PM       Lab Results   Component Value Date/Time    Hemoglobin A1c 7.4 11/06/2017 04:04 AM     Deuce Taylor RN, MS  Glycemic Control Team

## 2017-11-06 NOTE — PROGRESS NOTES
Pharmacy Dosing Services: Vancomycin    Consult for Vancomycin Dosing by Pharmacy by Dr. Jelani Landry provided for this 74y.o. year old male , for indication of UTI/ sepsis. Day of Therapy 2    Scr = 0.59   CrCl ~ 86 ml/min    Vancomycin Trough: 5.6 (11/6/17 at 14:55, level obtained prior to 4th dose)     Dose adjusted to: Vancomycin 1500 mg IV every 12 hours, starting 11/6/17 at 16:00. Dose calculated to approximate a therapeutic trough of 10 - 15 mcg/mL. Ht Readings from Last 1 Encounters:   11/05/17 188 cm (74\")        Wt Readings from Last 1 Encounters:   11/05/17 68.5 kg (151 lb)      Previous Regimen Vancomycin 750 mg IV q12h   Other Current Antibiotics Ceftriaxone 1 gram IV q24h   Significant Cultures Blood pending  Urine culture: possible contamination   Serum Creatinine Lab Results   Component Value Date/Time    Creatinine 0.59 11/06/2017 04:04 AM      Creatinine Clearance Estimated Creatinine Clearance: 106.4 mL/min (based on Cr of 0.59). BUN Lab Results   Component Value Date/Time    BUN 7 11/06/2017 04:04 AM      WBC Lab Results   Component Value Date/Time    WBC 3.4 11/06/2017 04:04 AM      H/H Lab Results   Component Value Date/Time    HGB 11.0 11/06/2017 04:04 AM      Platelets Lab Results   Component Value Date/Time    PLATELET 060 24/42/3026 04:04 AM      Temp 97.5 °F (36.4 °C)       Pharmacy to follow daily and will make changes to dose and/or frequency based on clinical status.   Pharmacist Juan F Hopkins, [facility]

## 2017-11-06 NOTE — PROGRESS NOTES
Patient was visited by BARRINGTON. Volunteer followed up with patient and/or family and reported no needs to this . Chaplains will continue to follow and will provide pastoral care as needed or requested. Rev.  [facility]  [phone]

## 2017-11-06 NOTE — PROGRESS NOTES
Problem: Falls - Risk of  Goal: *Absence of Falls  Document Donald Fall Risk and appropriate interventions in the flowsheet.    Outcome: Progressing Towards Goal  Fall Risk Interventions:  Mobility Interventions: Patient to call before getting OOB         Medication Interventions: Evaluate medications/consider consulting pharmacy    Elimination Interventions: Call light in reach

## 2017-11-07 VITALS
HEIGHT: 74 IN | SYSTOLIC BLOOD PRESSURE: 111 MMHG | RESPIRATION RATE: 17 BRPM | BODY MASS INDEX: 18.98 KG/M2 | DIASTOLIC BLOOD PRESSURE: 50 MMHG | TEMPERATURE: 97.8 F | HEART RATE: 100 BPM | WEIGHT: 147.93 LBS | OXYGEN SATURATION: 97 %

## 2017-11-07 LAB
ANION GAP SERPL CALC-SCNC: 8 MMOL/L (ref 3–18)
BUN SERPL-MCNC: 8 MG/DL (ref 7–18)
BUN/CREAT SERPL: 13 (ref 12–20)
CALCIUM SERPL-MCNC: 8.6 MG/DL (ref 8.5–10.1)
CHLORIDE SERPL-SCNC: 103 MMOL/L (ref 100–108)
CO2 SERPL-SCNC: 27 MMOL/L (ref 21–32)
CREAT SERPL-MCNC: 0.6 MG/DL (ref 0.6–1.3)
ERYTHROCYTE [DISTWIDTH] IN BLOOD BY AUTOMATED COUNT: 14.9 % (ref 11.6–14.5)
GLUCOSE BLD STRIP.AUTO-MCNC: 201 MG/DL (ref 70–110)
GLUCOSE SERPL-MCNC: 203 MG/DL (ref 74–99)
HCT VFR BLD AUTO: 31.9 % (ref 36–48)
HGB BLD-MCNC: 10.8 G/DL (ref 13–16)
MCH RBC QN AUTO: 29.3 PG (ref 24–34)
MCHC RBC AUTO-ENTMCNC: 33.9 G/DL (ref 31–37)
MCV RBC AUTO: 86.4 FL (ref 74–97)
PLATELET # BLD AUTO: 111 K/UL (ref 135–420)
PMV BLD AUTO: 9.8 FL (ref 9.2–11.8)
POTASSIUM SERPL-SCNC: 3.6 MMOL/L (ref 3.5–5.5)
RBC # BLD AUTO: 3.69 M/UL (ref 4.7–5.5)
SODIUM SERPL-SCNC: 138 MMOL/L (ref 136–145)
WBC # BLD AUTO: 3.3 K/UL (ref 4.6–13.2)

## 2017-11-07 PROCEDURE — 74011636637 HC RX REV CODE- 636/637: Performed by: INTERNAL MEDICINE

## 2017-11-07 PROCEDURE — 85027 COMPLETE CBC AUTOMATED: CPT | Performed by: HOSPITALIST

## 2017-11-07 PROCEDURE — 74011250637 HC RX REV CODE- 250/637: Performed by: INTERNAL MEDICINE

## 2017-11-07 PROCEDURE — 82962 GLUCOSE BLOOD TEST: CPT

## 2017-11-07 PROCEDURE — 36415 COLL VENOUS BLD VENIPUNCTURE: CPT | Performed by: HOSPITALIST

## 2017-11-07 PROCEDURE — 80048 BASIC METABOLIC PNL TOTAL CA: CPT | Performed by: HOSPITALIST

## 2017-11-07 PROCEDURE — 74011250636 HC RX REV CODE- 250/636: Performed by: INTERNAL MEDICINE

## 2017-11-07 PROCEDURE — 74011636637 HC RX REV CODE- 636/637: Performed by: HOSPITALIST

## 2017-11-07 RX ORDER — AMOXICILLIN AND CLAVULANATE POTASSIUM 875; 125 MG/1; MG/1
1 TABLET, FILM COATED ORAL 2 TIMES DAILY
Qty: 14 TAB | Refills: 0 | Status: SHIPPED | OUTPATIENT
Start: 2017-11-07 | End: 2018-02-05

## 2017-11-07 RX ADMIN — INSULIN LISPRO 6 UNITS: 100 INJECTION, SOLUTION INTRAVENOUS; SUBCUTANEOUS at 06:44

## 2017-11-07 RX ADMIN — HEPARIN SODIUM 5000 UNITS: 5000 INJECTION, SOLUTION INTRAVENOUS; SUBCUTANEOUS at 00:58

## 2017-11-07 RX ADMIN — LACTOBACILLUS TAB 1 TABLET: TAB at 08:28

## 2017-11-07 RX ADMIN — ATORVASTATIN CALCIUM 20 MG: 20 TABLET, FILM COATED ORAL at 08:30

## 2017-11-07 RX ADMIN — CILOSTAZOL 100 MG: 50 TABLET ORAL at 06:45

## 2017-11-07 RX ADMIN — FLUTICASONE PROPIONATE 2 SPRAY: 50 SPRAY, METERED NASAL at 08:32

## 2017-11-07 RX ADMIN — FLUTICASONE FUROATE AND VILANTEROL TRIFENATATE 1 PUFF: 100; 25 POWDER RESPIRATORY (INHALATION) at 08:32

## 2017-11-07 RX ADMIN — CLOPIDOGREL BISULFATE 75 MG: 75 TABLET ORAL at 08:30

## 2017-11-07 RX ADMIN — TRAMADOL HYDROCHLORIDE 50 MG: 50 TABLET, FILM COATED ORAL at 08:29

## 2017-11-07 RX ADMIN — OMEPRAZOLE 20 MG: 20 CAPSULE, DELAYED RELEASE ORAL at 08:27

## 2017-11-07 RX ADMIN — VANCOMYCIN HYDROCHLORIDE 1500 MG: 10 INJECTION, POWDER, LYOPHILIZED, FOR SOLUTION INTRAVENOUS at 04:33

## 2017-11-07 RX ADMIN — GABAPENTIN 300 MG: 300 CAPSULE ORAL at 08:30

## 2017-11-07 RX ADMIN — LISINOPRIL 10 MG: 5 TABLET ORAL at 08:28

## 2017-11-07 RX ADMIN — DILTIAZEM HYDROCHLORIDE 360 MG: 180 CAPSULE, EXTENDED RELEASE ORAL at 08:27

## 2017-11-07 RX ADMIN — LORATADINE 10 MG: 10 TABLET ORAL at 08:29

## 2017-11-07 RX ADMIN — INSULIN LISPRO 6 UNITS: 100 INJECTION, SOLUTION INTRAVENOUS; SUBCUTANEOUS at 08:24

## 2017-11-07 RX ADMIN — Medication 1 TABLET: at 08:32

## 2017-11-07 RX ADMIN — HEPARIN SODIUM 5000 UNITS: 5000 INJECTION, SOLUTION INTRAVENOUS; SUBCUTANEOUS at 08:25

## 2017-11-07 RX ADMIN — LEVOTHYROXINE SODIUM 100 MCG: 100 TABLET ORAL at 06:45

## 2017-11-07 RX ADMIN — ASPIRIN 81 MG: 81 TABLET, COATED ORAL at 08:29

## 2017-11-07 NOTE — PROGRESS NOTES
Bedside shift change report given to PIPER Starr (oncoming nurse) by Katharina Kenney (offgoing nurse). Report included the following information SBAR and Kardex.

## 2017-11-07 NOTE — PROGRESS NOTES
0730 Assumed responsibility for patient from Terese Mazariegos RN    1300 Patient given discharge instructions, prescription sent to outpatient pharmacy.   Patient signed confirmation of discharge instructions and placed in chart

## 2017-11-07 NOTE — DISCHARGE INSTRUCTIONS
Lab Results   Component Value Date/Time    Hemoglobin A1c 7.4 11/06/2017 04:04 AM       This lab test reflects that your blood sugar has been slightly elevated over the past 3 months and should be evaluated by your primary care provider. An A1C of 5.7-6.4% meets the criteria for pre-diabetes; an A1C of 6.5% or higher meets the criteria for diabetes. This lab test reflects that your blood sugar averaged  162 mg/dL over the past 3 months. It is important to follow up with your provider on a routine basis to continue to evaluate your blood sugar and discuss any necessary changes in treatment.

## 2017-11-07 NOTE — PROGRESS NOTES
Shift summary: pt is A/O x4, no complaint for shift. Suprapubic cath draining to clear very light yellow urine. Bilateral LE with compression wrap done by Wound Care. For possible D/C'd today. BS of 201 - SSI administered. Shift uneventful    0730 Bedside and Verbal shift change report given to Chinyere Womack RN (oncoming nurse) by Alberta Gonzalez RN   (offgoing nurse). Report included the following information SBAR, Kardex, Intake/Output, MAR and Recent Results.

## 2017-11-07 NOTE — PROGRESS NOTES
D/c plan home today  Patient declines HHC. Has standing appointment for follow up with wound care and will follow up with Dr. Ivory Houston. Denies needs for [facility] or [facility] equipment    Care Management Interventions  PCP Verified by CM:  Yes  Transition of Care Consult (CM Consult):  (refused HHC)  Current Support Network: Lives with Spouse  Confirm Follow Up Transport: Family  Plan discussed with Pt/Family/Caregiver: Yes  Freedom of Choice Offered: Yes  Discharge Location  Discharge Placement: Home with family assistance

## 2017-11-10 LAB
BACTERIA SPEC CULT: NORMAL
SERVICE CMNT-IMP: NORMAL

## 2017-11-10 PROCEDURE — 15271 SKIN SUB GRAFT TRNK/ARM/LEG: CPT

## 2017-11-10 PROCEDURE — 15272 SKIN SUB GRAFT T/A/L ADD-ON: CPT

## 2017-11-14 NOTE — DISCHARGE SUMMARY
[address] SUMMARY    Name:  Olya Doss  MR#:  [id number]  :  1945  Account #:  [de-identified]  Date of Adm:  2017  Date of Discharge:  2017      DIAGNOSES AT DISCHARGE  1. Urinary tract infection and sepsis due to urinary tract infection with  suprapubic catheter. 2. Insulin-dependent diabetes mellitus. 3. Peripheral vascular disease. 4. Chronic obstructive pulmonary disease. 5. Paroxysmal atrial fibrillation. 6. Chronic thrombocytopenia. 7. Weakness and debility. HOSPITAL SUMMARY: The patient is a 69-year-old gentleman who  was admitted to the hospital with a suprapubic catheter in place prior to  admission, extensive medical history, admitted for sepsis, placed on  antibiotics for UTI. Recovered from sepsis quickly with fluid  resuscitation. Did not require pressor support. Was followed closely for  his antibiotic requirements and improved clinically quite quickly. By the  , he was feeling well enough to go home. We saw a low white count  of 3.3, platelets stable at 266. He is being followed for that as an  outpatient. His glucose levels were stable between 180 and 200 prior  to his discharge. His BUN and creatinine were 8 and 0.60 on . His hemoglobin A1c was 7.4%. Urine culture on the  came back  with 3 more organisms. There may have been contaminant or  colonization. His blood culture was negative for 6 days. Appropriate  antibiotic choices were made, conversion from IV to p.o. He was doing  well clinically by the day of discharge, awake and alert, in good spirits,  feeling well, ready for discharge. Suprapubic catheter was in place. Vital  signs showed a blood pressure of 111/50, pulse 100, temp 97.8,  respiratory rate 17, SaO2 97% on room air. He had no complaint and  was clinically stable at that point. Lungs were clear. Cardiac exam was  regular rate and rhythm. Abdomen benign, soft, nontender.  Lower  extremities with chronic wraps on in place, as he has ulcers that are  tended to by the wound clinic. He was discharged in good condition on  the aforementioned date to follow up with his PCP in 3-7 days and his  urologist in 1 week. MEDICATIONS FOR DISCHARGE  Included  1. Tylenol Extra Strength 500 mg every 6 hours as needed for pain. 2. Albuterol 2 puffs every 4 hours as needed. 3. Augmentin 875 one twice daily for 7 days. 4. Aspirin 81 mg daily. 5. Lipitor 20 mg at night. 6. Plavix 75 mg daily. 7. Vitamin B12 injections per his usual routine. 8. Tiazac 360 mg daily. 9. Flonase 2 sprays in both nostrils daily. 10. Advair 1 puff twice daily. 11. Lasix 40 mg daily. 12. Neurontin 300 mg twice daily. 13. NovoLog before breakfast, lunch and dinner, sliding scale. 14. Lantus 20 units at night. 15. Iron tablet and lactase daily. 16. Synthroid 25 mcg daily. 17. Lisinopril 2.5 mg daily. 18. Claritin 10 mg daily. 19. Glucophage 1000 mg twice daily. 20. Multivitamin with iron once a day. 21. Prilosec 20 mg daily. 22. Urocit-K-10, 10 mEq twice daily. 23. Spiriva 1 capsule inhalation daily. 24. Tramadol 50 mg every 6 hours as needed for pain. He was discharged in good condition with followup as noted and  antibiotics by mouth for his resolved sepsis and urinary tract infection  associated with his suprapubic catheter. 35 minutes were spent on his discharge time.         MD JÚNIOR Diaz / NAYAN  D:  11/13/2017   14:12  T:  11/14/2017   10:40  Job #:  [id number]

## 2017-11-17 PROCEDURE — 97602 WOUND(S) CARE NON-SELECTIVE: CPT

## 2017-11-27 PROCEDURE — 29581 APPL MULTLAYER CMPRN SYS LEG: CPT

## 2017-12-06 ENCOUNTER — HOSPITAL ENCOUNTER (OUTPATIENT)
Dept: WOUND CARE | Age: 72
Discharge: HOME | End: 2017-12-06
Payer: MEDICARE

## 2017-12-06 PROCEDURE — 97602 WOUND(S) CARE NON-SELECTIVE: CPT

## 2017-12-15 PROCEDURE — 97602 WOUND(S) CARE NON-SELECTIVE: CPT

## 2017-12-21 ENCOUNTER — OFFICE VISIT (OUTPATIENT)
Dept: VASCULAR SURGERY | Age: 72
End: 2017-12-21

## 2017-12-21 VITALS
RESPIRATION RATE: 18 BRPM | HEIGHT: 74 IN | DIASTOLIC BLOOD PRESSURE: 70 MMHG | HEART RATE: 88 BPM | SYSTOLIC BLOOD PRESSURE: 116 MMHG | WEIGHT: 147 LBS | BODY MASS INDEX: 18.87 KG/M2

## 2017-12-21 DIAGNOSIS — L97.911 ULCER OF RIGHT LOWER EXTREMITY, LIMITED TO BREAKDOWN OF SKIN (HCC): Primary | ICD-10-CM

## 2017-12-21 PROCEDURE — 29581 APPL MULTLAYER CMPRN SYS LEG: CPT

## 2017-12-25 NOTE — PROGRESS NOTES
Aquilino Emmanuel Sr. Chief Complaint   Patient presents with    Wound Check       History and Physical    Mr. Jennifer Boo returns to our office for follow up of his bilateral lower extremity leg wounds. He states that his leg wounds continue to heal well. He still has an ulcer on his foot that is slowly improving. Past Medical History:   Diagnosis Date    Acid reflux     Anemia     Atrial fibrillation ([facility])     CAD (coronary artery disease)     Chronic obstructive pulmonary disease ([facility])     Diabetes ([facility])     GERD (gastroesophageal reflux disease)     H/O seasonal allergies     Hypercholesterolemia     Hypertension     Ill-defined condition     hand tremors    PAF (paroxysmal atrial fibrillation) ([address] 11/5/2017    Sleep apnea     pt states he has not used bipap \"in years\".     Thyroid disease      Patient Active Problem List   Diagnosis Code    PAD (peripheral artery disease) ([facility]) I73.9    Venous reflux I87.2    Leg ulcer, left ([address] L97.929    Leg ulcer ([address] L97.909    Diabetic ulcer of right midfoot associated with type 2 diabetes mellitus, with fat layer exposed ([address] E11.621, L97.412    Sepsis ([address] A41.9    CAD (coronary artery disease) I25.10    COPD (chronic obstructive pulmonary disease) ([facility]) J44.9    HTN (hypertension) I10    UTI (urinary tract infection) due to urinary indwelling Sher catheter ([facility]) T83.511A, N39.0    PAF (paroxysmal atrial fibrillation) ([address] I48.0     Past Surgical History:   Procedure Laterality Date    ABDOMEN SURGERY PROC UNLISTED  1997    Vikas-en-Y    ABDOMEN SURGERY PROC UNLISTED  2000    abdominal hernia repair/mesh    HX COLONOSCOPY      HX HEENT      cataracts removed right eye    HX ORTHOPAEDIC  1982    cervical laminectomy    HX UROLOGICAL  12/2015    suprapubic catheter in place    VASCULAR SURGERY PROCEDURE UNLIST       Current Outpatient Prescriptions   Medication Sig Dispense Refill    amoxicillin-clavulanate (AUGMENTIN) 875125 mg per tablet Take 1 Tab by mouth two (2) times a day. 14 Tab 0    Omeprazole delayed release (PRILOSEC D/R) 20 mg tablet Take 20 mg by mouth daily.  potassium citrate (UROCIT-K10) 10 mEq (1,080 mg) TbER Take 10 mEq by mouth two (2) times a day.  multivitamin, tx-iron-ca-min (THERA-M W/ IRON) 9 mg iron-400 mcg tab tablet Take 1 Tab by mouth daily.  acetaminophen (TYLENOL EXTRA STRENGTH) 500 mg tablet Take 500 mg by mouth every six (6) hours as needed for Pain.  iron aspgly,fu-G-T41-FA-Ca-suc (FERREX 150 FORTE PLUS) 400-79-83-7 mg-mg-mcg-mg cap cap Take 1 Cap by mouth daily.  insulin aspart (NOVOLOG FLEXPEN) 100 unit/mL inpn by SubCUTAneous route Before breakfast, lunch, and dinner. Sliding scale      metFORMIN (GLUCOPHAGE) 1,000 mg tablet Take 1,000 mg by mouth two (2) times daily (with meals).  atorvastatin (LIPITOR) 20 mg tablet Take  by mouth daily.  lisinopril (PRINIVIL, ZESTRIL) 2.5 mg tablet Take  by mouth daily.  albuterol (PROAIR HFA) 90 mcg/actuation inhaler Take  by inhalation.  clopidogrel (PLAVIX) 75 mg tab Take  by mouth.  cyanocobalamin (VITAMIN B-12) 1,000 mcg/mL injection 1,000 mcg by IntraMUSCular route every thirty (30) days.  aspirin 81 mg tablet Take 81 mg by mouth daily.  insulin glargine (LANTUS) 100 unit/mL injection 20 Units by SubCUTAneous route nightly.  furosemide (LASIX) 40 mg tablet Take 40 mg by mouth daily.  diltiazem (TIAZAC) 360 mg SR capsule Take 360 mg by mouth daily.  tiotropium (SPIRIVA WITH HANDIHALER) 18 mcg inhalation capsule Take 1 Cap by inhalation daily.  loratadine (CLARITIN) 10 mg tablet Take 10 mg by mouth daily.  fluticasone (FLONASE) 50 mcg/actuation nasal spray 2 Sprays by Both Nostrils route daily.  traMADol (ULTRAM) 50 mg tablet Take 50 mg by mouth daily.  gabapentin (NEURONTIN) 300 mg capsule Take 300 mg by mouth two (2) times a day.  One in the am and 2 at hs     [facility] LACTASE ENZYME PO Take 1 Cap by mouth daily.  levothyroxine (SYNTHROID) 25 mcg tablet Take 100 mcg by mouth Daily (before breakfast).  fluticasone-salmeterol (ADVAIR DISKUS) 250-50 mcg/dose diskus inhaler Take 1 Puff by inhalation every twelve (12) hours. Allergies   Allergen Reactions    Latex Contact Dermatitis     Pt states he is NOT allergic to latex.  Neosporin [Neomycin-Bacitracin-Polymyxin] Rash     Social History     Social History    Marital status:      Spouse name: N/A    Number of children: N/A    Years of education: N/A     Occupational History    Not on file. Social History Main Topics    Smoking status: Former Smoker     Types: Cigarettes     Quit date: 1/1/1997    Smokeless tobacco: Never Used    Alcohol use 0.6 oz/week     1 Glasses of wine per week    Drug use: No    Sexual activity: Not on file     Other Topics Concern    Not on file     Social History Narrative      Family History   Problem Relation Age of Onset    Cancer Mother     Diabetes Father     Heart Disease Sister     Cancer Brother     Diabetes Brother     Hypertension Brother        Review of Systems    Review of Systems   Constitutional: Negative for chills, diaphoresis, fever, malaise/fatigue and weight loss. HENT: Negative for hearing loss and sore throat. Eyes: Negative for blurred vision, photophobia and redness. Respiratory: Negative for cough, hemoptysis, shortness of breath and wheezing. Cardiovascular: Negative for chest pain, palpitations and orthopnea. Gastrointestinal: Negative for abdominal pain, blood in stool, constipation, diarrhea, heartburn, nausea and vomiting. Genitourinary: Negative for dysuria, frequency, hematuria and urgency. Musculoskeletal: Negative for back pain and myalgias. Skin: Negative for itching and rash. Neurological: Negative for dizziness, speech change, focal weakness, weakness and headaches.    Endo/Heme/Allergies: Does not bruise/bleed easily. Psychiatric/Behavioral: Negative for depression and suicidal ideas. Physical Exam:    Visit Vitals    /70 (BP 1 Location: Left arm, BP Patient Position: Sitting)    Pulse 88    Resp 18    Ht 6' 2\" (1.88 m)    Wt 147 lb (66.7 kg)    BMI 18.87 kg/m2      Physical Examination: General appearance - alert, well appearing, and in no distress  Mental status - alert, oriented to person, place, and time  Eyes - sclera anicteric, left eye normal, right eye normal  Ears - right ear normal, left ear normal  Nose - normal and patent, no erythema, discharge or polyps  Mouth - mucous membranes moist, pharynx normal without lesions  Extremities - Palpable pedal pulses bilaterally. Superficial skin tears improving. Foot ulcer with fibrinous tissue. No signs of infection      Impression and Plan:    ICD-10-CM ICD-9-CM    1. Ulcer of right lower extremity, limited to breakdown of skin ([facility] L97.911 707.10      I am very pleased that Mr. Breanna Fernandez continues to heal well. The wound care department is doing a great job with his wound healing. We will continue to keep a close eye on him in follow up in order to intervene whenever we can augment his treatment. Follow-up Disposition:  Return for Wound check. The treatment plan was reviewed with the patient in detail. The patient voiced understanding of this plan and all questions and concerns were addressed. The patient agrees with this plan. We discussed the signs and symptoms that would require earlier attention or intervention. The patient was given educational material related to his/her visit and the patient has voiced understanding of the material.     I appreciate the opportunity to participate in the care of your patient. I will be sure to keep you informed of any subsequent changes in the treatment plan. If you have any questions or concerns, please feel free to contact me.   Jolie Brunner MD    PLEASE NOTE:  This document has been produced using voice recognition software. Unrecognized errors in transcription may be present.

## 2018-01-03 ENCOUNTER — HOSPITAL ENCOUNTER (OUTPATIENT)
Dept: WOUND CARE | Age: 73
Discharge: HOME | End: 2018-01-03
Payer: MEDICARE

## 2018-01-03 PROCEDURE — 29581 APPL MULTLAYER CMPRN SYS LEG: CPT

## 2018-01-11 PROCEDURE — 97602 WOUND(S) CARE NON-SELECTIVE: CPT

## 2018-01-19 PROCEDURE — 97602 WOUND(S) CARE NON-SELECTIVE: CPT

## 2018-01-23 ENCOUNTER — OFFICE VISIT (OUTPATIENT)
Dept: VASCULAR SURGERY | Age: 73
End: 2018-01-23

## 2018-01-23 VITALS
SYSTOLIC BLOOD PRESSURE: 128 MMHG | WEIGHT: 147 LBS | BODY MASS INDEX: 18.87 KG/M2 | HEART RATE: 80 BPM | DIASTOLIC BLOOD PRESSURE: 60 MMHG | RESPIRATION RATE: 18 BRPM | HEIGHT: 74 IN

## 2018-01-23 DIAGNOSIS — I73.9 PAD (PERIPHERAL ARTERY DISEASE) (HCC): Primary | ICD-10-CM

## 2018-01-23 DIAGNOSIS — L97.921 ULCER OF LEFT LOWER EXTREMITY, LIMITED TO BREAKDOWN OF SKIN (HCC): ICD-10-CM

## 2018-01-23 PROCEDURE — 97602 WOUND(S) CARE NON-SELECTIVE: CPT

## 2018-01-23 NOTE — MR AVS SNAPSHOT
[facility] 
 
 
 [address] 
[phone] Patient: Meri Knee Sr. MRN: [id number] :[id number] Visit Information Date & Time Provider Department Dept. Phone Encounter #  
 1/23/2018 10:00 AM Alea Golden MD [facility] [address] [id number] Your Appointments 2/27/2018 10:00 AM  
Follow Up with Alea Golden MD  
[facility] ([address]) Appt Note: 1 month fup with studies. Referral to Dr. Derrek Ford. Getting all information [address]  
  
   
 [address] [place] [address] Upcoming Health Maintenance Date Due  
 LIPID PANEL Q1 1945 FOOT EXAM Q1 5/16/1953 MICROALBUMIN Q1 5/16/1953 EYE EXAM RETINAL OR DILATED Q1 5/16/1953 DTaP/Tdap/Td series (1 - Tdap) 5/16/1964 FOBT Q 1 YEAR AGE 50-75 5/16/1993 ZOSTER VACCINE AGE 60> 3/16/2003 GLAUCOMA SCREENING Q2Y 5/16/2008 Pneumococcal 65+ Low/Medium Risk (1 of 2 - PCV13) 5/16/2008 MEDICARE YEARLY EXAM 5/16/2008 Influenza Age 5 to Adult 8/1/2017 HEMOGLOBIN A1C Q6M 5/6/2018 Allergies as of 1/23/2018  Review Complete On: 12/25/2017 By: Alea Golden MD  
  
 Severity Noted Reaction Type Reactions Latex  05/28/2013    Contact Dermatitis Pt states he is NOT allergic to latex. Neosporin [Neomycin-bacitracin-polymyxin] High 05/09/2017   Systemic Rash Current Immunizations  Never Reviewed No immunizations on file. Not reviewed this visit You Were Diagnosed With   
  
 Codes Comments PAD (peripheral artery disease) (HCC)    -  Primary ICD-10-CM: I73.9 ICD-9-CM: 443.9 Ulcer of left lower extremity, limited to breakdown of skin ([facility]     ICD-10-CM: W62.332 ICD-9-CM: 707.10 Vitals BP Pulse Resp Height(growth percentile) Weight(growth percentile) BMI 128/60 (BP 1 Location: Left arm, BP Patient Position: Sitting) 80 18 6' 2\" (1.88 m) 147 lb (66.7 kg) 18.87 kg/m2 Smoking Status Former Smoker BMI and BSA Data Body Mass Index Body Surface Area  
 18.87 kg/m 2 1.87 m 2 Preferred Pharmacy Pharmacy Name Phone [id number] - [place], [address] [phone] Your Updated Medication List  
  
   
This list is accurate as of: 1/23/18 10:32 AM.  Always use your most recent med list.  
  
  
  
  
 ADVAIR DISKUS 250-50 mcg/dose diskus inhaler Generic drug:  fluticasone-salmeterol Take 1 Puff by inhalation every twelve (12) hours. amoxicillin-clavulanate 875-125 mg per tablet Commonly known as:  AUGMENTIN Take 1 Tab by mouth two (2) times a day. aspirin 81 mg tablet Take 81 mg by mouth daily. atorvastatin 20 mg tablet Commonly known as:  LIPITOR Take  by mouth daily. CLARITIN 10 mg tablet Generic drug:  loratadine Take 10 mg by mouth daily. FLONASE 50 mcg/actuation nasal spray Generic drug:  fluticasone 2 Sprays by Both Nostrils route daily. gabapentin 300 mg capsule Commonly known as:  NEURONTIN Take 300 mg by mouth two (2) times a day. One in the am and 2 at hs  
  
 iron aspgly,zt-H-E68-FA-Ca-suc 150-60-25-1 mg-mg-mcg-mg Cap cap Commonly known as:  FERREX [address] Take 1 Cap by mouth daily. LACTASE ENZYME PO Take 1 Cap by mouth daily. LANTUS 100 unit/mL injection Generic drug:  insulin glargine 20 Units by SubCUTAneous route nightly. LASIX 40 mg tablet Generic drug:  furosemide Take 40 mg by mouth daily. levothyroxine 25 mcg tablet Commonly known as:  SYNTHROID Take 100 mcg by mouth Daily (before breakfast). lisinopril 2.5 mg tablet Commonly known as:  Anaya Jose Martinns Take  by mouth daily. metFORMIN 1,000 mg tablet Commonly known as:  GLUCOPHAGE  
 Take 1,000 mg by mouth two (2) times daily (with meals). multivitamin, tx-iron-ca-min 9 mg iron-400 mcg Tab tablet Commonly known as:  THERA-M w/ IRON Take 1 Tab by mouth daily. NovoLOG Flexpen 100 unit/mL Inpn Generic drug:  insulin aspart  
by SubCUTAneous route Before breakfast, lunch, and dinner. Sliding scale Omeprazole delayed release 20 mg tablet Commonly known as:  PRILOSEC D/R Take 20 mg by mouth daily. PLAVIX 75 mg Tab Generic drug:  clopidogrel Take  by mouth.  
  
 potassium citrate 10 mEq (1,080 mg) Carmela Carrow Commonly known as:  [place] Take 10 mEq by mouth two (2) times a day. PROAIR HFA 90 mcg/actuation inhaler Generic drug:  albuterol Take  by inhalation. SPIRIVA WITH HANDIHALER 18 mcg inhalation capsule Generic drug:  tiotropium Take 1 Cap by inhalation daily. TIAZAC 360 mg SR capsule Generic drug:  dilTIAZem Take 360 mg by mouth daily. traMADol 50 mg tablet Commonly known as:  ULTRAM  
Take 50 mg by mouth daily. TYLENOL EXTRA STRENGTH 500 mg tablet Generic drug:  acetaminophen Take 500 mg by mouth every six (6) hours as needed for Pain. VITAMIN B-12 1,000 mcg/mL injection Generic drug:  cyanocobalamin  
1,000 mcg by IntraMUSCular route every thirty (30) days. To-Do List   
 02/02/2018 8:00 AM  
  Appointment with Kenneth Younger DPM at [facility] ([phone]) [facility] SERVICES! Dear Charles Sousa: Thank you for requesting a [employer] account. Our records indicate that you already have an active [employer] account. You can access your account anytime at [email or web address] Did you know that you can access your hospital and ER discharge instructions at any time in [employer]? You can also review all of your test results from your hospital stay or ER visit. Additional Information If you have questions, please visit the Frequently Asked Questions section of the [employer] website at [email or web address] Remember, [employer] is NOT to be used for urgent needs. For medical emergencies, dial 911. Now available from your iPhone and Android! Please provide this summary of care documentation to your next provider. Your primary care clinician is listed as Pham Thakur. If you have any questions after today's visit, please call [phone].

## 2018-01-23 NOTE — PROGRESS NOTES
Alondra Manjarrez . Chief Complaint   Patient presents with    Wound Check       History and Physical    Mr. Mathieu Kaur returns to our office today for continued follow up and management of his bilateral venous and arterial disease. He continues to follow up regularly in the wound care office and has no new complaints. He states he is scheduled to see Dr. Sim Dupree again on 2/9. Past Medical History:   Diagnosis Date    Acid reflux     Anemia     Atrial fibrillation (HCC)     CAD (coronary artery disease)     Chronic obstructive pulmonary disease ([facility])     Diabetes ([facility])     GERD (gastroesophageal reflux disease)     H/O seasonal allergies     Hypercholesterolemia     Hypertension     Ill-defined condition     hand tremors    PAF (paroxysmal atrial fibrillation) ([address] 11/5/2017    Sleep apnea     pt states he has not used bipap \"in years\".     Thyroid disease      Patient Active Problem List   Diagnosis Code    PAD (peripheral artery disease) ([facility]) I73.9    Venous reflux I87.2    Leg ulcer, left ([address] L97.929    Leg ulcer ([address] L97.909    Diabetic ulcer of right midfoot associated with type 2 diabetes mellitus, with fat layer exposed ([address] E11.621, L97.412    Sepsis ([address] A41.9    CAD (coronary artery disease) I25.10    COPD (chronic obstructive pulmonary disease) ([facility]) J44.9    HTN (hypertension) I10    UTI (urinary tract infection) due to urinary indwelling Sher catheter ([facility]) T83.511A, N39.0    PAF (paroxysmal atrial fibrillation) ([address] I48.0     Past Surgical History:   Procedure Laterality Date    ABDOMEN SURGERY PROC UNLISTED  1997    Vikas-en-Y    ABDOMEN SURGERY PROC UNLISTED  2000    abdominal hernia repair/mesh    HX COLONOSCOPY      HX HEENT      cataracts removed right eye    HX ORTHOPAEDIC  1982    cervical laminectomy    HX UROLOGICAL  12/2015    suprapubic catheter in place    VASCULAR SURGERY PROCEDURE UNLIST       Current Outpatient Prescriptions   Medication Sig Dispense Refill    amoxicillin-clavulanate (AUGMENTIN) 875-125 mg per tablet Take 1 Tab by mouth two (2) times a day. 14 Tab 0    Omeprazole delayed release (PRILOSEC D/R) 20 mg tablet Take 20 mg by mouth daily.  potassium citrate (UROCIT-K10) 10 mEq (1,080 mg) TbER Take 10 mEq by mouth two (2) times a day.  multivitamin, tx-iron-ca-min (THERA-M W/ IRON) 9 mg iron-400 mcg tab tablet Take 1 Tab by mouth daily.  acetaminophen (TYLENOL EXTRA STRENGTH) 500 mg tablet Take 500 mg by mouth every six (6) hours as needed for Pain.  iron aspgly,pq-G-Z24-FA-Ca-suc (FERREX 150 FORTE PLUS) 392-68-41-6 mg-mg-mcg-mg cap cap Take 1 Cap by mouth daily.  insulin aspart (NOVOLOG FLEXPEN) 100 unit/mL inpn by SubCUTAneous route Before breakfast, lunch, and dinner. Sliding scale      metFORMIN (GLUCOPHAGE) 1,000 mg tablet Take 1,000 mg by mouth two (2) times daily (with meals).  atorvastatin (LIPITOR) 20 mg tablet Take  by mouth daily.  lisinopril (PRINIVIL, ZESTRIL) 2.5 mg tablet Take  by mouth daily.  albuterol (PROAIR HFA) 90 mcg/actuation inhaler Take  by inhalation.  clopidogrel (PLAVIX) 75 mg tab Take  by mouth.  cyanocobalamin (VITAMIN B-12) 1,000 mcg/mL injection 1,000 mcg by IntraMUSCular route every thirty (30) days.  aspirin 81 mg tablet Take 81 mg by mouth daily.  insulin glargine (LANTUS) 100 unit/mL injection 20 Units by SubCUTAneous route nightly.  furosemide (LASIX) 40 mg tablet Take 40 mg by mouth daily.  diltiazem (TIAZAC) 360 mg SR capsule Take 360 mg by mouth daily.  tiotropium (SPIRIVA WITH HANDIHALER) 18 mcg inhalation capsule Take 1 Cap by inhalation daily.  loratadine (CLARITIN) 10 mg tablet Take 10 mg by mouth daily.  fluticasone (FLONASE) 50 mcg/actuation nasal spray 2 Sprays by Both Nostrils route daily.  traMADol (ULTRAM) 50 mg tablet Take 50 mg by mouth daily.       gabapentin (NEURONTIN) 300 mg capsule Take 300 mg by mouth two (2) times a day. One in the am and 2 at hs      LACTASE ENZYME PO Take 1 Cap by mouth daily.  levothyroxine (SYNTHROID) 25 mcg tablet Take 100 mcg by mouth Daily (before breakfast).  fluticasone-salmeterol (ADVAIR DISKUS) 250-50 mcg/dose diskus inhaler Take 1 Puff by inhalation every twelve (12) hours. Allergies   Allergen Reactions    Latex Contact Dermatitis     Pt states he is NOT allergic to latex.  Neosporin [Neomycin-Bacitracin-Polymyxin] Rash     Social History     Social History    Marital status:      Spouse name: N/A    Number of children: N/A    Years of education: N/A     Occupational History    Not on file. Social History Main Topics    Smoking status: Former Smoker     Types: Cigarettes     Quit date: 1/1/1997    Smokeless tobacco: Never Used    Alcohol use 0.6 oz/week     1 Glasses of wine per week    Drug use: No    Sexual activity: Not on file     Other Topics Concern    Not on file     Social History Narrative      Family History   Problem Relation Age of Onset    Cancer Mother     Diabetes Father     Heart Disease Sister     Cancer Brother     Diabetes Brother     Hypertension Brother        Review of Systems    Review of Systems   Constitutional: Negative for chills, diaphoresis, fever, malaise/fatigue and weight loss. HENT: Negative for hearing loss and sore throat. Eyes: Negative for blurred vision, photophobia and redness. Respiratory: Negative for cough, hemoptysis, shortness of breath and wheezing. Cardiovascular: Negative for chest pain, palpitations and orthopnea. Gastrointestinal: Negative for abdominal pain, blood in stool, constipation, diarrhea, heartburn, nausea and vomiting. Genitourinary: Negative for dysuria, frequency, hematuria and urgency. Musculoskeletal: Positive for joint pain. Negative for back pain and myalgias. Skin: Positive for itching. Negative for rash.    Neurological: Negative for dizziness, speech change, focal weakness, weakness and headaches. Endo/Heme/Allergies: Does not bruise/bleed easily. Psychiatric/Behavioral: Negative for depression and suicidal ideas. Physical Exam:    Visit Vitals    /60 (BP 1 Location: Left arm, BP Patient Position: Sitting)    Pulse 80    Resp 18    Ht 6' 2\" (1.88 m)    Wt 147 lb (66.7 kg)    BMI 18.87 kg/m2      Physical Examination: General appearance - alert, well appearing, and in no distress  Mental status - alert, oriented to person, place, and time  Eyes - sclera anicteric, left eye normal, right eye normal  Ears - right ear normal, left ear normal  Nose - normal and patent, no erythema, discharge or polyps  Mouth - mucous membranes moist, pharynx normal without lesions  Extremities - Bilateral calves and shins with superficial skin tears. No signs of active infection. Right heel ulcer with good granulation tissue with dime sized region of fibrinous tissue. No exposed bone or tendons      Impression and Plan:    ICD-10-CM ICD-9-CM    1. PAD (peripheral artery disease) ([facility]) I73.9 443.9    2. Ulcer of left lower extremity, limited to breakdown of skin [facility]) L97.921 707.10      I told Mr. Hough that his legs look the best that they have. He has pulses, but I would like to check an SUSANNAH before his next visit to confirm his circulation is adequate. I do believe that Mr. Hough's right heel ulcer does need to be debrided to remove his biofilm and allow the granulation tissue to fill in. I told MrPaul We will see Mr. Jayro Olivo after his next appointment with Dr. Arley Watkins to check his circulation and to re examine his heel wound. Follow-up Disposition:  Return in about 1 month (around 2/23/2018) for Vascular labs. The treatment plan was reviewed with the patient in detail. The patient voiced understanding of this plan and all questions and concerns were addressed. The patient agrees with this plan.   We discussed the signs and symptoms that would require earlier attention or intervention. The patient was given educational material related to his/her visit and the patient has voiced understanding of the material.     I appreciate the opportunity to participate in the care of your patient. I will be sure to keep you informed of any subsequent changes in the treatment plan. If you have any questions or concerns, please feel free to contact me. Ashwin Shearer MD    PLEASE NOTE:  This document has been produced using voice recognition software. Unrecognized errors in transcription may be present.

## 2018-02-01 ENCOUNTER — APPOINTMENT (OUTPATIENT)
Dept: GENERAL RADIOLOGY | Age: 73
DRG: 698 | End: 2018-02-01
Attending: EMERGENCY MEDICINE
Payer: MEDICARE

## 2018-02-01 ENCOUNTER — HOSPITAL ENCOUNTER (INPATIENT)
Age: 73
LOS: 4 days | Discharge: HOME HEALTH | DRG: 698 | End: 2018-02-05
Attending: EMERGENCY MEDICINE | Admitting: HOSPITALIST
Payer: MEDICARE

## 2018-02-01 DIAGNOSIS — E87.1 HYPONATREMIA: ICD-10-CM

## 2018-02-01 DIAGNOSIS — N30.00 ACUTE CYSTITIS WITHOUT HEMATURIA: Primary | ICD-10-CM

## 2018-02-01 DIAGNOSIS — A41.9 SEPSIS, DUE TO UNSPECIFIED ORGANISM: ICD-10-CM

## 2018-02-01 PROBLEM — E83.42 HYPOMAGNESEMIA: Status: ACTIVE | Noted: 2018-02-01

## 2018-02-01 PROBLEM — L97.509 CHRONIC FOOT ULCER (HCC): Status: ACTIVE | Noted: 2018-02-01

## 2018-02-01 PROBLEM — E11.622 DIABETIC ULCER OF BOTH LOWER EXTREMITIES (HCC): Status: ACTIVE | Noted: 2018-02-01

## 2018-02-01 PROBLEM — L97.919 DIABETIC ULCER OF BOTH LOWER EXTREMITIES (HCC): Status: ACTIVE | Noted: 2018-02-01

## 2018-02-01 PROBLEM — N39.0 UTI (URINARY TRACT INFECTION): Status: ACTIVE | Noted: 2018-02-01

## 2018-02-01 PROBLEM — L97.929 DIABETIC ULCER OF BOTH LOWER EXTREMITIES (HCC): Status: ACTIVE | Noted: 2018-02-01

## 2018-02-01 LAB
ALBUMIN SERPL-MCNC: 3 G/DL (ref 3.4–5)
ALBUMIN/GLOB SERPL: 0.8 {RATIO} (ref 0.8–1.7)
ALP SERPL-CCNC: 75 U/L (ref 45–117)
ALT SERPL-CCNC: 14 U/L (ref 16–61)
ANION GAP SERPL CALC-SCNC: 10 MMOL/L (ref 3–18)
APPEARANCE UR: ABNORMAL
APPEARANCE UR: ABNORMAL
AST SERPL-CCNC: 12 U/L (ref 15–37)
BACTERIA URNS QL MICRO: ABNORMAL /HPF
BACTERIA URNS QL MICRO: ABNORMAL /HPF
BASOPHILS # BLD: 0 K/UL (ref 0–0.06)
BASOPHILS NFR BLD: 0 % (ref 0–2)
BILIRUB SERPL-MCNC: 0.4 MG/DL (ref 0.2–1)
BILIRUB UR QL: NEGATIVE
BILIRUB UR QL: NEGATIVE
BUN SERPL-MCNC: 7 MG/DL (ref 7–18)
BUN/CREAT SERPL: 9 (ref 12–20)
CALCIUM SERPL-MCNC: 8.7 MG/DL (ref 8.5–10.1)
CHLORIDE SERPL-SCNC: 94 MMOL/L (ref 100–108)
CO2 SERPL-SCNC: 27 MMOL/L (ref 21–32)
COLOR UR: YELLOW
COLOR UR: YELLOW
CREAT SERPL-MCNC: 0.75 MG/DL (ref 0.6–1.3)
DIFFERENTIAL METHOD BLD: ABNORMAL
EOSINOPHIL # BLD: 0.2 K/UL (ref 0–0.4)
EOSINOPHIL NFR BLD: 1 % (ref 0–5)
EPITH CASTS URNS QL MICRO: ABNORMAL /LPF (ref 0–5)
EPITH CASTS URNS QL MICRO: NEGATIVE /LPF (ref 0–5)
ERYTHROCYTE [DISTWIDTH] IN BLOOD BY AUTOMATED COUNT: 14.6 % (ref 11.6–14.5)
EST. AVERAGE GLUCOSE BLD GHB EST-MCNC: 163 MG/DL
FLUAV AG NPH QL IA: NEGATIVE
FLUBV AG NOSE QL IA: NEGATIVE
GLOBULIN SER CALC-MCNC: 4 G/DL (ref 2–4)
GLUCOSE BLD STRIP.AUTO-MCNC: 172 MG/DL (ref 70–110)
GLUCOSE BLD STRIP.AUTO-MCNC: 228 MG/DL (ref 70–110)
GLUCOSE SERPL-MCNC: 124 MG/DL (ref 74–99)
GLUCOSE UR STRIP.AUTO-MCNC: NEGATIVE MG/DL
GLUCOSE UR STRIP.AUTO-MCNC: NEGATIVE MG/DL
HBA1C MFR BLD: 7.3 % (ref 4.5–5.6)
HCT VFR BLD AUTO: 33.7 % (ref 36–48)
HGB BLD-MCNC: 11.2 G/DL (ref 13–16)
HGB UR QL STRIP: ABNORMAL
HGB UR QL STRIP: NEGATIVE
KETONES UR QL STRIP.AUTO: ABNORMAL MG/DL
KETONES UR QL STRIP.AUTO: NEGATIVE MG/DL
LACTATE SERPL-SCNC: 1.3 MMOL/L (ref 0.4–2)
LACTATE SERPL-SCNC: 2.5 MMOL/L (ref 0.4–2)
LEUKOCYTE ESTERASE UR QL STRIP.AUTO: ABNORMAL
LEUKOCYTE ESTERASE UR QL STRIP.AUTO: ABNORMAL
LYMPHOCYTES # BLD: 0.5 K/UL (ref 0.9–3.6)
LYMPHOCYTES NFR BLD: 5 % (ref 21–52)
MAGNESIUM SERPL-MCNC: 1.5 MG/DL (ref 1.6–2.6)
MCH RBC QN AUTO: 29.1 PG (ref 24–34)
MCHC RBC AUTO-ENTMCNC: 33.2 G/DL (ref 31–37)
MCV RBC AUTO: 87.5 FL (ref 74–97)
MONOCYTES # BLD: 0.6 K/UL (ref 0.05–1.2)
MONOCYTES NFR BLD: 5 % (ref 3–10)
MUCOUS THREADS URNS QL MICRO: ABNORMAL /LPF
MUCOUS THREADS URNS QL MICRO: ABNORMAL /LPF
NEUTS SEG # BLD: 9.9 K/UL (ref 1.8–8)
NEUTS SEG NFR BLD: 89 % (ref 40–73)
NITRITE UR QL STRIP.AUTO: POSITIVE
NITRITE UR QL STRIP.AUTO: POSITIVE
PH UR STRIP: >8.5 [PH] (ref 5–8)
PH UR STRIP: >8.5 [PH] (ref 5–8)
PLATELET # BLD AUTO: 116 K/UL (ref 135–420)
PMV BLD AUTO: 9.2 FL (ref 9.2–11.8)
POTASSIUM SERPL-SCNC: 3.9 MMOL/L (ref 3.5–5.5)
PROT SERPL-MCNC: 7 G/DL (ref 6.4–8.2)
PROT UR STRIP-MCNC: 100 MG/DL
PROT UR STRIP-MCNC: ABNORMAL MG/DL
RBC # BLD AUTO: 3.85 M/UL (ref 4.7–5.5)
RBC #/AREA URNS HPF: ABNORMAL /HPF (ref 0–5)
RBC #/AREA URNS HPF: ABNORMAL /HPF (ref 0–5)
SODIUM SERPL-SCNC: 131 MMOL/L (ref 136–145)
SP GR UR REFRACTOMETRY: 1.01 (ref 1–1.03)
SP GR UR REFRACTOMETRY: 1.02 (ref 1–1.03)
UROBILINOGEN UR QL STRIP.AUTO: 1 EU/DL (ref 0.2–1)
UROBILINOGEN UR QL STRIP.AUTO: 1 EU/DL (ref 0.2–1)
WBC # BLD AUTO: 11.2 K/UL (ref 4.6–13.2)
WBC URNS QL MICRO: ABNORMAL /HPF (ref 0–5)
WBC URNS QL MICRO: ABNORMAL /HPF (ref 0–5)

## 2018-02-01 PROCEDURE — 83605 ASSAY OF LACTIC ACID: CPT | Performed by: EMERGENCY MEDICINE

## 2018-02-01 PROCEDURE — 83735 ASSAY OF MAGNESIUM: CPT | Performed by: HOSPITALIST

## 2018-02-01 PROCEDURE — 65660000000 HC RM CCU STEPDOWN

## 2018-02-01 PROCEDURE — 71045 X-RAY EXAM CHEST 1 VIEW: CPT

## 2018-02-01 PROCEDURE — 96365 THER/PROPH/DIAG IV INF INIT: CPT

## 2018-02-01 PROCEDURE — 74011636637 HC RX REV CODE- 636/637: Performed by: HOSPITALIST

## 2018-02-01 PROCEDURE — 82962 GLUCOSE BLOOD TEST: CPT

## 2018-02-01 PROCEDURE — 92610 EVALUATE SWALLOWING FUNCTION: CPT

## 2018-02-01 PROCEDURE — 74011000258 HC RX REV CODE- 258: Performed by: HOSPITALIST

## 2018-02-01 PROCEDURE — 74011000250 HC RX REV CODE- 250: Performed by: EMERGENCY MEDICINE

## 2018-02-01 PROCEDURE — 80053 COMPREHEN METABOLIC PANEL: CPT | Performed by: EMERGENCY MEDICINE

## 2018-02-01 PROCEDURE — 83036 HEMOGLOBIN GLYCOSYLATED A1C: CPT | Performed by: HOSPITALIST

## 2018-02-01 PROCEDURE — 97167 OT EVAL HIGH COMPLEX 60 MIN: CPT

## 2018-02-01 PROCEDURE — 74011250636 HC RX REV CODE- 250/636: Performed by: EMERGENCY MEDICINE

## 2018-02-01 PROCEDURE — 74011250636 HC RX REV CODE- 250/636: Performed by: HOSPITALIST

## 2018-02-01 PROCEDURE — 74011250637 HC RX REV CODE- 250/637: Performed by: HOSPITALIST

## 2018-02-01 PROCEDURE — 97530 THERAPEUTIC ACTIVITIES: CPT

## 2018-02-01 PROCEDURE — 77030011256 HC DRSG MEPILEX <16IN NO BORD MOLN -A

## 2018-02-01 PROCEDURE — 99285 EMERGENCY DEPT VISIT HI MDM: CPT

## 2018-02-01 PROCEDURE — 87040 BLOOD CULTURE FOR BACTERIA: CPT | Performed by: EMERGENCY MEDICINE

## 2018-02-01 PROCEDURE — 77030020186 HC BOOT HL PROTCT SAGE -B

## 2018-02-01 PROCEDURE — 85025 COMPLETE CBC W/AUTO DIFF WBC: CPT | Performed by: EMERGENCY MEDICINE

## 2018-02-01 PROCEDURE — 87086 URINE CULTURE/COLONY COUNT: CPT | Performed by: EMERGENCY MEDICINE

## 2018-02-01 PROCEDURE — 87804 INFLUENZA ASSAY W/OPTIC: CPT | Performed by: EMERGENCY MEDICINE

## 2018-02-01 PROCEDURE — 97166 OT EVAL MOD COMPLEX 45 MIN: CPT

## 2018-02-01 PROCEDURE — 97162 PT EVAL MOD COMPLEX 30 MIN: CPT

## 2018-02-01 PROCEDURE — 77030010545

## 2018-02-01 PROCEDURE — 81001 URINALYSIS AUTO W/SCOPE: CPT | Performed by: EMERGENCY MEDICINE

## 2018-02-01 PROCEDURE — 93005 ELECTROCARDIOGRAM TRACING: CPT

## 2018-02-01 PROCEDURE — 96361 HYDRATE IV INFUSION ADD-ON: CPT

## 2018-02-01 RX ORDER — LISINOPRIL 5 MG/1
2.5 TABLET ORAL DAILY
Status: DISCONTINUED | OUTPATIENT
Start: 2018-02-02 | End: 2018-02-05 | Stop reason: HOSPADM

## 2018-02-01 RX ORDER — ONDANSETRON 2 MG/ML
4 INJECTION INTRAMUSCULAR; INTRAVENOUS
Status: DISCONTINUED | OUTPATIENT
Start: 2018-02-01 | End: 2018-02-05 | Stop reason: HOSPADM

## 2018-02-01 RX ORDER — INSULIN LISPRO 100 [IU]/ML
INJECTION, SOLUTION INTRAVENOUS; SUBCUTANEOUS
Status: DISCONTINUED | OUTPATIENT
Start: 2018-02-01 | End: 2018-02-03

## 2018-02-01 RX ORDER — SODIUM CHLORIDE 9 MG/ML
75 INJECTION, SOLUTION INTRAVENOUS CONTINUOUS
Status: DISCONTINUED | OUTPATIENT
Start: 2018-02-01 | End: 2018-02-02

## 2018-02-01 RX ORDER — FLUTICASONE FUROATE AND VILANTEROL 100; 25 UG/1; UG/1
1 POWDER RESPIRATORY (INHALATION) DAILY
Status: DISCONTINUED | OUTPATIENT
Start: 2018-02-02 | End: 2018-02-05 | Stop reason: HOSPADM

## 2018-02-01 RX ORDER — ALBUTEROL SULFATE 90 UG/1
1 AEROSOL, METERED RESPIRATORY (INHALATION)
Status: DISCONTINUED | OUTPATIENT
Start: 2018-02-01 | End: 2018-02-05 | Stop reason: HOSPADM

## 2018-02-01 RX ORDER — MAGNESIUM SULFATE 100 %
4 CRYSTALS MISCELLANEOUS AS NEEDED
Status: DISCONTINUED | OUTPATIENT
Start: 2018-02-01 | End: 2018-02-05 | Stop reason: HOSPADM

## 2018-02-01 RX ORDER — CLOPIDOGREL BISULFATE 75 MG/1
75 TABLET ORAL DAILY
Status: DISCONTINUED | OUTPATIENT
Start: 2018-02-02 | End: 2018-02-05 | Stop reason: HOSPADM

## 2018-02-01 RX ORDER — SODIUM CHLORIDE 0.9 % (FLUSH) 0.9 %
5-10 SYRINGE (ML) INJECTION AS NEEDED
Status: DISCONTINUED | OUTPATIENT
Start: 2018-02-01 | End: 2018-02-05 | Stop reason: HOSPADM

## 2018-02-01 RX ORDER — VANCOMYCIN HCL IN 5 % DEXTROSE 1.25 G/25
1250 PLASTIC BAG, INJECTION (ML) INTRAVENOUS ONCE
Status: COMPLETED | OUTPATIENT
Start: 2018-02-01 | End: 2018-02-01

## 2018-02-01 RX ORDER — ENOXAPARIN SODIUM 100 MG/ML
40 INJECTION SUBCUTANEOUS EVERY 24 HOURS
Status: DISCONTINUED | OUTPATIENT
Start: 2018-02-01 | End: 2018-02-05 | Stop reason: HOSPADM

## 2018-02-01 RX ORDER — NALOXONE HYDROCHLORIDE 0.4 MG/ML
0.4 INJECTION, SOLUTION INTRAMUSCULAR; INTRAVENOUS; SUBCUTANEOUS AS NEEDED
Status: DISCONTINUED | OUTPATIENT
Start: 2018-02-01 | End: 2018-02-05 | Stop reason: HOSPADM

## 2018-02-01 RX ORDER — INSULIN GLARGINE 100 [IU]/ML
20 INJECTION, SOLUTION SUBCUTANEOUS
Status: DISCONTINUED | OUTPATIENT
Start: 2018-02-01 | End: 2018-02-03

## 2018-02-01 RX ORDER — OMEPRAZOLE 20 MG/1
20 CAPSULE, DELAYED RELEASE ORAL DAILY
Status: DISCONTINUED | OUTPATIENT
Start: 2018-02-02 | End: 2018-02-05 | Stop reason: HOSPADM

## 2018-02-01 RX ORDER — DILTIAZEM HYDROCHLORIDE 180 MG/1
360 CAPSULE, COATED, EXTENDED RELEASE ORAL DAILY
Status: DISCONTINUED | OUTPATIENT
Start: 2018-02-02 | End: 2018-02-05 | Stop reason: HOSPADM

## 2018-02-01 RX ORDER — ACETAMINOPHEN 325 MG/1
650 TABLET ORAL
Status: DISCONTINUED | OUTPATIENT
Start: 2018-02-01 | End: 2018-02-05 | Stop reason: HOSPADM

## 2018-02-01 RX ORDER — KETOROLAC TROMETHAMINE 30 MG/ML
15 INJECTION, SOLUTION INTRAMUSCULAR; INTRAVENOUS
Status: DISCONTINUED | OUTPATIENT
Start: 2018-02-01 | End: 2018-02-01

## 2018-02-01 RX ORDER — DEXTROSE 50 % IN WATER (D50W) INTRAVENOUS SYRINGE
25-50 AS NEEDED
Status: DISCONTINUED | OUTPATIENT
Start: 2018-02-01 | End: 2018-02-05 | Stop reason: HOSPADM

## 2018-02-01 RX ORDER — ATORVASTATIN CALCIUM 20 MG/1
20 TABLET, FILM COATED ORAL DAILY
Status: DISCONTINUED | OUTPATIENT
Start: 2018-02-02 | End: 2018-02-05 | Stop reason: HOSPADM

## 2018-02-01 RX ORDER — LEVOTHYROXINE SODIUM 100 UG/1
100 TABLET ORAL
Status: DISCONTINUED | OUTPATIENT
Start: 2018-02-02 | End: 2018-02-05 | Stop reason: HOSPADM

## 2018-02-01 RX ORDER — DIPHENHYDRAMINE HYDROCHLORIDE 50 MG/ML
12.5 INJECTION, SOLUTION INTRAMUSCULAR; INTRAVENOUS
Status: DISCONTINUED | OUTPATIENT
Start: 2018-02-01 | End: 2018-02-05 | Stop reason: HOSPADM

## 2018-02-01 RX ORDER — DOCUSATE SODIUM 100 MG/1
100 CAPSULE, LIQUID FILLED ORAL 2 TIMES DAILY
Status: DISCONTINUED | OUTPATIENT
Start: 2018-02-01 | End: 2018-02-05 | Stop reason: HOSPADM

## 2018-02-01 RX ORDER — ASPIRIN 81 MG/1
81 TABLET ORAL DAILY
Status: DISCONTINUED | OUTPATIENT
Start: 2018-02-02 | End: 2018-02-05 | Stop reason: HOSPADM

## 2018-02-01 RX ADMIN — INSULIN LISPRO 2 UNITS: 100 INJECTION, SOLUTION INTRAVENOUS; SUBCUTANEOUS at 21:45

## 2018-02-01 RX ADMIN — SODIUM CHLORIDE 1000 ML: 900 INJECTION, SOLUTION INTRAVENOUS at 15:58

## 2018-02-01 RX ADMIN — ENOXAPARIN SODIUM 40 MG: 40 INJECTION SUBCUTANEOUS at 11:18

## 2018-02-01 RX ADMIN — DOCUSATE SODIUM 100 MG: 100 CAPSULE, LIQUID FILLED ORAL at 21:50

## 2018-02-01 RX ADMIN — CEFTRIAXONE 1 G: 1 INJECTION, POWDER, FOR SOLUTION INTRAMUSCULAR; INTRAVENOUS at 09:26

## 2018-02-01 RX ADMIN — SODIUM CHLORIDE 1959 ML: 900 INJECTION, SOLUTION INTRAVENOUS at 08:01

## 2018-02-01 RX ADMIN — ACETAMINOPHEN 650 MG: 325 TABLET ORAL at 15:52

## 2018-02-01 RX ADMIN — SODIUM CHLORIDE 1000 ML: 900 INJECTION, SOLUTION INTRAVENOUS at 10:44

## 2018-02-01 RX ADMIN — VANCOMYCIN HYDROCHLORIDE 1250 MG: 10 INJECTION, POWDER, LYOPHILIZED, FOR SOLUTION INTRAVENOUS at 11:18

## 2018-02-01 RX ADMIN — INSULIN GLARGINE 20 UNITS: 100 INJECTION, SOLUTION SUBCUTANEOUS at 21:44

## 2018-02-01 RX ADMIN — INSULIN LISPRO 4 UNITS: 100 INJECTION, SOLUTION INTRAVENOUS; SUBCUTANEOUS at 17:01

## 2018-02-01 RX ADMIN — SODIUM CHLORIDE 75 ML/HR: 900 INJECTION, SOLUTION INTRAVENOUS at 15:57

## 2018-02-01 RX ADMIN — PIPERACILLIN SODIUM AND TAZOBACTAM SODIUM 3.38 G: 36; 4.5 INJECTION, POWDER, FOR SOLUTION INTRAVENOUS at 18:25

## 2018-02-01 RX ADMIN — DOCUSATE SODIUM 100 MG: 100 CAPSULE, LIQUID FILLED ORAL at 13:51

## 2018-02-01 NOTE — PROGRESS NOTES
Problem: Dysphagia (Adult)  Goal: *Acute Goals and Plan of Care (Insert Text)  Recommendations:  Diet: Regular solids, thin liquids   Meds: With water  Aspiration Precautions  Oral Care TID  Other: small bites/sips    Goals:  Patient will:  1. Tolerate PO trials with 0 s/s overt distress in 4/5 trials  2. Utilize compensatory swallow strategies/maneuvers (decrease bite/sip, size/rate, alt. liq/sol) with min cues in 4/5 trials  3. Perform oral-motor/laryngeal exercises to increase oropharyngeal swallow function with min cues  4. Complete an objective swallow study (i.e., MBSS) to assess swallow integrity, r/o aspiration, and determine of safest LRD, min A      Speech LAnguage Pathology bedside swallow evaluation    Patient: Niurka Weinberg Sr. (69 y.o. male)  Date: 2/1/2018  Primary Diagnosis: UTI (urinary tract infection)  Sepsis ([facility]  Chronic foot ulcer (HCC)  Precautions: Aspiration. ASSESSMENT :  Based on the objective data described below, the patient presents with mild oropharyngeal dysphagia. Patient reports globus sensation in the neck and h/o GERD. During consecutive swallows of thin liquids, the patient demo throat clear, gurgling, and belching; resolved with slow rate and small sips of water. Significant weight loss mentioned by the patient and his wife. Question esophageal dysphagia. Patient wears upper dentures and has missing lower teeth. Prolonged mastication however functional for grinding solids without evidence of oral stasis post-swallow. See MBS results for information re: pharyngeal phase. Old MBS from [facility]:  \"The patient initiated glue tissue normally with evidence for E prolonged oral phase due to the rope mastication.  Pharyngeal phase demonstratespooling to the level of the valleculae.  There is evidence for delay andmild diminished laryngeal elevation.  Epiglottic motion is reduced and significantly affected by a prominent anterior osteophyte and cervical spine noted at K0-4.  YY certain situations, this osteophyte traps the epiglottis.  Majority of materials passed normally with mild residue. There was evidence for flash penetration on thin liquids during successive  sips due to trapping of the epiglottis at the level of the spur.  No other  isolated abnormalities were encounter.  Cough reflex was intact.  Fill  challenge was uneventful with no focal abnormalities.  Pill was swallowed  utilizing puree. \"    Patient will benefit from skilled intervention to address the above impairments. Patients rehabilitation potential is considered to be Good  Factors which may influence rehabilitation potential include:   [x]            None noted     PLAN :  Recommendations and Planned Interventions:  As above. Frequency/Duration: Patient will be followed by speech-language pathology 3 times a week to address goals. Discharge Recommendations: To Be Determined     SUBJECTIVE:   Patient stated Sometimes food still gets stuck. OBJECTIVE:     Past Medical History:   Diagnosis Date    Acid reflux     Anemia     Atrial fibrillation (HCC)     CAD (coronary artery disease)     Chronic obstructive pulmonary disease (HCC)     Diabetes (HCC)     GERD (gastroesophageal reflux disease)     H/O seasonal allergies     Hypercholesterolemia     Hypertension     Ill-defined condition     hand tremors    PAF (paroxysmal atrial fibrillation) ([address] 11/5/2017    Sleep apnea     pt states he has not used bipap \"in years\".     Thyroid disease      Past Surgical History:   Procedure Laterality Date    ABDOMEN SURGERY PROC UNLISTED  1997    Vikas-en-Y    ABDOMEN SURGERY PROC UNLISTED  2000    abdominal hernia repair/mesh    HX COLONOSCOPY      HX HEENT      cataracts removed right eye    HX ORTHOPAEDIC  1982    cervical laminectomy    HX UROLOGICAL  12/2015    suprapubic catheter in place    VASCULAR SURGERY PROCEDURE UNLIST     Prior Level of Function/Home Situation: per pt chart  Diet prior to admission: regular  Current Diet:  regular  Cognitive and Communication Status:  Neurologic State: Alert  Orientation Level: Oriented X4  Cognition: Appropriate decision making, Appropriate for age attention/concentration, Appropriate safety awareness, Follows commands  Perception: Appears intact  Perseveration: No perseveration noted  Safety/Judgement: Insight into deficits  Oral Assessment:  Oral Assessment  Labial: No impairment  Dentition: Upper dentures;Limited; Intact (bottom row )  Oral Hygiene: good/dry  Lingual: No impairment  Velum: No impairment  Mandible: No impairment  Gag Reflex: Other (comment) (not assessed)  P.O. Trials:  Patient Position: 60HOB  Vocal quality prior to P.O.: Tremorous  Consistency Presented: Thin liquid; Solid;Puree  How Presented: Self-fed/presented;SLP-fed/presented;Cup/sip; Successive swallows;Straw;Spoon     Bolus Acceptance: No impairment  Bolus Formation/Control: Impaired  Type of Impairment: Delayed;Mastication     Oral Residue: None  Initiation of Swallow: Delayed (# of seconds)  Laryngeal Elevation: Functional  Aspiration Signs/Symptoms: Change vocal quality;Clear throat  Pharyngeal Phase Characteristics: Suspected pharyngeal residue; Altered vocal quality  Effective Modifications: Small sips and bites  Cues for Modifications: Minimal  Comments: h/o GERD     Oral Phase Severity: Mild  Pharyngeal Phase Severity : Mild    GCODESwallowing:  Swallow Current Status CI= 1-19%   Swallow Goal Status CI= 1-19%    The severity rating is based on the following outcomes:  SETH [facility] Swallow Level 6   Clinical Judgement    PAIN:  Start of Eval: 0  End of Eval: 0     After treatment:   []            Patient left in no apparent distress sitting up in chair  [x]            Patient left in no apparent distress in bed  [x]            Call bell left within reach  [x]            Nursing notified  [x]            Family present  []            Caregiver present  []            Bed alarm activated    COMMUNICATION/EDUCATION:   [x]            Aspiration precautions; swallow safety; compensatory techniques. [x]            Patient/family have participated as able in goal setting and plan of care. [x]            Patient/family agree to work toward stated goals and plan of care. []            Patient understands intent and goals of therapy; neutral about participation. [x]            Educ ongoing with interdisciplinary staff  [x]         Posted safety precautions in patient's room.     Thank you for this referral.  Marilyn Benedict, SLP  Time Calculation: 8 mins

## 2018-02-01 NOTE — PROGRESS NOTES
Problem: Self Care Deficits Care Plan (Adult)  Goal: *Acute Goals and Plan of Care (Insert Text)  Initial Occupational Therapy Goals (2/1/2018) Within 7 day(s):    1. Patient will perform grooming seated EOB with setup x 5 minutes for increased independence with ADLs. 2. Patient will perform UB dressing with setup/Mildred for increased independence with ADLs. 3. Patient will perform functional transfer in prep for toileting with CGA for increased independence in ADLs  4. Patient will independently apply energy conservation techniques with 1 verbal cues for increased independence with ADLs. Outcome: Progressing Towards Goal  Occupational Therapy EVALUATION    Patient: Kari Sarabia Sr. (69 y.o. male)  Date: 2/1/2018  Primary Diagnosis: UTI (urinary tract infection)  Sepsis ([facility]  Chronic foot ulcer (HCC)        Precautions:  DNR, Aspiration, Fall, Skin    ASSESSMENT :  Based on the objective data described below, the patient presents with decreased functional strength, decreased functional balance, decreased overall activity tolerance limiting independence with ADLs. Patient reporting great fatigue this date and spouse assisting w/ self-feeding. Pt requesting assist w/ bed positioning and encouraged pt to assist to decreased caregiver burden. Pt able to pull up on bedrails to assist w/ pillow placement, but quickly fatigues. Pt reports minimal ambulation at baseline of ~15-25feet. Pt has DME in home to assist and spouse assists at baseline w/ ADLs and shower transfer. Pt would benefit from continued OT services to return to PLOF and decreased caregiver burden. Education: Patient instructed on home safety, body mechanics for optimal respiratory effort, Energy Conservation/Work Simplification Techniques, adaptive strategies and adaptive dressing techniques including clothing modifications with patient verbalizing understanding at this time.       Patient will benefit from skilled intervention to address the above impairments. Patients rehabilitation potential is considered to be Good  Factors which may influence rehabilitation potential include:   []             None noted  [x]             Mental ability/status  [x]             Medical condition  []             Home/family situation and support systems  []             Safety awareness  []             Pain tolerance/management  []             Other:      PLAN :  Recommendations and Planned Interventions:  [x]               Self Care Training                  [x]        Therapeutic Activities  [x]               Functional Mobility Training    [x]        Cognitive Retraining  [x]               Therapeutic Exercises           [x]        Endurance Activities  [x]               Balance Training                   [x]        Neuromuscular Re-Education  []               Visual/Perceptual Training     [x]   Home Safety Training  [x]               Patient Education                 [x]        Family Training/Education  []               Other (comment):    Frequency/Duration: Patient will be followed by occupational therapy 3 times a week to address goals. Discharge Recommendations: Home Health  Further Equipment Recommendations for Discharge: N/A     SUBJECTIVE:   Patient stated Giovanna Lopez had a really bad urinary tract infection in the past and I was still able to make it home.     OBJECTIVE DATA SUMMARY:     Past Medical History:   Diagnosis Date    Acid reflux     Anemia     Atrial fibrillation (HCC)     CAD (coronary artery disease)     Chronic obstructive pulmonary disease (HCC)     Diabetes (HCC)     GERD (gastroesophageal reflux disease)     H/O seasonal allergies     Hypercholesterolemia     Hypertension     Ill-defined condition     hand tremors    PA[facility]) ([facility] 11/5/2017    Sleep apnea     pt states he has not used bipap \"in years\".     Thyroid disease      Past Surgical History:   Procedure Laterality Date    ABDOMEN SURGERY PROC UNLISTED 1997    Vikas-en-Y    ABDOMEN SURGERY PROC UNLISTED  2000    abdominal hernia repair/mesh    HX COLONOSCOPY      HX HEENT      cataracts removed right eye    HX ORTHOPAEDIC  1982    cervical laminectomy    HX UROLOGICAL  12/2015    suprapubic catheter in place    VASCULAR SURGERY PROCEDURE UNLIST       Barriers to Learning/Limitations: yes;  sensory deficits-vision/hearing/speech  Compensate with: visual, verbal, tactile, kinesthetic cues/model    Prior Level of Function/Home Situation: supportive spouse; son has modified home w/ railings  Home Situation  Home Environment: Private residence  # Steps to Enter: 1  Wheelchair Ramp: Yes  One/Two Story Residence: Two story  Lift Chair Available: Yes  Living Alone: No  Support Systems: Spouse/Significant Other/Partner  Patient Expects to be Discharged to[de-identified] Private residence  Current DME Used/Available at Home: Grab bars, Raised toilet seat, Shower chair, Walker, rollator, Wheelchair  Tub or Shower Type: Tub/Shower combination (Tub/shower that pt/spouse reports is walk-in)  [x]  Right hand dominant   []  Left hand dominant    Cognitive/Behavioral Status:  Neurologic State: Alert  Orientation Level: Oriented X4  Cognition: Follows commands  Safety/Judgement: Fall prevention;Good awareness of safety precautions; Insight into deficits    Skin: appears intact  Edema: no edema noted    Vision/Perceptual:      appears WFL     Coordination:  Coordination: Within functional limits  Fine Motor Skills-Upper: Left Intact; Right Intact    Gross Motor Skills-Upper: Left Intact; Right Intact    Strength:  Strength: Generally decreased, functional  Tone & Sensation:  Tone: Normal  Sensation: Intact  Range of Motion:  AROM: Generally decreased, functional  PROM: Generally decreased, functional    Functional Mobility and Transfers for ADLs:  Bed Mobility:  Supine to Sit: Minimum assistance    ADL Assessment:   Feeding: Minimum assistance; Additional time    Oral Facial Hygiene/Grooming: Additional time;Minimum assistance    Bathing: Maximum assistance    Upper Body Dressing: Moderate assistance    Lower Body Dressing: Total assistance    Toileting: Maximum assistance    ADL Intervention:  Feeding  Feeding Assistance: Supervision/set-up; Moderate assistance (initially setup/mod d/t fatigue)  Container Management: Maximum assistance  Cutting Food: Maximum assistance  Utensil Management: Stand-by assistance  Food to Mouth: Supervision/set-up  Drink to Mouth: Supervision/set-up    Grooming  Washing Hands: Supervision/set-up (with wipes)    Toileting  Bladder Hygiene: Maximum assistance (suprapubic catheter)    Cognitive Retraining  Safety/Judgement: Fall prevention;Good awareness of safety precautions; Insight into deficits    Pain:  Pre-treatment: 0/10  Post-treatment: 0/10    Activity Tolerance:   Patient unable to complete ADLs without  constant rest breaks. Patient limited by functional endurance to sustain simple ADLs. Please refer to the flowsheet for vital signs taken during this treatment. After treatment:   [] Patient left in no apparent distress sitting up in chair  [x] Patient left in no apparent distress in bed  [x] Call bell left within reach  [x] Nursing notified  [] Caregiver present  [] Bed alarm activated    COMMUNICATION/EDUCATION:   [x] Home safety education was provided and the patient/caregiver indicated understanding. [x] Patient/family have participated as able in goal setting and plan of care. [x] Patient/family agree to work toward stated goals and plan of care. [] Patient understands intent and goals of therapy, but is neutral about his/her participation. [] Patient is unable to participate in goal setting and plan of care.     Thank you for this referral.  Angie Mckenna, OTR/L  Time Calculation: 13 mins    G-Codes (GP)  Self Care   Current  CL= 60-79%   Goal  CK= 40-59%  The severity rating is based on the professional judgement & direct observation of Level of Assistance required for Functional Mobility and ADLs. Eval Complexity: History: HIGH Complexity : Extensive review of history including physical, cognitive and psychosocial history ; Examination: MEDIUM Complexity : 3-5 performance deficits relating to physical, cognitive , or psychosocial skils that result in activity limitations and / or participation restrictions; Decision Making:HIGH Complexity : Patient presents with comorbidities that affect occupational performance.  Signifigant modification of tasks or assistance (eg, physical or verbal) with assessment (s) is necessary to enable patient to complete evaluation

## 2018-02-01 NOTE — ED TRIAGE NOTES
Patient arrived via rescue. Patient presents complaining of weakness, fever, and shortness of breath onset around 0530 this morning upon waking. Patient reports he took two 500mg tylenol prior to arrival at 0600. Patient is seen by wound care clinic at this facility for wounds to bilateral lower extremities and states his next appointment is tomorrow morning. Patient also has suprapubic catheter in place. Sepsis Screening completed    ( x )Patient meets SIRS criteria. (  )Patient does not meet SIRS criteria.       SIRS Criteria is achieved when two or more of the following are present   Temperature < 96.8°F (36°C) or > 100.9°F (38.3°C)   Heart Rate > 90 beats per minute   Respiratory Rate > 20 breaths per minute   WBC count > 12,000 or <4,000 or > 10% bands

## 2018-02-01 NOTE — PROGRESS NOTES
Problem: Mobility Impaired (Adult and Pediatric)  Goal: *Acute Goals and Plan of Care (Insert Text)  Physical TherapyGoals   Initiated 2/1/2018 to be met within 5-7 days  1. Bed mobility: Rolling L to R to with min/SBA A with use of HR for positioning. 2. Transfer: Supine <> Sit with min A for change of position/prep for transfers. 3. Activity Tolerance: Tolerate EOB sitting 10-15 minutes for ADL/balance activities. 4. Ambulation:  Ambulate 10-20 ft. Min A with RW for increased functional mobility. Outcome: Progressing Towards Goal  physical Therapy EVALUATION    Patient: Deandra Tubbs Sr. (69 y.o. male)  Date: 2/1/2018  Primary Diagnosis: UTI (urinary tract infection)  Sepsis ([address]  Chronic foot ulcer (HCC)  Precautions:Aspiration, DNR, Fall, Skin    ASSESSMENT :  Based on the objective data described below, the patient is a 75 yo M admitted with diagnosis as noted above. Pt presents with decreased independence in functional mobility with regard to bed mobility, transfers, gait, balance and activity tolerance at this time. Pt reports usual ability to exit bed with assist from spouse, use RW upstairs for short distance ambulation of ~20-30ft, use w/c to reach stair chair lift to get downstairs, then use rollator downstairs for short distances. Patient reports he usually has pain medial R ankle, however, 0/10 at this time. At this session, pt required mod/max A with bed mobility, exhibited fair/fair- sitting balance (limited by fatigue) and was unable to attempt standing due to fatigue. Pt returned to supine, left with foot boots on and with all needs in reach,. Recommendation for physical therapy upon discharge at this time is SNF based on pt CLOF as compared to PLOF. Pt Education: pt educated in safety/technique during functional mobility tasks     Patient will benefit from skilled intervention to address the above impairments.   Patients rehabilitation potential is considered to be Fair  Factors which may influence rehabilitation potential include:   []         None noted  []         Mental ability/status  [x]         Medical condition  [x]         Home/family situation and support systems  []         Safety awareness  []         Pain tolerance/management  []         Other:      PLAN :  Recommendations and Planned Interventions:  [x]           Bed Mobility Training             []    Neuromuscular Re-Education  [x]           Transfer Training                   []    Orthotic/Prosthetic Training  [x]           Gait Training                          []    Modalities  [x]           Therapeutic Exercises          []    Edema Management/Control  [x]           Therapeutic Activities            [x]    Patient and Family Training/Education  []           Other (comment):    Frequency/Duration: Patient will be followed by physical therapy 1-2 times per day to address goals. Discharge Recommendations: To Be Determined  Further Equipment Recommendations for Discharge: N/A     SUBJECTIVE:   Patient stated I'm doing the best I can.     OBJECTIVE DATA SUMMARY:     Past Medical History:   Diagnosis Date    Acid reflux     Anemia     Atrial fibrillation (HCC)     CAD (coronary artery disease)     Chronic obstructive pulmonary disease (HCC)     Diabetes (HCC)     GERD (gastroesophageal reflux disease)     H/O seasonal allergies     Hypercholesterolemia     Hypertension     Ill-defined condition     hand tremors    [facility]    Sleep apnea     pt states he has not used bipap \"in years\".     Thyroid disease      Past Surgical History:   Procedure Laterality Date    ABDOMEN SURGERY PROC UNLISTED  1997    Vikas-en-Y    ABDOMEN SURGERY PROC UNLISTED  2000    abdominal hernia repair/mesh    HX COLONOSCOPY      HX HEENT      cataracts removed right eye    HX ORTHOPAEDIC  1982    cervical laminectomy    HX UROLOGICAL  12/2015    suprapubic catheter in place    VASCULAR SURGERY PROCEDURE UNLIST       Barriers to Learning/Limitations: yes;  sensory deficits-vision/hearing/speech and physical  Compensate with: Verbal Cues  Prior Level of Function/Home Situation: as noted above  Home Situation  Home Environment: Private residence  # Steps to Enter: 1  Wheelchair Ramp: Yes  One/Two Story Residence: Two story  Lift Chair Available: Yes  Living Alone: No  Support Systems: Spouse/Significant Other/Partner  Patient Expects to be Discharged to[de-identified] Private residence  Current DME Used/Available at Home: Grab bars, Raised toilet seat, Shower chair, Walker, rollator, Wheelchair  Tub or Shower Type: Tub/Shower combination (Tub/shower that pt/spouse reports is walk-in)  Critical Behavior:  Neurologic State: Alert; Appropriate for age  Orientation Level: Oriented X4  Cognition: Follows commands  Safety/Judgement: Fall prevention;Good awareness of safety precautions; Insight into deficits  Psychosocial  Patient Behaviors: Calm; Cooperative  Family  Behaviors: Calm;Supportive  Needs Expressed: Educational  Purposeful Interaction: Yes  Pt Identified Daily Priority: Clinical issues (comment)  Caritas Process: Nurture loving kindness;Enable leola/hope;Establish trust;Nurture spiritual self;Teaching/learning; Attend basic human needs;Create healing environment  Caring Interventions: Reassure  Reassure: Therapeutic listening; Informing; Acceptance; Instilling leola and hope;Support family  Family  Behaviors: Calm;Supportive  Skin Integrity: Wound (add Wound LDA) (Ulcer to right ankle; seen by wound care here)  Skin Integumentary  Skin Integrity: Wound (add Wound LDA) (Ulcer to right ankle; seen by wound care here)  Wound Sacral/coccyx-Periwound Skin Condition: Intact  Strength:    Strength: Generally decreased, functional  Tone & Sensation:   Tone: Normal  Sensation: Intact  Range Of Motion:  AROM: Generally decreased, functional  PROM: Generally decreased, functional  Functional Mobility:  Bed Mobility:  Rolling: Minimum assistance  Supine to Sit: Moderate assistance  Sit to Supine: Moderate assistance (max assist to LE's)  Transfers:  Balance:   Sitting: Impaired  Sitting - Static: Fair/fair- (occasional) (limited by fatigue)  Sitting - Dynamic: Not tested  Pain:  Pain Scale 1: Numeric (0 - 10)  Pain Intensity 1: 0  Pain Location 1: Ankle  Pain Orientation 1: Right  Activity Tolerance:   Fair/fair - reports feeling exhausted following session  Please refer to the flowsheet for vital signs taken during this treatment. After treatment:   []         Patient left in no apparent distress sitting up in chair  [x]         Patient left in no apparent distress in bed positioned toward R with pillow beneath L side  [x]         Call bell left within reach  []         Nursing notified  []         Caregiver present  []         Bed alarm activated    COMMUNICATION/EDUCATION:   [x]         Fall prevention education was provided and the patient/caregiver indicated understanding. [x]         Patient/family have participated as able in goal setting and plan of care. [x]         Patient/family agree to work toward stated goals and plan of care. []         Patient understands intent and goals of therapy, but is neutral about his/her participation. []         Patient is unable to participate in goal setting and plan of care. Eval Complexity: History: HIGH Complexity :3+ comorbidities / personal factors will impact the outcome/ POC Exam:MEDIUM Complexity : 3 Standardized tests and measures addressing body structure, function, activity limitation and / or participation in recreation  Presentation: MEDIUM Complexity : Evolving with changing characteristics  Clinical Decision Making:High Complexity unable to ambulate at this time Overall Complexity:MEDIUM    G-Codes (GP)  Mobility  [id number] Current  CL= 60-79%   Goal  CI= 1-19%  The severity rating is based on the functional mobility assessment.     Thank you for this referral.  Chandrika Albarado, PT   Time Calculation: 24 mins

## 2018-02-01 NOTE — PROGRESS NOTES
INITIAL NUTRITION ASSESSMENT     RECOMMENDATIONS/PLAN:   Add MVI  Will continue to monitor [facility]  Monitor labs/lytes, PO intakes, skin integrity, wt, fluid status, BM    REASON FOR ASSESSMENT:     [] Positive Nutrition Screen:  [x] BMI <18.5    NUTRITION ASSESSMENT:   Client History: 74 yrs old Male admitted with awaiting MD note     PMHx: awaiting MD note  Cultural/Christianity Food Preferences: None Identified    FOOD/NUTRITION HISTORY  Diet History: will obtain at a later time; need more information in chart   Food Allergies:       [x] Yes (Latex)   Pertinent PTA Medications: K citrate, MVI, iron, novlog, metformin, lipitor, lantus, lasix, lactase enzyme, synthroid    NUTRITION INTAKE   Diet Order:  Diabetic, AHA-LOW-CHOL Fat      Average PO Intake:       No data found. Pertinent Medications:  [x] Reviewed; Electrolyte Replacement Protocol: []K  []Mg  []PO4    Insulin:  [] SSI  [x] Pre-meal   []  Basal   [] Drip  [] None  Pt expected to meet estimated nutrient needs through next review:          [x]  Yes     [] No;  ANTHROPOMETRICS  Height: 6' 2\" (188 cm)       Weight: 65.3 kg (144 lb)    BMI: 18.5 kg/m^2  -  normal weight (18.5%-24.9% BMI)        Weight change: pt was 151# on 11/6/17 currently pt is 144# which is not significant wt loss                                 Comparison to Reference Standards:  IBW: 190 lbs      %IBW: 76%      AdjBW: n/a    NUTRITION-FOCUSED PHYSICAL ASSESSMENT  Skin: Wound noted in chart. GI: No BM noted    BIOCHEMICAL DATA & MEDICAL TESTS  Pertinent Labs:  [x] Reviewed; Na-131, hemoglobin a1c-7.3     NUTRITION PRESCRIPTION  Calories: 2220 kcal/day based on [place] x 1.2 x 1.3  Protein: 78-98 g/day based on 1.2-1.5 g/kg  CHO: 278 g/day based on 50% of total energy  Fluid: 2220 ml/day based on 1 kcal/ml      NUTRITION DIAGNOSES:   1.  At risk for inadequate oral intake related to low BMI as evidence by 18.5 BMI    NUTRITION INTERVENTIONS: INTERVENTIONS:        GOALS:  1. MVI 1. Encourage PO intake >50% at most meals by next review 3 days             LEARNING NEEDS (Diet, Supplementation, Food/Nutrient-Drug Interaction):   [x] None Identified  [] Inpatient education provided/documented    [] Identified and patient:  [] Declined     [] Was not appropriate/indicated  NUTRITION MONITORING /EVALUATION:   Follow PO intake  Monitor wt  Monitor renal labs, electrolytes, fluid status  Monitor for additional supplement needs    [] Participated in Interdisciplinary Rounds  [x] [facility] Reviewed/Documented  DISCHARGE NUTRITION RECOMMENDATIONS ADDRESSED:      [x] To be determined closer to discharge    NUTRITION RISK:     [x]  At risk                     []  Not currently at risk     Will follow-up per policy.   Tresa Canales, [facility]

## 2018-02-01 NOTE — ED NOTES
Repeat lactic acid drawn and sent to lab for analysis. Repeat urine sample obtained from new leg bag. Patient medicated per MAR orders. Verified order, patient identification, and allergies prior to administration. Call bell within reach of patient. Will continue to monitor and assess.

## 2018-02-01 NOTE — ED PROVIDER NOTES
EMERGENCY DEPARTMENT HISTORY AND PHYSICAL EXAM    Date: 2/1/2018  Patient Name: Disha Kumar.    History of Presenting Illness     Chief Complaint   Patient presents with    Fever         History Provided By: Patient and EMS    Chief Complaint: fever  Duration: this morning PTA   Timing:  Acute  Location: generalized  Severity: 3 out of 10  Modifying Factors: right ankle pain  Associated Symptoms: SOB, right ankle pain, right ankle ulcer    Additional History (Context):   7:17 AM  Disha Vegas is a 74 y.o. male with PMHX of AFIB, COPD, DM, RIVKA, thyroid disease, HTN, anemia, sinus tachycardia (baseline at HR in 90s to low 100s), and bladder dysfunction (suprapubic catheter in place) who presents to the emergency department via EMS C/O fever at Tmax 101.3 F onset this morning upon awakening. Associated sxs include SOB, 3/10 right ankle pain (with ulcer; seen at this facility for a dressing change regularly; not on abx tx; due to DM). Pt took Tylenol 1000 mg this morning upon awakening without relief. Pt lives at his own home. No sick contacts. Pt is normally able to walk around and sometimes uses walkers to get out of bed. PSHx includes colonoscopy, vascular surgery, hernia repair, suprapubic catheter placement, and orthopedic surgery. Allergies reported to Latex and Neosporin. Pt is a former smoker and an EtOH user. Denies N/V, cough, sore throat, and any other associated signs or sxs.      PCP: Brandie Fischer MD    Current Facility-Administered Medications   Medication Dose Route Frequency Provider Last Rate Last Dose    sodium chloride (NS) flush 5-10 mL  5-10 mL IntraVENous PRN Aakash Velasquez MD        sodium chloride 0.9 % bolus infusion 1,000 mL  1,000 mL IntraVENous ONCE Aakash Velasquez MD        vancomycin (VANCOCIN) 1,000 mg in 0.9% sodium chloride (MBP/ADV) 250 mL adv  1,000 mg IntraVENous ONCE Aakash Velasquez MD         Current Outpatient Prescriptions   Medication Sig Dispense Refill    amoxicillin-clavulanate (AUGMENTIN) 875-125 mg per tablet Take 1 Tab by mouth two (2) times a day. 14 Tab 0    Omeprazole delayed release (PRILOSEC D/R) 20 mg tablet Take 20 mg by mouth daily.  potassium citrate (UROCIT-K10) 10 mEq (1,080 mg) TbER Take 10 mEq by mouth two (2) times a day.  multivitamin, tx-iron-ca-min (THERA-M W/ IRON) 9 mg iron-400 mcg tab tablet Take 1 Tab by mouth daily.  acetaminophen (TYLENOL EXTRA STRENGTH) 500 mg tablet Take 500 mg by mouth every six (6) hours as needed for Pain.  iron aspgly,pb-T-G67-FA-Ca-suc (FERREX 150 FORTE PLUS) 098-70-85-8 mg-mg-mcg-mg cap cap Take 1 Cap by mouth daily.  insulin aspart (NOVOLOG FLEXPEN) 100 unit/mL inpn by SubCUTAneous route Before breakfast, lunch, and dinner. Sliding scale      metFORMIN (GLUCOPHAGE) 1,000 mg tablet Take 1,000 mg by mouth two (2) times daily (with meals).  atorvastatin (LIPITOR) 20 mg tablet Take  by mouth daily.  lisinopril (PRINIVIL, ZESTRIL) 2.5 mg tablet Take  by mouth daily.  albuterol (PROAIR HFA) 90 mcg/actuation inhaler Take  by inhalation.  clopidogrel (PLAVIX) 75 mg tab Take  by mouth.  cyanocobalamin (VITAMIN B-12) 1,000 mcg/mL injection 1,000 mcg by IntraMUSCular route every thirty (30) days.  aspirin 81 mg tablet Take 81 mg by mouth daily.  insulin glargine (LANTUS) 100 unit/mL injection 20 Units by SubCUTAneous route nightly.  furosemide (LASIX) 40 mg tablet Take 40 mg by mouth daily.  diltiazem (TIAZAC) 360 mg SR capsule Take 360 mg by mouth daily.  tiotropium (SPIRIVA WITH HANDIHALER) 18 mcg inhalation capsule Take 1 Cap by inhalation daily.  loratadine (CLARITIN) 10 mg tablet Take 10 mg by mouth daily.  fluticasone (FLONASE) 50 mcg/actuation nasal spray 2 Sprays by Both Nostrils route daily.  traMADol (ULTRAM) 50 mg tablet Take 50 mg by mouth daily.       gabapentin (NEURONTIN) 300 mg capsule Take 300 mg by mouth two (2) times a day. One in the am and 2 at hs      LACTASE ENZYME PO Take 1 Cap by mouth daily.  levothyroxine (SYNTHROID) 25 mcg tablet Take 100 mcg by mouth Daily (before breakfast).  fluticasone-salmeterol (ADVAIR DISKUS) 250-50 mcg/dose diskus inhaler Take 1 Puff by inhalation every twelve (12) hours. Past History     Past Medical History:  Past Medical History:   Diagnosis Date    Acid reflux     Anemia     Atrial fibrillation (HCC)     CAD (coronary artery disease)     Chronic obstructive pulmonary disease (HCC)     Diabetes (HCC)     GERD (gastroesophageal reflux disease)     H/O seasonal allergies     Hypercholesterolemia     Hypertension     Ill-defined condition     hand tremors    [facility]    Sleep apnea     pt states he has not used bipap \"in years\".  Thyroid disease        Past Surgical History:  Past Surgical History:   Procedure Laterality Date    ABDOMEN SURGERY PROC UNLISTED  1997    Vikas-en-Y    ABDOMEN SURGERY PROC UNLISTED  2000    abdominal hernia repair/mesh    HX COLONOSCOPY      HX HEENT      cataracts removed right eye    HX ORTHOPAEDIC  1982    cervical laminectomy    HX UROLOGICAL  12/2015    suprapubic catheter in place    VASCULAR SURGERY PROCEDURE UNLIST         Family History:  Family History   Problem Relation Age of Onset    Cancer Mother     Diabetes Father     Heart Disease Sister     Cancer Brother     Diabetes Brother     Hypertension Brother        Social History:  Social History   Substance Use Topics    Smoking status: Former Smoker     Types: Cigarettes     Quit date: 1/1/1997    Smokeless tobacco: Never Used    Alcohol use 0.6 oz/week     1 Glasses of wine per week       Allergies: Allergies   Allergen Reactions    Latex Contact Dermatitis     Pt states he is NOT allergic to latex.     Neosporin [Neomycin-Bacitracin-Polymyxin] Rash         Review of Systems   Review of Systems   Constitutional: Positive for fever. HENT: Negative for sore throat. Respiratory: Positive for shortness of breath. Negative for cough. Gastrointestinal: Negative for nausea and vomiting. Musculoskeletal: Positive for arthralgias. (+) Right ankle pain   Skin: Positive for wound (ulcer right ankle). All other systems reviewed and are negative. Physical Exam     Vitals:    02/01/18 0830 02/01/18 0900 02/01/18 0930 02/01/18 1000   BP: 119/43 107/60 107/42 109/41   Pulse: (!) 105 (!) 109 (!) 105 (!) 104   Resp: 18 18 19 18   Temp:   98.6 °F (37 °C)    SpO2: 97%  95% 96%   Weight:       Height:         Physical Exam   Nursing note and vitals reviewed. Constitutional: Alert. Frail. No acute distress. Head: Normocephalic, Atraumatic  Eyes: Pupils are equal, round, and reactive to light, EOMI  ENT: Moist mucous membranes, oropharynx clear. Neck: Supple, non-tender  Cardiovascular: Tachycardic. Regular rhythm, no murmurs, rubs, or gallops  Chest: Normal work of breathing and chest excursion bilaterally. No reproducible chest tenderness. Lungs: Coarse breath sounds at right base without wheezing or crackles. Abdomen: Soft, non tender, non distended, normoactive bowel sounds  Back: No evidence of trauma or deformity. No CVA Tenderness. Extremities: No LE edema  Skin: Multiple chronic wounds to bilateral lower extremities with superficial skin sloughing. Good underlying granulation tissue. Ulcer on the right medial malleolus with no visible underlying bone or surrounding erythema. : Indwelling suprapubic catheter draining cloudy urine.   Neuro: Alert and appropriate, facial movement symmetric, normal speech, chronically deconditioned, normal coordination  Psychiatric: Normal mood and affect         Diagnostic Study Results     Labs -     Recent Results (from the past 12 hour(s))   EKG, 12 LEAD, INITIAL    Collection Time: 02/01/18  7:36 AM   Result Value Ref Range    Ventricular Rate 117 BPM    Atrial Rate 117 BPM    P-R Interval 164 ms    QRS Duration 116 ms    Q-T Interval 350 ms    QTC Calculation (Bezet) 488 ms    Calculated P Axis 72 degrees    Calculated R Axis -36 degrees    Calculated T Axis 97 degrees    Diagnosis       Sinus tachycardia  Left axis deviation  Septal infarct (cited on or before 04-NOV-2017)  T wave abnormality, consider lateral ischemia  Abnormal ECG  When compared with ECG of 04-NOV-2017 19:20,  premature supraventricular complexes are no longer present  Questionable change in initial forces of Septal leads     URINALYSIS W/ RFLX MICROSCOPIC    Collection Time: 02/01/18  7:45 AM   Result Value Ref Range    Color YELLOW      Appearance TURBID      Specific gravity 1.018 1.005 - 1.030      pH (UA) >8.5 (H) 5.0 - 8.0    Protein 100 (A) NEG mg/dL    Glucose NEGATIVE  NEG mg/dL    Ketone TRACE (A) NEG mg/dL    Bilirubin NEGATIVE  NEG      Blood NEGATIVE  NEG      Urobilinogen 1.0 0.2 - 1.0 EU/dL    Nitrites POSITIVE (A) NEG      Leukocyte Esterase LARGE (A) NEG     INFLUENZA A & B AG (RAPID TEST)    Collection Time: 02/01/18  7:45 AM   Result Value Ref Range    Influenza A Antigen NEGATIVE  NEG      Influenza B Antigen NEGATIVE  NEG     URINE MICROSCOPIC ONLY    Collection Time: 02/01/18  7:45 AM   Result Value Ref Range    WBC 21 to 30 0 - 5 /hpf    RBC NONE 0 - 5 /hpf    Epithelial cells NEGATIVE  0 - 5 /lpf    Bacteria 4+ (A) NEG /hpf    Mucus 2+ (A) NEG /lpf   LACTIC ACID    Collection Time: 02/01/18  7:50 AM   Result Value Ref Range    Lactic acid 2.5 (HH) 0.4 - 2.0 MMOL/L   METABOLIC PANEL, COMPREHENSIVE    Collection Time: 02/01/18  7:50 AM   Result Value Ref Range    Sodium 131 (L) 136 - 145 mmol/L    Potassium 3.9 3.5 - 5.5 mmol/L    Chloride 94 (L) 100 - 108 mmol/L    CO2 27 21 - 32 mmol/L    Anion gap 10 3.0 - 18 mmol/L    Glucose 124 (H) 74 - 99 mg/dL    BUN 7 7.0 - 18 MG/DL    Creatinine 0.75 0.6 - 1.3 MG/DL    BUN/Creatinine ratio 9 (L) 12 - 20      GFR est AA >60 >60 ml/min/1.73m2    GFR est non-AA >60 >60 ml/min/1.73m2    Calcium 8.7 8.5 - 10.1 MG/DL    Bilirubin, total 0.4 0.2 - 1.0 MG/DL    ALT (SGPT) 14 (L) 16 - 61 U/L    AST (SGOT) 12 (L) 15 - 37 U/L    Alk. phosphatase 75 45 - 117 U/L    Protein, total 7.0 6.4 - 8.2 g/dL    Albumin 3.0 (L) 3.4 - 5.0 g/dL    Globulin 4.0 2.0 - 4.0 g/dL    A-G Ratio 0.8 0.8 - 1.7     CBC WITH AUTOMATED DIFF    Collection Time: 02/01/18  7:50 AM   Result Value Ref Range    WBC 11.2 4.6 - 13.2 K/uL    RBC 3.85 (L) 4.70 - 5.50 M/uL    HGB 11.2 (L) 13.0 - 16.0 g/dL    HCT 33.7 (L) 36.0 - 48.0 %    MCV 87.5 74.0 - 97.0 FL    MCH 29.1 24.0 - 34.0 PG    MCHC 33.2 31.0 - 37.0 g/dL    RDW 14.6 (H) 11.6 - 14.5 %    PLATELET 705 (L) 372 - 420 K/uL    MPV 9.2 9.2 - 11.8 FL    NEUTROPHILS 89 (H) 40 - 73 %    LYMPHOCYTES 5 (L) 21 - 52 %    MONOCYTES 5 3 - 10 %    EOSINOPHILS 1 0 - 5 %    BASOPHILS 0 0 - 2 %    ABS. NEUTROPHILS 9.9 (H) 1.8 - 8.0 K/UL    ABS. LYMPHOCYTES 0.5 (L) 0.9 - 3.6 K/UL    ABS. MONOCYTES 0.6 0.05 - 1.2 K/UL    ABS. EOSINOPHILS 0.2 0.0 - 0.4 K/UL    ABS.  BASOPHILS 0.0 0.0 - 0.06 K/UL    DF AUTOMATED     LACTIC ACID    Collection Time: 02/01/18  9:25 AM   Result Value Ref Range    Lactic acid 1.3 0.4 - 2.0 MMOL/L   URINALYSIS W/ RFLX MICROSCOPIC    Collection Time: 02/01/18  9:25 AM   Result Value Ref Range    Color YELLOW      Appearance CLOUDY      Specific gravity 1.009 1.005 - 1.030      pH (UA) >8.5 (H) 5.0 - 8.0    Protein TRACE (A) NEG mg/dL    Glucose NEGATIVE  NEG mg/dL    Ketone NEGATIVE  NEG mg/dL    Bilirubin NEGATIVE  NEG      Blood TRACE (A) NEG      Urobilinogen 1.0 0.2 - 1.0 EU/dL    Nitrites POSITIVE (A) NEG      Leukocyte Esterase LARGE (A) NEG     URINE MICROSCOPIC ONLY    Collection Time: 02/01/18  9:25 AM   Result Value Ref Range    WBC 51 to 60 0 - 5 /hpf    RBC 1 to 4 0 - 5 /hpf    Epithelial cells FEW 0 - 5 /lpf    Bacteria 3+ (A) NEG /hpf    Mucus FEW (A) NEG /lpf       Radiologic Studies -   XR CHEST PORT    (Results Pending)     10:14 AM  RADIOLOGY FINDINGS  Chest X-ray shows No infiltrate or PTX. Pending review by Radiologist  Recorded by Orlin Sagastume, ED Scribe, as dictated by Jomar Horner MD     CT Results  (Last 48 hours)    None        CXR Results  (Last 48 hours)    None          Medications given in the ED-  Medications   sodium chloride (NS) flush 5-10 mL (not administered)   sodium chloride 0.9 % bolus infusion 1,000 mL (not administered)   vancomycin (VANCOCIN) 1,000 mg in 0.9% sodium chloride (MBP/ADV) 250 mL adv (not administered)   sodium chloride 0.9 % bolus infusion 1,959 mL (0 mL/kg × 65.3 kg IntraVENous IV Completed 2/1/18 0915)   cefTRIAXone (ROCEPHIN) 1 g in sterile water (preservative free) 10 mL IV syringe (1 g IntraVENous Given 2/1/18 0926)         Medical Decision Making   I am the first provider for this patient. I reviewed the vital signs, available nursing notes, past medical history, past surgical history, family history and social history. Vital Signs-Reviewed the patient's vital signs. Pulse Oximetry Analysis - 96% on room air. Cardiac Monitor:  Rate: 117 bpm  Rhythm: Sinus tachycardia    EKG interpretation: (Preliminary)  7:36 AM  Sinus tachycardia at 117. LBBB. No ischemic changes. EKG read by Jomar Horner MD at 7:39 AM     Records Reviewed: Nursing Notes    Provider Notes (Medical Decision Making): 74year old male presenting for fever and sepsis likely due to UTI from indwelling suprapubic catheter. However, he does have multiple chronic skin wounds and may have an element of overlying skin infection though there is no obvious cellulitis on physical exam. Initially febrile and tachycardic. This improved with Tylenol and IV fluids, though pt does report he is baseline tachycardia in the low 100s. Initial lactic was elevated at 2.5 which also cleared after fluids. Covered with Vancomycin and Ceftriaxone.  Will admit for continued abx tx and wound management. Procedures:  Procedures    ED Course:   7:17 AM Initial assessment performed. The patients presenting problems have been discussed, and they are in agreement with the care plan formulated and outlined with them. I have encouraged them to ask questions as they arise throughout their visit. SEPSIS ASSESSMENT NOTE:   9:13 AM  Marsha Freeman MD has seen and assessed the patient as follows:    Capillary Refill:normal/brisk  Cardiopulmonary Evaluation:   Lung Sounds: coarse at right base, otherwise clear   Cardiac Sounds: regular rhythm and rate  Peripheral Pulses: present  Skin Exam: no change compared to prior skin exam, warm, turgor good    Visit Vitals    /41    Pulse (!) 104    Temp 98.6 °F (37 °C)    Resp 18    Ht 6' 2\" (1.88 m)    Wt 65.3 kg (144 lb)    SpO2 96%    BMI 18.49 kg/m2       Written by Joseph Kaur ED Scribe, as dictated by Jamarcus Campbell MD     10:23 AM Discussed patient's history, exam, and available diagnostics results with Summer Lopez MD, internal medicine, who agree with admitting pt to telemetry. Diagnosis and Disposition     Critical Care Time:    Core Measures:  For Hospitalized Patients:    1. Hospitalization Decision Time:  The decision to hospitalize the patient was made by Jamarcus Campbell MD at 10:15 AM on 2/1/2018    2. Aspirin: Aspirin was not given because the patient did not present with a stroke at the time of their Emergency Department evaluation    10:15 AM  Patient is being admitted to the hospital by Summer Lopez MD. The results of their tests and reasons for their admission have been discussed with them and/or available family. They convey agreement and understanding for the need to be admitted and for their admission diagnosis. CONDITIONS ON ADMISSION:  Sepsis is present at the time of admission. Deep Vein Thrombosis is not present at the time of admission. Thrombosis is not present at the time of admission.  Urinary Tract Infection is present at the time of admission. Pneumonia is not present at the time of admission. MRSA is not present at the time of admission. Wound infection is not present at the time of admission. Pressure Ulcer is not present at the time of admission. CLINICAL IMPRESSION:    1. Acute cystitis without hematuria    2. Sepsis, due to unspecified organism ([address]    3. Hyponatremia        PLAN:  1. ADMIT    _______________________________    Attestations: This note is prepared by Ayana Menchaca, acting as Scribe for Teresa Diaz MD.    Teresa Diaz MD:  The scribe's documentation has been prepared under my direction and personally reviewed by me in its entirety.   I confirm that the note above accurately reflects all work, treatment, procedures, and medical decision making performed by me.  _______________________________

## 2018-02-01 NOTE — ED NOTES
Verbal report given to Aguila Burkett RN on [facility] being transferred to [facility](tele) for routine progression of care    Report consisted of patient's Situation, Background, Assessment and Recommendations (SBAR)    Information from the following report(s)  SBAR, ED Summary, MAR and Recent Results was reviewed with the receiving nurse. Opportunity for questions and clarification was provided.     Patient transported with:  Monitor  Registered Nurse  Tech    Last Filed Values:  Temp: 98.8 °F (37.1 °C) (02/01/18 1100)  Pulse (Heart Rate): (!) 106 (02/01/18 1100)  Resp Rate: 17 (02/01/18 1100)  O2 Sat (%): 96 % (02/01/18 1100)  BP: 132/54 (02/01/18 1100)  MAP (Monitor): 73 (02/01/18 1100)  MAP (Calculated): 70 (02/01/18 0723)  Level of Consciousness: Alert (02/01/18 1100)      WBC   Date Value Ref Range Status   02/01/2018 11.2 4.6 - 13.2 K/uL Final   11/07/2017 3.3 (L) 4.6 - 13.2 K/uL Final   11/06/2017 3.4 (L) 4.6 - 13.2 K/uL Final       Blood Cultures Drawn:  yes    Initial Lactic Acid (LA):  Time 0750,  Result 2.5    Repeat LA:  Time Due 0925 (after fluid bolus comlpete), Done & Result 1.3    Fluid Resuscitation:  Total needed 1959 mL, Status completed, amount 1959 mL    All Antibiotics Started:  yes, Dose Due 2/2/18 10am    VS x 2 post-fluid resuscitation:   yes    Vasopressor Infusion:  no n/a    Provider Reassessment needed and notified:  no , Due n/a    Additional Interventions/Comments:  n/a

## 2018-02-01 NOTE — WOUND CARE
Pt seen by wound care for consult for ulcers to right ankle. Pt well know to the clinic, being seen for chronic wounds to the legs and feet. Pt with scattered open areas to bilateral legs, and pressure ulcers to medial right ankle, from other foot constantly apply pressure. Pt with difficulty ambulating and maintaining body position. Ankle wounds dressed with aqua cell ag and mepilex transfer. Legs dressing using adaptic coated with proshield and bacitracin. Feet and legs then wrapped from MPJ to popliteal space using soft roll and coban, as ordered previously by Dr. Antione Seth who follows pt in the clinic. Leave dressings intact for 7 days and dressings will be managed by wound care. Also upon assessment pt found to have stage II pressure injury to sacrum. Covered with proshield and a mepilex border.        Sacral stage II      RLE       LLE        R medial ankle

## 2018-02-01 NOTE — IP AVS SNAPSHOT
[address] [address] 
[phone] Patient: Grisel [facility] Sr. MRN: [id number] KMF:[id number] A check jasson indicates which time of day the medication should be taken. My Medications START taking these medications Instructions Each Dose to Equal  
 Morning Noon Evening Bedtime  
 amoxicillin 500 mg capsule Commonly known as:  AMOXIL Your last dose was: Your next dose is: Take 1 Cap by mouth two (2) times a day for 7 days. 500 mg  
    
   
   
   
  
 doxycycline 100 mg capsule Commonly known as:  Huseyin Monas Your last dose was: Your next dose is: Take 1 Cap by mouth two (2) times a day for 7 days. 100 mg CONTINUE taking these medications Instructions Each Dose to Equal  
 Morning Noon Evening Bedtime ADVAIR DISKUS 250-50 mcg/dose diskus inhaler Generic drug:  fluticasone-salmeterol Your last dose was: Your next dose is: Take 1 Puff by inhalation every twelve (12) hours. 1 Puff  
    
   
   
   
  
 aspirin 81 mg tablet Your last dose was: Your next dose is: Take 81 mg by mouth daily. 81 mg  
    
   
   
   
  
 atorvastatin 20 mg tablet Commonly known as:  LIPITOR Your last dose was: Your next dose is: Take  by mouth daily. CLARITIN 10 mg tablet Generic drug:  loratadine Your last dose was: Your next dose is: Take 10 mg by mouth daily. 10 mg  
    
   
   
   
  
 FLONASE 50 mcg/actuation nasal spray Generic drug:  fluticasone Your last dose was: Your next dose is: 2 Sprays by Both Nostrils route daily. 2 Spray  
    
   
   
   
  
 gabapentin 300 mg capsule Commonly known as:  NEURONTIN Your last dose was: Your next dose is: Take 300 mg by mouth two (2) times a day. One in the am and 2 at hs  
 300 mg  
    
   
   
   
  
 iron aspgly,nr-P-F95-FA-Ca-suc 150-60-25-1 mg-mg-mcg-mg Cap cap Commonly known as:  FERREX [address] Your last dose was: Your next dose is: Take 1 Cap by mouth daily. 1 Cap LACTASE ENZYME PO Your last dose was: Your next dose is: Take 1 Cap by mouth daily. 1 Cap LANTUS 100 unit/mL injection Generic drug:  insulin glargine Your last dose was: Your next dose is:    
   
   
 20 Units by SubCUTAneous route nightly. 20 Units LASIX 40 mg tablet Generic drug:  furosemide Your last dose was: Your next dose is: Take 40 mg by mouth daily. 40 mg  
    
   
   
   
  
 levothyroxine 25 mcg tablet Commonly known as:  SYNTHROID Your last dose was: Your next dose is: Take 100 mcg by mouth Daily (before breakfast). 100 mcg  
    
   
   
   
  
 lisinopril 2.5 mg tablet Commonly known as:  Norma Irvona Your last dose was: Your next dose is: Take  by mouth daily. metFORMIN 1,000 mg tablet Commonly known as:  GLUCOPHAGE Your last dose was: Your next dose is: Take 1,000 mg by mouth two (2) times daily (with meals). 1000 mg  
    
   
   
   
  
 multivitamin, tx-iron-ca-min 9 mg iron-400 mcg Tab tablet Commonly known as:  THERA-M w/ IRON Your last dose was: Your next dose is: Take 1 Tab by mouth daily. 1 Tab NovoLOG Flexpen 100 unit/mL Inpn Generic drug:  insulin aspart Your last dose was: Your next dose is:    
   
   
 by SubCUTAneous route Before breakfast, lunch, and dinner. Sliding scale Omeprazole delayed release 20 mg tablet Commonly known as:  PRILOSEC D/R Your last dose was: Your next dose is: Take 20 mg by mouth daily. 20 mg  
    
   
   
   
  
 PLAVIX 75 mg Tab Generic drug:  clopidogrel Your last dose was: Your next dose is: Take  by mouth.  
     
   
   
   
  
 potassium citrate 10 mEq (1,080 mg) Carmela Bailey Commonly known as:  [place] Your last dose was: Your next dose is: Take 10 mEq by mouth two (2) times a day. 10 mEq PROAIR HFA 90 mcg/actuation inhaler Generic drug:  albuterol Your last dose was: Your next dose is: Take  by inhalation. SPIRIVA WITH HANDIHALER 18 mcg inhalation capsule Generic drug:  tiotropium Your last dose was: Your next dose is: Take 1 Cap by inhalation daily. 1 Cap TIAZAC 360 mg SR capsule Generic drug:  dilTIAZem Your last dose was: Your next dose is: Take 360 mg by mouth daily. 360 mg  
    
   
   
   
  
 traMADol 50 mg tablet Commonly known as:  ULTRAM  
   
Your last dose was: Your next dose is: Take 50 mg by mouth daily. 50 mg  
    
   
   
   
  
 TYLENOL EXTRA STRENGTH 500 mg tablet Generic drug:  acetaminophen Your last dose was: Your next dose is: Take 500 mg by mouth every six (6) hours as needed for Pain. 500 mg  
    
   
   
   
  
 VITAMIN B-12 1,000 mcg/mL injection Generic drug:  cyanocobalamin Your last dose was: Your next dose is:    
   
   
 1,000 mcg by IntraMUSCular route every thirty (30) days. 1000 mcg STOP taking these medications   
 amoxicillin-clavulanate 875-125 mg per tablet Commonly known as:  AUGMENTIN Where to Get Your Medications These medications were sent to [address] [facility], [place] - [address]  [address], [place] [address] Hours:  M-F 930am-6pm Phone:  [phone]  
  amoxicillin 500 mg capsule  
 doxycycline 100 mg capsule

## 2018-02-01 NOTE — ED NOTES
Urine sample obtained from patient from pre-existing suprapubic catheter bag as there is no port in tubing. MD made aware. Specimen collected per orders. Flu swab obtained. PIV access established with 20g in right forearm. Malin drawn and sent to lab. Line flushed with 10cc of nomal saline. Line secured per protocol with Tegaderm dressing. No signs of infiltration noted at PIV site. Labs collected as ordered. NS bolus infusing per MAR orders. Call bell within reach of patient. Will continue to monitor and assess.

## 2018-02-01 NOTE — H&P
History & Physical    Patient: Keke Lopez Sr. MRN: [id number]  CSN: [id number]    YOB: 1945  Age: 74 y.o. Sex: male      DOA: 2/1/2018  Primary Care Provider:  Scarlet Dee MD      Assessment/Plan     Hospital Problems  Date Reviewed: 1/23/2018          Codes Class Noted POA    UTI (urinary tract infection) ICD-10-CM: N39.0  ICD-9-CM: 599.0  2/1/2018 Unknown        Diabetic ulcer of both lower extremities ([facility] ICD-10-CM: E11.622, L97.919, L97.929  ICD-9-CM: 250.80, 707.10  2/1/2018 Unknown        Hyponatremia ICD-10-CM: E87.1  ICD-9-CM: 276.1  2/1/2018 Unknown        Hypomagnesemia ICD-10-CM: E83.42  ICD-9-CM: 275.2  2/1/2018 Unknown        PAF (paroxysmal atrial fibrillation) ([facility] ICD-10-CM: I48.0  ICD-9-CM: 427.31  11/5/2017 Yes        Sepsis ([facility] ICD-10-CM: A41.9  ICD-9-CM: 038.9, 995.91  11/4/2017 Unknown        CAD (coronary artery disease) ICD-10-CM: I25.10  ICD-9-CM: 414.00  11/4/2017 Yes        COPD (chronic obstructive pulmonary disease) ([facility] ICD-10-CM: J44.9  ICD-9-CM: 320  11/4/2017 Yes        HTN (hypertension) ICD-10-CM: I10  ICD-9-CM: 401.9  11/4/2017 Yes        UTI (urinary tract infection) due to urinary indwelling Sher catheter [facility]) ICD-10-CM: T83.511A, N39.0  ICD-9-CM: 996.64, 599.0  11/4/2017 Yes        Diabetic ulcer of right midfoot associated with type 2 diabetes mellitus, with fat layer exposed ([facility] ICD-10-CM: E11.621, R15.200  ICD-9-CM: 250.80, 707.14  10/4/2017 Yes        PAD (peripheral artery disease) (HCC) ICD-10-CM: I73.9  ICD-9-CM: 443.9  5/9/2017 Yes              Admit to tele     1.sepsis   Start sepsis bundle, lactic acid wnl after iv fluid   Source of infection- ulcer and uti     2. UTI complicated,catheter related  Will send to cx   Switch to zosyn     3. Diabatic ulcer of edwin LE   will continue wound care   On vanc /zosyn  Will have pvl to r/o dvt     4.  DM type II , with complication,  -long term insulin , Complication with ulcer   -on lantus, ssi, diabetic diet , hypoglycemia protocol       5 HTN, accelerated  Continue home medication. 6 hyponatremia  Ns infusion, will continue monitor     7 pad and cad   On plavix and aspirin     8 PAF  Continue cardizem  9 copd   Stable. will continue home medication   10 hypomagnesemia   Mg replacement      AC:  I have had a discussion with patient and family regarding code status. Particularly, described potential options in event of cardiac or respiratory arrest. I have explained what being full code entails, including cardiorespiratory resuscitation attempts with chest compressions, potential cariodioversion/ \" shocks\" as well as intubation. I have also explained Do not resuscitate which would mean we allow a natural death with out aggressive interventions. DNR/I granted AC 32 min     DVT : lovenox. ppi proph  CC: fever and tired        HPI:     [place]. is a 74 y.o. male who hx  DM, htn, hld and chronic diabetic ulcer came to ed due to fever and fatigue. He reported he felt tired for three days and slept a lot. He felt so week and was unable to get up AM,also noted fever am. He has chronic ulcer of LE, following wound clinic. He was found lactic acid 2.5 and resolved per iv fluid ns. He [place] [facility] [place]   Denies any slurred speech/headache/cp/n/v/blurred vission/d/c/palpitation/gait change/bleeding. Denies smoking/ any alcohol or drug use.        Visit Vitals    BP (!) 119/39    Pulse (!) 118    Temp (!) 102.4 °F (39.1 °C)    Resp 17    Ht 6' 2\" (1.88 m)    Wt 65.3 kg (144 lb)    SpO2 96%    BMI 18.49 kg/m2      O2 Device: Room air      Past Medical History:   Diagnosis Date    Acid reflux     Anemia     Atrial fibrillation (HCC)     CAD (coronary artery disease)     Chronic obstructive pulmonary disease (HCC)     Diabetes (HCC)     GERD (gastroesophageal reflux disease)     H/O seasonal allergies     Hypercholesterolemia     Hypertension     Ill-defined condition     hand tremors    PAF (paroxysmal atrial fibrillation) ([facility] 11/5/2017    Sleep apnea     pt states he has not used bipap \"in years\".  Thyroid disease        Past Surgical History:   Procedure Laterality Date    ABDOMEN SURGERY PROC UNLISTED  1997    Vikas-en-Y    ABDOMEN SURGERY PROC UNLISTED  2000    abdominal hernia repair/mesh    HX COLONOSCOPY      HX HEENT      cataracts removed right eye    HX ORTHOPAEDIC  1982    cervical laminectomy    HX UROLOGICAL  12/2015    suprapubic catheter in place    VASCULAR SURGERY PROCEDURE UNLIST         Family History   Problem Relation Age of Onset    Cancer Mother     Diabetes Father     Heart Disease Sister     Cancer Brother     Diabetes Brother     Hypertension Brother        Social History     Social History    Marital status:      Spouse name: N/A    Number of children: N/A    Years of education: N/A     Social History Main Topics    Smoking status: Former Smoker     Types: Cigarettes     Quit date: 1/1/1997    Smokeless tobacco: Never Used    Alcohol use 0.6 oz/week     1 Glasses of wine per week    Drug use: No    Sexual activity: Not Asked     Other Topics Concern    None     Social History Narrative       Prior to Admission medications    Medication Sig Start Date End Date Taking? Authorizing Provider   amoxicillin-clavulanate (AUGMENTIN) 875-125 mg per tablet Take 1 Tab by mouth two (2) times a day. 11/7/17   Veronica Sotomayor MD   Omeprazole delayed release (PRILOSEC D/R) 20 mg tablet Take 20 mg by mouth daily. Historical Provider   potassium citrate (UROCIT-K10) 10 mEq (1,080 mg) TbER Take 10 mEq by mouth two (2) times a day. Historical Provider   multivitamin, tx-iron-ca-min (THERA-M W/ IRON) 9 mg iron-400 mcg tab tablet Take 1 Tab by mouth daily. Historical Provider   acetaminophen (TYLENOL EXTRA STRENGTH) 500 mg tablet Take 500 mg by mouth every six (6) hours as needed for Pain. Historical Provider   iron aspgly,bc-J-W64-FA-Ca-suc (FERREX 150 FORTE PLUS) 115-94-59-0 mg-mg-mcg-mg cap cap Take 1 Cap by mouth daily. Tera Akers MD   insulin aspart (NOVOLOG FLEXPEN) 100 unit/mL inpn by SubCUTAneous route Before breakfast, lunch, and dinner. Sliding scale    Historical Provider   metFORMIN (GLUCOPHAGE) 1,000 mg tablet Take 1,000 mg by mouth two (2) times daily (with meals). Historical Provider   atorvastatin (LIPITOR) 20 mg tablet Take  by mouth daily. Historical Provider   lisinopril (PRINIVIL, ZESTRIL) 2.5 mg tablet Take  by mouth daily. Historical Provider   albuterol (PROAIR HFA) 90 mcg/actuation inhaler Take  by inhalation. Historical Provider   clopidogrel (PLAVIX) 75 mg tab Take  by mouth. Historical Provider   cyanocobalamin (VITAMIN B-12) 1,000 mcg/mL injection 1,000 mcg by IntraMUSCular route every thirty (30) days. Historical Provider   aspirin 81 mg tablet Take 81 mg by mouth daily. Historical Provider   insulin glargine (LANTUS) 100 unit/mL injection 20 Units by SubCUTAneous route nightly. Historical Provider   furosemide (LASIX) 40 mg tablet Take 40 mg by mouth daily. Historical Provider   diltiazem (TIAZAC) 360 mg SR capsule Take 360 mg by mouth daily. Historical Provider   tiotropium (SPIRIVA WITH HANDIHALER) 18 mcg inhalation capsule Take 1 Cap by inhalation daily. Historical Provider   loratadine (CLARITIN) 10 mg tablet Take 10 mg by mouth daily. Historical Provider   fluticasone (FLONASE) 50 mcg/actuation nasal spray 2 Sprays by Both Nostrils route daily. Historical Provider   traMADol (ULTRAM) 50 mg tablet Take 50 mg by mouth daily. Historical Provider   gabapentin (NEURONTIN) 300 mg capsule Take 300 mg by mouth two (2) times a day. One in the am and 2 at hs    Historical Provider   LACTASE ENZYME PO Take 1 Cap by mouth daily.     Historical Provider   levothyroxine (SYNTHROID) 25 mcg tablet Take 100 mcg by mouth Daily (before breakfast). Historical Provider   fluticasone-salmeterol (ADVAIR DISKUS) 250-50 mcg/dose diskus inhaler Take 1 Puff by inhalation every twelve (12) hours. Historical Provider       Allergies   Allergen Reactions    Latex Contact Dermatitis     Pt states he is NOT allergic to latex.  Neosporin [Neomycin-Bacitracin-Polymyxin] Rash       Review of Systems  Gen: fever, chills, malaise, no weight loss/gain. Heent: No headache, rhinorrhea, epistaxis, ear pain, hearing loss, sinus pain, neck pain/stiffness, sore throat. Heart: No chest pain, palpitations, ISABEL, pnd, or orthopnea. Resp: No cough, hemoptysis, wheezing and shortness of breath. GI: No nausea, vomiting, diarrhea, constipation, melena or hematochezia. : No urinary obstruction, dysuria or hematuria. Derm: skin lesion bilateral leg   Musc/skeletal: no bone or joint complains. Vasc: No edema, cyanosis or claudication. Endo: No heat/cold intolerance, no polyuria,polydipsia or polyphagia. Neuro: No unilateral weakness, + numbness in bilateral legs, no tingling. No seizures. Heme: No easy bruising or bleeding. Physical Exam:     Physical Exam:  Visit Vitals    BP (!) 119/39    Pulse (!) 118    Temp (!) 102.4 °F (39.1 °C)    Resp 17    Ht 6' 2\" (1.88 m)    Wt 65.3 kg (144 lb)    SpO2 96%    BMI 18.49 kg/m2      O2 Device: Room air    Temp (24hrs), Av °F (37.8 °C), Min:98.6 °F (37 °C), Max:102.4 °F (39.1 °C)     07 -  1900  In: -   Out: 600 [Urine:600]        General:  Awake, cooperative, no distress. Head:  Normocephalic, without obvious abnormality, atraumatic. Eyes:  Conjunctivae/corneas clear, sclera anicteric, PERRL, EOMs intact. Nose: Nares normal. No drainage or sinus tenderness. Throat: Lips, mucosa, and tongue normal. .   Neck: Supple, symmetrical, trachea midline, no adenopathy. Lungs:   Clear to auscultation bilaterally.        Heart:  Tachy , S1, S2 normal, + murmur, click, rub or gallop. Abdomen: Soft, non-tender. Bowel sounds normal. No masses,  No organomegaly. Indwelling cath noted     Extremities: Extremities normal, mulitiple ulcer noted on bilateral leg ,  no cyanosis or edema. Pulses: 2+ and symmetric all extremities. Skin: Skin color-pink, texture, turgor normal. See above  Capillary refill normal    Neurologic: CNII-XII intact.  No focal motor, decrease sensory in bilateral feet        Labs Reviewed:    BMP:   Lab Results   Component Value Date/Time     (L) 02/01/2018 07:50 AM    K 3.9 02/01/2018 07:50 AM    CL 94 (L) 02/01/2018 07:50 AM    CO2 27 02/01/2018 07:50 AM    AGAP 10 02/01/2018 07:50 AM     (H) 02/01/2018 07:50 AM    BUN 7 02/01/2018 07:50 AM    CREA 0.75 02/01/2018 07:50 AM    GFRAA >60 02/01/2018 07:50 AM    GFRNA >60 02/01/2018 07:50 AM     CMP:   Lab Results   Component Value Date/Time     (L) 02/01/2018 07:50 AM    K 3.9 02/01/2018 07:50 AM    CL 94 (L) 02/01/2018 07:50 AM    CO2 27 02/01/2018 07:50 AM    AGAP 10 02/01/2018 07:50 AM     (H) 02/01/2018 07:50 AM    BUN 7 02/01/2018 07:50 AM    CREA 0.75 02/01/2018 07:50 AM    GFRAA >60 02/01/2018 07:50 AM    GFRNA >60 02/01/2018 07:50 AM    CA 8.7 02/01/2018 07:50 AM    MG 1.5 (L) 02/01/2018 07:50 AM    ALB 3.0 (L) 02/01/2018 07:50 AM    TP 7.0 02/01/2018 07:50 AM    GLOB 4.0 02/01/2018 07:50 AM    AGRAT 0.8 02/01/2018 07:50 AM    SGOT 12 (L) 02/01/2018 07:50 AM    ALT 14 (L) 02/01/2018 07:50 AM     CBC:   Lab Results   Component Value Date/Time    WBC 11.2 02/01/2018 07:50 AM    HGB 11.2 (L) 02/01/2018 07:50 AM    HCT 33.7 (L) 02/01/2018 07:50 AM     (L) 02/01/2018 07:50 AM     All Cardiac Markers in the last 24 hours: No results found for: CPK, CK, CKMMB, CKMB, RCK3, CKMBT, CKNDX, CKND1, SUNNY, TROPT, TROIQ, CYNTHIA, TROPT, TNIPOC, BNP, BNPP  Recent Glucose Results:   Lab Results   Component Value Date/Time     (H) 02/01/2018 07:50 AM     ABG: No results found for: PH, PHI, PCO2, PCO2I, PO2, PO2I, HCO3, HCO3I, FIO2, FIO2I  COAGS: No results found for: APTT, PTP, INR  Liver Panel:   Lab Results   Component Value Date/Time    ALB 3.0 (L) 02/01/2018 07:50 AM    TP 7.0 02/01/2018 07:50 AM    GLOB 4.0 02/01/2018 07:50 AM    AGRAT 0.8 02/01/2018 07:50 AM    SGOT 12 (L) 02/01/2018 07:50 AM    ALT 14 (L) 02/01/2018 07:50 AM    AP 75 02/01/2018 07:50 AM     Pancreatic Markers: No results found for: AMYLPOCT, AML, LIPPOCT, LPSE    Procedures/imaging: see electronic medical records for all procedures/Xrays and details which were not copied into this note but were reviewed prior to creation of Otoniel Ibanez MD, Internal Medicine     CC: Baldo Townsend MD

## 2018-02-01 NOTE — ROUTINE PROCESS
TRANSFER - IN REPORT:    Verbal report received from RILEY Baldwin (name) on [facility].  being received from ED (unit) for routine progression of care      Report consisted of patients Situation, Background, Assessment and   Recommendations(SBAR). Information from the following report(s) SBAR, Kardex and ED Summary was reviewed with the receiving nurse. Opportunity for questions and clarification was provided. Assessment completed upon patients arrival to unit and care assumed. Skin assessment performed at bedside. Open pink dry area to left upper buttocks near sacrum and right inner ankle open maroon/pink area \"patient stated ulcer present\".  Generalized BLE sores \"diabetic ulcers\" present

## 2018-02-01 NOTE — IP AVS SNAPSHOT
[address] [place] [address] 
 
 
 [address] [address] 
[phone] Patient: Shahid Ron Sr. 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), Hyponatremia, Pad (Peripheral Artery Disease) (Hcc), Cad (Coronary Artery Disease), Copd (Chronic Obstructive Pulmonary Disease) (Hcc), Diabetic Ulcer Of Right Midfoot Associated With Type 2 Diabetes Mellitus, With Fat Layer Exposed (Hcc), Uti (Urinary Tract Infection) Due To Urinary Indwelling Sher Catheter (Hcc), Htn (Hypertension), Paf (Paroxysmal Atrial Fibrillation) (Hcc), Hypomagnesemia, Hypokalemia, Severe Protein-Calorie Malnutrition (Hcc) Follow-up Information Follow up With Details Comments Contact Info [address] [place] [address] to continue managing your healthcare needs. [phone] Alonso Goldmann, MD On 2/20/2018 Follow up appointment scheduled for February 20, 2018 at 9:20 a.m. (first available appointment per MD's office). [address] [facility] [address] 
[phone] Alonso Goldmann, MD In 3 days  [address] [facility] [address] 
[phone] 
  
 urology as scheudled      
 wound clinic  In 1 week Your Scheduled Appointments Thursday February 08, 2018  1:00 PM EST  
WOUND CARE NURSE VISIT with WOUND NURSE  
THE [facility] OP WOUND CARE [facility]) [address] [address] [phone] Patients scheduled for OP Wound Care visits should enter the [facility], 2nd floor, [address] for check in. Tuesday February 20, 2018  1:00 PM EST HR DUPLEX LOWER ARTERIAL with THE [facility] VASCULAR LAB  
THE [facility] VASCULAR LAB [facility] Cardiovascular Department [address] [place] [id number]  
[phone] There are no restrictions for this study. The patient can eat breakfast and take their medications. This study requires a written physician's order which may be either hand-carried by the patient or faxed to Central Scheduling at [phone]. Patients scheduled for Out Patient Vascular tests, with the exception of those related to Wound Care, should report to THE [facility] Admission/ Registration located in the [facility] entrance - [address], [address], [address] If Wound Care related, enter the [facility] entrance and report directly to the Out Patient Wound Care department located on the 2nd floor, [address]. Stefani Solorzano Tuesday February 27, 2018 10:00 AM EST Follow Up with Jennifer Nathan MD  
BS Vein/Vascular Spec THE [facility] ([address])  
 [address] [place] [address] Drive [address] [place] [address],[address] [place] [address]  
[phone] Discharge Orders None A check jasson indicates which time of day the medication should be taken. My Medications START taking these medications Instructions Each Dose to Equal  
 Morning Noon Evening Bedtime  
 amoxicillin 500 mg capsule Commonly known as:  AMOXIL Your last dose was: Your next dose is: Take 1 Cap by mouth two (2) times a day for 7 days. 500 mg  
    
   
   
   
  
 doxycycline 100 mg capsule Commonly known as:  Izella Grade Your last dose was: Your next dose is: Take 1 Cap by mouth two (2) times a day for 7 days. 100 mg CONTINUE taking these medications Instructions Each Dose to Equal  
 Morning Noon Evening Bedtime ADVAIR DISKUS 250-50 mcg/dose diskus inhaler Generic drug:  fluticasone-salmeterol Your last dose was: Your next dose is: Take 1 Puff by inhalation every twelve (12) hours. 1 Puff  
    
   
   
   
  
 aspirin 81 mg tablet Your last dose was: Your next dose is: Take 81 mg by mouth daily. 81 mg  
    
   
   
   
  
 atorvastatin 20 mg tablet Commonly known as:  LIPITOR Your last dose was: Your next dose is: Take  by mouth daily. CLARITIN 10 mg tablet Generic drug:  loratadine Your last dose was: Your next dose is: Take 10 mg by mouth daily. 10 mg  
    
   
   
   
  
 FLONASE 50 mcg/actuation nasal spray Generic drug:  fluticasone Your last dose was: Your next dose is: 2 Sprays by Both Nostrils route daily. 2 Spray  
    
   
   
   
  
 gabapentin 300 mg capsule Commonly known as:  NEURONTIN Your last dose was: Your next dose is: Take 300 mg by mouth two (2) times a day. One in the am and 2 at hs  
 300 mg  
    
   
   
   
  
 iron aspgly,uc-X-W89-FA-Ca-suc 150-60-25-1 mg-mg-mcg-mg Cap cap Commonly known as:  FERREX [address] Your last dose was: Your next dose is: Take 1 Cap by mouth daily. 1 Cap LACTASE ENZYME PO Your last dose was: Your next dose is: Take 1 Cap by mouth daily. 1 Cap LANTUS 100 unit/mL injection Generic drug:  insulin glargine Your last dose was: Your next dose is:    
   
   
 20 Units by SubCUTAneous route nightly. 20 Units LASIX 40 mg tablet Generic drug:  furosemide Your last dose was: Your next dose is: Take 40 mg by mouth daily. 40 mg  
    
   
   
   
  
 levothyroxine 25 mcg tablet Commonly known as:  SYNTHROID Your last dose was: Your next dose is: Take 100 mcg by mouth Daily (before breakfast). 100 mcg  
    
   
   
   
  
 lisinopril 2.5 mg tablet Commonly known as:  Shahid Ivelisse Your last dose was: Your next dose is: Take  by mouth daily. metFORMIN 1,000 mg tablet Commonly known as:  GLUCOPHAGE Your last dose was: Your next dose is: Take 1,000 mg by mouth two (2) times daily (with meals). 1000 mg  
    
   
   
   
  
 multivitamin, tx-iron-ca-min 9 mg iron-400 mcg Tab tablet Commonly known as:  THERA-M w/ IRON Your last dose was: Your next dose is: Take 1 Tab by mouth daily. 1 Tab NovoLOG Flexpen 100 unit/mL Inpn Generic drug:  insulin aspart Your last dose was: Your next dose is:    
   
   
 by SubCUTAneous route Before breakfast, lunch, and dinner. Sliding scale Omeprazole delayed release 20 mg tablet Commonly known as:  PRILOSEC D/R Your last dose was: Your next dose is: Take 20 mg by mouth daily. 20 mg  
    
   
   
   
  
 PLAVIX 75 mg Tab Generic drug:  clopidogrel Your last dose was: Your next dose is: Take  by mouth.  
     
   
   
   
  
 potassium citrate 10 mEq (1,080 mg) Tivis Pod Commonly known as:  [place] Your last dose was: Your next dose is: Take 10 mEq by mouth two (2) times a day. 10 mEq PROAIR HFA 90 mcg/actuation inhaler Generic drug:  albuterol Your last dose was: Your next dose is: Take  by inhalation. SPIRIVA WITH HANDIHALER 18 mcg inhalation capsule Generic drug:  tiotropium Your last dose was: Your next dose is: Take 1 Cap by inhalation daily. 1 Cap TIAZAC 360 mg SR capsule Generic drug:  dilTIAZem Your last dose was: Your next dose is: Take 360 mg by mouth daily. 360 mg  
    
   
   
   
  
 traMADol 50 mg tablet Commonly known as:  ULTRAM  
   
Your last dose was: Your next dose is: Take 50 mg by mouth daily. 50 mg  
    
   
   
   
  
 TYLENOL EXTRA STRENGTH 500 mg tablet Generic drug:  acetaminophen Your last dose was: Your next dose is: Take 500 mg by mouth every six (6) hours as needed for Pain. 500 mg  
    
   
   
   
  
 VITAMIN B-12 1,000 mcg/mL injection Generic drug:  cyanocobalamin Your last dose was: Your next dose is:    
   
   
 1,000 mcg by IntraMUSCular route every thirty (30) days. 1000 mcg STOP taking these medications   
 amoxicillin-clavulanate 875-125 mg per tablet Commonly known as:  AUGMENTIN Where to Get Your Medications These medications were [facility] to [facility] [address]  [address] Hours:  M-F 930am-6pm Phone:  [phone]  
  amoxicillin 500 mg capsule  
 doxycycline 100 mg capsule Discharge Instructions Diabetic Foot Ulcer: Care Instructions Your Care Instructions Diabetes can damage the nerve endings and blood vessels in your feet. That means you are less likely to notice when your feet are injured. A small skin problem like a callus, blister, or cracked skin can turn into a larger sore, called a foot ulcer. Foot ulcers form most often on the pad (ball) of the foot or the bottom of the big toe. You can also get them on the top and bottom of each toe. Foot ulcers can get infected. If the infection is severe, then tissue in the foot can die. This is called gangrene. In that case, one or more of the toes, part or all of the foot, and sometimes part of the leg may have to be removed (amputated). Your doctor may have removed the dead tissue and cleaned the ulcer. Your foot wound may be wrapped in a protective bandage. It is very important to keep your weight off your injured foot. After a foot ulcer has formed, it will not heal as long as you keep putting weight on the area. Always get early treatment for foot problems. A minor irritation can lead to a major problem if it's not taken care of soon. Follow-up care is a key part of your treatment and safety. Be sure to make and go to all appointments, and call your doctor if you are having problems. It's also a good idea to know your test results and keep a list of the medicines you take. How can you care for yourself at home? · Follow your doctor's instructions about keeping pressure off the foot ulcer. You may need to use crutches or a wheelchair. Or you may wear a cast or a walking boot. · Follow your doctor's instructions on how to clean the ulcer and change the bandage. · If your doctor prescribed antibiotics, take them as directed. Do not stop taking them just because you feel better. You need to take the full course of antibiotics. To prevent foot ulcers · Keep your blood sugar close to normal by watching what and how much you eat. Track your blood sugar, take medicines if prescribed, and get regular exercise. · Do not smoke. Smoking affects blood flow and can make foot problems worse. If you need help quitting, talk to your doctor about stop-smoking programs and medicines. These can increase your chances of quitting for good. · Do not go barefoot. Protect your feet by wearing shoes that fit well. Choose shoes that are made of materials that are flexible and breathable, such as leather or cloth. · Inspect your feet daily for blisters, cuts, cracks, or sores. If you can't see well, use a mirror or have someone help you. · Have your doctor check your feet during each visit.  If you have a foot problem, see your doctor. Do not try to treat your foot problem on your own. Home remedies or treatments that you can buy without a prescription (such as corn removers) can be harmful. When should you call for help? Call your doctor now or seek immediate medical care if: 
? · You have symptoms of infection, such as: 
¨ Increased pain, swelling, warmth, or redness. ¨ Red streaks leading from the area. ¨ Pus draining from the area. ¨ A fever. ? Watch closely for changes in your health, and be sure to contact your doctor if: 
? · You have a new problem with your feet, such as: ¨ A new sore or ulcer. ¨ A break in the skin that is not healing after several days. ¨ Bleeding corns or calluses. ¨ An ingrown toenail. ? · You do not get better as expected. Where can you learn more? Go to http://mariella-kenyon.info/. Enter T131 in the search box to learn more about \"Diabetic Foot Ulcer: Care Instructions. \" Current as of: March 13, 2017 Content Version: 11.4 © 0488-2128 [employer]. Care instructions adapted under license by [employer] (which disclaims liability or warranty for this information). If you have questions about a medical condition or this instruction, always ask your healthcare professional. [address] any warranty or liability for your use of this information. Learning About Sepsis What is sepsis? Sepsis is an intense reaction to an infection that can cause life-threatening damage to the body. The body reacts to the infection with widespread inflammation. It can damage tissue and organs and lead to very low blood pressure. Sepsis requires immediate care in a hospital. People with sepsis might need to be treated in an intensive care unit (ICU) for several days or weeks. An ICU is a part of the hospital where very sick people get care. Severe sepsis is called septic shock.  It often causes extremely low blood pressure, which limits blood flow to the body. It can cause organ failure and death. A long-term or sudden illness can cause sepsis. So can an injury or a reaction to surgery. Sepsis can develop very quickly. Symptoms can include low blood pressure, breathing problems, fast heartbeat, and confusion. Other symptoms include fever or low body temperature, chills, cool clammy skin, skin rashes, and shaking. Sepsis can cause problems in many organs. How is sepsis treated? When a person has sepsis, equipment in the ICU can support many body systems. That includes breathing, circulation, fluids, and help for organs like the kidneys and heart. If the person needs help breathing, a ventilator may be used. Doctors will treat the person with antibiotics. They will try to find the infection that led to sepsis. Machines will track the person's vital signs, including temperature, blood pressure, breathing rate, and pulse rate. The person will get fluids through an IV and will get medicine to help blood flow. If the sepsis is severe, medicine may help raise the blood pressure. What can you expect when someone has sepsis? · The ICU staff will do everything they can to treat all of the problems sepsis causes, including the infection. · The person may start new treatments while still in the hospital. Different doctors may help with different symptoms. · Expect a long recovery after the person leaves the ICU. · If you need it, ask for support from friends and family. There's a lot going on in the ICU. It can be scary and confusing for patients and their families, friends, and supporters. But it's designed to keep your loved one comfortable and safe and to provide the best medical care. Where can you learn more? Go to http://mariella-kenyon.info/. Enter G343 in the search box to learn more about \"Learning About Sepsis. \" Current as of: March 20, 2017 Content Version: 11.4 © 2434-1477 [employer]. Care instructions adapted under license by [employer] (which disclaims liability or warranty for this information). If you have questions about a medical condition or this instruction, always ask your healthcare professional. [address] any warranty or liability for your use of this information. Urinary Tract Infections in Men: Care Instructions Your Care Instructions A urinary tract infection, or UTI, is a general term for an infection anywhere between the kidneys and the tip of the penis. UTIs can also be a result of a prostate problem. Most cause pain or burning when you urinate. Most UTIs are caused by bacteria and can be cured with antibiotics. It is important to complete your treatment so that the infection does not get worse. Follow-up care is a key part of your treatment and safety. Be sure to make and go to all appointments, and call your doctor if you are having problems. It's also a good idea to know your test results and keep a list of the medicines you take. How can you care for yourself at home? · Take your antibiotics as prescribed. Do not stop taking them just because you feel better. You need to take the full course of antibiotics. · Take your medicines exactly as prescribed. Your doctor may have prescribed a medicine, such as phenazopyridine (Pyridium), to help relieve pain when you urinate. This turns your urine orange. You may stop taking it when your symptoms get better. But be sure to take all of your antibiotics, which treat the infection. · Drink extra water for the next day or two. This will help make the urine less concentrated and help wash out the bacteria causing the infection. (If you have kidney, heart, or liver disease and have to limit your fluids, talk with your doctor before you increase your fluid intake.) · Avoid drinks that are carbonated or have caffeine. They can irritate the bladder. · Urinate often. Try to empty your bladder each time. · To relieve pain, take a hot bath or lay a heating pad (set on low) over your lower belly or genital area. Never go to sleep with a heating pad in place. To help prevent UTIs · Drink plenty of fluids, enough so that your urine is light yellow or clear like water. If you have kidney, heart, or liver disease and have to limit fluids, talk with your doctor before you increase the amount of fluids you drink. · Urinate when you have the urge. Do not hold your urine for a long time. Urinate before you go to sleep. · Keep your penis clean. Catheter care If you have a drainage tube (catheter) in place, the following steps will help you care for it. · Always wash your hands before and after touching your catheter. · Check the area around the urethra for inflammation or signs of infection. Signs of infection include irritated, swollen, red, or tender skin, or pus around the catheter. · Clean the area around the catheter with soap and water two times a day. Dry with a clean towel afterward. · Do not apply powder or lotion to the skin around the catheter. To empty the urine collection bag · Wash your hands with soap and water. · Without touching the drain spout, remove the spout from its sleeve at the bottom of the collection bag. Open the valve on the spout. · Let the urine flow out of the bag and into the toilet or a container. Do not let the tubing or drain spout touch anything. · After you empty the bag, clean the end of the drain spout with tissue and water. Close the valve and put the drain spout back into its sleeve at the bottom of the collection bag. · Wash your hands with soap and water. When should you call for help? Call your doctor now or seek immediate medical care if: 
? · Symptoms such as a fever, chills, nausea, or vomiting get worse or happen for the first time. ? · You have new pain in your back just below your rib cage.  This is called flank pain. ? · There is new blood or pus in your urine. ? · You are not able to take or keep down your antibiotics. ? Watch closely for changes in your health, and be sure to contact your doctor if: 
? · You are not getting better after taking an antibiotic for 2 days. ? · Your symptoms go away but then come back. Where can you learn more? Go to http://mariella-kenyon.info/. Enter Y779 in the search box to learn more about \"Urinary Tract Infections in Men: Care Instructions. \" Current as of: May 12, 2017 Content Version: 11.4 © 4883-8552 SonicLiving. Care instructions adapted under license by [employer] (which disclaims liability or warranty for this information). If you have questions about a medical condition or this instruction, always ask your healthcare professional. [address] any warranty or liability for your use of this information. Beatpacking Announcement We are excited to announce that we are making your provider's discharge notes available to you in Beatpacking. You will see these notes when they are completed and signed by the physician that discharged you from your recent hospital stay. If you have any questions or concerns about any information you see in Beatpacking, please call the Health Information Department where you were seen or reach out to your Primary Care Provider for more information about your plan of care. Introducing [facility]! Dear Sunny Ariza: Thank you for requesting a Beatpacking account. Our records indicate that you already have an active Beatpacking account. You can access your account anytime at [email or web address] Information. ProTip/Handseeing Information Did you know that you can access your hospital and ER discharge instructions at any time in Beatpacking? You can also review all of your test results from your hospital stay or ER visit. Additional Information If you have questions, please visit the Frequently Asked Questions section of the MyChart website at [email or web address] [employer]. [email or web address] Remember, MyChart is NOT to be used for urgent needs. For medical emergencies, dial 911. Now available from your iPhone and Android! Unresulted Labs-Please follow up with your PCP about these lab tests Order Current Status XR CHEST PORT In process CULTURE, BLOOD Preliminary result EKG, 12 LEAD, INITIAL Preliminary result Providers Seen During Your Hospitalization Provider Specialty Primary office phone Maria A Hdez MD Emergency Medicine [phone] Alli Haji MD Internal Medicine [phone] Your Primary Care Physician (PCP) Primary Care Physician Office Phone Office Fax Anushka Houston [phone] You are allergic to the following Allergen Reactions Latex Contact Dermatitis Pt states he is NOT allergic to latex. Neosporin (Neomycin-Bacitracin-Polymyxin) Rash Recent Documentation Height Weight BMI Smoking Status 1.88 m 66.9 kg 18.94 kg/m2 Former Smoker Emergency Contacts Name Discharge Info Relation Home Work Mobile Batsheva Hough DISCHARGE CAREGIVER [3] Spouse [3] [phone] Patient Belongings The following personal items are in your possession at time of discharge: 
     Visual Aid: Glasses   Hearing Aids/Status: Functioning         Clothing: Shorts, Undergarments, Shirt    Other Valuables: Eyeglasses (Hearing aids) Please provide this summary of care documentation to your next provider. Signatures-by signing, you are acknowledging that this After Visit Summary has been reviewed with you and you have received a copy. Patient Signature:  ____________________________________________________________ Date:  ____________________________________________________________  
  
[place] Favorite Provider Signature:  ____________________________________________________________ Date:  ____________________________________________________________

## 2018-02-01 NOTE — PROGRESS NOTES
Occupational Therapy Evaluation/Treatment Attempt    Chart reviewed. Attempted Occupational Therapy Evaluation/Treatment, however, patient unable to be seen due to:  []  Nausea/vomiting  []  Eating  []  Pain  []  Patient too lethargic  []  Off Unit for testing/procedure  []  Dialysis treatment in progress   []  Telemetry Results  [x]  Other: Orders received with no H&P in chart. Will f/u later as patient's schedule allows.    Thank you for this referral.  Angie Mckenna, OTR/L

## 2018-02-02 ENCOUNTER — APPOINTMENT (OUTPATIENT)
Dept: WOUND CARE | Age: 73
End: 2018-02-02
Payer: MEDICARE

## 2018-02-02 PROBLEM — E87.6 HYPOKALEMIA: Status: ACTIVE | Noted: 2018-02-02

## 2018-02-02 PROBLEM — E43 SEVERE PROTEIN-CALORIE MALNUTRITION (HCC): Status: ACTIVE | Noted: 2018-02-02

## 2018-02-02 LAB
ANION GAP SERPL CALC-SCNC: 12 MMOL/L (ref 3–18)
BACTERIA SPEC CULT: NORMAL
BASOPHILS # BLD: 0 K/UL (ref 0–0.1)
BASOPHILS NFR BLD: 1 % (ref 0–3)
BUN SERPL-MCNC: 7 MG/DL (ref 7–18)
BUN/CREAT SERPL: 13 (ref 12–20)
CALCIUM SERPL-MCNC: 8 MG/DL (ref 8.5–10.1)
CHLORIDE SERPL-SCNC: 105 MMOL/L (ref 100–108)
CO2 SERPL-SCNC: 24 MMOL/L (ref 21–32)
CREAT SERPL-MCNC: 0.56 MG/DL (ref 0.6–1.3)
DIFFERENTIAL METHOD BLD: ABNORMAL
EOSINOPHIL # BLD: 0.2 K/UL (ref 0–0.4)
EOSINOPHIL NFR BLD: 5 % (ref 0–5)
ERYTHROCYTE [DISTWIDTH] IN BLOOD BY AUTOMATED COUNT: 14.6 % (ref 11.6–14.5)
GLUCOSE BLD STRIP.AUTO-MCNC: 177 MG/DL (ref 70–110)
GLUCOSE BLD STRIP.AUTO-MCNC: 205 MG/DL (ref 70–110)
GLUCOSE BLD STRIP.AUTO-MCNC: 231 MG/DL (ref 70–110)
GLUCOSE BLD STRIP.AUTO-MCNC: 265 MG/DL (ref 70–110)
GLUCOSE SERPL-MCNC: 183 MG/DL (ref 74–99)
HCT VFR BLD AUTO: 32.8 % (ref 36–48)
HGB BLD-MCNC: 10.9 G/DL (ref 13–16)
LYMPHOCYTES # BLD: 0.6 K/UL (ref 0.8–3.5)
LYMPHOCYTES NFR BLD: 15 % (ref 20–51)
MAGNESIUM SERPL-MCNC: 1.6 MG/DL (ref 1.6–2.6)
MCH RBC QN AUTO: 28.9 PG (ref 24–34)
MCHC RBC AUTO-ENTMCNC: 33.2 G/DL (ref 31–37)
MCV RBC AUTO: 87 FL (ref 74–97)
MONOCYTES # BLD: 0.3 K/UL (ref 0–1)
MONOCYTES NFR BLD: 7 % (ref 2–9)
NEUTS SEG # BLD: 2.9 K/UL (ref 1.8–8)
NEUTS SEG NFR BLD: 72 % (ref 42–75)
PLATELET # BLD AUTO: 79 K/UL (ref 135–420)
PLATELET COMMENTS,PCOM: ABNORMAL
PMV BLD AUTO: 9.5 FL (ref 9.2–11.8)
POTASSIUM SERPL-SCNC: 3.3 MMOL/L (ref 3.5–5.5)
RBC # BLD AUTO: 3.77 M/UL (ref 4.7–5.5)
RBC MORPH BLD: ABNORMAL
SERVICE CMNT-IMP: NORMAL
SODIUM SERPL-SCNC: 141 MMOL/L (ref 136–145)
WBC # BLD AUTO: 4 K/UL (ref 4.6–13.2)

## 2018-02-02 PROCEDURE — 85025 COMPLETE CBC W/AUTO DIFF WBC: CPT | Performed by: HOSPITALIST

## 2018-02-02 PROCEDURE — 74011250636 HC RX REV CODE- 250/636: Performed by: HOSPITALIST

## 2018-02-02 PROCEDURE — 93970 EXTREMITY STUDY: CPT

## 2018-02-02 PROCEDURE — 82962 GLUCOSE BLOOD TEST: CPT

## 2018-02-02 PROCEDURE — 36415 COLL VENOUS BLD VENIPUNCTURE: CPT | Performed by: HOSPITALIST

## 2018-02-02 PROCEDURE — 74011000258 HC RX REV CODE- 258: Performed by: HOSPITALIST

## 2018-02-02 PROCEDURE — 74011636637 HC RX REV CODE- 636/637: Performed by: HOSPITALIST

## 2018-02-02 PROCEDURE — 83735 ASSAY OF MAGNESIUM: CPT | Performed by: HOSPITALIST

## 2018-02-02 PROCEDURE — 74011250637 HC RX REV CODE- 250/637: Performed by: HOSPITALIST

## 2018-02-02 PROCEDURE — 80048 BASIC METABOLIC PNL TOTAL CA: CPT | Performed by: HOSPITALIST

## 2018-02-02 PROCEDURE — 92526 ORAL FUNCTION THERAPY: CPT

## 2018-02-02 PROCEDURE — 65660000000 HC RM CCU STEPDOWN

## 2018-02-02 PROCEDURE — 97530 THERAPEUTIC ACTIVITIES: CPT

## 2018-02-02 PROCEDURE — 97110 THERAPEUTIC EXERCISES: CPT

## 2018-02-02 PROCEDURE — 97116 GAIT TRAINING THERAPY: CPT

## 2018-02-02 RX ORDER — POTASSIUM CHLORIDE 20MEQ/15ML
40 LIQUID (ML) ORAL
Status: COMPLETED | OUTPATIENT
Start: 2018-02-02 | End: 2018-02-02

## 2018-02-02 RX ORDER — FLUTICASONE PROPIONATE 50 MCG
2 SPRAY, SUSPENSION (ML) NASAL DAILY
Status: DISCONTINUED | OUTPATIENT
Start: 2018-02-02 | End: 2018-02-05 | Stop reason: HOSPADM

## 2018-02-02 RX ORDER — GABAPENTIN 300 MG/1
300 CAPSULE ORAL 2 TIMES DAILY
Status: DISCONTINUED | OUTPATIENT
Start: 2018-02-02 | End: 2018-02-05 | Stop reason: HOSPADM

## 2018-02-02 RX ORDER — ASCORBIC ACID 250 MG
500 TABLET ORAL DAILY
Status: DISCONTINUED | OUTPATIENT
Start: 2018-02-03 | End: 2018-02-05 | Stop reason: HOSPADM

## 2018-02-02 RX ORDER — LORATADINE 10 MG/1
10 TABLET ORAL DAILY
Status: DISCONTINUED | OUTPATIENT
Start: 2018-02-02 | End: 2018-02-05 | Stop reason: HOSPADM

## 2018-02-02 RX ORDER — MAGNESIUM SULFATE HEPTAHYDRATE 40 MG/ML
2 INJECTION, SOLUTION INTRAVENOUS
Status: DISPENSED | OUTPATIENT
Start: 2018-02-02 | End: 2018-02-02

## 2018-02-02 RX ADMIN — SODIUM CHLORIDE 750 MG: 900 INJECTION, SOLUTION INTRAVENOUS at 14:12

## 2018-02-02 RX ADMIN — FLUTICASONE PROPIONATE 2 SPRAY: 50 SPRAY, METERED NASAL at 15:15

## 2018-02-02 RX ADMIN — DILTIAZEM HYDROCHLORIDE 360 MG: 180 CAPSULE, COATED, EXTENDED RELEASE ORAL at 10:09

## 2018-02-02 RX ADMIN — OMEPRAZOLE 20 MG: 20 CAPSULE, DELAYED RELEASE ORAL at 10:09

## 2018-02-02 RX ADMIN — LEVOTHYROXINE SODIUM 100 MCG: 100 TABLET ORAL at 07:08

## 2018-02-02 RX ADMIN — INSULIN LISPRO 2 UNITS: 100 INJECTION, SOLUTION INTRAVENOUS; SUBCUTANEOUS at 07:05

## 2018-02-02 RX ADMIN — MAGNESIUM SULFATE HEPTAHYDRATE 2 G: 40 INJECTION, SOLUTION INTRAVENOUS at 12:37

## 2018-02-02 RX ADMIN — DOCUSATE SODIUM 100 MG: 100 CAPSULE, LIQUID FILLED ORAL at 23:13

## 2018-02-02 RX ADMIN — ASPIRIN 81 MG: 81 TABLET, COATED ORAL at 10:10

## 2018-02-02 RX ADMIN — ENOXAPARIN SODIUM 40 MG: 40 INJECTION SUBCUTANEOUS at 10:17

## 2018-02-02 RX ADMIN — CLOPIDOGREL BISULFATE 75 MG: 75 TABLET ORAL at 10:09

## 2018-02-02 RX ADMIN — LORATADINE 10 MG: 10 TABLET ORAL at 12:46

## 2018-02-02 RX ADMIN — POTASSIUM CHLORIDE 40 MEQ: 20 SOLUTION ORAL at 12:46

## 2018-02-02 RX ADMIN — LISINOPRIL 2.5 MG: 5 TABLET ORAL at 10:10

## 2018-02-02 RX ADMIN — FLUTICASONE FUROATE AND VILANTEROL TRIFENATATE 1 PUFF: 100; 25 POWDER RESPIRATORY (INHALATION) at 10:16

## 2018-02-02 RX ADMIN — INSULIN LISPRO 4 UNITS: 100 INJECTION, SOLUTION INTRAVENOUS; SUBCUTANEOUS at 23:14

## 2018-02-02 RX ADMIN — PIPERACILLIN SODIUM AND TAZOBACTAM SODIUM 3.38 G: 36; 4.5 INJECTION, POWDER, FOR SOLUTION INTRAVENOUS at 17:01

## 2018-02-02 RX ADMIN — ATORVASTATIN CALCIUM 20 MG: 20 TABLET, FILM COATED ORAL at 10:09

## 2018-02-02 RX ADMIN — INSULIN LISPRO 6 UNITS: 100 INJECTION, SOLUTION INTRAVENOUS; SUBCUTANEOUS at 17:01

## 2018-02-02 RX ADMIN — GABAPENTIN 300 MG: 300 CAPSULE ORAL at 23:13

## 2018-02-02 RX ADMIN — PIPERACILLIN SODIUM AND TAZOBACTAM SODIUM 3.38 G: 36; 4.5 INJECTION, POWDER, FOR SOLUTION INTRAVENOUS at 01:25

## 2018-02-02 RX ADMIN — INSULIN GLARGINE 20 UNITS: 100 INJECTION, SOLUTION SUBCUTANEOUS at 23:13

## 2018-02-02 RX ADMIN — PIPERACILLIN SODIUM AND TAZOBACTAM SODIUM 3.38 G: 36; 4.5 INJECTION, POWDER, FOR SOLUTION INTRAVENOUS at 07:10

## 2018-02-02 RX ADMIN — DOCUSATE SODIUM 100 MG: 100 CAPSULE, LIQUID FILLED ORAL at 10:10

## 2018-02-02 RX ADMIN — PIPERACILLIN SODIUM AND TAZOBACTAM SODIUM 3.38 G: 36; 4.5 INJECTION, POWDER, FOR SOLUTION INTRAVENOUS at 12:41

## 2018-02-02 RX ADMIN — UMECLIDINIUM 1 PUFF: 62.5 AEROSOL, POWDER ORAL at 10:16

## 2018-02-02 RX ADMIN — GABAPENTIN 300 MG: 300 CAPSULE ORAL at 12:46

## 2018-02-02 RX ADMIN — MULTIPLE VITAMINS W/ MINERALS TAB 1 TABLET: TAB at 10:10

## 2018-02-02 RX ADMIN — SODIUM CHLORIDE 750 MG: 900 INJECTION, SOLUTION INTRAVENOUS at 01:25

## 2018-02-02 RX ADMIN — INSULIN LISPRO 4 UNITS: 100 INJECTION, SOLUTION INTRAVENOUS; SUBCUTANEOUS at 12:44

## 2018-02-02 NOTE — PROGRESS NOTES
1930: Assumed patient care, he was alert and oriented to person, place, time and situation. Respiratory status was stable on room air. Vital signs were stable. MEWS score was a three. Patient denied any nausea vomiting dizziness or anxiety. White board was updated and explained. Bed was locked and in lowest position. Call bell, water and personal belongings were within reach. Patient had no questions, comments or concerns after bedside shift report. 0700: Patient had an uneventful shift. Respiratory status, vital signs and MEWS score remained stable. Patient was resting quietly with no signs of distress noted. Bed locked and in lowest position. Call bell water and personal belongings were within reach. Patient had no questions, comments or concerns after bedside shift report.

## 2018-02-02 NOTE — PROGRESS NOTES
0730 Assumed care of patient from yoselyn Massey RN. Patient laying in bed watching Tv. No distress noted. will continue to monitor. 0840 Patient off floor to vascular for doppler. 4895 Patient back on from from vascular. 1100 Patient in bed watching TV visitors present at bedside. 1500 Patient in bed asleep family at his bedside respirations even and unlabored no signs or symptoms of distress. 1830 Patient in bed watching tv with wife at his bedside. No complaints voiced.

## 2018-02-02 NOTE — PROGRESS NOTES
Problem: Falls - Risk of  Goal: *Absence of Falls  Document Donald Fall Risk and appropriate interventions in the flowsheet.    Outcome: Progressing Towards Goal  Fall Risk Interventions:  Mobility Interventions: PT Consult for mobility concerns         Medication Interventions: Patient to call before getting OOB    Elimination Interventions: Call light in reach, Patient to call for help with toileting needs

## 2018-02-02 NOTE — ROUTINE PROCESS
Bedside and Verbal shift change report given to MARTINEZ Bahena (oncoming nurse) by [facility] (offgoing nurse). Report included the following information SBAR, Kardex, Intake/Output, MAR and Recent Results.

## 2018-02-02 NOTE — CDMP QUERY
Please clarify if this patient is being treated/managed for:    =>Severe Protein Calorie malnutrition as documented by Dietitian  =>Other Explanation of clinical findings  =>Unable to Determine (no explanation of clinical findings)    The medical record reflects the following:    Risk:Elderly diabetic patient    Clinical Indicators:Note as per Dietitian states--Nutrition assessment was completed by RDN and the patient was found to meet the following malnutrition criteria established by ASPEN/AND:     Adult Malnutrition Guidelines:  SEVERE PROTEIN CALORIE MALNUTRITION IN THE CONTEXT OF ACUTE INJURY/ILLNESS  Body Fat: Moderate depletion  Muscle Mass: Moderate depletion  Pt has no calf is thin w/ minimal to no muscle definition, patella prominent, arms appear to have no muscle definition. Pt also, has stage II pressure injury to sacrum. Physical assessment shows severe protein malnutrition. Will add supplements to help w/ wound healing. BMI=18.5    Treatment: Lance BID    Please clarify and document your clinical opinion in the progress notes and discharge summary including the definitive and/or presumptive diagnosis, (suspected or probable), related to the above clinical findings. Please include clinical findings supporting your diagnosis. If you DECLINE this query or would like to communicate with [facility], please utilize the \"[facility] message box\" at the TOP of the Progress Note on the right.       Thank you,  Dg Luna RN [facility]  [phone]

## 2018-02-02 NOTE — PROCEDURES
[facility]  *** FINAL REPORT ***    Name: Waleska Hart  MRN: [id number]    Inpatient  : 16 May 1945  HIS Order #: [id number]  [address] [facility] Visit #: [id number]  Date: 2018    TYPE OF TEST: Peripheral Venous Testing    REASON FOR TEST  Pain in limb    Right Leg:-  Deep venous thrombosis:           No  Superficial venous thrombosis:    No  Deep venous insufficiency:        Not examined  Superficial venous insufficiency: Not examined    Left Leg:-  Deep venous thrombosis:           No  Superficial venous thrombosis:    No  Deep venous insufficiency:        Not examined  Superficial venous insufficiency: Not examined      INTERPRETATION/FINDINGS  Duplex images were obtained using 2-D gray scale, color flow, and  spectral Doppler analysis. Right leg :  1. Deep vein(s) visualized include the common femoral, deep femoral,  proximal femoral, mid femoral, distal femoral, popliteal(above knee),  popliteal(fossa), popliteal(below knee), posterior tibial and peroneal   veins. 2. No evidence of deep venous thrombosis detected in the veins  visualized. 3. Superficial vein(s) visualized include the great saphenous vein. 4. No evidence of superficial thrombosis detected. Left leg :  1. Deep vein(s) visualized include the common femoral, deep femoral,  proximal femoral, mid femoral, distal femoral, popliteal(above knee),  popliteal(fossa), popliteal(below knee), posterior tibial and peroneal   veins. 2. No evidence of deep venous thrombosis detected in the veins  visualized. 3. Superficial vein(s) visualized include the great saphenous vein. 4. No evidence of superficial thrombosis detected. ADDITIONAL COMMENTS    I have personally reviewed the data relevant to the interpretation of  this  study.     TECHNOLOGIST: Cachorro Guzman  Signed: 2018 10:03 AM    PHYSICIAN: Eddie Gautam MD  Signed: 2018 01:59 PM

## 2018-02-02 NOTE — PROGRESS NOTES
Problem: Dysphagia (Adult)  Goal: *Acute Goals and Plan of Care (Insert Text)  Recommendations:  Diet: Regular solids, thin liquids   Meds: With water  Aspiration Precautions  Oral Care TID  Other: small bites/sips    Goals:  Patient will:  1. Tolerate PO trials with 0 s/s overt distress in 4/5 trials  2. Utilize compensatory swallow strategies/maneuvers (decrease bite/sip, size/rate, alt. liq/sol) with min cues in 4/5 trials  3. Perform oral-motor/laryngeal exercises to increase oropharyngeal swallow function with min cues  4. Complete an objective swallow study (i.e., MBSS) to assess swallow integrity, r/o aspiration, and determine of safest LRD, min A       Outcome: Progressing Towards Goal  Speech language pathology dysphagia treatment    Patient: Kari Sarabia Sr. (69 y.o. male)  Date: 2/2/2018  Diagnosis: UTI (urinary tract infection)  Sepsis ([facility]  Chronic foot ulcer (HCC) Sepsis ([facility]  Precautions:  Aspiration, DNR, Fall, Skin   ASSESSMENT:  Patient was pleasant and cooperative during dysphagia therapy. His daughter and wife were present for treatment. Re-assessed consecutive sips of thin liquid; unable to complete without (+) throat clears and coughs. These s/sx were resolved with small sips & slow rate. Assessed liquid gliding; question if phonological error was from childhood. Also, difficulties feeding due to unsteadiness/shaking hands. Voice is tremulous as well with perseverations of /hmm/ x3-5 when initiated. No neurological disorder in chart; question need for neuro consult, outpatient or inpatient. Education provided; patient verbalized/demonstrated understanding. Progression toward goals:  [x]         Improving appropriately and progressing toward goals  []         Improving slowly and progressing toward goals  []         Not making progress toward goals and plan of care will be adjusted     PLAN:  Recommendations and Planned Interventions:  As above.  Patient continues to benefit from skilled intervention to address the above impairments. Continue treatment per established plan of care. May benefit from neuro consult  Discharge Recommendations:  Skilled Nursing Facility     SUBJECTIVE:   Patient stated I got pureed eggs. OBJECTIVE:   Cognitive and Communication Status:  Neurologic State: Alert  Orientation Level: Oriented X4  Cognition: Appropriate for age attention/concentration, Appropriate decision making, Appropriate safety awareness, Follows commands  Perception: Appears intact  Perseveration: Perseverates during conversation  Safety/Judgement: Insight into deficits  Dysphagia Treatment:  Oral Assessment:  Oral Assessment  Labial: No impairment  Dentition: Upper dentures, Limited, Intact (bottom row )  Oral Hygiene: good/dry  Lingual: No impairment  Velum: No impairment  Mandible: No impairment  Gag Reflex: Other (comment) (not assessed)  P.O. Trials:   Patient Position: 70HOB   Vocal quality prior to P.O.: Tremorous   Consistency Presented: Thin liquid, Solid   How Presented: Self-fed/presented, Successive swallows, Straw   Bolus Acceptance: No impairment   Bolus Formation/Control: Impaired   Type of Impairment: Delayed, Mastication   Oral Residue: None   Initiation of Swallow: Delayed (# of seconds)   Laryngeal Elevation: Functional   Aspiration Signs/Symptoms: Change vocal quality, Clear throat, Strong cough   Pharyngeal Phase Characteristics: Suspected pharyngeal residue, Altered vocal quality, Audible swallow   Effective Modifications:  Alternate liquids/solids, Small sips and bites   Cues for Modifications: Minimal   Comments: question need for Neuro constult   Oral Phase Severity: Mild   Pharyngeal Phase Severity : Mild     PAIN:  Start of Tx: 0  End of Tx: 0   After treatment:   []              Patient left in no apparent distress sitting up in chair  [x]              Patient left in no apparent distress in bed  [x]              Call bell left within reach  [x]              Nursing notified  [x]              Family present  []              Caregiver present  []              Bed alarm activated      COMMUNICATION/EDUCATION:   [x] Aspiration precautions; swallow safety; compensatory techniques  [x]       Education ongoing with staff  [x]  Posted safety precautions in patient's room.   [] Oral-motor/laryngeal strengthening exercises      Caryle Ping V, SLP  Time Calculation: 13 mins

## 2018-02-02 NOTE — PROGRESS NOTES
Hospitalist Progress Note-critical care note     Patient: Nely Zuleta Sr. MRN: [id number]  CSN: [id number]    YOB: 1945  Age: 74 y.o. Sex: male    DOA: 2/1/2018 LOS:  LOS: 1 day            Chief complaint: sepsis, uti,  Dm , hypokalemia, hypomagnesemia ,     Assessment/Plan         Hospital Problems  Date Reviewed: 1/23/2018          Codes Class Noted POA    UTI (urinary tract infection) ICD-10-CM: N39.0  ICD-9-CM: 599.0  2/1/2018 Unknown        Diabetic ulcer of both lower extremities ([facility] ICD-10-CM: E11.622, L97.919, L97.929  ICD-9-CM: 250.80, 707.10  2/1/2018 Unknown        Hyponatremia ICD-10-CM: E87.1  ICD-9-CM: 276.1  2/1/2018 Unknown        Hypomagnesemia ICD-10-CM: E83.42  ICD-9-CM: 275.2  2/1/2018 Unknown        PAF (paroxysmal atrial fibrillation) ([facility] ICD-10-CM: I48.0  ICD-9-CM: 427.31  11/5/2017 Yes        * (Principal)Sepsis ([facility] ICD-10-CM: A41.9  ICD-9-CM: 038.9, 995.91  11/4/2017 Unknown        CAD (coronary artery disease) ICD-10-CM: I25.10  ICD-9-CM: 414.00  11/4/2017 Yes        COPD (chronic obstructive pulmonary disease) ([facility] ICD-10-CM: J44.9  ICD-9-CM: 552  11/4/2017 Yes        HTN (hypertension) ICD-10-CM: I10  ICD-9-CM: 401.9  11/4/2017 Yes        UTI (urinary tract infection) due to urinary indwelling Sher catheter [facility]) ICD-10-CM: T83.511A, N39.0  ICD-9-CM: 996.64, 599.0  11/4/2017 Yes        Diabetic ulcer of right midfoot associated with type 2 diabetes mellitus, with fat layer exposed ([facility] ICD-10-CM: E11.621, G07.691  ICD-9-CM: 250.80, 707.14  10/4/2017 Yes        PAD (peripheral artery disease) ([facility] ICD-10-CM: I73.9  ICD-9-CM: 443.9  5/9/2017 Yes            1. .sepsis   Afebrile overnight, so far cx no growth , hr is better   Source of infection- ulcer and uti      2. UTI complicated,catheter related  Waiting for cx   - continue  zosyn      3. Diabatic ulcer of edwin LE   will continue wound care   On vanc /zosyn  pvl negative for dvt      4.  DM type II , with complication,  -long term insulin , Complication with ulcer   -will continue lantus, ssi, diabetic diet , hypoglycemia protocol        5 HTN, accelerated  Continue home medication.     6 hyponatremia  Resolved per Ns infusion, will d/c fluid      7 pad and cad   On plavix and aspirin      8 PAF  Continue cardizem,   Optimize electrolytes to keep K at 4, mg at 2  9 copd   Stable. will continue breathing treatment   10 hypomagnesemia   Mg replacement , improving, will give 4 g to keep at 2   11 hypokalemia   K replacement   Disposition :2-3 day,    Subjective: feel much better, want to eat regular diet   Nurse: no  Acute issue    Will continue abx /fu with cx , d/c fluid, pt/ot . Review of systems:    General: No fevers or chills. Cardiovascular: No chest pain or pressure. No palpitations. Pulmonary: No shortness of breath. Gastrointestinal: No nausea, vomiting. Vital signs/Intake and Output:  Visit Vitals    /64 (BP 1 Location: Left arm, BP Patient Position: At rest;Supine)    Pulse (!) 102    Temp 98.2 °F (36.8 °C)    Resp 16    Ht 6' 2\" (1.88 m)    Wt 65.3 kg (144 lb)    SpO2 98%    BMI 18.49 kg/m2     Current Shift:  02/02 0701 - 02/02 1900  In: 240 [P.O.:240]  Out: 1500 [Urine:1500]  Last three shifts:  01/31 1901 - 02/02 0700  In: 3082.5 [P.O.:780; I.V.:2302.5]  Out: 3750 [Urine:3750]    Physical Exam:  General: WD, WN. Alert, cooperative, no acute distress    HEENT: NC, Atraumatic. PERRLA, anicteric sclerae. Lungs: CTA Bilaterally. No Wheezing/Rhonchi/Rales. Heart:  Tachy ,  +murmur, No Rubs, No Gallops  Abdomen: Soft, Non distended, Non tender.  +Bowel sounds,   Extremities: No c/c/e, multiple ulcer noted on bilateral legs   Psych:   Not anxious or agitated. Neurologic:  No acute neurological deficit.              Labs: Results:       Chemistry Recent Labs      02/02/18   0404  02/01/18   0750   GLU  183*  124*   NA  141  131*   K  3.3*  3.9   CL  105  94*   CO2  24  27   BUN  7  7 CREA  0.56*  0.75   CA  8.0*  8.7   AGAP  12  10   BUCR  13  9*   AP   --   75   TP   --   7.0   ALB   --   3.0*   GLOB   --   4.0   AGRAT   --   0.8      CBC w/Diff Recent Labs      02/02/18   0404  02/01/18   0750   WBC  4.0*  11.2   RBC  3.77*  3.85*   HGB  10.9*  11.2*   HCT  32.8*  33.7*   PLT  79*  116*   GRANS  72  89*   LYMPH  15*  5*   EOS  5  1      Cardiac Enzymes No results for input(s): CPK, CKND1, SUNNY in the last 72 hours. No lab exists for component: CKRMB, TROIP   Coagulation No results for input(s): PTP, INR, APTT in the last 72 hours. No lab exists for component: INREXT    Lipid Panel No results found for: CHOL, CHOLPOCT, CHOLX, CHLST, CHOLV, 865349, HDL, LDL, LDLC, DLDLP, 954649, VLDLC, VLDL, TGLX, TRIGL, TRIGP, TGLPOCT, CHHD, CHHDX   BNP No results for input(s): BNPP in the last 72 hours.    Liver Enzymes Recent Labs      02/01/18   0750   TP  7.0   ALB  3.0*   AP  75   SGOT  12*      Thyroid Studies Lab Results   Component Value Date/Time    TSH 0.90 11/04/2017 06:23 PM        Procedures/imaging: see electronic medical records for all procedures/Xrays and details which were not copied into this note but were reviewed prior to creation of Alice Jose MD

## 2018-02-02 NOTE — PROGRESS NOTES
Problem: Mobility Impaired (Adult and Pediatric)  Goal: *Acute Goals and Plan of Care (Insert Text)  Physical TherapyGoals   Initiated 2/1/2018 to be met within 5-7 days  1. Bed mobility: Rolling L to R to with min/SBA A with use of HR for positioning. 2. Transfer: Supine <> Sit with min A for change of position/prep for transfers. 3. Activity Tolerance: Tolerate EOB sitting 10-15 minutes for ADL/balance activities. 4. Ambulation:  Ambulate 10-20 ft. Min A with RW for increased functional mobility. Outcome: Progressing Towards Goal  physical Therapy TREATMENT    Patient: Pearl Souza Sr. (69 y.o. male)  Date: 2/2/2018  Diagnosis: UTI (urinary tract infection)  Sepsis ([facility] [address]  Chronic foot ulcer (HCC) Sepsis ([facility]       Precautions: Aspiration, DNR, Fall, Skin  Chart, physical therapy assessment, plan of care and goals were reviewed. ASSESSMENT:  Pt is able to demonstrate fair mobility, with assist for standing transfer. Pt's family is present and noting to this is similar to pt's normal home function. Will continue to follow, to avoid atrophy, or lost of strength before d/c. Progression toward goals:  []      Improving appropriately and progressing toward goals  [x]      Improving slowly and progressing toward goals  []      Not making progress toward goals and plan of care will be adjusted     PLAN:  Patient continues to benefit from skilled intervention to address the above impairments. Continue treatment per established plan of care. Discharge Recommendations:  Home health of TBD  Further Equipment Recommendations for Discharge:  bedside commode and rolling walker     SUBJECTIVE:   Patient stated I usually use my bed to help me sit up.     OBJECTIVE DATA SUMMARY:   Critical Behavior:  Neurologic State: Alert  Orientation Level: Oriented X4  Cognition: Appropriate for age attention/concentration, Appropriate decision making, Appropriate safety awareness, Follows commands  Safety/Judgement: Insight into deficits  Functional Mobility Training:  Bed Mobility:  Rolling: Supervision  Supine to Sit: Supervision  Sit to Supine: Minimum assistance  Transfers:  Sit to Stand: Stand-by asssistance  Stand to Sit: Stand-by asssistance  Stand Pivot Transfers: Stand-by asssistance  Balance:  Sitting: Intact  Sitting - Static: Fair (occasional)  Sitting - Dynamic: Fair (occasional)  Standing: Impaired  Standing - Static: Fair  Standing - Dynamic : Fair  Ambulation/Gait Training:  Distance (ft): 64 Feet (ft) (32 + 32)  Assistive Device: Gait belt  Ambulation - Level of Assistance: Supervision  Gait Abnormalities: Ataxic  Base of Support: Narrowed  Speed/Rachele: Slow  Step Length: Right shortened;Left shortened  Swing Pattern: Right symmetrical;Left symmetrical   Therapeutic Exercises:       EXERCISE   Sets   Reps   Active Active Assist   Passive Self ROM   Comments   Ankle Pumps 1 20  [x] [] [] []    Quad Sets/Glut Sets 1 10 [x] [] [] []    Hamstring Sets 1 20 [x] [] [] []    Short Arc Quads 1 10 [x] [] [] []    Heel Slides   [] [] [] []    Straight Leg Raises 1 5 [x] [] [] []    Hip Abd/Add 1 15 [x] [] [] []    Long Arc Quads 1 10 [x] [] [] []    Seated Marching 1 10 [x] [] [] []    Standing Marching   [] [] [] []       [] [] [] []        Pain:  Pain Scale 1: Numeric (0 - 10)  Pain Intensity 1: 0  Pain Out: 0  Activity Tolerance:   Fair  Please refer to the flowsheet for vital signs taken during this treatment.   After treatment:   [] Patient left in no apparent distress sitting up in chair  [x] Patient left in no apparent distress in bed  [x] Call bell left within reach  [x] Nursing notified  [x] Caregiver present  [] Bed alarm activated      Sigrid Bloch, PTA   Time Calculation: 43 mins

## 2018-02-02 NOTE — PROGRESS NOTES
Pharmacy Dosing Services:     Consult for Vancomycin Dosing by Pharmacy by Dr. Whit Palacio provided for this 74y.o. year old male , for indication of diabetic ulcer. Day of Therapy: 1    Ht Readings from Last 1 Encounters:   02/01/18 188 cm (74\")        Wt Readings from Last 1 Encounters:   02/01/18 65.3 kg (144 lb)        Other Current Antibiotics Zosyn   Serum Creatinine Lab Results   Component Value Date/Time    Creatinine 0.75 02/01/2018 07:50 AM      Creatinine Clearance Estimated Creatinine Clearance: 79.8 mL/min (based on Cr of 0.75). BUN Lab Results   Component Value Date/Time    BUN 7 02/01/2018 07:50 AM      WBC Lab Results   Component Value Date/Time    WBC 11.2 02/01/2018 07:50 AM      H/H Lab Results   Component Value Date/Time    HGB 11.2 02/01/2018 07:50 AM      Platelets Lab Results   Component Value Date/Time    PLATELET 090 83/38/0912 07:50 AM      Temp 99.7 °F (37.6 °C)     Start Vancomycin therapy, with loading dose of 1250 (mg) at 1118 on 2/1/18. Follow with maintenance dose of 750 (mg) at 2300 on 2/1/18, every 12 hours. Dose calculated to approximate a therapeutic trough of 10-15 mcg/mL. Pharmacy to follow daily and will make changes to dose and/or frequency based on clinical status.     Pharmacist Marissa Mann, PHARMD

## 2018-02-02 NOTE — PROGRESS NOTES
NUTRITION FOLLOW-UP    RECOMMENDATIONS/PLAN:   - Add Lance BID  - Add Vit C 500mg x 10d  Monitor labs/lytes, PO intakes, skin integrity, wt, fluid status, BM  Adult Malnutrition Criteria:     Nutrition assessment was completed by RDN and the patient was found to meet the following malnutrition criteria established by ASPEN/AND:    Adult Malnutrition Guidelines:  SEVERE PROTEIN CALORIE MALNUTRITION IN THE CONTEXT OF ACUTE INJURY/ILLNESS  Body Fat: Moderate depletion  Muscle Mass: Moderate depletion        Please document MALNUTRITION in Problem List if in agreement. [facility]  02/02/18    NUTRITION ASSESSMENT:   Client Update: 74 yrs old Male with UTI and sepsis. Pt is being followed by SLP. Did physical assessment during pt interview. Pt has no calf is thin w/ minimal to no muscle definition, patella prominent, arms appear to have no muscle definition. Pt also, has stage II pressure injury to sacrum. Physical assessment shows severe protein malnutrition. Pt does report eating 3 meals a day and wife cooks for him. He does report a steady wt loss in the last year. However, wt loss is not significant enough in a time sense. Will add supplements to help w/ wound healing. Will provide pt w/ Ensure Coupons. Need to check to see if Dorian Antony has coupons for patients to use at home. Pt interested in taking Lance at home, but too expensive at this time.       FOOD/NUTRITION INTAKE   Diet Order:  Ricki Domingo, AHA   Supplements: n/a  Food Allergies: Latex  Average PO Intake:      Patient Vitals for the past 100 hrs:   % Diet Eaten   02/02/18 0902 80 %   02/01/18 2235 0 %   02/01/18 1930 0 %   02/01/18 1739 100 %   02/01/18 1300 50 %      Pertinent Medications:  [x] Reviewed; lipitor, plavix, MVI,   Electrolyte Replacement Protocol: []K []Mg []PO4  Insulin:  []SSI  [x]Pre-meal   []Basal    []Drip  []None  Cultural/Religion Food Preferences: None Identified    BIOCHEMICAL DATA & MEDICAL TESTS  Pertinent Labs:  [x] Reviewed; ANTHROPOMETRICS  Height: 6' 2\" (188 cm)       Weight: 65.3 kg (144 lb)         BMI: 18.5 kg/m^2 normal weight (18.5%-24.9% BMI)   Adm Weight: 144 lbs                Weight change: 0  Adjusted Body Weight: n/a     NUTRITION-FOCUSED PHYSICAL ASSESSMENT  Skin: Stage II to sacrum, Ankle wounds      GI: No BM    NUTRITION PRESCRIPTION  Calories: 2220 kcal/day based on [place] x 1.2 x 1.3  Protein: 78-98 g/day based on 1.2-1.5 g/kg  CHO: 278 g/day based on 50% of total energy  Fluid: 2220 ml/day based on 1 kcal/ml         NUTRITION DIAGNOSES:   1. At risk for inadequate oral intake related to low BMI as evidence by 18.5 BMI    NUTRITION INTERVENTIONS:   INTERVENTIONS:        GOALS:  1. Commercial Beverage-Lance BID 1. Meet estimated needs by consuming >50% at most meals by next review 3 days   2. Vit C          LEARNING NEEDS (Diet, Supplementation, Food/Nutrient-Drug Interaction):   [x] None Identified   [] Education provided/documented      Identified and patient: [] Declined   [] Was not appropriate/indicated        NUTRITION MONITORING /EVALUATION:   Follow PO intake  Monitor wt  Monitor renal labs, electrolytes, fluid status  Monitor for additional supplement needs     Previous Recommendations Implemented: yes        Previous Goals Met:  yes -      [] Participated in Interdisciplinary Rounds    [x] Interdisciplinary Care Plan Reviewed  DISCHARGE NUTRITION RECOMMENDATIONS ADDRESSED:     [x] Yes- recommended Pureed, Ricki 1800 AHA diet     NUTRITION RISK:           [x] At risk                        [] Not currently at risk        Will follow-up per policy.   Sonya Key, [address] [facility]

## 2018-02-02 NOTE — WOUND CARE
Pt seen again today to reapply wraps, no change from yesterdays assessment. Dressings are good for seven days, please do not remove dressings, as pt skin is extremely friable and repeated unnecessary removal increases skin breakdown for pt. Wound care remains available as needed.

## 2018-02-02 NOTE — PROGRESS NOTES
Chart reviewed. Per MD Beto Mcqueen, pt will not discharge prior to Monday, 2/5/18. Met with patient and his daughter at bedside. Pt states he has RW, wheelchair, rollator, shower chair at home (with handicap accessible home). Pt stating he does not feel like he will need home health after this hospital admission, but states if he did he would like [facility] (he used them previously and liked them). Pt offered [facility]. No immediate needs identified. CM will cont to follow. 5  Met with patient and pts wife at bedside. Pt offered [facility] for rehab and home health. Pt is refusing rehab at this time, however CM gave pt rehab list in the event he changes his mind. Pt chose [facility]. Referral placed with CMS Luann for assistance. CM will cont to follow      Readmission Risk Assessment:     Moderate Risk and MSSP/Good Help ACO patients    RRAT Score:  13-20    Initial Assessment:  74 y.o. male who hx  DM, htn, hld and chronic diabetic ulcer came to ed due to fever and fatigue     Emergency Contact:    Michele Adhikari Spouse [phone]     Pertinent Medical Hx:         PCP/Specialists:  PCP MD Del Cid      Community Services:       DME:      RW, wheelchair - see above    Moderate Risk Care Transition Plan:  1. [facility] or [facility], SNF, acute rehab, community care coordination of resources. 2. Involve patient/caregiver in assessment, planning, education and implement of intervention. 3. CM daily patient care huddles/interdisciplinary rounds. 4. PCP/Specialist appointment within 5  7 days made prior to discharge. 5. Facilitate transportation and logistics for follow-up appointments. 6. Medication reconciliation [id number]  7. Formal handoff between hospital provider and post-acute provider to transition patient  Handoff to [address] Nurse Navigator or PCP practice. Care Management Interventions  PCP Verified by CM: Yes  Mode of Transport at Discharge:  Other (see comment) (family)  Transition of Care Consult (CM Consult): [address]: No  Reason Outside [place]: Patient already serviced by other home care/hospice agency [facility])  Physical Therapy Consult: Yes  Occupational Therapy Consult: Yes  Speech Therapy Consult: Yes  Current Support Network: Lives with Spouse  Confirm Follow Up Transport: Family  Plan discussed with Pt/Family/Caregiver: Yes  Freedom of Choice Offered: Yes  Discharge Location  Discharge Placement: Home with home health

## 2018-02-03 LAB
ANION GAP SERPL CALC-SCNC: 4 MMOL/L (ref 3–18)
BASOPHILS # BLD: 0 K/UL (ref 0–0.06)
BASOPHILS NFR BLD: 1 % (ref 0–2)
BUN SERPL-MCNC: 7 MG/DL (ref 7–18)
BUN/CREAT SERPL: 11 (ref 12–20)
CALCIUM SERPL-MCNC: 8.3 MG/DL (ref 8.5–10.1)
CHLORIDE SERPL-SCNC: 96 MMOL/L (ref 100–108)
CO2 SERPL-SCNC: 29 MMOL/L (ref 21–32)
CREAT SERPL-MCNC: 0.63 MG/DL (ref 0.6–1.3)
DATE LAST DOSE: ABNORMAL
DIFFERENTIAL METHOD BLD: ABNORMAL
EOSINOPHIL # BLD: 0.7 K/UL (ref 0–0.4)
EOSINOPHIL NFR BLD: 18 % (ref 0–5)
ERYTHROCYTE [DISTWIDTH] IN BLOOD BY AUTOMATED COUNT: 14.5 % (ref 11.6–14.5)
GLUCOSE BLD STRIP.AUTO-MCNC: 158 MG/DL (ref 70–110)
GLUCOSE BLD STRIP.AUTO-MCNC: 198 MG/DL (ref 70–110)
GLUCOSE BLD STRIP.AUTO-MCNC: 283 MG/DL (ref 70–110)
GLUCOSE BLD STRIP.AUTO-MCNC: 321 MG/DL (ref 70–110)
GLUCOSE SERPL-MCNC: 178 MG/DL (ref 74–99)
HCT VFR BLD AUTO: 34 % (ref 36–48)
HGB BLD-MCNC: 11.1 G/DL (ref 13–16)
LYMPHOCYTES # BLD: 0.7 K/UL (ref 0.9–3.6)
LYMPHOCYTES NFR BLD: 18 % (ref 21–52)
MAGNESIUM SERPL-MCNC: 2 MG/DL (ref 1.6–2.6)
MCH RBC QN AUTO: 28.5 PG (ref 24–34)
MCHC RBC AUTO-ENTMCNC: 32.6 G/DL (ref 31–37)
MCV RBC AUTO: 87.2 FL (ref 74–97)
MONOCYTES # BLD: 0.4 K/UL (ref 0.05–1.2)
MONOCYTES NFR BLD: 9 % (ref 3–10)
NEUTS SEG # BLD: 2.3 K/UL (ref 1.8–8)
NEUTS SEG NFR BLD: 54 % (ref 40–73)
PLATELET # BLD AUTO: 93 K/UL (ref 135–420)
PMV BLD AUTO: 9.7 FL (ref 9.2–11.8)
POTASSIUM SERPL-SCNC: 3.6 MMOL/L (ref 3.5–5.5)
RBC # BLD AUTO: 3.9 M/UL (ref 4.7–5.5)
REPORTED DOSE,DOSE: ABNORMAL UNITS
REPORTED DOSE/TIME,TMG: 200
SODIUM SERPL-SCNC: 129 MMOL/L (ref 136–145)
VANCOMYCIN TROUGH SERPL-MCNC: 5.7 UG/ML (ref 10–20)
WBC # BLD AUTO: 4.1 K/UL (ref 4.6–13.2)

## 2018-02-03 PROCEDURE — 82962 GLUCOSE BLOOD TEST: CPT

## 2018-02-03 PROCEDURE — 74011250636 HC RX REV CODE- 250/636: Performed by: HOSPITALIST

## 2018-02-03 PROCEDURE — 65660000000 HC RM CCU STEPDOWN

## 2018-02-03 PROCEDURE — 36415 COLL VENOUS BLD VENIPUNCTURE: CPT | Performed by: HOSPITALIST

## 2018-02-03 PROCEDURE — 97116 GAIT TRAINING THERAPY: CPT

## 2018-02-03 PROCEDURE — 74011636637 HC RX REV CODE- 636/637: Performed by: HOSPITALIST

## 2018-02-03 PROCEDURE — 80202 ASSAY OF VANCOMYCIN: CPT | Performed by: HOSPITALIST

## 2018-02-03 PROCEDURE — 74011250637 HC RX REV CODE- 250/637: Performed by: HOSPITALIST

## 2018-02-03 PROCEDURE — 80048 BASIC METABOLIC PNL TOTAL CA: CPT | Performed by: HOSPITALIST

## 2018-02-03 PROCEDURE — 97110 THERAPEUTIC EXERCISES: CPT

## 2018-02-03 PROCEDURE — 85025 COMPLETE CBC W/AUTO DIFF WBC: CPT | Performed by: HOSPITALIST

## 2018-02-03 PROCEDURE — 83735 ASSAY OF MAGNESIUM: CPT | Performed by: HOSPITALIST

## 2018-02-03 PROCEDURE — 97535 SELF CARE MNGMENT TRAINING: CPT

## 2018-02-03 PROCEDURE — 74011000258 HC RX REV CODE- 258: Performed by: HOSPITALIST

## 2018-02-03 PROCEDURE — 97530 THERAPEUTIC ACTIVITIES: CPT

## 2018-02-03 PROCEDURE — 74011636637 HC RX REV CODE- 636/637: Performed by: FAMILY MEDICINE

## 2018-02-03 RX ORDER — INSULIN GLARGINE 100 [IU]/ML
22 INJECTION, SOLUTION SUBCUTANEOUS
Status: DISCONTINUED | OUTPATIENT
Start: 2018-02-03 | End: 2018-02-04

## 2018-02-03 RX ORDER — INSULIN LISPRO 100 [IU]/ML
5 INJECTION, SOLUTION INTRAVENOUS; SUBCUTANEOUS
Status: DISCONTINUED | OUTPATIENT
Start: 2018-02-03 | End: 2018-02-05

## 2018-02-03 RX ORDER — INSULIN LISPRO 100 [IU]/ML
INJECTION, SOLUTION INTRAVENOUS; SUBCUTANEOUS
Status: DISCONTINUED | OUTPATIENT
Start: 2018-02-03 | End: 2018-02-05 | Stop reason: HOSPADM

## 2018-02-03 RX ORDER — VANCOMYCIN HCL IN 5 % DEXTROSE 1.25 G/25
1250 PLASTIC BAG, INJECTION (ML) INTRAVENOUS EVERY 12 HOURS
Status: DISCONTINUED | OUTPATIENT
Start: 2018-02-03 | End: 2018-02-05 | Stop reason: HOSPADM

## 2018-02-03 RX ADMIN — LISINOPRIL 2.5 MG: 5 TABLET ORAL at 08:30

## 2018-02-03 RX ADMIN — INSULIN LISPRO 5 UNITS: 100 INJECTION, SOLUTION INTRAVENOUS; SUBCUTANEOUS at 12:10

## 2018-02-03 RX ADMIN — GABAPENTIN 300 MG: 300 CAPSULE ORAL at 21:28

## 2018-02-03 RX ADMIN — CLOPIDOGREL BISULFATE 75 MG: 75 TABLET ORAL at 08:26

## 2018-02-03 RX ADMIN — FLUTICASONE FUROATE AND VILANTEROL TRIFENATATE 1 PUFF: 100; 25 POWDER RESPIRATORY (INHALATION) at 08:36

## 2018-02-03 RX ADMIN — INSULIN LISPRO 12 UNITS: 100 INJECTION, SOLUTION INTRAVENOUS; SUBCUTANEOUS at 12:10

## 2018-02-03 RX ADMIN — INSULIN LISPRO 9 UNITS: 100 INJECTION, SOLUTION INTRAVENOUS; SUBCUTANEOUS at 21:29

## 2018-02-03 RX ADMIN — LEVOTHYROXINE SODIUM 100 MCG: 100 TABLET ORAL at 07:20

## 2018-02-03 RX ADMIN — ASPIRIN 81 MG: 81 TABLET, COATED ORAL at 08:30

## 2018-02-03 RX ADMIN — INSULIN LISPRO 5 UNITS: 100 INJECTION, SOLUTION INTRAVENOUS; SUBCUTANEOUS at 08:31

## 2018-02-03 RX ADMIN — GABAPENTIN 300 MG: 300 CAPSULE ORAL at 08:30

## 2018-02-03 RX ADMIN — PIPERACILLIN SODIUM AND TAZOBACTAM SODIUM 3.38 G: 36; 4.5 INJECTION, POWDER, FOR SOLUTION INTRAVENOUS at 07:16

## 2018-02-03 RX ADMIN — INSULIN GLARGINE 22 UNITS: 100 INJECTION, SOLUTION SUBCUTANEOUS at 21:30

## 2018-02-03 RX ADMIN — SODIUM CHLORIDE 750 MG: 900 INJECTION, SOLUTION INTRAVENOUS at 01:39

## 2018-02-03 RX ADMIN — UMECLIDINIUM 1 PUFF: 62.5 AEROSOL, POWDER ORAL at 08:36

## 2018-02-03 RX ADMIN — OMEPRAZOLE 20 MG: 20 CAPSULE, DELAYED RELEASE ORAL at 08:30

## 2018-02-03 RX ADMIN — ATORVASTATIN CALCIUM 20 MG: 20 TABLET, FILM COATED ORAL at 08:26

## 2018-02-03 RX ADMIN — LORATADINE 10 MG: 10 TABLET ORAL at 08:30

## 2018-02-03 RX ADMIN — FLUTICASONE PROPIONATE 2 SPRAY: 50 SPRAY, METERED NASAL at 08:36

## 2018-02-03 RX ADMIN — ENOXAPARIN SODIUM 40 MG: 40 INJECTION SUBCUTANEOUS at 08:55

## 2018-02-03 RX ADMIN — INSULIN LISPRO 3 UNITS: 100 INJECTION, SOLUTION INTRAVENOUS; SUBCUTANEOUS at 17:19

## 2018-02-03 RX ADMIN — INSULIN LISPRO 2 UNITS: 100 INJECTION, SOLUTION INTRAVENOUS; SUBCUTANEOUS at 07:17

## 2018-02-03 RX ADMIN — MULTIPLE VITAMINS W/ MINERALS TAB 1 TABLET: TAB at 08:30

## 2018-02-03 RX ADMIN — VANCOMYCIN HYDROCHLORIDE 1250 MG: 10 INJECTION, POWDER, LYOPHILIZED, FOR SOLUTION INTRAVENOUS at 17:19

## 2018-02-03 RX ADMIN — DILTIAZEM HYDROCHLORIDE 360 MG: 180 CAPSULE, COATED, EXTENDED RELEASE ORAL at 08:30

## 2018-02-03 RX ADMIN — PIPERACILLIN SODIUM AND TAZOBACTAM SODIUM 3.38 G: 36; 4.5 INJECTION, POWDER, FOR SOLUTION INTRAVENOUS at 13:50

## 2018-02-03 RX ADMIN — DOCUSATE SODIUM 100 MG: 100 CAPSULE, LIQUID FILLED ORAL at 08:30

## 2018-02-03 RX ADMIN — DOCUSATE SODIUM 100 MG: 100 CAPSULE, LIQUID FILLED ORAL at 21:28

## 2018-02-03 RX ADMIN — Medication 500 MG: at 08:26

## 2018-02-03 RX ADMIN — PIPERACILLIN SODIUM AND TAZOBACTAM SODIUM 3.38 G: 36; 4.5 INJECTION, POWDER, FOR SOLUTION INTRAVENOUS at 01:00

## 2018-02-03 RX ADMIN — INSULIN LISPRO 5 UNITS: 100 INJECTION, SOLUTION INTRAVENOUS; SUBCUTANEOUS at 17:19

## 2018-02-03 RX ADMIN — PIPERACILLIN SODIUM AND TAZOBACTAM SODIUM 3.38 G: 36; 4.5 INJECTION, POWDER, FOR SOLUTION INTRAVENOUS at 21:28

## 2018-02-03 NOTE — PROGRESS NOTES
1930: Assumed patient care, he was alert and oriented to person, place, time and situation. Respiratory status was stable on room air . Vital signs were stable. MEWS score was a one. Patient denied any nausea vomiting dizziness or anxiety. White board was updated and explained. Bed was locked and in lowest position. Call bell, water and personal belongings were within reach. Patient had no questions, comments or concerns after bedside shift report. 0700: Patient had an uneventful shift. Respiratory status, vital signs and MEWS score remained stable. Patient was resting quietly with no signs of distress noted. Bed locked and in lowest position. Call bell water and personal belongings were within reach. Patient had no questions, comments or concerns after bedside shift report.

## 2018-02-03 NOTE — PROGRESS NOTES
Problem: Self Care Deficits Care Plan (Adult)  Goal: *Acute Goals and Plan of Care (Insert Text)  Initial Occupational Therapy Goals (2/1/2018) Within 7 day(s):    1. Patient will perform grooming seated EOB with setup x 5 minutes for increased independence with ADLs. 2. Patient will perform UB dressing with setup/Mildred for increased independence with ADLs. 3. Patient will perform functional transfer in prep for toileting with CGA for increased independence in ADLs  4. Patient will independently apply energy conservation techniques with 1 verbal cues for increased independence with ADLs. Occupational Therapy TREATMENT    Patient: Katherne Spatz Sr. (69 y.o. male)  Date: 2/3/2018  Diagnosis: UTI (urinary tract infection)  Sepsis ([facility] [address]  Chronic foot ulcer (HCC) Sepsis ([facility] [address]       Precautions: Aspiration, DNR, Fall, Skin  Chart, occupational therapy assessment, plan of care, and goals were reviewed. ASSESSMENT: Pt was supine with HOB elevated and agreeable to participate in OT. Pt performed supine to sit EOB transfer with SBA and extended time. Pt was able to tear the brief for removal with no A. Pt performed sit/stand transfer with Min-Mod A and RW. Pt required Max A overall to don new brief. Pt required CGA for safety with stand balance. Once seated, pt performed oral care with setup. Pt demo good sitting balance while completing. Pt transferred back to supine with SBA. Pt was left with all needs met. Pt was encouraged to eat meals while sitting on EOB or transfer to chair (with nursing assistance) in order to improve his overall endurance. Pt is highly motivated and cooperative. Progression toward goals:  [x]          Improving appropriately and progressing toward goals  []          Improving slowly and progressing toward goals  []          Not making progress toward goals and plan of care will be adjusted     PLAN:  Patient continues to benefit from skilled intervention to address the above impairments. Continue treatment per established plan of care. Discharge Recommendations:  [facility]  Further Equipment Recommendations for Discharge:  TBD     SUBJECTIVE:   Patient stated I'm doing good.     OBJECTIVE DATA SUMMARY:   Cognitive/Behavioral Status:  Neurologic State: Alert, Appropriate for age  Orientation Level: Appropriate for age, Oriented X4  Cognition: Appropriate decision making, Appropriate for age attention/concentration, Appropriate safety awareness, Follows commands  Safety/Judgement: Awareness of environment  Functional Mobility and Transfers for ADLs:   Bed Mobility:   Supine to Sit: Stand-by asssistance  Sit to Supine: Stand-by asssistance    Transfers:  Sit to Stand: Minimum assistance; Moderate assistance   ADL Intervention:   Grooming  Washing Face: Supervision/set-up (seated on EOB)  Brushing Teeth: Supervision/set-up (seated on EOB)  Lower Body Dressing Assistance  Underpants: Maximum assistance (donning of briefs)  Cognitive Retraining  Safety/Judgement: Awareness of environment  Pain:  Pain Scale 1: Numeric (0 - 10)  Pain Intensity 1: 0  Activity Tolerance:    Pt tolerated treatment well no signs of fatigue or increase pain    Please refer to the flowsheet for vital signs taken during this treatment.   After treatment:   []  Patient left in no apparent distress sitting up in chair  [x]  Patient left in no apparent distress in bed  [x]  Call bell left within reach  [x]  Nursing notified  [x]  Caregiver present (spouse)  []  Bed alarm activated    Crystal MACHO Mullen  Time Calculation: 39 mins

## 2018-02-03 NOTE — ROUTINE PROCESS
Bedside and Verbal shift change report given to DEDE Martínez (oncoming nurse) by SeeOn (offgoing nurse). Report included the following information SBAR, Kardex, Intake/Output, MAR, Recent Results and Cardiac Rhythm NSR.

## 2018-02-03 NOTE — ROUTINE PROCESS
1930 Bedside shift change report given to MARTINEZ Seay RN (oncoming nurse) by Dorothy Kebede RN (offgoing nurse). Report included the following information SBAR, Procedure Summary, Intake/Output, MAR, Recent Results, Med Rec Status and Cardiac Rhythm Sinus Tach.

## 2018-02-03 NOTE — DIABETES MGMT
NUTRITION / GLYCEMIC CONTROL PLAN OF CARE        Diabetes Management:      - recommend: add mealtime dose of Humalog 5 units qac, BG not in target in last 24 hours, required consistent corrective coverage at each meal & HS, received 16 units of corrective coverage yesterday, orders entered per protocol (Dr. Riri Francisco)  - goals:    * BG will be within target range of  mg/dl (non-ICU) by 2/6/18   * pt intake will average at least 75% of meals offered by 2/6/18    - TDD: 36 units (20 Lantus, 16 Humalog - all corrective)  - 36 hr BG range: 158-265 mg/dl  - Hypo: none  - BG in target range (non-ICU: <140; ICU<180): [] Yes  [x] No  - Steroids: none  - Intake: adequate   Patient Vitals for the past 100 hrs:   % Diet Eaten   02/02/18 1729 100 %   02/02/18 0902 80 %   02/01/18 2235 0 %   02/01/18 1930 0 %   02/01/18 1739 100 %   02/01/18 1300 50 %        Current Insulin regimen:   - Lantus 20 units qhd  - Humalog Normal Insulin Sensitivity Corrective Coverage    Home medication/insulin regimen: (per PTA list)  - Lantus 20 units qhs  - Novolog SSI qac  - Metformin 1000 mg BID    Recent Glucose Results: Lab Results   Component Value Date/Time     (H) 02/03/2018 04:44 AM    GLUCPOC 158 (H) 02/03/2018 06:08 AM    GLUCPOC 205 (H) 02/02/2018 08:57 PM    GLUCPOC 265 (H) 02/02/2018 04:23 PM           Adequate glycemic control PTA:  [x] Yes  [] No *within recommended range for age + comorbids    HbA1c: equivalent  to ave BGlucose of  mg/dl for 2-3 months prior to admission    Lab Results   Component Value Date/Time    Hemoglobin A1c 7.3 02/01/2018 07:50 AM         Diet:   Active Orders   Diet    DIET DIABETIC CONSISTENT CARB Regular; AHA-LOW-CHOL FAT         ANA Lamb, MPH, RD, CDE

## 2018-02-03 NOTE — PROGRESS NOTES
Hospitalist Progress Note    Patient: Alexis Gracia Sr. MRN: [id number]  CSN: [id number]    YOB: 1945  Age: 74 y.o. Sex: male    DOA: 2/1/2018 LOS:  LOS: 2 days            Assessment/Plan     Principal Problem:    Sepsis ([address] (11/4/2017)    Active Problems:    PAD (peripheral artery disease) ([address] (5/9/2017)      Diabetic ulcer of right midfoot associated with type 2 diabetes mellitus, with fat layer exposed ([address] (10/4/2017)      CAD (coronary artery disease) (11/4/2017)      COPD (chronic obstructive pulmonary disease) ([address] (11/4/2017)      HTN (hypertension) (11/4/2017)      UTI (urinary tract infection) due to urinary indwelling Sher catheter ([address] (11/4/2017)      PAF (paroxysmal atrial fibrillation) ([address] (11/5/2017)      UTI (urinary tract infection) (2/1/2018)      Diabetic ulcer of both lower extremities ([address] (2/1/2018)      Hyponatremia (2/1/2018)      Hypomagnesemia (2/1/2018)      Hypokalemia (2/2/2018)      Severe protein-calorie malnutrition ([address] (2/2/2018)    Sepsis/UTI, ulcer: Improving  Afebrile, so far cx no growth , hr is better   Source of infection- ulcer and uti       UTI complicated,catheter related. Cx reviewed/possible colonization. continue  zosyn       Diabatic ulcer of edwin LE, will continue wound care   On vanc /zosyn  pvl negative for dvt       DM type II , with complication: Complication with ulcer   Up the lantus to 22 QHS, ssi, diabetic diet , hypoglycemia protocol      Protein Kellen malnutrition: NP support with Glucerna       HTN, accelerated: Continue home medication.      Hyponatremia: Resolved per Ns infusion, will d/c fluid       PAD and cad: On plavix and aspirin       PAF: Continue cardizem,   Optimize electrolytes to keep K at 4, mg at 2      COPD: Stable. will continue breathing treatment     DNR    Disposition :2-3 day to SNF    Chief complaint: sepsis, uti,  Dm , hypokalemia, hypomagnesemia              Subjective:     Pt was seen and examined with the nurse in the morning round. Feeling good. No acute event overnight. Glucose been rising up. Review of systems  General: No fevers or chills. Cardiovascular: No chest pain or pressure. No palpitations. Pulmonary: No cough, SOB  Gastrointestinal: No nausea, vomiting. Objective:      Visit Vitals    /47 (BP 1 Location: Left arm, BP Patient Position: At rest;Post activity)    Pulse 78    Temp 98.2 °F (36.8 °C)    Resp 18    Ht 6' 2\" (1.88 m)    Wt 68.8 kg (151 lb 10.8 oz)    SpO2 98%    BMI 19.47 kg/m2       Physical Exam:    Gen: NAD, Frail   Heent:  MMM, NC, AT. Cor: s1s2 RRR. No MRG. PMI mid 5th intercostal space. Resp:  CTA b/l. No w/r/r. Nml effort and diaphragmatic excursion. Abd:  NT ND.  BS positive. No rebound or guarding. No masses. Ext: Dressing intact. Intake and Output:  Current Shift:  02/03 0701 - 02/03 1900  In: 700 [P.O.:600; I.V.:100]  Out: 625 [Urine:625]  Last three shifts:  02/01 1901 - 02/03 0700  In: 2097 [P.O.:1540; I.V.:557]  Out: 23647 [OHCDI:03801]    Labs: Results:       Chemistry Recent Labs      02/03/18 0444 02/02/18   0404  02/01/18   0750   GLU  178*  183*  124*   NA  129*  141  131*   K  3.6  3.3*  3.9   CL  96*  105  94*   CO2  29  24  27   BUN  7  7  7   CREA  0.63  0.56*  0.75   CA  8.3*  8.0*  8.7   AGAP  4  12  10   BUCR  11*  13  9*   AP   --    --   75   TP   --    --   7.0   ALB   --    --   3.0*   GLOB   --    --   4.0   AGRAT   --    --   0.8      CBC w/Diff Recent Labs      02/03/18 0444 02/02/18   0404  02/01/18   0750   WBC  4.1*  4.0*  11.2   RBC  3.90*  3.77*  3.85*   HGB  11.1*  10.9*  11.2*   HCT  34.0*  32.8*  33.7*   PLT  93*  79*  116*   GRANS  54  72  89*   LYMPH  18*  15*  5*   EOS  18*  5  1      Cardiac Enzymes No results for input(s): CPK, CKND1, SUNNY in the last 72 hours. No lab exists for component: CKRMB, TROIP   Coagulation No results for input(s): PTP, INR, APTT in the last 72 hours.     No lab exists for component: INREXT    Lipid Panel No results found for: CHOL, CHOLPOCT, CHOLX, CHLST, CHOLV, 408312, HDL, LDL, LDLC, DLDLP, 395363, VLDLC, VLDL, TGLX, TRIGL, TRIGP, TGLPOCT, CHHD, CHHDX   BNP No results for input(s): BNPP in the last 72 hours.    Liver Enzymes Recent Labs      02/01/18   0750   TP  7.0   ALB  3.0*   AP  75   SGOT  12*      Thyroid Studies Lab Results   Component Value Date/Time    TSH 0.90 11/04/2017 06:23 PM        Procedures/imaging: see electronic medical records for all procedures/Xrays and details which were not copied into this note but were reviewed prior to creation of Plan      Medications Reviewed  Manda Leyva MD

## 2018-02-03 NOTE — PROGRESS NOTES
Problem: Mobility Impaired (Adult and Pediatric)  Goal: *Acute Goals and Plan of Care (Insert Text)  Physical TherapyGoals   Initiated 2/1/2018 to be met within 5-7 days  1. Bed mobility: Rolling L to R to with min/SBA A with use of HR for positioning. 2. Transfer: Supine <> Sit with min A for change of position/prep for transfers. 3. Activity Tolerance: Tolerate EOB sitting 10-15 minutes for ADL/balance activities. 4. Ambulation:  Ambulate 10-20 ft. Min A with RW for increased functional mobility. Outcome: Progressing Towards Goal  physical Therapy TREATMENT    Patient: Jhoana Willett Sr. (69 y.o. male)  Date: 2/3/2018  Diagnosis: UTI (urinary tract infection)  Sepsis ([facility] [address]  Chronic foot ulcer (HCC) Sepsis ([facility] [address]       Precautions: Aspiration, DNR, Fall, Skin   Chart, physical therapy assessment, plan of care and goals were reviewed. ASSESSMENT:  Pt present with increased C/o right ankle discomfort and positioning. ableto increase to 61' of amb on singular trial, but refuses additional attempt. Passive stretch and exercises were well tolerated. Pt will be able to d/c to home with wife's assistance. Progression toward goals:  []      Improving appropriately and progressing toward goals  [x]      Improving slowly and progressing toward goals  []      Not making progress toward goals and plan of care will be adjusted     PLAN:  Patient continues to benefit from skilled intervention to address the above impairments. Continue treatment per established plan of care. Discharge Recommendations:  Home Health  Further Equipment Recommendations for Discharge:  N/A     SUBJECTIVE:   Patient stated I'm feeling pretty good today.     OBJECTIVE DATA SUMMARY:   Critical Behavior:  Neurologic State: Alert, Appropriate for age  Orientation Level: Appropriate for age, Oriented X4  Cognition: Appropriate decision making, Appropriate for age attention/concentration, Appropriate safety awareness, Follows commands  Safety/Judgement: Awareness of environment  Functional Mobility Training:  Bed Mobility:  Rolling: Supervision  Supine to Sit: Stand-by asssistance  Sit to Supine: Stand-by asssistance  Transfers:  Sit to Stand: Minimum assistance; Moderate assistance  Stand to Sit: Minimum assistance; Moderate assistance  Balance:  Sitting: Impaired  Sitting - Static: Good (unsupported)  Sitting - Dynamic: Fair (occasional)  Standing: Impaired; With support  Standing - Static: Fair  Standing - Dynamic : Fair  Ambulation/Gait Training:  Distance (ft): 60 Feet (ft)  Assistive Device: Gait belt;Walker, rolling  Ambulation - Level of Assistance: Supervision  Gait Abnormalities: Antalgic  Base of Support: Widened  Stance: Right decreased; Left decreased  Speed/Rachele: Shuffled  Step Length: Right shortened;Left shortened  Swing Pattern: Right asymmetrical;Left asymmetrical   Therapeutic Exercises:       EXERCISE   Sets   Reps   Active Active Assist   Passive Self ROM   Comments   Ankle Pumps 3 20 [x] [] [] []    Quad Sets/Glut Sets 1 10 [x] [] [] []    Hamstring Sets   [] [] [] []    Short Arc Quads   [] [] [] []    Heel Slides 1 10 [x] [] [] []    Straight Leg Raises 1 10 [x] [] [] []    Hip Abd/Add 1 10 [x] [] [] []    Long Arc Quads 1 10 [x] [] [] []    Seated Marching 1 10 [x] [] [] []    Standing Marching   [] [] [] []       [] [] [] []      Passive stretch of R ankle in DF and EV 4 x 45 sec  Pain:  Pain Scale 1: Numeric (0 - 10)  Pain Intensity 1: 0   Pain Out: 0  Activity Tolerance:   Good  Please refer to the flowsheet for vital signs taken during this treatment.   After treatment:   [] Patient left in no apparent distress sitting up in chair  [x] Patient left in no apparent distress in bed  [x] Call bell left within reach  [x] Nursing notified  [] Caregiver present  [] Bed alarm activated      Allyson Nicholls, PTA   Time Calculation: 38 mins

## 2018-02-03 NOTE — PROGRESS NOTES
Pharmacy Dosing Services: Vancomycin     Consult for Vancomycin Dosing by Pharmacy by Dr. Dayana Napier provided for this 74y.o. year old male , for indication of SSTI . Day of Therapy 3    Ht Readings from Last 1 Encounters:   02/02/18 188 cm (74\")        Wt Readings from Last 1 Encounters:   02/03/18 68.8 kg (151 lb 10.8 oz)      Previous Regimen Vancomycin 750 mg IV q12h   Last Level Trough = 5.7   Other Current Antibiotics Zosyn    Serum Creatinine Lab Results   Component Value Date/Time    Creatinine 0.63 02/03/2018 04:44 AM      Creatinine Clearance Estimated Creatinine Clearance: 100.1 mL/min (based on Cr of 0.63). BUN Lab Results   Component Value Date/Time    BUN 7 02/03/2018 04:44 AM      WBC Lab Results   Component Value Date/Time    WBC 4.1 02/03/2018 04:44 AM      H/H Lab Results   Component Value Date/Time    HGB 11.1 02/03/2018 04:44 AM      Platelets Lab Results   Component Value Date/Time    PLATELET 93 21/64/2995 04:44 AM      Temp 98.4 °F (36.9 °C)     Currently receiving Vancomycin 750 mg IV q12h   Trough = 5.7  Will increase to Vancomycin 1250 mg IV q12h       Dose calculated to approximate a therapeutic trough of 10-15 mcg/mL. Pharmacy to follow daily and will make changes to dose and/or frequency based on clinical status.     Pharmacist Harini Menard, [facility]

## 2018-02-03 NOTE — PROGRESS NOTES
0730 Assumed care of patient from [facility] Agnes Ramirez. 1143 Patient sliding scale moved to very insulin resistant. 1421 Patient had a uneventful shift.

## 2018-02-04 LAB
ANION GAP SERPL CALC-SCNC: 6 MMOL/L (ref 3–18)
BASOPHILS # BLD: 0 K/UL (ref 0–0.06)
BASOPHILS NFR BLD: 1 % (ref 0–2)
BUN SERPL-MCNC: 12 MG/DL (ref 7–18)
BUN/CREAT SERPL: 17 (ref 12–20)
CALCIUM SERPL-MCNC: 8.4 MG/DL (ref 8.5–10.1)
CHLORIDE SERPL-SCNC: 103 MMOL/L (ref 100–108)
CO2 SERPL-SCNC: 29 MMOL/L (ref 21–32)
CREAT SERPL-MCNC: 0.7 MG/DL (ref 0.6–1.3)
DIFFERENTIAL METHOD BLD: ABNORMAL
EOSINOPHIL # BLD: 1 K/UL (ref 0–0.4)
EOSINOPHIL NFR BLD: 22 % (ref 0–5)
ERYTHROCYTE [DISTWIDTH] IN BLOOD BY AUTOMATED COUNT: 14.4 % (ref 11.6–14.5)
GLUCOSE BLD STRIP.AUTO-MCNC: 248 MG/DL (ref 70–110)
GLUCOSE BLD STRIP.AUTO-MCNC: 280 MG/DL (ref 70–110)
GLUCOSE BLD STRIP.AUTO-MCNC: 298 MG/DL (ref 70–110)
GLUCOSE BLD STRIP.AUTO-MCNC: 311 MG/DL (ref 70–110)
GLUCOSE BLD STRIP.AUTO-MCNC: 347 MG/DL (ref 70–110)
GLUCOSE SERPL-MCNC: 295 MG/DL (ref 74–99)
HCT VFR BLD AUTO: 33.3 % (ref 36–48)
HGB BLD-MCNC: 11.1 G/DL (ref 13–16)
LYMPHOCYTES # BLD: 0.7 K/UL (ref 0.9–3.6)
LYMPHOCYTES NFR BLD: 16 % (ref 21–52)
MAGNESIUM SERPL-MCNC: 1.8 MG/DL (ref 1.6–2.6)
MCH RBC QN AUTO: 29.1 PG (ref 24–34)
MCHC RBC AUTO-ENTMCNC: 33.3 G/DL (ref 31–37)
MCV RBC AUTO: 87.2 FL (ref 74–97)
MONOCYTES # BLD: 0.4 K/UL (ref 0.05–1.2)
MONOCYTES NFR BLD: 8 % (ref 3–10)
NEUTS SEG # BLD: 2.4 K/UL (ref 1.8–8)
NEUTS SEG NFR BLD: 53 % (ref 40–73)
PLATELET # BLD AUTO: 116 K/UL (ref 135–420)
PMV BLD AUTO: 10 FL (ref 9.2–11.8)
POTASSIUM SERPL-SCNC: 4.1 MMOL/L (ref 3.5–5.5)
RBC # BLD AUTO: 3.82 M/UL (ref 4.7–5.5)
SODIUM SERPL-SCNC: 138 MMOL/L (ref 136–145)
WBC # BLD AUTO: 4.4 K/UL (ref 4.6–13.2)

## 2018-02-04 PROCEDURE — 97116 GAIT TRAINING THERAPY: CPT

## 2018-02-04 PROCEDURE — 74011636637 HC RX REV CODE- 636/637: Performed by: FAMILY MEDICINE

## 2018-02-04 PROCEDURE — 82962 GLUCOSE BLOOD TEST: CPT

## 2018-02-04 PROCEDURE — 74011636637 HC RX REV CODE- 636/637: Performed by: HOSPITALIST

## 2018-02-04 PROCEDURE — 85025 COMPLETE CBC W/AUTO DIFF WBC: CPT | Performed by: HOSPITALIST

## 2018-02-04 PROCEDURE — 74011250637 HC RX REV CODE- 250/637: Performed by: HOSPITALIST

## 2018-02-04 PROCEDURE — 80048 BASIC METABOLIC PNL TOTAL CA: CPT | Performed by: HOSPITALIST

## 2018-02-04 PROCEDURE — 83735 ASSAY OF MAGNESIUM: CPT | Performed by: HOSPITALIST

## 2018-02-04 PROCEDURE — 65660000000 HC RM CCU STEPDOWN

## 2018-02-04 PROCEDURE — 74011000258 HC RX REV CODE- 258: Performed by: HOSPITALIST

## 2018-02-04 PROCEDURE — 74011250636 HC RX REV CODE- 250/636: Performed by: HOSPITALIST

## 2018-02-04 PROCEDURE — 36415 COLL VENOUS BLD VENIPUNCTURE: CPT | Performed by: HOSPITALIST

## 2018-02-04 PROCEDURE — 97530 THERAPEUTIC ACTIVITIES: CPT

## 2018-02-04 RX ORDER — INSULIN GLARGINE 100 [IU]/ML
24 INJECTION, SOLUTION SUBCUTANEOUS
Status: DISCONTINUED | OUTPATIENT
Start: 2018-02-04 | End: 2018-02-05

## 2018-02-04 RX ADMIN — UMECLIDINIUM 1 PUFF: 62.5 AEROSOL, POWDER ORAL at 10:28

## 2018-02-04 RX ADMIN — GABAPENTIN 300 MG: 300 CAPSULE ORAL at 21:37

## 2018-02-04 RX ADMIN — INSULIN LISPRO 5 UNITS: 100 INJECTION, SOLUTION INTRAVENOUS; SUBCUTANEOUS at 17:05

## 2018-02-04 RX ADMIN — ATORVASTATIN CALCIUM 20 MG: 20 TABLET, FILM COATED ORAL at 10:24

## 2018-02-04 RX ADMIN — FLUTICASONE FUROATE AND VILANTEROL TRIFENATATE 1 PUFF: 100; 25 POWDER RESPIRATORY (INHALATION) at 10:26

## 2018-02-04 RX ADMIN — OMEPRAZOLE 20 MG: 20 CAPSULE, DELAYED RELEASE ORAL at 09:00

## 2018-02-04 RX ADMIN — INSULIN LISPRO 5 UNITS: 100 INJECTION, SOLUTION INTRAVENOUS; SUBCUTANEOUS at 13:07

## 2018-02-04 RX ADMIN — DOCUSATE SODIUM 100 MG: 100 CAPSULE, LIQUID FILLED ORAL at 10:24

## 2018-02-04 RX ADMIN — INSULIN LISPRO 12 UNITS: 100 INJECTION, SOLUTION INTRAVENOUS; SUBCUTANEOUS at 13:08

## 2018-02-04 RX ADMIN — FLUTICASONE PROPIONATE 2 SPRAY: 50 SPRAY, METERED NASAL at 10:25

## 2018-02-04 RX ADMIN — DILTIAZEM HYDROCHLORIDE 360 MG: 180 CAPSULE, COATED, EXTENDED RELEASE ORAL at 10:21

## 2018-02-04 RX ADMIN — DOCUSATE SODIUM 100 MG: 100 CAPSULE, LIQUID FILLED ORAL at 21:37

## 2018-02-04 RX ADMIN — INSULIN LISPRO 12 UNITS: 100 INJECTION, SOLUTION INTRAVENOUS; SUBCUTANEOUS at 17:04

## 2018-02-04 RX ADMIN — PIPERACILLIN SODIUM AND TAZOBACTAM SODIUM 3.38 G: 36; 4.5 INJECTION, POWDER, FOR SOLUTION INTRAVENOUS at 21:36

## 2018-02-04 RX ADMIN — LORATADINE 10 MG: 10 TABLET ORAL at 10:23

## 2018-02-04 RX ADMIN — GABAPENTIN 300 MG: 300 CAPSULE ORAL at 10:23

## 2018-02-04 RX ADMIN — LISINOPRIL 2.5 MG: 5 TABLET ORAL at 10:23

## 2018-02-04 RX ADMIN — INSULIN LISPRO 9 UNITS: 100 INJECTION, SOLUTION INTRAVENOUS; SUBCUTANEOUS at 21:37

## 2018-02-04 RX ADMIN — INSULIN LISPRO 5 UNITS: 100 INJECTION, SOLUTION INTRAVENOUS; SUBCUTANEOUS at 08:42

## 2018-02-04 RX ADMIN — VANCOMYCIN HYDROCHLORIDE 1250 MG: 10 INJECTION, POWDER, LYOPHILIZED, FOR SOLUTION INTRAVENOUS at 04:48

## 2018-02-04 RX ADMIN — Medication 500 MG: at 10:25

## 2018-02-04 RX ADMIN — MULTIPLE VITAMINS W/ MINERALS TAB 1 TABLET: TAB at 10:21

## 2018-02-04 RX ADMIN — ASPIRIN 81 MG: 81 TABLET, COATED ORAL at 10:24

## 2018-02-04 RX ADMIN — PIPERACILLIN SODIUM AND TAZOBACTAM SODIUM 3.38 G: 36; 4.5 INJECTION, POWDER, FOR SOLUTION INTRAVENOUS at 14:40

## 2018-02-04 RX ADMIN — CLOPIDOGREL BISULFATE 75 MG: 75 TABLET ORAL at 10:22

## 2018-02-04 RX ADMIN — PIPERACILLIN SODIUM AND TAZOBACTAM SODIUM 3.38 G: 36; 4.5 INJECTION, POWDER, FOR SOLUTION INTRAVENOUS at 03:40

## 2018-02-04 RX ADMIN — INSULIN LISPRO 9 UNITS: 100 INJECTION, SOLUTION INTRAVENOUS; SUBCUTANEOUS at 06:45

## 2018-02-04 RX ADMIN — PIPERACILLIN SODIUM AND TAZOBACTAM SODIUM 3.38 G: 36; 4.5 INJECTION, POWDER, FOR SOLUTION INTRAVENOUS at 08:43

## 2018-02-04 RX ADMIN — ENOXAPARIN SODIUM 40 MG: 40 INJECTION SUBCUTANEOUS at 12:00

## 2018-02-04 RX ADMIN — INSULIN GLARGINE 24 UNITS: 100 INJECTION, SOLUTION SUBCUTANEOUS at 21:37

## 2018-02-04 RX ADMIN — VANCOMYCIN HYDROCHLORIDE 1250 MG: 10 INJECTION, POWDER, LYOPHILIZED, FOR SOLUTION INTRAVENOUS at 17:04

## 2018-02-04 RX ADMIN — LEVOTHYROXINE SODIUM 100 MCG: 100 TABLET ORAL at 06:44

## 2018-02-04 NOTE — PROGRESS NOTES
1943: Assumed care of pt; Arrived to find pt. Resting quietly in bed; No signs or symptoms of discomfort; status stable    2128: PM meds administered without difficulty; Pt. assisted on to bedpan and passed large soft stool; Pt. repositioned for comfort     0011: Reassessment performed; Pt. Resting quietly with eyes closed; Will continue to monitor    0340: Reassessment performed; No Change in status; Scheduled antibiotics infusing    0649: Shift otherwise uneventful; status stable     0725: Bedside shift change report given to Anne Charles (oncoming nurse) by PIPER Maharaj (offgoing nurse). Report included the following information SBAR.

## 2018-02-04 NOTE — PROGRESS NOTES
Problem: Mobility Impaired (Adult and Pediatric)  Goal: *Acute Goals and Plan of Care (Insert Text)  Physical TherapyGoals   Initiated 2/1/2018 to be met within 5-7 days  1. Bed mobility: Rolling L to R to with min/SBA A with use of HR for positioning. 2. Transfer: Supine <> Sit with min A for change of position/prep for transfers. 3. Activity Tolerance: Tolerate EOB sitting 10-15 minutes for ADL/balance activities. 4. Ambulation:  Ambulate 10-20 ft. Min A with RW for increased functional mobility. Reviewed and updated 2/4/2018 to be met within 5-7 days  1. Bed mobility: Rolling L to R to with supervision with use of HR for positioning. 2. Transfer: Supine <> Sit with supervision for change of position/prep for transfers. 3. Activity Tolerance: Tolerate EOB sitting 10-15 minutes for ADL/balance activities. 4. Ambulation:  Ambulate 75 ft. supervision with RW for increased functional mobility. Outcome: Progressing Towards Goal  physical Therapy TREATMENT    Patient: Trina Kan  (69 y.o. male)  Date: 2/4/2018  Diagnosis: UTI (urinary tract infection)  Sepsis (HCC)  Chronic foot ulcer (HCC) Sepsis ([facility] [address]  Precautions: Aspiration, DNR, Fall, Skin   Chart, physical therapy assessment, plan of care and goals were reviewed. ASSESSMENT:  Pt moved to EOB with supervision. Pt did require Min-ModA to move to standing from elevated bed. Pt amb 40 ft c/RW, CGA for safety; no heel strike on R, unable to reach foot flat on R due to muscle tightness. Pt reports he ambulates with rollator at home about 50 ft to bathroom etc. He reports he does have a lift chair at home however doesn't always use it. Pt fatigued after this amb and declined further attempts. He reports he was unable to sleep much last and is overall more fatigued today. Pt left supine in bed with all needs met and call bell within reach.    Progression toward goals:  []      Improving appropriately and progressing toward goals  [x]      Improving slowly and progressing toward goals  []      Not making progress toward goals and plan of care will be adjusted     PLAN:  Patient continues to benefit from skilled intervention to address the above impairments. Continue treatment per established plan of care. Discharge Recommendations:  Home Health with assistance  Further Equipment Recommendations for Discharge:  N/A     SUBJECTIVE:   Patient stated I am doing ok.     OBJECTIVE DATA SUMMARY:   Critical Behavior:  Neurologic State: Alert  Orientation Level: Oriented X4  Cognition: Follows commands  Safety/Judgement: Awareness of environment  Functional Mobility Training:  Bed Mobility:  Supine to Sit: Supervision  Sit to Supine: Supervision  Transfers:  Sit to Stand: Minimum assistance; Moderate assistance  Stand to Sit: Minimum assistance  Balance:  Sitting: Impaired  Sitting - Static: Good (unsupported)  Sitting - Dynamic: Fair (occasional)  Standing: Impaired; With support  Standing - Static: Fair  Standing - Dynamic : Fair  Ambulation/Gait Training:  Distance (ft): 40 Feet (ft)  Assistive Device: Gait belt;Walker, rolling  Ambulation - Level of Assistance: Contact guard assistance  Gait Abnormalities: Decreased step clearance; Antalgic;Early heel rise; Step to gait (decreased heel strike, toe walking on R)  Base of Support: Narrowed  Stance: Right decreased;Time;Weight shift  Speed/Rachele: Slow;Shuffled  Step Length: Right shortened;Left shortened  Swing Pattern: Left asymmetrical;Right asymmetrical  Pain:  Pain Scale 1: Numeric (0 - 10)  Pain Intensity 1: 0  Activity Tolerance:   Fair  Please refer to the flowsheet for vital signs taken during this treatment.   After treatment:   [] Patient left in no apparent distress sitting up in chair  [x] Patient left in no apparent distress in bed  [x] Call bell left within reach  [x] Nursing notified  [] Caregiver present  [] Bed alarm activated      Abdi Guardian Titcomb   Time Calculation: 25 mins

## 2018-02-04 NOTE — PROGRESS NOTES
Hospitalist Progress Note    Patient: Niruka Weinberg Sr. MRN: [id number]  CSN: [id number]    YOB: 1945  Age: 74 y.o. Sex: male    DOA: 2/1/2018 LOS:  LOS: 3 days            Assessment/Plan     Principal Problem:    Sepsis ([address] (11/4/2017)    Active Problems:    PAD (peripheral artery disease) ([address] (5/9/2017)      Diabetic ulcer of right midfoot associated with type 2 diabetes mellitus, with fat layer exposed ([address] (10/4/2017)      CAD (coronary artery disease) (11/4/2017)      COPD (chronic obstructive pulmonary disease) ([address] (11/4/2017)      HTN (hypertension) (11/4/2017)      UTI (urinary tract infection) due to urinary indwelling Sher catheter ([address] (11/4/2017)      PAF (paroxysmal atrial fibrillation) ([address] (11/5/2017)      UTI (urinary tract infection) (2/1/2018)      Diabetic ulcer of both lower extremities ([address] (2/1/2018)      Hyponatremia (2/1/2018)      Hypomagnesemia (2/1/2018)      Hypokalemia (2/2/2018)      Severe protein-calorie malnutrition ([address] (2/2/2018)    Sepsis/UTI, ulcer: Improving  Afebrile, so far cx no growth , hr is better   Source of infection- ulcer and uti       UTI complicated,catheter related. Cx reviewed/possible colonization. continue  zosyn       Diabatic ulcer of edwin LE, will continue wound care   On vanc /zosyn  pvl negative for dvt       DM type II , with complication: Complication with ulcer   Up the lantus to 24 QHS, ssi, diabetic diet , hypoglycemia protocol       Protein Kellen malnutrition: NP support with Glucerna       HTN, accelerated: Continue home medication.      Hyponatremia: Resolved per Ns infusion, will d/c fluid       PAD and cad: On plavix and aspirin       PAF: Continue cardizem,   Optimize electrolytes to keep K at 4, mg at 2        COPD: Stable. will continue breathing treatment      DNR     Disposition :1-2 day to SNF     Chief complaint: sepsis, uti,  Dm , hypokalemia, hypomagnesemia     Subjective:     Pt was seen and examined with the nurse in the morning round. No acute event overnight. No fevers/chills. Review of systems  General: No fevers or chills. Cardiovascular: No chest pain or pressure. No palpitations. Pulmonary: No cough, SOB  Gastrointestinal: No nausea, vomiting. Objective:      Visit Vitals    /49 (BP 1 Location: Left arm, BP Patient Position: At rest)    Pulse 98    Temp 98.5 °F (36.9 °C)    Resp 18    Ht 6' 2\" (1.88 m)    Wt 68.6 kg (151 lb 3.8 oz)    SpO2 100%    BMI 19.42 kg/m2       Physical Exam:    Gen: NAD, frail. Heent:  MMM, NC, AT. Cor: s1s2 RRR. No MRG. PMI mid 5th intercostal space. Resp:  CTA b/l. No w/r/r. Nml effort and diaphragmatic excursion. Abd:  NT ND.  BS positive. No rebound or guarding. No masses. Ext: dressing intact with ulnar boots to bilateral legs. Intake and Output:  Current Shift:  02/04 0701 - 02/04 1900  In: 240 [P.O.:240]  Out: 850 [Urine:850]  Last three shifts:  02/02 1901 - 02/04 0700  In: 9906 [P.O.:920; I.V.:742]  Out: 95530 [FAPSQ:35722]    Labs: Results:       Chemistry Recent Labs      02/04/18 0318 02/03/18   0444  02/02/18   0404   GLU  295*  178*  183*   NA  138  129*  141   K  4.1  3.6  3.3*   CL  103  96*  105   CO2  29  29  24   BUN  12  7  7   CREA  0.70  0.63  0.56*   CA  8.4*  8.3*  8.0*   AGAP  6  4  12   BUCR  17  11*  13      CBC w/Diff Recent Labs      02/04/18 0318 02/03/18   0444  02/02/18   0404   WBC  4.4*  4.1*  4.0*   RBC  3.82*  3.90*  3.77*   HGB  11.1*  11.1*  10.9*   HCT  33.3*  34.0*  32.8*   PLT  116*  93*  79*   GRANS  53  54  72   LYMPH  16*  18*  15*   EOS  22*  18*  5      Cardiac Enzymes No results for input(s): CPK, CKND1, SUNNY in the last 72 hours. No lab exists for component: CKRMB, TROIP   Coagulation No results for input(s): PTP, INR, APTT in the last 72 hours.     No lab exists for component: INREXT    Lipid Panel No results found for: CHOL, CHOLPOCT, CHOLX, CHLST, CHOLV, 804620, HDL, LDL, LDLC, DLDLP, 867074, VLDLC, VLDL, TGLX, TRIGL, TRIGP, TGLPOCT, CHHD, CHHDX   BNP No results for input(s): BNPP in the last 72 hours. Liver Enzymes No results for input(s): TP, ALB, TBIL, AP, SGOT, GPT in the last 72 hours.     No lab exists for component: DBIL   Thyroid Studies Lab Results   Component Value Date/Time    TSH 0.90 11/04/2017 06:23 PM        Procedures/imaging: see electronic medical records for all procedures/Xrays and details which were not copied into this note but were reviewed prior to creation of Plan      Medications Reviewed  Zayda Soto MD

## 2018-02-04 NOTE — PROGRESS NOTES
Problem: Falls - Risk of  Goal: *Absence of Falls  Document Donald Fall Risk and appropriate interventions in the flowsheet.    Fall Risk Interventions:  Mobility Interventions: Bed/chair exit alarm         Medication Interventions: Bed/chair exit alarm, Patient to call before getting OOB    Elimination Interventions: Bed/chair exit alarm, Call light in reach, Patient to call for help with toileting needs

## 2018-02-04 NOTE — ROUTINE PROCESS
Bedside and Verbal shift change report given to J. Cheryle Slot RN  (oncoming nurse) by DEDE Ramirez RN  (offgoing nurse). Report included the following information SBAR, Kardex, Intake/Output, MAR and Recent Results.

## 2018-02-04 NOTE — PROGRESS NOTES
[address] responsibility for patient From Rodger Zamora, RN    0357 Morning insulin and zosyn administered    1025 Morning medications and assessment completed    1200 medications given    1308 Midday insulin given    1704 Evening insulin given    Bedside shift change report given to Rodger Zamora RN (oncoming nurse) by Helen Godinez RN (offgoing nurse). Report included the following information SBAR, Kardex and MAR.

## 2018-02-04 NOTE — PROGRESS NOTES
1943: Assumed care of pt; Arrived to find pt. Resting quietly in bed; No signs or symptoms of discomfort; status stable    2128: PM meds administered without difficulty; Pt. assisted on to bedpan and passed large soft stool; Pt. repositioned for comfort     0011: Reassessment performed; Pt. Resting quietly with eyes closed; Will continue to monitor    0340: Reassessment performed;  No Change in status; Scheduled antibiotics infusing

## 2018-02-05 VITALS
HEIGHT: 74 IN | OXYGEN SATURATION: 97 % | RESPIRATION RATE: 18 BRPM | HEART RATE: 103 BPM | BODY MASS INDEX: 18.93 KG/M2 | TEMPERATURE: 98.9 F | SYSTOLIC BLOOD PRESSURE: 113 MMHG | DIASTOLIC BLOOD PRESSURE: 48 MMHG | WEIGHT: 147.49 LBS

## 2018-02-05 LAB
ANION GAP SERPL CALC-SCNC: 7 MMOL/L (ref 3–18)
BASOPHILS # BLD: 0 K/UL (ref 0–0.06)
BASOPHILS NFR BLD: 0 % (ref 0–2)
BUN SERPL-MCNC: 18 MG/DL (ref 7–18)
BUN/CREAT SERPL: 29 (ref 12–20)
CALCIUM SERPL-MCNC: 8.9 MG/DL (ref 8.5–10.1)
CHLORIDE SERPL-SCNC: 103 MMOL/L (ref 100–108)
CO2 SERPL-SCNC: 28 MMOL/L (ref 21–32)
CREAT SERPL-MCNC: 0.62 MG/DL (ref 0.6–1.3)
DIFFERENTIAL METHOD BLD: ABNORMAL
EOSINOPHIL # BLD: 1.1 K/UL (ref 0–0.4)
EOSINOPHIL NFR BLD: 23 % (ref 0–5)
ERYTHROCYTE [DISTWIDTH] IN BLOOD BY AUTOMATED COUNT: 14.3 % (ref 11.6–14.5)
GLUCOSE BLD STRIP.AUTO-MCNC: 261 MG/DL (ref 70–110)
GLUCOSE BLD STRIP.AUTO-MCNC: 325 MG/DL (ref 70–110)
GLUCOSE SERPL-MCNC: 263 MG/DL (ref 74–99)
HCT VFR BLD AUTO: 34.7 % (ref 36–48)
HGB BLD-MCNC: 11.4 G/DL (ref 13–16)
LYMPHOCYTES # BLD: 0.8 K/UL (ref 0.9–3.6)
LYMPHOCYTES NFR BLD: 17 % (ref 21–52)
MAGNESIUM SERPL-MCNC: 1.9 MG/DL (ref 1.6–2.6)
MCH RBC QN AUTO: 28.6 PG (ref 24–34)
MCHC RBC AUTO-ENTMCNC: 32.9 G/DL (ref 31–37)
MCV RBC AUTO: 87 FL (ref 74–97)
MONOCYTES # BLD: 0.4 K/UL (ref 0.05–1.2)
MONOCYTES NFR BLD: 8 % (ref 3–10)
NEUTS SEG # BLD: 2.4 K/UL (ref 1.8–8)
NEUTS SEG NFR BLD: 52 % (ref 40–73)
PLATELET # BLD AUTO: 127 K/UL (ref 135–420)
PMV BLD AUTO: 9.4 FL (ref 9.2–11.8)
POTASSIUM SERPL-SCNC: 4.5 MMOL/L (ref 3.5–5.5)
RBC # BLD AUTO: 3.99 M/UL (ref 4.7–5.5)
SODIUM SERPL-SCNC: 138 MMOL/L (ref 136–145)
WBC # BLD AUTO: 4.6 K/UL (ref 4.6–13.2)

## 2018-02-05 PROCEDURE — 74011636637 HC RX REV CODE- 636/637: Performed by: FAMILY MEDICINE

## 2018-02-05 PROCEDURE — 36415 COLL VENOUS BLD VENIPUNCTURE: CPT | Performed by: HOSPITALIST

## 2018-02-05 PROCEDURE — 97140 MANUAL THERAPY 1/> REGIONS: CPT

## 2018-02-05 PROCEDURE — 85025 COMPLETE CBC W/AUTO DIFF WBC: CPT | Performed by: HOSPITALIST

## 2018-02-05 PROCEDURE — 80048 BASIC METABOLIC PNL TOTAL CA: CPT | Performed by: HOSPITALIST

## 2018-02-05 PROCEDURE — 82962 GLUCOSE BLOOD TEST: CPT

## 2018-02-05 PROCEDURE — 97530 THERAPEUTIC ACTIVITIES: CPT

## 2018-02-05 PROCEDURE — 74011000258 HC RX REV CODE- 258: Performed by: HOSPITALIST

## 2018-02-05 PROCEDURE — 83735 ASSAY OF MAGNESIUM: CPT | Performed by: HOSPITALIST

## 2018-02-05 PROCEDURE — 74011636637 HC RX REV CODE- 636/637: Performed by: HOSPITALIST

## 2018-02-05 PROCEDURE — 74011250636 HC RX REV CODE- 250/636: Performed by: HOSPITALIST

## 2018-02-05 PROCEDURE — 74011250637 HC RX REV CODE- 250/637: Performed by: HOSPITALIST

## 2018-02-05 RX ORDER — INSULIN LISPRO 100 [IU]/ML
15 INJECTION, SOLUTION INTRAVENOUS; SUBCUTANEOUS
Status: DISCONTINUED | OUTPATIENT
Start: 2018-02-05 | End: 2018-02-05 | Stop reason: HOSPADM

## 2018-02-05 RX ORDER — DOXYCYCLINE 100 MG/1
100 CAPSULE ORAL 2 TIMES DAILY
Qty: 14 CAP | Refills: 0 | Status: SHIPPED | OUTPATIENT
Start: 2018-02-05 | End: 2018-02-12

## 2018-02-05 RX ORDER — INSULIN GLARGINE 100 [IU]/ML
30 INJECTION, SOLUTION SUBCUTANEOUS
Status: DISCONTINUED | OUTPATIENT
Start: 2018-02-05 | End: 2018-02-05 | Stop reason: HOSPADM

## 2018-02-05 RX ORDER — AMOXICILLIN 500 MG/1
500 CAPSULE ORAL 2 TIMES DAILY
Qty: 14 CAP | Refills: 0 | Status: SHIPPED | OUTPATIENT
Start: 2018-02-05 | End: 2018-02-12

## 2018-02-05 RX ADMIN — INSULIN LISPRO 9 UNITS: 100 INJECTION, SOLUTION INTRAVENOUS; SUBCUTANEOUS at 12:19

## 2018-02-05 RX ADMIN — INSULIN LISPRO 15 UNITS: 100 INJECTION, SOLUTION INTRAVENOUS; SUBCUTANEOUS at 09:07

## 2018-02-05 RX ADMIN — PIPERACILLIN SODIUM AND TAZOBACTAM SODIUM 3.38 G: 36; 4.5 INJECTION, POWDER, FOR SOLUTION INTRAVENOUS at 03:30

## 2018-02-05 RX ADMIN — DILTIAZEM HYDROCHLORIDE 360 MG: 180 CAPSULE, COATED, EXTENDED RELEASE ORAL at 09:10

## 2018-02-05 RX ADMIN — LORATADINE 10 MG: 10 TABLET ORAL at 09:11

## 2018-02-05 RX ADMIN — INSULIN LISPRO 12 UNITS: 100 INJECTION, SOLUTION INTRAVENOUS; SUBCUTANEOUS at 09:09

## 2018-02-05 RX ADMIN — ATORVASTATIN CALCIUM 20 MG: 20 TABLET, FILM COATED ORAL at 09:10

## 2018-02-05 RX ADMIN — VANCOMYCIN HYDROCHLORIDE 1250 MG: 10 INJECTION, POWDER, LYOPHILIZED, FOR SOLUTION INTRAVENOUS at 05:36

## 2018-02-05 RX ADMIN — INSULIN LISPRO 15 UNITS: 100 INJECTION, SOLUTION INTRAVENOUS; SUBCUTANEOUS at 12:18

## 2018-02-05 RX ADMIN — PIPERACILLIN SODIUM AND TAZOBACTAM SODIUM 3.38 G: 36; 4.5 INJECTION, POWDER, FOR SOLUTION INTRAVENOUS at 10:07

## 2018-02-05 RX ADMIN — LISINOPRIL 2.5 MG: 5 TABLET ORAL at 09:11

## 2018-02-05 RX ADMIN — MULTIPLE VITAMINS W/ MINERALS TAB 1 TABLET: TAB at 09:10

## 2018-02-05 RX ADMIN — FLUTICASONE PROPIONATE 2 SPRAY: 50 SPRAY, METERED NASAL at 12:06

## 2018-02-05 RX ADMIN — ASPIRIN 81 MG: 81 TABLET, COATED ORAL at 09:10

## 2018-02-05 RX ADMIN — FLUTICASONE FUROATE AND VILANTEROL TRIFENATATE 1 PUFF: 100; 25 POWDER RESPIRATORY (INHALATION) at 12:06

## 2018-02-05 RX ADMIN — LEVOTHYROXINE SODIUM 100 MCG: 100 TABLET ORAL at 07:29

## 2018-02-05 RX ADMIN — OMEPRAZOLE 20 MG: 20 CAPSULE, DELAYED RELEASE ORAL at 09:10

## 2018-02-05 RX ADMIN — GABAPENTIN 300 MG: 300 CAPSULE ORAL at 09:10

## 2018-02-05 RX ADMIN — Medication 500 MG: at 09:10

## 2018-02-05 RX ADMIN — CLOPIDOGREL BISULFATE 75 MG: 75 TABLET ORAL at 09:10

## 2018-02-05 RX ADMIN — ENOXAPARIN SODIUM 40 MG: 40 INJECTION SUBCUTANEOUS at 12:18

## 2018-02-05 NOTE — DIABETES MGMT
GLYCEMIC CONTROL & NUTRITION:    - fasting & post-prandial BG continue out of target, recommend increase basal/bolus regimen to the following   * Lantus 30 units qhs   * Humalog 15 units qac   * Humalog Very Insulin Resistant Corrective Coverage  - orders entered per protocol, Dr. Loree Shane.  Marycarmen Riley, MPH, RD, CDE

## 2018-02-05 NOTE — DISCHARGE INSTRUCTIONS
Diabetic Foot Ulcer: Care Instructions  Your Care Instructions  Diabetes can damage the nerve endings and blood vessels in your feet. That means you are less likely to notice when your feet are injured. A small skin problem like a callus, blister, or cracked skin can turn into a larger sore, called a foot ulcer. Foot ulcers form most often on the pad (ball) of the foot or the bottom of the big toe. You can also get them on the top and bottom of each toe. Foot ulcers can get infected. If the infection is severe, then tissue in the foot can die. This is called gangrene. In that case, one or more of the toes, part or all of the foot, and sometimes part of the leg may have to be removed (amputated). Your doctor may have removed the dead tissue and cleaned the ulcer. Your foot wound may be wrapped in a protective bandage. It is very important to keep your weight off your injured foot. After a foot ulcer has formed, it will not heal as long as you keep putting weight on the area. Always get early treatment for foot problems. A minor irritation can lead to a major problem if it's not taken care of soon. Follow-up care is a key part of your treatment and safety. Be sure to make and go to all appointments, and call your doctor if you are having problems. It's also a good idea to know your test results and keep a list of the medicines you take. How can you care for yourself at home? · Follow your doctor's instructions about keeping pressure off the foot ulcer. You may need to use crutches or a wheelchair. Or you may wear a cast or a walking boot. · Follow your doctor's instructions on how to clean the ulcer and change the bandage. · If your doctor prescribed antibiotics, take them as directed. Do not stop taking them just because you feel better. You need to take the full course of antibiotics. To prevent foot ulcers  · Keep your blood sugar close to normal by watching what and how much you eat.  Track your blood sugar, take medicines if prescribed, and get regular exercise. · Do not smoke. Smoking affects blood flow and can make foot problems worse. If you need help quitting, talk to your doctor about stop-smoking programs and medicines. These can increase your chances of quitting for good. · Do not go barefoot. Protect your feet by wearing shoes that fit well. Choose shoes that are made of materials that are flexible and breathable, such as leather or cloth. · Inspect your feet daily for blisters, cuts, cracks, or sores. If you can't see well, use a mirror or have someone help you. · Have your doctor check your feet during each visit. If you have a foot problem, see your doctor. Do not try to treat your foot problem on your own. Home remedies or treatments that you can buy without a prescription (such as corn removers) can be harmful. When should you call for help? Call your doctor now or seek immediate medical care if:  ? · You have symptoms of infection, such as:  ¨ Increased pain, swelling, warmth, or redness. ¨ Red streaks leading from the area. ¨ Pus draining from the area. ¨ A fever. ? Watch closely for changes in your health, and be sure to contact your doctor if:  ? · You have a new problem with your feet, such as:  ¨ A new sore or ulcer. ¨ A break in the skin that is not healing after several days. ¨ Bleeding corns or calluses. ¨ An ingrown toenail. ? · You do not get better as expected. Where can you learn more? Go to http://mariella-kenyon.info/. Enter T131 in the search box to learn more about \"Diabetic Foot Ulcer: Care Instructions. \"  Current as of: March 13, 2017  Content Version: 11.4  © 1006-6665 [employer]. Care instructions adapted under license by [employer] (which disclaims liability or warranty for this information).  If you have questions about a medical condition or this instruction, always ask your healthcare professional. [employer], [facility] disclaims any warranty or liability for your use of this information. Learning About Sepsis  What is sepsis? Sepsis is an intense reaction to an infection that can cause life-threatening damage to the body. The body reacts to the infection with widespread inflammation. It can damage tissue and organs and lead to very low blood pressure. Sepsis requires immediate care in a hospital. People with sepsis might need to be treated in an intensive care unit (ICU) for several days or weeks. An ICU is a part of the hospital where very sick people get care. Severe sepsis is called septic shock. It often causes extremely low blood pressure, which limits blood flow to the body. It can cause organ failure and death. A long-term or sudden illness can cause sepsis. So can an injury or a reaction to surgery. Sepsis can develop very quickly. Symptoms can include low blood pressure, breathing problems, fast heartbeat, and confusion. Other symptoms include fever or low body temperature, chills, cool clammy skin, skin rashes, and shaking. Sepsis can cause problems in many organs. How is sepsis treated? When a person has sepsis, equipment in the ICU can support many body systems. That includes breathing, circulation, fluids, and help for organs like the kidneys and heart. If the person needs help breathing, a ventilator may be used. Doctors will treat the person with antibiotics. They will try to find the infection that led to sepsis. Machines will track the person's vital signs, including temperature, blood pressure, breathing rate, and pulse rate. The person will get fluids through an IV and will get medicine to help blood flow. If the sepsis is severe, medicine may help raise the blood pressure. What can you expect when someone has sepsis? · The ICU staff will do everything they can to treat all of the problems sepsis causes, including the infection.   · The person may start new treatments while still in the hospital. Different doctors may help with different symptoms. · Expect a long recovery after the person leaves the ICU. · If you need it, ask for support from friends and family. There's a lot going on in the ICU. It can be scary and confusing for patients and their families, friends, and supporters. But it's designed to keep your loved one comfortable and safe and to provide the best medical care. Where can you learn more? Go to http://mariella-kenyon.info/. Enter Z816 in the search box to learn more about \"Learning About Sepsis. \"  Current as of: March 20, 2017  Content Version: 11.4  © 1919-9973 [employer]. Care instructions adapted under license by [employer] (which disclaims liability or warranty for this information). If you have questions about a medical condition or this instruction, always ask your healthcare professional. [place] [address] any warranty or liability for your use of this information. Urinary Tract Infections in Men: Care Instructions  Your Care Instructions    A urinary tract infection, or UTI, is a general term for an infection anywhere between the kidneys and the tip of the penis. UTIs can also be a result of a prostate problem. Most cause pain or burning when you urinate. Most UTIs are caused by bacteria and can be cured with antibiotics. It is important to complete your treatment so that the infection does not get worse. Follow-up care is a key part of your treatment and safety. Be sure to make and go to all appointments, and call your doctor if you are having problems. It's also a good idea to know your test results and keep a list of the medicines you take. How can you care for yourself at home? · Take your antibiotics as prescribed. Do not stop taking them just because you feel better. You need to take the full course of antibiotics. · Take your medicines exactly as prescribed.  Your doctor may have prescribed a medicine, such as phenazopyridine (Pyridium), to help relieve pain when you urinate. This turns your urine orange. You may stop taking it when your symptoms get better. But be sure to take all of your antibiotics, which treat the infection. · Drink extra water for the next day or two. This will help make the urine less concentrated and help wash out the bacteria causing the infection. (If you have kidney, heart, or liver disease and have to limit your fluids, talk with your doctor before you increase your fluid intake.)  · Avoid drinks that are carbonated or have caffeine. They can irritate the bladder. · Urinate often. Try to empty your bladder each time. · To relieve pain, take a hot bath or lay a heating pad (set on low) over your lower belly or genital area. Never go to sleep with a heating pad in place. To help prevent UTIs  · Drink plenty of fluids, enough so that your urine is light yellow or clear like water. If you have kidney, heart, or liver disease and have to limit fluids, talk with your doctor before you increase the amount of fluids you drink. · Urinate when you have the urge. Do not hold your urine for a long time. Urinate before you go to sleep. · Keep your penis clean. Catheter care  If you have a drainage tube (catheter) in place, the following steps will help you care for it. · Always wash your hands before and after touching your catheter. · Check the area around the urethra for inflammation or signs of infection. Signs of infection include irritated, swollen, red, or tender skin, or pus around the catheter. · Clean the area around the catheter with soap and water two times a day. Dry with a clean towel afterward. · Do not apply powder or lotion to the skin around the catheter. To empty the urine collection bag  · Wash your hands with soap and water. · Without touching the drain spout, remove the spout from its sleeve at the bottom of the collection bag.  Open the valve on the spout. · Let the urine flow out of the bag and into the toilet or a container. Do not let the tubing or drain spout touch anything. · After you empty the bag, clean the end of the drain spout with tissue and water. Close the valve and put the drain spout back into its sleeve at the bottom of the collection bag. · Wash your hands with soap and water. When should you call for help? Call your doctor now or seek immediate medical care if:  ? · Symptoms such as a fever, chills, nausea, or vomiting get worse or happen for the first time. ? · You have new pain in your back just below your rib cage. This is called flank pain. ? · There is new blood or pus in your urine. ? · You are not able to take or keep down your antibiotics. ? Watch closely for changes in your health, and be sure to contact your doctor if:  ? · You are not getting better after taking an antibiotic for 2 days. ? · Your symptoms go away but then come back. Where can you learn more? Go to http://mariella-kenyon.info/. Enter E327 in the search box to learn more about \"Urinary Tract Infections in Men: Care Instructions. \"  Current as of: May 12, 2017  Content Version: 11.4  © 5838-1521 [employer], [employer]. Care instructions adapted under license by [employer] (which disclaims liability or warranty for this information). If you have questions about a medical condition or this instruction, always ask your healthcare professional. [place] [address] any warranty or liability for your use of this information.

## 2018-02-05 NOTE — PROGRESS NOTES
Problem: Mobility Impaired (Adult and Pediatric)  Goal: *Acute Goals and Plan of Care (Insert Text)  Physical TherapyGoals   Initiated 2/1/2018 to be met within 5-7 days  1. Bed mobility: Rolling L to R to with min/SBA A with use of HR for positioning. 2. Transfer: Supine <> Sit with min A for change of position/prep for transfers. 3. Activity Tolerance: Tolerate EOB sitting 10-15 minutes for ADL/balance activities. 4. Ambulation:  Ambulate 10-20 ft. Min A with RW for increased functional mobility. Reviewed and updated 2/4/2018 to be met within 5-7 days  1. Bed mobility: Rolling L to R to with supervision with use of HR for positioning. 2. Transfer: Supine <> Sit with supervision for change of position/prep for transfers. 3. Activity Tolerance: Tolerate EOB sitting 10-15 minutes for ADL/balance activities. 4. Ambulation:  Ambulate 75 ft. supervision with RW for increased functional mobility. Outcome: Progressing Towards Goal  physical Therapy TREATMENT    Patient: Magnus Dove  (69 y.o. male)  Date: 2/5/2018  Diagnosis: UTI (urinary tract infection)  Sepsis ([facility] [address]  Chronic foot ulcer (HCC) Sepsis ([facility] [address]  Precautions: Fall   Chart, physical therapy assessment, plan of care and goals were reviewed. ASSESSMENT:  Pt agreeable to therapy at this time but declined gait training stating he was tired after just being up with nursing staff. Pt complete LE therex with AAROM with increase verbal and tactile cuing required. Pt tolerated LE manual stretching x 12 min as pt is flexed, IR, and adducted in the bed. Educated pt on positioning in the bed for a static stretch; pt verbalized understanding. Recommend SNF vs. HHPT at time of discharge.   Progression toward goals:  []      Improving appropriately and progressing toward goals  [x]      Improving slowly and progressing toward goals  []      Not making progress toward goals and plan of care will be adjusted     PLAN:  Patient continues to benefit from skilled intervention to address the above impairments. Continue treatment per established plan of care. Discharge Recommendations:  Home Health vs. SNF  Further Equipment Recommendations for Discharge:  rolling walker     SUBJECTIVE:   Patient stated I am doing ok, I am supposed to be leaving today.     OBJECTIVE DATA SUMMARY:   Critical Behavior:  Neurologic State: Alert  Orientation Level: Oriented X4  Cognition: Appropriate decision making, Appropriate for age attention/concentration, Appropriate safety awareness, Follows commands  Safety/Judgement: Awareness of environment  Functional Mobility Training:  Bed Mobility:  Rolling: Supervision  Supine to Sit: Supervision  Sit to Supine: Supervision   Balance:  Sitting: Impaired  Sitting - Static: Good (unsupported)  Sitting - Dynamic: Fair (occasional)   Therapeutic Exercises:   HEP included ankle pumps, heel slides, hip abd/add x 10 reps    Manual therapy stretching x 12 min to B LEs DF, hamstring stretch, hip add stretch, hip IR stretch  Pain:  Pain Scale 1: Numeric (0 - 10)  Pain Intensity 1: 0   Activity Tolerance:   Fair+  Please refer to the flowsheet for vital signs taken during this treatment.   After treatment:   [] Patient left in no apparent distress sitting up in chair  [x] Patient left in no apparent distress in bed  [x] Call bell left within reach  [x] Nursing notified  [] Caregiver present  [] Bed alarm activated        Keely Boswell PT, DPT     Time Calculation: 26 mins

## 2018-02-05 NOTE — PROGRESS NOTES
TRANSFER - IN REPORT:    Verbal report received from PIPER Juarez(name) on [facility]  being received from (unit) for routine progression of care      Report consisted of patients Situation, Background, Assessment and   Recommendations(SBAR). Information from the following report(s) SBAR was reviewed with the receiving nurse. Opportunity for questions and clarification was provided. Assessment completed upon patients arrival to unit and care assumed.

## 2018-02-05 NOTE — PROGRESS NOTES
TRANSFER - IN REPORT:    Verbal report received from Leonidas Vogel RN(name) on [facility]  being received from Telemetry(unit) for routine progression of care      Report consisted of patients Situation, Background, Assessment and   Recommendations(SBAR). Information from the following report(s) SBAR, Kardex, Intake/Output, MAR and Recent Results was reviewed with the receiving nurse. Opportunity for questions and clarification was provided. Assessment completed upon patients arrival to unit and care assumed. Pt oriented to room. Incontinent of loose brown stool - gavino care done and brief changed. Repositioned on R side. Suprapubic cath draining to clear yellow urine. Unna boots and cradle boots to bilateral LE. Pt A/O x4, good bed mobility. Lung sounds clear, on RA. Mepilex to sacrum D/I        Verbal shift change report given to Jevon Myrick RN (oncoming nurse) by Sona Kaur RN   (offgoing nurse). Report included the following information SBAR, Kardex, Intake/Output, MAR and Recent Results.

## 2018-02-05 NOTE — DISCHARGE SUMMARY
Discharge Summary    Patient: Alondra Manjarrez Sr. MRN: [id number]  CSN: [id number]    YOB: 1945  Age: 74 y.o.   Sex: male    DOA: 2/1/2018 LOS:  LOS: 4 days   Discharge Date:      Primary Care Provider:  Maile Geiger MD    Admission Diagnoses: UTI (urinary tract infection)  Sepsis ([place] [address]  Chronic foot ulcer [facility])    Discharge Diagnoses:    Hospital Problems  Date Reviewed: 1/23/2018          Codes Class Noted POA    Hypokalemia ICD-10-CM: E87.6  ICD-9-CM: 276.8  2/2/2018 Unknown        Severe protein-calorie malnutrition ([place] [address] ICD-10-CM: E43  ICD-9-CM: 262  2/2/2018 Unknown        UTI (urinary tract infection) ICD-10-CM: N39.0  ICD-9-CM: 599.0  2/1/2018 Unknown        Diabetic ulcer of both lower extremities ([place] [address] ICD-10-CM: E11.622, L97.919, L97.929  ICD-9-CM: 250.80, 707.10  2/1/2018 Unknown        Hyponatremia ICD-10-CM: E87.1  ICD-9-CM: 276.1  2/1/2018 Unknown        Hypomagnesemia ICD-10-CM: E83.42  ICD-9-CM: 275.2  2/1/2018 Unknown        PAF (paroxysmal atrial fibrillation) ([place] [address] ICD-10-CM: I48.0  ICD-9-CM: 427.31  11/5/2017 Yes        * (Principal)Sepsis ([facility] ICD-10-CM: A41.9  ICD-9-CM: 038.9, 995.91  11/4/2017 Unknown        CAD (coronary artery disease) ICD-10-CM: I25.10  ICD-9-CM: 414.00  11/4/2017 Yes        COPD (chronic obstructive pulmonary disease) ([place] [address] ICD-10-CM: J44.9  ICD-9-CM: 817  11/4/2017 Yes        HTN (hypertension) ICD-10-CM: I10  ICD-9-CM: 401.9  11/4/2017 Yes        UTI (urinary tract infection) due to urinary indwelling Sher catheter [facility]) ICD-10-CM: T83.511A, N39.0  ICD-9-CM: 996.64, 599.0  11/4/2017 Yes        Diabetic ulcer of right midfoot associated with type 2 diabetes mellitus, with fat layer exposed ([facility] ICD-10-CM: M08.730, S77.799  ICD-9-CM: 250.80, 707.14  10/4/2017 Yes        PAD (peripheral artery disease) ([facility] ICD-10-CM: I73.9  ICD-9-CM: 443.9  5/9/2017 Yes              Discharge Condition: Stable    Discharge Medications:     Current Discharge Medication List      START taking these medications    Details   amoxicillin (AMOXIL) 500 mg capsule Take 1 Cap by mouth two (2) times a day for 7 days. Qty: 14 Cap, Refills: 0      doxycycline (MONODOX) 100 mg capsule Take 1 Cap by mouth two (2) times a day for 7 days. Qty: 14 Cap, Refills: 0         CONTINUE these medications which have NOT CHANGED    Details   Omeprazole delayed release (PRILOSEC D/R) 20 mg tablet Take 20 mg by mouth daily. potassium citrate (UROCIT-K10) 10 mEq (1,080 mg) TbER Take 10 mEq by mouth two (2) times a day. multivitamin, tx-iron-ca-min (THERA-M W/ IRON) 9 mg iron-400 mcg tab tablet Take 1 Tab by mouth daily. acetaminophen (TYLENOL EXTRA STRENGTH) 500 mg tablet Take 500 mg by mouth every six (6) hours as needed for Pain.      iron aspgly,ds-P-S59-FA-Ca-suc (FERREX 150 FORTE PLUS) 975-55-97-6 mg-mg-mcg-mg cap cap Take 1 Cap by mouth daily. insulin aspart (NOVOLOG FLEXPEN) 100 unit/mL inpn by SubCUTAneous route Before breakfast, lunch, and dinner. Sliding scale      metFORMIN (GLUCOPHAGE) 1,000 mg tablet Take 1,000 mg by mouth two (2) times daily (with meals). atorvastatin (LIPITOR) 20 mg tablet Take  by mouth daily. lisinopril (PRINIVIL, ZESTRIL) 2.5 mg tablet Take  by mouth daily. albuterol (PROAIR HFA) 90 mcg/actuation inhaler Take  by inhalation. clopidogrel (PLAVIX) 75 mg tab Take  by mouth.      cyanocobalamin (VITAMIN B-12) 1,000 mcg/mL injection 1,000 mcg by IntraMUSCular route every thirty (30) days. aspirin 81 mg tablet Take 81 mg by mouth daily. insulin glargine (LANTUS) 100 unit/mL injection 20 Units by SubCUTAneous route nightly. furosemide (LASIX) 40 mg tablet Take 40 mg by mouth daily. diltiazem (TIAZAC) 360 mg SR capsule Take 360 mg by mouth daily. tiotropium (SPIRIVA WITH HANDIHALER) 18 mcg inhalation capsule Take 1 Cap by inhalation daily.       loratadine (CLARITIN) 10 mg tablet Take 10 mg by mouth daily. fluticasone (FLONASE) 50 mcg/actuation nasal spray 2 Sprays by Both Nostrils route daily. traMADol (ULTRAM) 50 mg tablet Take 50 mg by mouth daily. gabapentin (NEURONTIN) 300 mg capsule Take 300 mg by mouth two (2) times a day. One in the am and 2 at hs      LACTASE ENZYME PO Take 1 Cap by mouth daily. levothyroxine (SYNTHROID) 25 mcg tablet Take 100 mcg by mouth Daily (before breakfast). fluticasone-salmeterol (ADVAIR DISKUS) 250-50 mcg/dose diskus inhaler Take 1 Puff by inhalation every twelve (12) hours. STOP taking these medications       amoxicillin-clavulanate (AUGMENTIN) 875-125 mg per tablet Comments:   Reason for Stopping:               Procedures : none     Consults: None      PHYSICAL EXAM   Visit Vitals    /48 (BP 1 Location: Left arm, BP Patient Position: At rest)    Pulse 78    Temp 98.7 °F (37.1 °C)    Resp 18    Ht 6' 2\" (1.88 m)    Wt 66.9 kg (147 lb 7.8 oz)    SpO2 100%    BMI 18.94 kg/m2     General: Awake, cooperative, no acute distress    HEENT: NC, Atraumatic. PERRLA, EOMI. Anicteric sclerae. Lungs:  CTA Bilaterally. No Wheezing/Rhonchi/Rales. Heart:  Regular  rhythm,  +murmur, No Rubs, No Gallops  Abdomen: Soft, Non distended, Non tender. +Bowel sounds,   Extremities: No c/c/e, ulcer noted-improving,  Psych:   Not anxious or agitated. Neurologic:  No acute neurological deficits. Admission HPI :   Koki Cadet. is a 74 y.o. male who hx  DM, htn, hld and chronic diabetic ulcer came to ed due to fever and fatigue. He reported he felt tired for three days and slept a lot. He felt so week and was unable to get up AM,also noted fever am. He has chronic ulcer of LE, following wound clinic. He was found lactic acid 2.5 and resolved per iv fluid ns.  He has indwell urine catheter-f/u with urology in [facility].   Denies any slurred speech/headache/cp/n/v/blurred vission/d/c/palpitation/gait change/bleeding. Denies smoking/ any alcohol or drug use. Hospital Course :   Since he was admitted, sepsis protocol started. He received  Iv fluid, vanc and zosyn for ulcera and uti. Urine cx: >100,000  COLONIES/mL 3 or more ORGANISMS PRESENT, like colonized. Wound cx : c Ecoli Faccalis and staph. With the treatment, his ulcer improving and remained afebrile. He will have appointment with urology in [facility] for catheter change-coming Friday. His appetite increased and work with pt/ot. His hyponatremia resolved and mg was replaced. He received PT/OT and would like to go home which he has good support at home. He is dnr/ I during his stay. Activity: Activity as tolerated    Diet: Diabetic Diet    Follow-up: pcm, wound care , urology     Disposition: home with home health     Minutes spent on discharge: 45 min       Labs: Results:       Chemistry Recent Labs      02/05/18 0257 02/04/18 0318 02/03/18   0444   GLU  263*  295*  178*   NA  138  138  129*   K  4.5  4.1  3.6   CL  103  103  96*   CO2  28  29  29   BUN  18  12  7   CREA  0.62  0.70  0.63   CA  8.9  8.4*  8.3*   AGAP  7  6  4   BUCR  29*  17  11*      CBC w/Diff Recent Labs      02/05/18 0257 02/04/18 0318 02/03/18   0444   WBC  4.6  4.4*  4.1*   RBC  3.99*  3.82*  3.90*   HGB  11.4*  11.1*  11.1*   HCT  34.7*  33.3*  34.0*   PLT  127*  116*  93*   GRANS  52  53  54   LYMPH  17*  16*  18*   EOS  23*  22*  18*      Cardiac Enzymes No results for input(s): CPK, CKND1, SUNNY in the last 72 hours. No lab exists for component: CKRMB, TROIP   Coagulation No results for input(s): PTP, INR, APTT in the last 72 hours. No lab exists for component: INREXT    Lipid Panel No results found for: CHOL, CHOLPOCT, CHOLX, CHLST, CHOLV, 669885, HDL, LDL, LDLC, DLDLP, 747567, VLDLC, VLDL, TGLX, TRIGL, TRIGP, TGLPOCT, CHHD, CHHDX   BNP No results for input(s): BNPP in the last 72 hours.    Liver Enzymes No results for input(s): TP, ALB, TBIL, AP, SGOT, GPT in the last 72 hours. No lab exists for component: DBIL   Thyroid Studies Lab Results   Component Value Date/Time    TSH 0.90 11/04/2017 06:23 PM            Significant Diagnostic Studies: No results found.           [facility]     CC: Timo Ramirez MD

## 2018-02-05 NOTE — PROGRESS NOTES
Chart reviewed. Pt plans discharge home with [facility] [phone] for follow up, referral and orders placed. No DME needed, pt lives with wife who is in agreement with plan for discharge. Pt offered SNF, refused. No further needs at this time, CM remains available. Met with pt and wife, confirmed agreement with discharge plan. Wife will take pt home in private vehicle, has DME, will let Byron Strange know pt for discharge today. Care Management Interventions  PCP Verified by CM: Yes  Mode of Transport at Discharge:  Other (see comment) (family)  Transition of Care Consult (CM Consult): [address]: No  Reason Outside [place]: Patient already serviced by other home care/hospice agency [facility])  Physical Therapy Consult: Yes  Occupational Therapy Consult: Yes  Speech Therapy Consult: Yes  Current Support Network: Lives with Spouse  Confirm Follow Up Transport: Family  Plan discussed with Pt/Family/Caregiver: Yes  Freedom of Choice Offered: Yes  Discharge Location  Discharge Placement: Home with home health

## 2018-02-05 NOTE — PROGRESS NOTES
Problem: Falls - Risk of  Goal: *Absence of Falls  Document Donald Fall Risk and appropriate interventions in the flowsheet.    Fall Risk Interventions:  Mobility Interventions: Bed/chair exit alarm         Medication Interventions: Bed/chair exit alarm, Patient to call before getting OOB    Elimination Interventions: Bed/chair exit alarm, Call light in reach, Toileting schedule/hourly rounds, Patient to call for help with toileting needs

## 2018-02-07 LAB
BACTERIA SPEC CULT: NORMAL
SERVICE CMNT-IMP: NORMAL

## 2018-02-08 ENCOUNTER — HOSPITAL ENCOUNTER (OUTPATIENT)
Dept: WOUND CARE | Age: 73
Discharge: HOME | End: 2018-02-08
Payer: MEDICARE

## 2018-02-08 PROCEDURE — 29581 APPL MULTLAYER CMPRN SYS LEG: CPT

## 2018-02-10 LAB
ATRIAL RATE: 117 BPM
CALCULATED P AXIS, ECG09: 72 DEGREES
CALCULATED R AXIS, ECG10: -36 DEGREES
CALCULATED T AXIS, ECG11: 97 DEGREES
DIAGNOSIS, 93000: NORMAL
P-R INTERVAL, ECG05: 164 MS
Q-T INTERVAL, ECG07: 350 MS
QRS DURATION, ECG06: 116 MS
QTC CALCULATION (BEZET), ECG08: 488 MS
VENTRICULAR RATE, ECG03: 117 BPM

## 2018-02-20 ENCOUNTER — HOSPITAL ENCOUNTER (OUTPATIENT)
Dept: VASCULAR SURGERY | Age: 73
Discharge: HOME | End: 2018-02-20
Attending: SURGERY
Payer: MEDICARE

## 2018-02-20 DIAGNOSIS — L97.921 ULCER OF LEFT LOWER EXTREMITY, LIMITED TO BREAKDOWN OF SKIN (HCC): ICD-10-CM

## 2018-02-20 DIAGNOSIS — I73.9 PAD (PERIPHERAL ARTERY DISEASE) (HCC): ICD-10-CM

## 2018-02-20 PROCEDURE — 93923 UPR/LXTR ART STDY 3+ LVLS: CPT

## 2018-02-20 NOTE — PROCEDURES
[facility]  *** FINAL REPORT ***    Name: Davida Loredo  MRN: [id number]    Outpatient  : 16 May 1945  HIS Order #: [id number]  [address] [facility] Visit #: [id number]  Date: 2018    TYPE OF TEST: Peripheral Arterial Testing    REASON FOR TEST  Extremity ulceration    Right Leg  Doppler:  Ankle/Brachial: 0.60    Left Leg  Doppler:  Ankle/Brachial: 0.78    INTERPRETATION/FINDINGS  Physiologic testing was performed using continuous wave Doppler and  segmental pressures. Right le. Moderate peripheral arterial disease indicated at rest in the right   leg. 2. The right ankle brachial index is 0.60 and the toe pressure is  40mmHg. Left le. Moderate peripheral arterial disease indicated at rest in the left  leg. 2. There has been a significant drop in the ankle/brachial index in  the left leg since the last exam, from 1.01 to 0.78.  3. The toe pressure is 76mmHg    ADDITIONAL COMMENTS    I have personally reviewed the data relevant to the interpretation of  this  study. TECHNOLOGIST: Zeinab Puga  Signed: 2018 01:57 PM    PHYSICIAN: Jus Low MD  Signed: 2018 02:26 PM

## 2018-02-21 PROCEDURE — 29581 APPL MULTLAYER CMPRN SYS LEG: CPT

## 2018-02-27 ENCOUNTER — OFFICE VISIT (OUTPATIENT)
Dept: VASCULAR SURGERY | Age: 73
End: 2018-02-27

## 2018-02-27 VITALS
DIASTOLIC BLOOD PRESSURE: 80 MMHG | WEIGHT: 147 LBS | HEIGHT: 74 IN | RESPIRATION RATE: 18 BRPM | BODY MASS INDEX: 18.87 KG/M2 | HEART RATE: 88 BPM | SYSTOLIC BLOOD PRESSURE: 122 MMHG

## 2018-02-27 DIAGNOSIS — I87.2 VENOUS REFLUX: ICD-10-CM

## 2018-02-27 DIAGNOSIS — L97.911 ULCER OF RIGHT LOWER EXTREMITY, LIMITED TO BREAKDOWN OF SKIN (HCC): Primary | ICD-10-CM

## 2018-02-27 PROCEDURE — 97602 WOUND(S) CARE NON-SELECTIVE: CPT

## 2018-02-27 NOTE — PROGRESS NOTES
Order for Santyl to be applied daily to wound 30gm with 3 refils  placed per Verbal order from Dr. Dakotah Andrews . Pt verbalized understanding .

## 2018-02-27 NOTE — PROGRESS NOTES
Mt Boyd Sr. Chief Complaint   Patient presents with    Wound Check       History and Physical    Mr. Wendy Rebolledo returns to our office today for continued evaluation of his peripheral vascular disease and leg wounds. He is seen regularly at the wound care office for follow up and dressing changes. He has no new complaints. Past Medical History:   Diagnosis Date    Acid reflux     Anemia     Atrial fibrillation ([facility])     CAD (coronary artery disease)     Chronic obstructive pulmonary disease ([facility])     Diabetes ([facility])     GERD (gastroesophageal reflux disease)     H/O seasonal allergies     Hypercholesterolemia     Hypertension     Ill-defined condition     hand tremors    PAF (paroxysmal atrial fibrillation) ([address] 11/5/2017    Sleep apnea     pt states he has not used bipap \"in years\".     Thyroid disease      Patient Active Problem List   Diagnosis Code    PAD (peripheral artery disease) ([facility]) I73.9    Venous reflux I87.2    Leg ulcer, left ([address] L97.929    Leg ulcer ([address] L97.909    Diabetic ulcer of right midfoot associated with type 2 diabetes mellitus, with fat layer exposed ([address] E11.621, L97.412    Sepsis ([address] A41.9    CAD (coronary artery disease) I25.10    COPD (chronic obstructive pulmonary disease) ([facility]) J44.9    HTN (hypertension) I10    UTI (urinary tract infection) due to urinary indwelling Sher catheter ([facility]) T83.511A, N39.0    PAF (paroxysmal atrial fibrillation) ([facility]) I48.0    UTI (urinary tract infection) N39.0    Diabetic ulcer of both lower extremities ([facility]) E11.622, L97.919, L97.929    Hyponatremia E87.1    Hypomagnesemia E83.42    Hypokalemia E87.6    Severe protein-calorie malnutrition ([facility]) E43    Ulcer of right lower extremity, limited to breakdown of skin ([address] L97.911     Past Surgical History:   Procedure Laterality Date    ABDOMEN SURGERY PROC UNLISTED  1997    Vikas-en-Y    ABDOMEN SURGERY PROC UNLISTED  2000    abdominal hernia repair/mesh    HX COLONOSCOPY      HX HEENT      cataracts removed right eye    HX ORTHOPAEDIC  1982    cervical laminectomy    HX UROLOGICAL  12/2015    suprapubic catheter in place    VASCULAR SURGERY PROCEDURE UNLIST       Current Outpatient Prescriptions   Medication Sig Dispense Refill    collagenase (SANTYL) 250 unit/gram ointment Apply  to affected area daily. 30 g 4    Omeprazole delayed release (PRILOSEC D/R) 20 mg tablet Take 20 mg by mouth daily.  potassium citrate (UROCIT-K10) 10 mEq (1,080 mg) TbER Take 10 mEq by mouth two (2) times a day.  multivitamin, tx-iron-ca-min (THERA-M W/ IRON) 9 mg iron-400 mcg tab tablet Take 1 Tab by mouth daily.  acetaminophen (TYLENOL EXTRA STRENGTH) 500 mg tablet Take 500 mg by mouth every six (6) hours as needed for Pain.  iron aspgly,zc-X-A98-FA-Ca-suc (FERREX 150 FORTE PLUS) 732-47-46-1 mg-mg-mcg-mg cap cap Take 1 Cap by mouth daily.  insulin aspart (NOVOLOG FLEXPEN) 100 unit/mL inpn by SubCUTAneous route Before breakfast, lunch, and dinner. Sliding scale      metFORMIN (GLUCOPHAGE) 1,000 mg tablet Take 1,000 mg by mouth two (2) times daily (with meals).  atorvastatin (LIPITOR) 20 mg tablet Take  by mouth daily.  lisinopril (PRINIVIL, ZESTRIL) 2.5 mg tablet Take  by mouth daily.  albuterol (PROAIR HFA) 90 mcg/actuation inhaler Take  by inhalation.  clopidogrel (PLAVIX) 75 mg tab Take  by mouth.  cyanocobalamin (VITAMIN B-12) 1,000 mcg/mL injection 1,000 mcg by IntraMUSCular route every thirty (30) days.  aspirin 81 mg tablet Take 81 mg by mouth daily.  insulin glargine (LANTUS) 100 unit/mL injection 20 Units by SubCUTAneous route nightly.  furosemide (LASIX) 40 mg tablet Take 40 mg by mouth daily.  diltiazem (TIAZAC) 360 mg SR capsule Take 360 mg by mouth daily.  tiotropium (SPIRIVA WITH HANDIHALER) 18 mcg inhalation capsule Take 1 Cap by inhalation daily.       loratadine (CLARITIN) 10 mg tablet Take 10 mg by mouth daily.  fluticasone (FLONASE) 50 mcg/actuation nasal spray 2 Sprays by Both Nostrils route daily.  traMADol (ULTRAM) 50 mg tablet Take 50 mg by mouth daily.  gabapentin (NEURONTIN) 300 mg capsule Take 300 mg by mouth two (2) times a day. One in the am and 2 at hs      LACTASE ENZYME PO Take 1 Cap by mouth daily.  levothyroxine (SYNTHROID) 25 mcg tablet Take 100 mcg by mouth Daily (before breakfast).  fluticasone-salmeterol (ADVAIR DISKUS) 250-50 mcg/dose diskus inhaler Take 1 Puff by inhalation every twelve (12) hours. Allergies   Allergen Reactions    Latex Contact Dermatitis     Pt states he is NOT allergic to latex.  Neosporin [Neomycin-Bacitracin-Polymyxin] Rash     Social History     Social History    Marital status:      Spouse name: N/A    Number of children: N/A    Years of education: N/A     Occupational History    Not on file. Social History Main Topics    Smoking status: Former Smoker     Types: Cigarettes     Quit date: 1/1/1997    Smokeless tobacco: Never Used    Alcohol use 0.6 oz/week     1 Glasses of wine per week    Drug use: No    Sexual activity: Not on file     Other Topics Concern    Not on file     Social History Narrative      Family History   Problem Relation Age of Onset    Cancer Mother     Diabetes Father     Heart Disease Sister     Cancer Brother     Diabetes Brother     Hypertension Brother        Review of Systems    Review of Systems   Constitutional: Negative for chills, diaphoresis, fever, malaise/fatigue and weight loss. HENT: Negative for hearing loss and sore throat. Eyes: Negative for blurred vision, photophobia and redness. Respiratory: Negative for cough, hemoptysis, shortness of breath and wheezing. Cardiovascular: Negative for chest pain, palpitations and orthopnea.    Gastrointestinal: Negative for abdominal pain, blood in stool, constipation, diarrhea, heartburn, nausea and vomiting. Genitourinary: Negative for dysuria, frequency, hematuria and urgency. Musculoskeletal: Negative for back pain and myalgias. Skin: Positive for itching. Negative for rash. Neurological: Negative for dizziness, speech change, focal weakness, weakness and headaches. Endo/Heme/Allergies: Does not bruise/bleed easily. Psychiatric/Behavioral: Negative for depression and suicidal ideas. Physical Exam:    Visit Vitals    /80 (BP 1 Location: Left arm, BP Patient Position: Sitting)    Pulse 88    Resp 18    Ht 6' 2\" (1.88 m)    Wt 147 lb (66.7 kg)    BMI 18.87 kg/m2      Physical Examination: General appearance - alert, well appearing, and in no distress  Mental status - alert, oriented to person, place, and time  Eyes - sclera anicteric, left eye normal, right eye normal  Ears - right ear normal, left ear normal  Nose - normal and patent, no erythema, discharge or polyps  Mouth - mucous membranes moist, pharynx normal without lesions  Neck - supple, no significant adenopathy  Extremities - Bilateral lower extremities with multiple skin tears. No significant edema. Right medial ankle with 2 ulcerations. No signs of infection. Impression and Plan:    ICD-10-CM ICD-9-CM    1. Ulcer of right lower extremity, limited to breakdown of skin ([facility] L97.911 707.10    2. Venous reflux I87.2 459.81      Orders Placed This Encounter    collagenase (SANTYL) 250 unit/gram ointment     I have debrided the bio film and debris off of Mr. Catherine Nw [address] [place] [address]'s ulcer. For the next few weeks we will see him weekly for debridements. I would like him to use Santyl on his medial right ankle ulcer daily as well. We will continue with Unna boots for now, but long term I would like him to be able to expose his legs to air some to allow his skin to heal.        The treatment plan was reviewed with the patient in detail.   The patient voiced understanding of this plan and all questions and concerns were addressed. The patient agrees with this plan. We discussed the signs and symptoms that would require earlier attention or intervention. The patient was given educational material related to his/her visit and the patient has voiced understanding of the material.     I appreciate the opportunity to participate in the care of your patient. I will be sure to keep you informed of any subsequent changes in the treatment plan. If you have any questions or concerns, please feel free to contact me. Norma Alex MD    PLEASE NOTE:  This document has been produced using voice recognition software. Unrecognized errors in transcription may be present.

## 2018-02-27 NOTE — MR AVS SNAPSHOT
[facility] 
 
 
 [address] 
[phone] Patient: Oziel Haney Sr. MRN: [id number] UYN:[birth year] Visit Information Date & Time Provider Department Dept. Phone Encounter #  
 2/27/2018 10:00 AM Nikki Howell MD [facility] [address] [id number] Your Appointments 3/7/2018 10:00 AM  
Follow Up with Leonidas Casillas NP  
BS [facility] THE [facility] ([place]ILIANA SCHEDULING) Appt Note: 1 week fup without studies. [facility] [address] [facility]  
  
   
 [address] [place] [address] Upcoming Health Maintenance Date Due  
 LIPID PANEL Q1 1945 FOOT EXAM Q1 5/16/1955 MICROALBUMIN Q1 5/16/1953 EYE EXAM RETINAL OR DILATED Q1 5/16/1953 DTaP/Tdap/Td series (1 - Tdap) 5/16/1964 FOBT Q 1 YEAR AGE 50-75 5/16/1993 ZOSTER VACCINE AGE 60> 3/16/2003 GLAUCOMA SCREENING Q2Y 5/16/2008 Pneumococcal 65+ Low/Medium Risk (1 of 2 - PCV13) 5/16/2008 MEDICARE YEARLY EXAM 5/16/2008 HEMOGLOBIN A1C Q6M 8/1/2018 Allergies as of 2/27/2018  Review Complete On: 2/27/2018 By: Aaron Zayas RN Severity Noted Reaction Type Reactions Latex  05/28/2013    Contact Dermatitis Pt states he is NOT allergic to latex. Neosporin [Neomycin-bacitracin-polymyxin] High 05/09/2017   Systemic Rash Current Immunizations  Reviewed on 2/1/2018 Name Date Influenza Vaccine 9/15/2017 Not reviewed this visit Vitals BP Pulse Resp Height(growth percentile) Weight(growth percentile) BMI  
 122/80 (BP 1 Location: Left arm, BP Patient Position: Sitting) 88 18 6' 2\" (1.88 m) 147 lb (66.7 kg) 18.87 kg/m2 Smoking Status Former Smoker BMI and BSA Data Body Mass Index Body Surface Area  
 18.87 kg/m 2 1.87 m 2 Preferred Pharmacy Pharmacy Name Phone [address] [facility], [address] [facility] [address] [facility] [address] [phone] Your Updated Medication List  
  
   
This list is accurate as of 2/27/18 10:26 AM.  Always use your most recent med list.  
  
  
  
  
 [place] Holiday 250-50 mcg/dose diskus inhaler Generic drug:  fluticasone-salmeterol Take 1 Puff by inhalation every twelve (12) hours. aspirin 81 mg tablet Take 81 mg by mouth daily. atorvastatin 20 mg tablet Commonly known as:  LIPITOR Take  by mouth daily. CLARITIN 10 mg tablet Generic drug:  loratadine Take 10 mg by mouth daily. FLONASE 50 mcg/actuation nasal spray Generic drug:  fluticasone 2 Sprays by Both Nostrils route daily. gabapentin 300 mg capsule Commonly known as:  NEURONTIN Take 300 mg by mouth two (2) times a day. One in the am and 2 at hs  
  
 iron aspgly,hd-A-Y45-FA-Ca-suc 150-60-25-1 mg-mg-mcg-mg Cap cap Commonly known as:  FERREX [address] Take 1 Cap by mouth daily. LACTASE ENZYME PO Take 1 Cap by mouth daily. LANTUS U-100 INSULIN 100 unit/mL injection Generic drug:  insulin glargine 20 Units by SubCUTAneous route nightly. LASIX 40 mg tablet Generic drug:  furosemide Take 40 mg by mouth daily. levothyroxine 25 mcg tablet Commonly known as:  SYNTHROID Take 100 mcg by mouth Daily (before breakfast). lisinopril 2.5 mg tablet Commonly known as:  Petal [place] Take  by mouth daily. metFORMIN 1,000 mg tablet Commonly known as:  GLUCOPHAGE Take 1,000 mg by mouth two (2) times daily (with meals). multivitamin, tx-iron-ca-min 9 mg iron-400 mcg Tab tablet Commonly known as:  THERA-M w/ IRON Take 1 Tab by mouth daily. NovoLOG Flexpen U-100 Insulin 100 unit/mL Inpn Generic drug:  insulin aspart U-100  
by SubCUTAneous route Before breakfast, lunch, and dinner. Sliding scale Omeprazole delayed release 20 mg tablet Commonly known as:  PRILOSEC D/R Take 20 mg by mouth daily. PLAVIX 75 mg Tab Generic drug:  clopidogrel Take  by mouth.  
  
 potassium citrate 10 mEq (1,080 mg) [place] Commonly known as:  [place] Take 10 mEq by mouth two (2) times a day. PROAIR HFA 90 mcg/actuation inhaler Generic drug:  albuterol Take  by inhalation. SPIRIVA WITH HANDIHALER 18 mcg inhalation capsule Generic drug:  tiotropium Take 1 Cap by inhalation daily. TIAZAC 360 mg SR capsule Generic drug:  dilTIAZem Take 360 mg by mouth daily. traMADol 50 mg tablet Commonly known as:  ULTRAM  
Take 50 mg by mouth daily. TYLENOL EXTRA STRENGTH 500 mg tablet Generic drug:  acetaminophen Take 500 mg by mouth every six (6) hours as needed for Pain. VITAMIN B-12 1,000 mcg/mL injection Generic drug:  cyanocobalamin  
1,000 mcg by IntraMUSCular route every thirty (30) days. Introducing [facility] & HEALTH SERVICES! Dear Rodney Caputo: Thank you for requesting a HistoryFile account. Our records indicate that you already have an active HistoryFile account. You can access your account anytime at [email or web address] Did you know that you can access your hospital and ER discharge instructions at any time in HistoryFile? You can also review all of your test results from your hospital stay or ER visit. Additional Information If you have questions, please visit the Frequently Asked Questions section of the HistoryFile website at [email or web address] Remember, HistoryFile is NOT to be used for urgent needs. For medical emergencies, dial 911. Now available from your iPhone and Android! Please provide this summary of care documentation to your next provider. Your primary care clinician is listed as Javy Valdez. If you have any questions after today's visit, please call [phone].

## 2018-03-07 ENCOUNTER — OFFICE VISIT (OUTPATIENT)
Dept: VASCULAR SURGERY | Age: 73
End: 2018-03-07

## 2018-03-07 ENCOUNTER — HOSPITAL ENCOUNTER (OUTPATIENT)
Dept: WOUND CARE | Age: 73
Discharge: HOME | End: 2018-03-07
Payer: MEDICARE

## 2018-03-07 VITALS
SYSTOLIC BLOOD PRESSURE: 132 MMHG | BODY MASS INDEX: 18.87 KG/M2 | HEIGHT: 74 IN | RESPIRATION RATE: 18 BRPM | HEART RATE: 80 BPM | DIASTOLIC BLOOD PRESSURE: 70 MMHG | WEIGHT: 147 LBS

## 2018-03-07 DIAGNOSIS — I87.2 VENOUS REFLUX: ICD-10-CM

## 2018-03-07 DIAGNOSIS — L97.911 ULCER OF RIGHT LOWER EXTREMITY, LIMITED TO BREAKDOWN OF SKIN (HCC): Primary | ICD-10-CM

## 2018-03-07 PROCEDURE — 97602 WOUND(S) CARE NON-SELECTIVE: CPT

## 2018-03-07 NOTE — MR AVS SNAPSHOT
[address] [facility] 
 
 
 [address] [facility] [address] 
[phone] Patient: Magda Ceja Sr. MRN: [id number] ECC:6/[birth year]261 Visit Information Date & Time Provider Department Dept. Phone Encounter #  
 3/7/2018 10:00 AM Diya Kebede NP [facility] [address] [id number] Follow-up Instructions Return in about 1 week (around 3/14/2018). Your Appointments 3/14/2018  9:30 AM  
Follow Up with Lisy Rouse MD  
[facility] Appt Note: 1 week FU no studies [facility] [address] [facility]  
  
   
 [address] [facility] [address] Upcoming Health Maintenance Date Due  
 LIPID PANEL Q1 1945 FOOT EXAM Q1 5/16/1953 MICROALBUMIN Q1 5/16/1953 EYE EXAM RETINAL OR DILATED Q1 5/16/1953 DTaP/Tdap/Td series (1 - Tdap) 5/16/1964 FOBT Q 1 YEAR AGE 50-75 5/16/1993 ZOSTER VACCINE AGE 60> 3/16/2003 GLAUCOMA SCREENING Q2Y 5/16/2008 Pneumococcal 65+ Low/Medium Risk (1 of 2 - PCV13) 5/16/2008 MEDICARE YEARLY EXAM 5/16/2008 HEMOGLOBIN A1C Q6M 8/1/2018 Allergies as of 3/7/2018  Review Complete On: 3/7/2018 By: Diya Kebede NP Severity Noted Reaction Type Reactions Latex  05/28/2013    Contact Dermatitis Pt states he is NOT allergic to latex. Neosporin [Neomycin-bacitracin-polymyxin] High 05/09/2017   Systemic Rash Current Immunizations  Reviewed on 2/1/2018 Name Date Influenza Vaccine 9/15/2017 Not reviewed this visit You Were Diagnosed With   
  
 Codes Comments Ulcer of right lower extremity, limited to breakdown of skin ([facility]    -  Primary ICD-10-CM: J14.551 ICD-9-CM: 707.10 Venous reflux     ICD-10-CM: I87.2 ICD-9-CM: 459.81 Vitals BP Pulse Resp Height(growth percentile) Weight(growth percentile) BMI 132/70 (BP 1 Location: Left arm, BP Patient Position: Sitting) 80 18 6' 2\" (1.88 m) 147 lb (66.7 kg) 18.87 kg/m2 Smoking Status Former Smoker BMI and BSA Data Body Mass Index Body Surface Area  
 18.87 kg/m 2 1.87 m 2 Preferred Pharmacy Pharmacy Name Phone [address], [address] [facility] [address] [place] [phone] Your Updated Medication List  
  
   
This list is accurate as of 3/7/18 10:05 AM.  Always use your most recent med list.  
  
  
  
  
 [place] Holiday 250-50 mcg/dose diskus inhaler Generic drug:  fluticasone-salmeterol Take 1 Puff by inhalation every twelve (12) hours. aspirin 81 mg tablet Take 81 mg by mouth daily. atorvastatin 20 mg tablet Commonly known as:  LIPITOR Take  by mouth daily. CLARITIN 10 mg tablet Generic drug:  loratadine Take 10 mg by mouth daily. collagenase 250 unit/gram ointment Commonly known as:  SANTYL Apply  to affected area daily. FLONASE 50 mcg/actuation nasal spray Generic drug:  fluticasone 2 Sprays by Both Nostrils route daily. gabapentin 300 mg capsule Commonly known as:  NEURONTIN Take 300 mg by mouth two (2) times a day. One in the am and 2 at hs  
  
 iron aspgly,kj-U-S84-FA-Ca-suc 150-60-25-1 mg-mg-mcg-mg Cap cap Commonly known as:  FERREX [address] Take 1 Cap by mouth daily. LACTASE ENZYME PO Take 1 Cap by mouth daily. LANTUS U-100 INSULIN 100 unit/mL injection Generic drug:  insulin glargine 20 Units by SubCUTAneous route nightly. LASIX 40 mg tablet Generic drug:  furosemide Take 40 mg by mouth daily. levothyroxine 25 mcg tablet Commonly known as:  SYNTHROID Take 100 mcg by mouth Daily (before breakfast). lisinopril 2.5 mg tablet Commonly known as:  [place] Take  by mouth daily. metFORMIN 1,000 mg tablet Commonly known as:  GLUCOPHAGE  
 Take 1,000 mg by mouth two (2) times daily (with meals). multivitamin, tx-iron-ca-min 9 mg iron-400 mcg Tab tablet Commonly known as:  THERA-M w/ IRON Take 1 Tab by mouth daily. NovoLOG Flexpen U-100 Insulin 100 unit/mL Inpn Generic drug:  insulin aspart U-100  
by SubCUTAneous route Before breakfast, lunch, and dinner. Sliding scale Omeprazole delayed release 20 mg tablet Commonly known as:  PRILOSEC D/R Take 20 mg by mouth daily. PLAVIX 75 mg Tab Generic drug:  clopidogrel Take  by mouth.  
  
 potassium citrate 10 mEq (1,080 mg) Corina [place] Commonly known as:  [place] Take 10 mEq by mouth two (2) times a day. PROAIR HFA 90 mcg/actuation inhaler Generic drug:  albuterol Take  by inhalation. SPIRIVA WITH HANDIHALER 18 mcg inhalation capsule Generic drug:  tiotropium Take 1 Cap by inhalation daily. TIAZAC 360 mg SR capsule Generic drug:  dilTIAZem Take 360 mg by mouth daily. traMADol 50 mg tablet Commonly known as:  ULTRAM  
Take 50 mg by mouth daily. TYLENOL EXTRA STRENGTH 500 mg tablet Generic drug:  acetaminophen Take 500 mg by mouth every six (6) hours as needed for Pain. VITAMIN B-12 1,000 mcg/mL injection Generic drug:  cyanocobalamin  
1,000 mcg by IntraMUSCular route every thirty (30) days. Follow-up Instructions Return in about 1 week (around 3/14/2018). To-Do List   
 03/07/2018 11:00 AM  
  Appointment with WOUND NURSE at [facility] ([phone]) [facility]! Dear Kike Phan: Thank you for requesting a LatamLeap account. Our records indicate that you already have an active LatamLeap account. You can access your account anytime at [email or web address] Did you know that you can access your hospital and ER discharge instructions at any time in LatamLeap? You can also review all of your test results from your hospital stay or ER visit. Additional Information If you have questions, please visit the Frequently Asked Questions section of the [employer] website at [email or web address] Remember, [facility] is NOT to be used for urgent needs. For medical emergencies, dial 911. Now available from your iPhone and Android! Please provide this summary of care documentation to your next provider. Your primary care clinician is listed as Kyung Montez. If you have any questions after today's visit, please call [phone].

## 2018-03-07 NOTE — PROGRESS NOTES
Shahid Ron Sr. Chief Complaint   Patient presents with    Leg Pain       History and Physical    Mr. Joaquin Bear is a 74year old male who returns to our office with his wife for continued management of his leg ulcers and venous reflux. He states he has a new skin tear on his left shin where his wife accidentally broke the skin while helping him with his leg exercises. He is seeing wound care for care of the venous ulcers to his left medial ankle and he feels like his legs are improving overall. His next appointment with wound care is today. Mr. Joaquin Bear has no new complaints at this time. Past Medical History:   Diagnosis Date    Acid reflux     Anemia     Atrial fibrillation ([facility])     CAD (coronary artery disease)     Chronic obstructive pulmonary disease ([facility])     Diabetes ([facility])     GERD (gastroesophageal reflux disease)     H/O seasonal allergies     Hypercholesterolemia     Hypertension     Ill-defined condition     hand tremors    PAF (paroxysmal atrial fibrillation) ([facility] 11/5/2017    Sleep apnea     pt states he has not used bipap \"in years\".     Thyroid disease      Patient Active Problem List   Diagnosis Code    PAD (peripheral artery disease) ([facility]) I73.9    Venous reflux I87.2    Leg ulcer, left ([facility] L97.929    Leg ulcer ([facility] L97.909    Diabetic ulcer of right midfoot associated with type 2 diabetes mellitus, with fat layer exposed ([address] E11.621, L97.412    Sepsis ([facility] A41.9    CAD (coronary artery disease) I25.10    COPD (chronic obstructive pulmonary disease) ([facility]) J44.9    HTN (hypertension) I10    UTI (urinary tract infection) due to urinary indwelling Sher catheter ([facility]) T83.511A, N39.0    PAF (paroxysmal atrial fibrillation) ([facility]) I48.0    UTI (urinary tract infection) N39.0    Diabetic ulcer of both lower extremities ([facility]) E11.622, L97.919, L97.929    Hyponatremia E87.1    Hypomagnesemia E83.42    Hypokalemia E87.6    Severe protein-calorie malnutrition ([facility] E43    Ulcer of right lower extremity, limited to breakdown of skin ([facility] L98.211     Past Surgical History:   Procedure Laterality Date    ABDOMEN SURGERY PROC UNLISTED  1997    Vikas-en-Y    ABDOMEN SURGERY PROC UNLISTED  2000    abdominal hernia repair/mesh    HX COLONOSCOPY      HX HEENT      cataracts removed right eye    HX ORTHOPAEDIC  1982    cervical laminectomy    HX UROLOGICAL  12/2015    suprapubic catheter in place    VASCULAR SURGERY PROCEDURE UNLIST       Current Outpatient Prescriptions   Medication Sig Dispense Refill    collagenase (SANTYL) 250 unit/gram ointment Apply  to affected area daily. 30 g 4    Omeprazole delayed release (PRILOSEC D/R) 20 mg tablet Take 20 mg by mouth daily.  potassium citrate (UROCIT-K10) 10 mEq (1,080 mg) TbER Take 10 mEq by mouth two (2) times a day.  multivitamin, tx-iron-ca-min (THERA-M W/ IRON) 9 mg iron-400 mcg tab tablet Take 1 Tab by mouth daily.  acetaminophen (TYLENOL EXTRA STRENGTH) 500 mg tablet Take 500 mg by mouth every six (6) hours as needed for Pain.  iron aspgly,kr-I-R94-FA-Ca-suc (FERREX 150 FORTE PLUS) 411-00-02-1 mg-mg-mcg-mg cap cap Take 1 Cap by mouth daily.  insulin aspart (NOVOLOG FLEXPEN) 100 unit/mL inpn by SubCUTAneous route Before breakfast, lunch, and dinner. Sliding scale      metFORMIN (GLUCOPHAGE) 1,000 mg tablet Take 1,000 mg by mouth two (2) times daily (with meals).  atorvastatin (LIPITOR) 20 mg tablet Take  by mouth daily.  lisinopril (PRINIVIL, ZESTRIL) 2.5 mg tablet Take  by mouth daily.  albuterol (PROAIR HFA) 90 mcg/actuation inhaler Take  by inhalation.  clopidogrel (PLAVIX) 75 mg tab Take  by mouth.  cyanocobalamin (VITAMIN B-12) 1,000 mcg/mL injection 1,000 mcg by IntraMUSCular route every thirty (30) days.  aspirin 81 mg tablet Take 81 mg by mouth daily.       insulin glargine (LANTUS) 100 unit/mL injection 20 Units by SubCUTAneous route nightly.  furosemide (LASIX) 40 mg tablet Take 40 mg by mouth daily.  diltiazem (TIAZAC) 360 mg SR capsule Take 360 mg by mouth daily.  tiotropium (SPIRIVA WITH HANDIHALER) 18 mcg inhalation capsule Take 1 Cap by inhalation daily.  loratadine (CLARITIN) 10 mg tablet Take 10 mg by mouth daily.  fluticasone (FLONASE) 50 mcg/actuation nasal spray 2 Sprays by Both Nostrils route daily.  traMADol (ULTRAM) 50 mg tablet Take 50 mg by mouth daily.  gabapentin (NEURONTIN) 300 mg capsule Take 300 mg by mouth two (2) times a day. One in the am and 2 at hs      LACTASE ENZYME PO Take 1 Cap by mouth daily.  levothyroxine (SYNTHROID) 25 mcg tablet Take 100 mcg by mouth Daily (before breakfast).  fluticasone-salmeterol (ADVAIR DISKUS) 250-50 mcg/dose diskus inhaler Take 1 Puff by inhalation every twelve (12) hours. Allergies   Allergen Reactions    Latex Contact Dermatitis     Pt states he is NOT allergic to latex.  Neosporin [Neomycin-Bacitracin-Polymyxin] Rash     Social History     Social History    Marital status:      Spouse name: N/A    Number of children: N/A    Years of education: N/A     Occupational History    Not on file. Social History Main Topics    Smoking status: Former Smoker     Types: Cigarettes     Quit date: 1/1/1997    Smokeless tobacco: Never Used    Alcohol use 0.6 oz/week     1 Glasses of wine per week    Drug use: No    Sexual activity: Not on file     Other Topics Concern    Not on file     Social History Narrative      Family History   Problem Relation Age of Onset    Cancer Mother     Diabetes Father     Heart Disease Sister     Cancer Brother     Diabetes Brother     Hypertension Brother        Review of Systems    Review of Systems   Constitutional: Negative for chills, fever, malaise/fatigue and weight loss. HENT: Negative for ear pain and hearing loss. Eyes: Negative for blurred vision, pain and discharge. Respiratory: Negative for cough, hemoptysis and sputum production. Cardiovascular: Negative for chest pain, palpitations, orthopnea and leg swelling. Gastrointestinal: Negative for heartburn, nausea and vomiting. Genitourinary: Negative for dysuria and urgency. Skin: Negative for itching and rash. +skin tears and leg wounds   Neurological: Negative for dizziness, tingling, weakness and headaches. Endo/Heme/Allergies: Does not bruise/bleed easily. Psychiatric/Behavioral: Negative for depression. Physical Exam:    Visit Vitals    /70 (BP 1 Location: Left arm, BP Patient Position: Sitting)    Pulse 80    Resp 18    Ht 6' 2\" (1.88 m)    Wt 147 lb (66.7 kg)    BMI 18.87 kg/m2      Physical Examination: General appearance - alert, well appearing, and in no distress  Mental status - alert, oriented to person, place, and time, normal mood, behavior, speech, dress, motor activity, and thought processes  Eyes - left eye normal, right eye normal  Ears - right ear normal, left ear normal  Mouth - mucous membranes moist  Chest - clear to auscultation, no wheezes  Heart - normal rate and regular rhythm  Abdomen - soft, nontender, nondistended  Musculoskeletal - no obvious deformity  Extremities - warm and well-perfused, minimal lower extremity edema  Skin - scattered skin tears to legs, two venous ulcers to left medial ankle, no signs of infection      Impression and Plan:    ICD-10-CM ICD-9-CM    1. Ulcer of right lower extremity, limited to breakdown of skin ([facility] L97.911 707.10    2. Venous reflux I87.2 459.81      No orders of the defined types were placed in this encounter. Mr. Shanti Garnett's ulcers were debrided of a small amount of biofilm and debris today in preparation for wound care today. We will continue to see him weekly for debridement before wound care. I recommend continuing with Santyl to the medial ankle ulcers.     Follow-up Disposition:  Return in about 1 week (around 3/14/2018). The treatment plan was reviewed with the patient in detail. The patient voiced understanding of this plan and all questions and concerns were addressed. The patient agrees with this plan. We discussed the signs and symptoms that would require earlier attention or intervention. The patient was given educational material related to his/her visit and the patient has voiced understanding of the material.     I appreciate the opportunity to participate in the care of your patient. I will be sure to keep you informed of any subsequent changes in the treatment plan. If you have any questions or concerns, please feel free to contact me. Salomon Patel NP    PLEASE NOTE:  This document has been produced using voice recognition software. Unrecognized errors in transcription may be present.

## 2018-03-14 ENCOUNTER — OFFICE VISIT (OUTPATIENT)
Dept: VASCULAR SURGERY | Age: 73
End: 2018-03-14

## 2018-03-14 VITALS
HEIGHT: 74 IN | BODY MASS INDEX: 18.87 KG/M2 | DIASTOLIC BLOOD PRESSURE: 68 MMHG | SYSTOLIC BLOOD PRESSURE: 118 MMHG | WEIGHT: 147 LBS | HEART RATE: 78 BPM

## 2018-03-14 DIAGNOSIS — L97.911 ULCER OF RIGHT LOWER EXTREMITY, LIMITED TO BREAKDOWN OF SKIN (HCC): Primary | ICD-10-CM

## 2018-03-14 PROCEDURE — 99213 OFFICE O/P EST LOW 20 MIN: CPT

## 2018-03-14 PROCEDURE — 97602 WOUND(S) CARE NON-SELECTIVE: CPT

## 2018-03-14 RX ORDER — PREGABALIN 50 MG/1
75 CAPSULE ORAL 2 TIMES DAILY
COMMUNITY

## 2018-03-14 NOTE — MR AVS SNAPSHOT
Tiara Jeans 
 
 
 [address] [facility] [address] 
[phone] Patient: Jhoana Willett Sr. MRN: [id number] ETG:3/13/2509 Visit Information Date & Time Provider Department Dept. Phone Encounter #  
 3/14/2018  9:30 AM Norma Ramos MD [facility] [address] [id number] Your Appointments 3/28/2018 11:15 AM  
Follow Up with Norma Ramos MD  
[facility] App[facility] Note: 2 week FU no studies [facility] [address] [facility]  
  
   
 [address] [facility] [address] Upcoming Health Maintenance Date Due  
 LIPID PANEL Q1 1945 FOOT EXAM Q1 5/16/1953 MICROALBUMIN Q1 5/16/1953 EYE EXAM RETINAL OR DILATED Q1 5/16/1953 DTaP/Tdap/Td series (1 - Tdap) 5/16/1964 FOBT Q 1 YEAR AGE 50-75 5/16/1993 ZOSTER VACCINE AGE 60> 3/16/2003 GLAUCOMA SCREENING Q2Y 5/16/2008 Pneumococcal 65+ Low/Medium Risk (1 of 2 - PCV13) 5/16/2008 MEDICARE YEARLY EXAM 5/16/2008 HEMOGLOBIN A1C Q6M 8/1/2018 Allergies as of 3/14/2018  Review Complete On: 3/14/2018 By: Haley Carr LPN Severity Noted Reaction Type Reactions Latex  05/28/2013    Contact Dermatitis Pt states he is NOT allergic to latex. Neosporin [Neomycin-bacitracin-polymyxin] High 05/09/2017   Systemic Rash Current Immunizations  Reviewed on 2/1/2018 Name Date Influenza Vaccine 9/15/2017 Not reviewed this visit Vitals BP Pulse Height(growth percentile) Weight(growth percentile) BMI Smoking Status 118/68 (BP 1 Location: Left arm, BP Patient Position: Sitting) 78 6' 2\" (1.88 m) 147 lb (66.7 kg) 18.87 kg/m2 Former Smoker BMI and BSA Data Body Mass Index Body Surface Area  
 18.87 kg/m 2 1.87 m 2 Preferred Pharmacy Pharmacy Name Phone [address] [facility], [place], [address] [facility] [address] [phone] Your Updated Medication List  
  
   
This list is accurate as of 3/14/18 10:07 AM.  Always use your most recent med list.  
  
  
  
  
 Recarissa Luster 250-50 mcg/dose diskus inhaler Generic drug:  fluticasone-salmeterol Take 1 Puff by inhalation every twelve (12) hours. aspirin 81 mg tablet Take 81 mg by mouth daily. atorvastatin 20 mg tablet Commonly known as:  LIPITOR Take  by mouth daily. CLARITIN 10 mg tablet Generic drug:  loratadine Take 10 mg by mouth daily. collagenase 250 unit/gram ointment Commonly known as:  SANTYL Apply  to affected area daily. FLONASE 50 mcg/actuation nasal spray Generic drug:  fluticasone 2 Sprays by Both Nostrils route daily. gabapentin 300 mg capsule Commonly known as:  NEURONTIN Take 300 mg by mouth two (2) times a day. One in the am and 2 at hs  
  
 iron aspgly,qt-N-P82-FA-Ca-suc 150-60-25-1 mg-mg-mcg-mg Cap cap Commonly known as:  FERREX [address] Take 1 Cap by mouth daily. LACTASE ENZYME PO Take 1 Cap by mouth daily. LANTUS U-100 INSULIN 100 unit/mL injection Generic drug:  insulin glargine 20 Units by SubCUTAneous route nightly. LASIX 40 mg tablet Generic drug:  furosemide Take 40 mg by mouth daily. levothyroxine 25 mcg tablet Commonly known as:  SYNTHROID Take 100 mcg by mouth Daily (before breakfast). lisinopril 2.5 mg tablet Commonly known as:  [place]a [place] Take  by mouth daily. LYRICA 50 mg capsule Generic drug:  pregabalin Take  by mouth.  
  
 metFORMIN 1,000 mg tablet Commonly known as:  GLUCOPHAGE Take 1,000 mg by mouth two (2) times daily (with meals). multivitamin, tx-iron-ca-min 9 mg iron-400 mcg Tab tablet Commonly known as:  THERA-M w/ IRON Take 1 Tab by mouth daily. NovoLOG Flexpen U-100 Insulin 100 unit/mL Inpn Generic drug:  insulin aspart U-100  
by SubCUTAneous route Before breakfast, lunch, and dinner. Sliding scale Omeprazole delayed release 20 mg tablet Commonly known as:  PRILOSEC D/R Take 20 mg by mouth daily. PLAVIX 75 mg Tab Generic drug:  clopidogrel Take  by mouth.  
  
 potassium citrate 10 mEq (1,080 mg) Duana Repine Commonly known as:  [place] Take 10 mEq by mouth two (2) times a day. PROAIR HFA 90 mcg/actuation inhaler Generic drug:  albuterol Take  by inhalation. SPIRIVA WITH HANDIHALER 18 mcg inhalation capsule Generic drug:  tiotropium Take 1 Cap by inhalation daily. TIAZAC 360 mg SR capsule Generic drug:  dilTIAZem Take 360 mg by mouth daily. traMADol 50 mg tablet Commonly known as:  ULTRAM  
Take 50 mg by mouth daily. TYLENOL EXTRA STRENGTH 500 mg tablet Generic drug:  acetaminophen Take 500 mg by mouth every six (6) hours as needed for Pain. VITAMIN B-12 1,000 mcg/mL injection Generic drug:  cyanocobalamin  
1,000 mcg by IntraMUSCular route every thirty (30) days. Introducing \[facility]\"" & HEALTH SERVICES! Dear Alma Figueroa: Thank you for requesting a [facility] account. Our records indicate that you already have an active Gamar account. You can access your account anytime at [email or web address] Did you know that you can access your hospital and ER discharge instructions at any time in [employer]amar? You can also review all of your test results from your hospital stay or ER visit. Additional Information If you have questions, please visit the Frequently Asked Questions section of the G[employer] website at [email or web address] Remember, [employer]amar is NOT to be used for urgent needs. For medical emergencies, dial 911. Now available from your iPhone and Android! Please provide this summary of care documentation to your next provider. Your primary care clinician is listed as Milton Lemus. If you have any questions after today's visit, please call [phone].

## 2018-03-16 PROBLEM — E11.40 TYPE 2 DIABETES MELLITUS WITH DIABETIC NEUROPATHY (HCC): Status: ACTIVE | Noted: 2018-03-16

## 2018-03-16 NOTE — PROGRESS NOTES
Grisel Acosta . Chief Complaint   Patient presents with    Leg Pain       History and Physical    Ms. Ryan Glynn returns to our office today for continued management of his bilateral skin tears and chronic right ankle wound. He continues to have some drainage from his wound and complains of itching. He has no other complaints today. Past Medical History:   Diagnosis Date    Acid reflux     Anemia     Atrial fibrillation ([facility])     CAD (coronary artery disease)     Chronic obstructive pulmonary disease ([facility])     Diabetes ([facility])     GERD (gastroesophageal reflux disease)     H/O seasonal allergies     Hypercholesterolemia     Hypertension     Ill-defined condition     hand tremors    PAF (paroxysmal atrial fibrillation) ([address] 11/5/2017    Sleep apnea     pt states he has not used bipap \"in years\".     Thyroid disease      Patient Active Problem List   Diagnosis Code    PAD (peripheral artery disease) ([facility]) I73.9    Venous reflux I87.2    Leg ulcer, left ([address] L97.929    Leg ulcer ([address] L97.909    Diabetic ulcer of right midfoot associated with type 2 diabetes mellitus, with fat layer exposed ([address] E11.621, L97.412    Sepsis ([address] A41.9    CAD (coronary artery disease) I25.10    COPD (chronic obstructive pulmonary disease) ([facility]) J44.9    HTN (hypertension) I10    UTI (urinary tract infection) due to urinary indwelling Sher catheter ([facility]) T83.511A, N39.0    PAF (paroxysmal atrial fibrillation) ([facility]) I48.0    UTI (urinary tract infection) N39.0    Diabetic ulcer of both lower extremities ([facility]) E11.622, L97.919, L97.929    Hyponatremia E87.1    Hypomagnesemia E83.42    Hypokalemia E87.6    Severe protein-calorie malnutrition ([facility]) E43    Ulcer of right lower extremity, limited to breakdown of skin ([address] L97.911    Type 2 diabetes mellitus with diabetic neuropathy ([address] E11.40     Past Surgical History:   Procedure Laterality Date   [address] Vikas-en-Y    ABDOMEN SURGERY PROC UNLISTED  2000    abdominal hernia repair/mesh    HX COLONOSCOPY      HX HEENT      cataracts removed right eye    HX ORTHOPAEDIC  1982    cervical laminectomy    HX UROLOGICAL  12/2015    suprapubic catheter in place    VASCULAR SURGERY PROCEDURE UNLIST       Current Outpatient Prescriptions   Medication Sig Dispense Refill    pregabalin (LYRICA) 50 mg capsule Take  by mouth.  collagenase (SANTYL) 250 unit/gram ointment Apply  to affected area daily. 30 g 4    Omeprazole delayed release (PRILOSEC D/R) 20 mg tablet Take 20 mg by mouth daily.  potassium citrate (UROCIT-K10) 10 mEq (1,080 mg) TbER Take 10 mEq by mouth two (2) times a day.  multivitamin, tx-iron-ca-min (THERA-M W/ IRON) 9 mg iron-400 mcg tab tablet Take 1 Tab by mouth daily.  acetaminophen (TYLENOL EXTRA STRENGTH) 500 mg tablet Take 500 mg by mouth every six (6) hours as needed for Pain.  iron aspgly,vp-T-X43-FA-Ca-suc (FERREX 150 FORTE PLUS) 486-14-45-8 mg-mg-mcg-mg cap cap Take 1 Cap by mouth daily.  insulin aspart (NOVOLOG FLEXPEN) 100 unit/mL inpn by SubCUTAneous route Before breakfast, lunch, and dinner. Sliding scale      metFORMIN (GLUCOPHAGE) 1,000 mg tablet Take 1,000 mg by mouth two (2) times daily (with meals).  atorvastatin (LIPITOR) 20 mg tablet Take  by mouth daily.  lisinopril (PRINIVIL, ZESTRIL) 2.5 mg tablet Take  by mouth daily.  albuterol (PROAIR HFA) 90 mcg/actuation inhaler Take  by inhalation.  clopidogrel (PLAVIX) 75 mg tab Take  by mouth.  cyanocobalamin (VITAMIN B-12) 1,000 mcg/mL injection 1,000 mcg by IntraMUSCular route every thirty (30) days.  aspirin 81 mg tablet Take 81 mg by mouth daily.  insulin glargine (LANTUS) 100 unit/mL injection 20 Units by SubCUTAneous route nightly.  furosemide (LASIX) 40 mg tablet Take 40 mg by mouth daily.  diltiazem (TIAZAC) 360 mg SR capsule Take 360 mg by mouth daily.       tiotropium (SPIRIVA WITH HANDIHALER) 18 mcg inhalation capsule Take 1 Cap by inhalation daily.  loratadine (CLARITIN) 10 mg tablet Take 10 mg by mouth daily.  fluticasone (FLONASE) 50 mcg/actuation nasal spray 2 Sprays by Both Nostrils route daily.  traMADol (ULTRAM) 50 mg tablet Take 50 mg by mouth daily.  gabapentin (NEURONTIN) 300 mg capsule Take 300 mg by mouth two (2) times a day. One in the am and 2 at hs      LACTASE ENZYME PO Take 1 Cap by mouth daily.  levothyroxine (SYNTHROID) 25 mcg tablet Take 100 mcg by mouth Daily (before breakfast).  fluticasone-salmeterol (ADVAIR DISKUS) 250-50 mcg/dose diskus inhaler Take 1 Puff by inhalation every twelve (12) hours. Allergies   Allergen Reactions    Latex Contact Dermatitis     Pt states he is NOT allergic to latex.  Neosporin [Neomycin-Bacitracin-Polymyxin] Rash     Social History     Social History    Marital status:      Spouse name: N/A    Number of children: N/A    Years of education: N/A     Occupational History    Not on file. Social History Main Topics    Smoking status: Former Smoker     Types: Cigarettes     Quit date: 1/1/1997    Smokeless tobacco: Never Used    Alcohol use 0.6 oz/week     1 Glasses of wine per week    Drug use: No    Sexual activity: Not on file     Other Topics Concern    Not on file     Social History Narrative      Family History   Problem Relation Age of Onset    Cancer Mother     Diabetes Father     Heart Disease Sister     Cancer Brother     Diabetes Brother     Hypertension Brother        Review of Systems    Review of Systems   Constitutional: Positive for malaise/fatigue. Negative for chills, diaphoresis, fever and weight loss. HENT: Negative for hearing loss and sore throat. Eyes: Negative for blurred vision, photophobia and redness. Respiratory: Negative for cough, hemoptysis, shortness of breath and wheezing.     Cardiovascular: Negative for chest pain, palpitations and orthopnea. Gastrointestinal: Negative for abdominal pain, blood in stool, constipation, diarrhea, heartburn, nausea and vomiting. Genitourinary: Negative for dysuria, frequency, hematuria and urgency. Musculoskeletal: Negative for back pain and myalgias. Skin: Positive for itching. Negative for rash. Neurological: Negative for dizziness, speech change, focal weakness, weakness and headaches. Endo/Heme/Allergies: Does not bruise/bleed easily. Psychiatric/Behavioral: Negative for depression and suicidal ideas. Physical Exam:    Visit Vitals    /68 (BP 1 Location: Left arm, BP Patient Position: Sitting)    Pulse 78    Ht 6' 2\" (1.88 m)    Wt 147 lb (66.7 kg)    BMI 18.87 kg/m2      Physical Examination: General appearance - alert, well appearing, and in no distress  Mental status - alert, oriented to person, place, and time  Eyes - sclera anicteric, left eye normal, right eye normal  Ears - right ear normal, left ear normal  Nose - normal and patent, no erythema, discharge or polyps  Mouth - mucous membranes moist, pharynx normal without lesions  Extremities - Bilateral legs with superificial skin tears. No signs of infection or arterial insufficiency. Right medial ankle ulcer with superficial biofilm layer that was debrided, and deep ulcer with good granulation tissue. Impression and Plan:    ICD-10-CM ICD-9-CM    1. Ulcer of right lower extremity, limited to breakdown of skin ([facility] L97.911 707.10      Orders Placed This Encounter    pregabalin (LYRICA) 50 mg capsule     I told Mr. Hough that his wound healing has stalled somewhat. We will continue with the tubi  compression and Santyl, but if his wound does not show signs of progression during his next visit we will schedule him for a wound debridement and graft placement. Follow-up Disposition:  Return in about 2 weeks (around 3/28/2018) for Wound check.       The treatment plan was reviewed with the patient in detail. The patient voiced understanding of this plan and all questions and concerns were addressed. The patient agrees with this plan. We discussed the signs and symptoms that would require earlier attention or intervention. The patient was given educational material related to his/her visit and the patient has voiced understanding of the material.     I appreciate the opportunity to participate in the care of your patient. I will be sure to keep you informed of any subsequent changes in the treatment plan. If you have any questions or concerns, please feel free to contact me. Francine Lee MD    PLEASE NOTE:  This document has been produced using voice recognition software. Unrecognized errors in transcription may be present.

## 2018-03-22 ENCOUNTER — HOSPITAL ENCOUNTER (OUTPATIENT)
Dept: WOUND CARE | Age: 73
Discharge: HOME | End: 2018-03-22
Payer: MEDICARE

## 2018-03-22 VITALS
RESPIRATION RATE: 18 BRPM | HEART RATE: 100 BPM | TEMPERATURE: 98.5 F | SYSTOLIC BLOOD PRESSURE: 116 MMHG | OXYGEN SATURATION: 100 % | DIASTOLIC BLOOD PRESSURE: 59 MMHG

## 2018-03-22 PROCEDURE — 97602 WOUND(S) CARE NON-SELECTIVE: CPT

## 2018-03-29 ENCOUNTER — HOSPITAL ENCOUNTER (OUTPATIENT)
Dept: WOUND CARE | Age: 73
Discharge: HOME | End: 2018-03-29
Payer: MEDICARE

## 2018-03-29 VITALS
TEMPERATURE: 97.5 F | DIASTOLIC BLOOD PRESSURE: 59 MMHG | WEIGHT: 155 LBS | SYSTOLIC BLOOD PRESSURE: 119 MMHG | HEART RATE: 89 BPM | BODY MASS INDEX: 19.89 KG/M2 | HEIGHT: 74 IN | OXYGEN SATURATION: 96 % | RESPIRATION RATE: 17 BRPM

## 2018-03-29 PROCEDURE — 97602 WOUND(S) CARE NON-SELECTIVE: CPT

## 2018-04-03 ENCOUNTER — OFFICE VISIT (OUTPATIENT)
Dept: VASCULAR SURGERY | Age: 73
End: 2018-04-03

## 2018-04-03 ENCOUNTER — HOSPITAL ENCOUNTER (OUTPATIENT)
Dept: WOUND CARE | Age: 73
Discharge: HOME | End: 2018-04-03
Payer: MEDICARE

## 2018-04-03 VITALS
DIASTOLIC BLOOD PRESSURE: 55 MMHG | TEMPERATURE: 98 F | HEART RATE: 98 BPM | RESPIRATION RATE: 17 BRPM | OXYGEN SATURATION: 97 % | SYSTOLIC BLOOD PRESSURE: 130 MMHG

## 2018-04-03 VITALS
HEIGHT: 74 IN | HEART RATE: 80 BPM | RESPIRATION RATE: 18 BRPM | BODY MASS INDEX: 19.89 KG/M2 | SYSTOLIC BLOOD PRESSURE: 122 MMHG | WEIGHT: 155 LBS | DIASTOLIC BLOOD PRESSURE: 74 MMHG

## 2018-04-03 DIAGNOSIS — M79.605 PAIN IN BOTH LOWER EXTREMITIES: ICD-10-CM

## 2018-04-03 DIAGNOSIS — M79.604 PAIN IN BOTH LOWER EXTREMITIES: ICD-10-CM

## 2018-04-03 DIAGNOSIS — I87.2 VENOUS REFLUX: ICD-10-CM

## 2018-04-03 DIAGNOSIS — L97.911 ULCER OF RIGHT LOWER EXTREMITY, LIMITED TO BREAKDOWN OF SKIN (HCC): Primary | ICD-10-CM

## 2018-04-03 PROCEDURE — 29581 APPL MULTLAYER CMPRN SYS LEG: CPT

## 2018-04-03 RX ORDER — OXYCODONE AND ACETAMINOPHEN 5; 325 MG/1; MG/1
1-2 TABLET ORAL
Qty: 84 TAB | Refills: 0 | Status: SHIPPED | OUTPATIENT
Start: 2018-04-03 | End: 2018-04-17

## 2018-04-03 NOTE — MR AVS SNAPSHOT
[address] [place] [address] [address] 
[phone] Patient: Wero Miranda Sr. MRN: [id number] Visit Information Date & Time Provider Department Dept. Phone Encounter #  
 4/3/2018  9:45 AM Tammy Patten NP [facility] [address] [id number] Upcoming Health Maintenance Date Due  
 LIPID PANEL Q1 1945 FOOT EXAM Q1 5/16/1953 MICROALBUMIN Q1 5/16/1953 EYE EXAM RETINAL OR DILATED Q1 5/16/1953 DTaP/Tdap/Td series (1 - Tdap) 5/16/1964 FOBT Q 1 YEAR AGE 50-75 5/16/1993 ZOSTER VACCINE AGE 60> 3/16/2003 GLAUCOMA SCREENING Q2Y 5/16/2008 Pneumococcal 65+ Low/Medium Risk (1 of 2 - PCV13) 5/16/2008 MEDICARE YEARLY EXAM 3/14/2018 HEMOGLOBIN A1C Q6M 8/1/2018 Allergies as of 4/3/2018  Review Complete On: 4/3/2018 By: Kaitlin Denton RN Severity Noted Reaction Type Reactions Latex  05/28/2013    Contact Dermatitis Pt states he is NOT allergic to latex. Neosporin [Neomycin-bacitracin-polymyxin] High 05/09/2017   Systemic Rash Current Immunizations  Reviewed on 2/1/2018 Name Date Influenza Vaccine 9/15/2017 Not reviewed this visit You Were Diagnosed With   
  
 Codes Comments Ulcer of right lower extremity, limited to breakdown of skin ([facility] [address]    -  Primary ICD-10-CM: M93.321 ICD-9-CM: 707.10 Venous reflux     ICD-10-CM: I87.2 ICD-9-CM: 459.81 Pain in both lower extremities     ICD-10-CM: M79.604, M79.605 ICD-9-CM: 729.5 Vitals BP Pulse Resp Height(growth percentile) Weight(growth percentile) BMI  
 122/74 (BP 1 Location: Left arm, BP Patient Position: Sitting) 80 18 6' 2\" (1.88 m) 155 lb (70.3 kg) 19.9 kg/m2 Smoking Status Former Smoker BMI and BSA Data Body Mass Index Body Surface Area 19.9 kg/m 2 1.92 m 2 Preferred Pharmacy Pharmacy Name Phone [address] [facility], [address] [facility] [id number] [phone] Your Updated Medication List  
  
   
This list is accurate as of 4/3/18 10:39 AM.  Always use your most recent med list.  
  
  
  
  
 Othella Brine 250-50 mcg/dose diskus inhaler Generic drug:  fluticasone-salmeterol Take 1 Puff by inhalation every twelve (12) hours. aspirin 81 mg tablet Take 81 mg by mouth daily. atorvastatin 20 mg tablet Commonly known as:  LIPITOR Take  by mouth daily. CLARITIN 10 mg tablet Generic drug:  loratadine Take 10 mg by mouth daily. collagenase 250 unit/gram ointment Commonly known as:  SANTYL Apply  to affected area daily. FLONASE 50 mcg/actuation nasal spray Generic drug:  fluticasone 2 Sprays by Both Nostrils route daily. gabapentin 300 mg capsule Commonly known as:  NEURONTIN Take 300 mg by mouth two (2) times a day. One in the am and 2 at hs  
  
 iron aspgly,dn-K-R00-FA-Ca-suc 150-60-25-1 mg-mg-mcg-mg Cap cap Commonly known as:  FERREX [address] Take 1 Cap by mouth daily. LACTASE ENZYME PO Take 1 Cap by mouth daily. LANTUS U-100 INSULIN 100 unit/mL injection Generic drug:  insulin glargine 20 Units by SubCUTAneous route nightly. LASIX 40 mg tablet Generic drug:  furosemide Take 40 mg by mouth daily. levothyroxine 25 mcg tablet Commonly known as:  SYNTHROID Take 100 mcg by mouth Daily (before breakfast). lisinopril 2.5 mg tablet Commonly known as:  Tungn Desanctis Take  by mouth daily. LYRICA 50 mg capsule Generic drug:  pregabalin Take  by mouth.  
  
 metFORMIN 1,000 mg tablet Commonly known as:  GLUCOPHAGE Take 1,000 mg by mouth two (2) times daily (with meals). multivitamin, tx-iron-ca-min 9 mg iron-400 mcg Tab tablet Commonly known as:  THERA-M w/ IRON Take 1 Tab by mouth daily. NovoLOG Flexpen U-100 Insulin 100 unit/mL Inpn Generic drug:  insulin aspart U-100  
by SubCUTAneous route Before breakfast, lunch, and dinner. Sliding scale Omeprazole delayed release 20 mg tablet Commonly known as:  PRILOSEC D/R Take 20 mg by mouth daily. oxyCODONE-acetaminophen 5-325 mg per tablet Commonly known as:  PERCOCET Take 1-2 Tabs by mouth every eight (8) hours as needed for Pain for up to 14 days. Max Daily Amount: 6 Tabs. PLAVIX 75 mg Tab Generic drug:  clopidogrel Take  by mouth.  
  
 potassium citrate 10 mEq (1,080 mg) Lupillo Left Commonly known as:  [place] Take 10 mEq by mouth two (2) times a day. PROAIR HFA 90 mcg/actuation inhaler Generic drug:  albuterol Take  by inhalation. SPIRIVA WITH HANDIHALER 18 mcg inhalation capsule Generic drug:  tiotropium Take 1 Cap by inhalation daily. TIAZAC 360 mg SR capsule Generic drug:  dilTIAZem Take 360 mg by mouth daily. traMADol 50 mg tablet Commonly known as:  ULTRAM  
Take 50 mg by mouth daily. TYLENOL EXTRA STRENGTH 500 mg tablet Generic drug:  acetaminophen Take 500 mg by mouth every six (6) hours as needed for Pain. VITAMIN B-12 1,000 mcg/mL injection Generic drug:  cyanocobalamin  
1,000 mcg by IntraMUSCular route every thirty (30) days. Prescriptions Printed Refills  
 oxyCODONE-acetaminophen (PERCOCET) 5-325 mg per tablet 0 Sig: Take 1-2 Tabs by mouth every eight (8) hours as needed for Pain for up to 14 days. Max Daily Amount: 6 Tabs. Class: Print Route: Oral  
  
To-Do List   
 04/03/2018 11:30 AM  
  Appointment with WOUND NURSE at [facility] ([phone]) [facility]! Dear Ping Galo: Thank you for requesting a [employer] account. Our records indicate that you already have an active [employer] account. You can access your account anytime at [email or web address] Did you know that you can access your hospital and ER discharge instructions at any time in [employer]? You can also review all of your test results from your hospital stay or ER visit. Additional Information If you have questions, please visit the Frequently Asked Questions section of the [employer] website at [email or web address] Remember, [employer] is NOT to be used for urgent needs. For medical emergencies, dial 911. Now available from your iPhone and Android! Please provide this summary of care documentation to your next provider. Your primary care clinician is listed as Veronica Fiore. If you have any questions after today's visit, please call [phone].

## 2018-04-05 NOTE — PROGRESS NOTES
Faiza High . Chief Complaint   Patient presents with    Wound Check       History and Physical    ***    Past Medical History:   Diagnosis Date    Acid reflux     Anemia     Atrial fibrillation ([address]     CAD (coronary artery disease)     Chronic obstructive pulmonary disease (HCC)     Diabetes ([address]     GERD (gastroesophageal reflux disease)     H/O seasonal allergies     Hypercholesterolemia     Hypertension     Ill-defined condition     hand tremors    PAF (paroxysmal atrial fibrillation) ([address] 11/5/2017    Sleep apnea     pt states he has not used bipap \"in years\".     Thyroid disease      Patient Active Problem List   Diagnosis Code    PAD (peripheral artery disease) ([facility]) I73.9    Venous reflux I87.2    Leg ulcer, left ([address] L97.929    Leg ulcer ([address] L97.909    Diabetic ulcer of right midfoot associated with type 2 diabetes mellitus, with fat layer exposed ([address] E11.621, L97.412    Sepsis ([address] A41.9    CAD (coronary artery disease) I25.10    COPD (chronic obstructive pulmonary disease) ([facility]) J44.9    HTN (hypertension) I10    UTI (urinary tract infection) due to urinary indwelling Sher catheter ([facility]) T83.511A, N39.0    PAF (paroxysmal atrial fibrillation) ([facility]) I48.0    UTI (urinary tract infection) N39.0    Diabetic ulcer of both lower extremities ([facility]) E11.622, L97.919, L97.929    Hyponatremia E87.1    Hypomagnesemia E83.42    Hypokalemia E87.6    Severe protein-calorie malnutrition ([facility]) E43    Ulcer of right lower extremity, limited to breakdown of skin ([address] L97.911    Type 2 diabetes mellitus with diabetic neuropathy ([address] E11.40     Past Surgical History:   Procedure Laterality Date    ABDOMEN SURGERY PROC UNLISTED  1997    Vikas-en-Y    ABDOMEN SURGERY PROC UNLISTED  2000    abdominal hernia repair/mesh    HX COLONOSCOPY      HX HEENT      cataracts removed right eye    HX ORTHOPAEDIC  1982    cervical laminectomy    HX UROLOGICAL  12/2015    suprapubic catheter in place    VASCULAR SURGERY PROCEDURE UNLIST       Current Outpatient Prescriptions   Medication Sig Dispense Refill    oxyCODONE-acetaminophen (PERCOCET) 5-325 mg per tablet Take 1-2 Tabs by mouth every eight (8) hours as needed for Pain for up to 14 days. Max Daily Amount: 6 Tabs. 84 Tab 0    pregabalin (LYRICA) 50 mg capsule Take  by mouth.  collagenase (SANTYL) 250 unit/gram ointment Apply  to affected area daily. 30 g 4    Omeprazole delayed release (PRILOSEC D/R) 20 mg tablet Take 20 mg by mouth daily.  potassium citrate (UROCIT-K10) 10 mEq (1,080 mg) TbER Take 10 mEq by mouth two (2) times a day.  multivitamin, tx-iron-ca-min (THERA-M W/ IRON) 9 mg iron-400 mcg tab tablet Take 1 Tab by mouth daily.  acetaminophen (TYLENOL EXTRA STRENGTH) 500 mg tablet Take 500 mg by mouth every six (6) hours as needed for Pain.  iron aspgly,on-O-M99-FA-Ca-suc (FERREX 150 FORTE PLUS) 578-81-96-5 mg-mg-mcg-mg cap cap Take 1 Cap by mouth daily.  insulin aspart (NOVOLOG FLEXPEN) 100 unit/mL inpn by SubCUTAneous route Before breakfast, lunch, and dinner. Sliding scale      metFORMIN (GLUCOPHAGE) 1,000 mg tablet Take 1,000 mg by mouth two (2) times daily (with meals).  atorvastatin (LIPITOR) 20 mg tablet Take  by mouth daily.  lisinopril (PRINIVIL, ZESTRIL) 2.5 mg tablet Take  by mouth daily.  albuterol (PROAIR HFA) 90 mcg/actuation inhaler Take  by inhalation.  clopidogrel (PLAVIX) 75 mg tab Take  by mouth.  cyanocobalamin (VITAMIN B-12) 1,000 mcg/mL injection 1,000 mcg by IntraMUSCular route every thirty (30) days.  aspirin 81 mg tablet Take 81 mg by mouth daily.  insulin glargine (LANTUS) 100 unit/mL injection 20 Units by SubCUTAneous route nightly.  furosemide (LASIX) 40 mg tablet Take 40 mg by mouth daily.  diltiazem (TIAZAC) 360 mg SR capsule Take 360 mg by mouth daily.       tiotropium (SPIRIVA WITH HANDIHALER) 18 mcg inhalation capsule Take 1 Cap by inhalation daily.  loratadine (CLARITIN) 10 mg tablet Take 10 mg by mouth daily.  fluticasone (FLONASE) 50 mcg/actuation nasal spray 2 Sprays by Both Nostrils route daily.  traMADol (ULTRAM) 50 mg tablet Take 50 mg by mouth daily.  gabapentin (NEURONTIN) 300 mg capsule Take 300 mg by mouth two (2) times a day. One in the am and 2 at hs      LACTASE ENZYME PO Take 1 Cap by mouth daily.  levothyroxine (SYNTHROID) 25 mcg tablet Take 100 mcg by mouth Daily (before breakfast).  fluticasone-salmeterol (ADVAIR DISKUS) 250-50 mcg/dose diskus inhaler Take 1 Puff by inhalation every twelve (12) hours. Allergies   Allergen Reactions    Latex Contact Dermatitis     Pt states he is NOT allergic to latex.  Neosporin [Neomycin-Bacitracin-Polymyxin] Rash     Social History     Social History    Marital status:      Spouse name: N/A    Number of children: N/A    Years of education: N/A     Occupational History    Not on file. Social History Main Topics    Smoking status: Former Smoker     Types: Cigarettes     Quit date: 1/1/1997    Smokeless tobacco: Never Used    Alcohol use 0.6 oz/week     1 Glasses of wine per week    Drug use: No    Sexual activity: Not on file     Other Topics Concern    Not on file     Social History Narrative      Family History   Problem Relation Age of Onset    Cancer Mother     Diabetes Father     Heart Disease Sister     Cancer Brother     Diabetes Brother     Hypertension Brother        Review of Systems    Review of Systems   Constitutional: Negative for chills, fever, malaise/fatigue and weight loss. HENT: Negative for ear pain and hearing loss. Eyes: Negative for blurred vision, pain and discharge. Respiratory: Negative for cough, hemoptysis and sputum production. Cardiovascular: Negative for chest pain, palpitations and orthopnea. Gastrointestinal: Negative for heartburn, nausea and vomiting. Genitourinary: Negative for dysuria and urgency. Skin: Negative for itching and rash. +skin tears and ulcers   Neurological: Negative for dizziness, tingling, weakness and headaches. Endo/Heme/Allergies: Does not bruise/bleed easily. Psychiatric/Behavioral: Negative for depression. Physical Exam:    Visit Vitals    /74 (BP 1 Location: Left arm, BP Patient Position: Sitting)    Pulse 80    Resp 18    Ht 6' 2\" (1.88 m)    Wt 155 lb (70.3 kg)    BMI 19.9 kg/m2      Physical Examination: {male adult master:[id number]}      Impression and Plan:    ICD-10-CM ICD-9-CM    1. Ulcer of right lower extremity, limited to breakdown of skin ([facility] L97.911 707.10    2. Venous reflux I87.2 459.81    3. Pain in both lower extremities M79.604 729.5 oxyCODONE-acetaminophen (PERCOCET) 5-325 mg per tablet    M79.605       Orders Placed This Encounter    oxyCODONE-acetaminophen (PERCOCET) 5-325 mg per tablet       Follow-up Disposition:  Return in about 3 weeks (around 4/24/2018). The treatment plan was reviewed with the patient in detail. The patient voiced understanding of this plan and all questions and concerns were addressed. The patient agrees with this plan. We discussed the signs and symptoms that would require earlier attention or intervention. The patient was given educational material related to his/her visit and the patient has voiced understanding of the material.     I appreciate the opportunity to participate in the care of your patient. I will be sure to keep you informed of any subsequent changes in the treatment plan. If you have any questions or concerns, please feel free to contact me. Derrek Cortés NP    PLEASE NOTE:  This document has been produced using voice recognition software. Unrecognized errors in transcription may be present.

## 2018-04-10 ENCOUNTER — HOSPITAL ENCOUNTER (OUTPATIENT)
Dept: WOUND CARE | Age: 73
Discharge: HOME | End: 2018-04-10
Payer: MEDICARE

## 2018-04-10 PROCEDURE — 97602 WOUND(S) CARE NON-SELECTIVE: CPT

## 2018-04-17 ENCOUNTER — HOSPITAL ENCOUNTER (OUTPATIENT)
Dept: WOUND CARE | Age: 73
Discharge: HOME | End: 2018-04-17
Payer: MEDICARE

## 2018-04-17 ENCOUNTER — HOSPITAL ENCOUNTER (OUTPATIENT)
Dept: PREADMISSION TESTING | Age: 73
Discharge: HOME | End: 2018-04-17
Payer: MEDICARE

## 2018-04-17 VITALS
HEART RATE: 94 BPM | DIASTOLIC BLOOD PRESSURE: 47 MMHG | SYSTOLIC BLOOD PRESSURE: 137 MMHG | RESPIRATION RATE: 19 BRPM | TEMPERATURE: 97.5 F | OXYGEN SATURATION: 98 %

## 2018-04-17 LAB
ALBUMIN SERPL-MCNC: 3.2 G/DL (ref 3.4–5)
ALBUMIN/GLOB SERPL: 0.8 {RATIO} (ref 0.8–1.7)
ALP SERPL-CCNC: 90 U/L (ref 45–117)
ALT SERPL-CCNC: 22 U/L (ref 16–61)
ANION GAP SERPL CALC-SCNC: 10 MMOL/L (ref 3–18)
APTT PPP: 39.5 SEC (ref 23–36.4)
AST SERPL-CCNC: 19 U/L (ref 15–37)
BASOPHILS # BLD: 0.1 K/UL (ref 0–0.06)
BASOPHILS NFR BLD: 1 % (ref 0–2)
BILIRUB SERPL-MCNC: 0.3 MG/DL (ref 0.2–1)
BUN SERPL-MCNC: 10 MG/DL (ref 7–18)
BUN/CREAT SERPL: 13 (ref 12–20)
CALCIUM SERPL-MCNC: 9 MG/DL (ref 8.5–10.1)
CHLORIDE SERPL-SCNC: 98 MMOL/L (ref 100–108)
CO2 SERPL-SCNC: 28 MMOL/L (ref 21–32)
CREAT SERPL-MCNC: 0.78 MG/DL (ref 0.6–1.3)
DIFFERENTIAL METHOD BLD: ABNORMAL
EOSINOPHIL # BLD: 1.5 K/UL (ref 0–0.4)
EOSINOPHIL NFR BLD: 21 % (ref 0–5)
ERYTHROCYTE [DISTWIDTH] IN BLOOD BY AUTOMATED COUNT: 15.4 % (ref 11.6–14.5)
EST. AVERAGE GLUCOSE BLD GHB EST-MCNC: 157 MG/DL
GLOBULIN SER CALC-MCNC: 4.2 G/DL (ref 2–4)
GLUCOSE SERPL-MCNC: 153 MG/DL (ref 74–99)
HBA1C MFR BLD: 7.1 % (ref 4.5–5.6)
HCT VFR BLD AUTO: 36.3 % (ref 36–48)
HGB BLD-MCNC: 11.6 G/DL (ref 13–16)
INR PPP: 1.1 (ref 0.8–1.2)
LYMPHOCYTES # BLD: 0.9 K/UL (ref 0.9–3.6)
LYMPHOCYTES NFR BLD: 13 % (ref 21–52)
MCH RBC QN AUTO: 28.4 PG (ref 24–34)
MCHC RBC AUTO-ENTMCNC: 32 G/DL (ref 31–37)
MCV RBC AUTO: 88.8 FL (ref 74–97)
MONOCYTES # BLD: 0.6 K/UL (ref 0.05–1.2)
MONOCYTES NFR BLD: 9 % (ref 3–10)
NEUTS SEG # BLD: 4 K/UL (ref 1.8–8)
NEUTS SEG NFR BLD: 56 % (ref 40–73)
PLATELET # BLD AUTO: 181 K/UL (ref 135–420)
PMV BLD AUTO: 9.8 FL (ref 9.2–11.8)
POTASSIUM SERPL-SCNC: 4.7 MMOL/L (ref 3.5–5.5)
PROT SERPL-MCNC: 7.4 G/DL (ref 6.4–8.2)
PROTHROMBIN TIME: 13.7 SEC (ref 11.5–15.2)
RBC # BLD AUTO: 4.09 M/UL (ref 4.7–5.5)
SODIUM SERPL-SCNC: 136 MMOL/L (ref 136–145)
WBC # BLD AUTO: 7 K/UL (ref 4.6–13.2)

## 2018-04-17 PROCEDURE — 80053 COMPREHEN METABOLIC PANEL: CPT | Performed by: SURGERY

## 2018-04-17 PROCEDURE — 29581 APPL MULTLAYER CMPRN SYS LEG: CPT

## 2018-04-17 PROCEDURE — 83036 HEMOGLOBIN GLYCOSYLATED A1C: CPT | Performed by: SURGERY

## 2018-04-17 PROCEDURE — 85025 COMPLETE CBC W/AUTO DIFF WBC: CPT | Performed by: SURGERY

## 2018-04-17 PROCEDURE — 85610 PROTHROMBIN TIME: CPT | Performed by: SURGERY

## 2018-04-17 PROCEDURE — 85730 THROMBOPLASTIN TIME PARTIAL: CPT | Performed by: SURGERY

## 2018-04-17 RX ORDER — SULFAMETHOXAZOLE AND TRIMETHOPRIM 800; 160 MG/1; MG/1
1 TABLET ORAL 2 TIMES DAILY
COMMUNITY
End: 2018-05-16

## 2018-04-17 RX ORDER — CEFAZOLIN SODIUM 2 G/50ML
2 SOLUTION INTRAVENOUS ONCE
Status: CANCELLED | OUTPATIENT
Start: 2018-04-17 | End: 2018-04-17

## 2018-04-17 RX ORDER — INSULIN ASPART 100 [IU]/ML
9 INJECTION, SOLUTION INTRAVENOUS; SUBCUTANEOUS 2 TIMES DAILY
COMMUNITY
End: 2018-06-20

## 2018-04-17 RX ORDER — CILOSTAZOL 100 MG/1
100 TABLET ORAL
COMMUNITY

## 2018-04-17 RX ORDER — GABAPENTIN 300 MG/1
900 CAPSULE ORAL
COMMUNITY
End: 2018-05-16

## 2018-04-17 RX ORDER — INSULIN GLARGINE 100 [IU]/ML
20 INJECTION, SOLUTION SUBCUTANEOUS
COMMUNITY
End: 2018-06-20

## 2018-04-17 RX ORDER — LEVOTHYROXINE SODIUM 100 UG/1
100 TABLET ORAL
COMMUNITY

## 2018-04-17 RX ORDER — SODIUM CHLORIDE, SODIUM LACTATE, POTASSIUM CHLORIDE, CALCIUM CHLORIDE 600; 310; 30; 20 MG/100ML; MG/100ML; MG/100ML; MG/100ML
125 INJECTION, SOLUTION INTRAVENOUS CONTINUOUS
Status: CANCELLED | OUTPATIENT
Start: 2018-04-17

## 2018-04-24 ENCOUNTER — HOSPITAL ENCOUNTER (OUTPATIENT)
Dept: WOUND CARE | Age: 73
Discharge: HOME | End: 2018-04-24
Payer: MEDICARE

## 2018-04-24 ENCOUNTER — OFFICE VISIT (OUTPATIENT)
Dept: VASCULAR SURGERY | Age: 73
End: 2018-04-24

## 2018-04-24 ENCOUNTER — TELEPHONE (OUTPATIENT)
Dept: VASCULAR SURGERY | Age: 73
End: 2018-04-24

## 2018-04-24 VITALS
DIASTOLIC BLOOD PRESSURE: 60 MMHG | SYSTOLIC BLOOD PRESSURE: 108 MMHG | HEART RATE: 88 BPM | HEIGHT: 74 IN | WEIGHT: 155 LBS | RESPIRATION RATE: 18 BRPM | BODY MASS INDEX: 19.89 KG/M2

## 2018-04-24 VITALS
HEART RATE: 90 BPM | OXYGEN SATURATION: 99 % | SYSTOLIC BLOOD PRESSURE: 118 MMHG | RESPIRATION RATE: 16 BRPM | DIASTOLIC BLOOD PRESSURE: 43 MMHG | TEMPERATURE: 97.4 F

## 2018-04-24 DIAGNOSIS — M79.605 PAIN IN BOTH LOWER EXTREMITIES: ICD-10-CM

## 2018-04-24 DIAGNOSIS — I87.2 VENOUS REFLUX: ICD-10-CM

## 2018-04-24 DIAGNOSIS — L97.911 ULCER OF RIGHT LOWER EXTREMITY, LIMITED TO BREAKDOWN OF SKIN (HCC): Primary | ICD-10-CM

## 2018-04-24 DIAGNOSIS — L97.921 ULCER OF LEFT LOWER EXTREMITY, LIMITED TO BREAKDOWN OF SKIN (HCC): ICD-10-CM

## 2018-04-24 DIAGNOSIS — M79.604 PAIN IN BOTH LOWER EXTREMITIES: ICD-10-CM

## 2018-04-24 PROCEDURE — 97602 WOUND(S) CARE NON-SELECTIVE: CPT

## 2018-04-24 RX ORDER — OXYCODONE AND ACETAMINOPHEN 5; 325 MG/1; MG/1
1 TABLET ORAL
Qty: 84 TAB | Refills: 0 | Status: SHIPPED | OUTPATIENT
Start: 2018-04-24 | End: 2018-05-08

## 2018-04-24 NOTE — PROGRESS NOTES
Danika Garcia . Chief Complaint   Patient presents with    Wound Check       History and Physical    Mr. Leigh Simon is a 74year old male who returns to our office with his wife for continued management of his bilateral leg wounds. Mr. Leigh Simon is scheduled for surgical debridement and Theraskin placement to the right medial ankle ulcer with Dr. Jerri Cobos on 4/27. He states his legs feel about the same, they are draining and are very painful, especially at night. He has no new complaints today. Past Medical History:   Diagnosis Date    Acid reflux     Anemia     Arthritis     Atrial fibrillation ([facility])     CAD (coronary artery disease)     Chronic obstructive pulmonary disease ([facility])     Coagulation disorder ([facility])     anemia    Diabetes ([address] 1997    GERD (gastroesophageal reflux disease)     H/O seasonal allergies     Hypercholesterolemia     Hypertension 1997    Ill-defined condition     hand tremors    PAF (paroxysmal atrial fibrillation) ([address] 11/5/2017    Peripheral arterial disease ([facility]     Sleep apnea     pt states he has not used bipap \"in years\".     Thyroid disease      Patient Active Problem List   Diagnosis Code    PAD (peripheral artery disease) ([facility]) I73.9    Venous reflux I87.2    Leg ulcer, left ([address] L97.929    Leg ulcer ([address] L97.909    Diabetic ulcer of right midfoot associated with type 2 diabetes mellitus, with fat layer exposed ([address] E11.621, L97.412    Sepsis ([address] A41.9    CAD (coronary artery disease) I25.10    COPD (chronic obstructive pulmonary disease) ([facility]) J44.9    HTN (hypertension) I10    UTI (urinary tract infection) due to urinary indwelling Sher catheter ([facility]) T83.511A, N39.0    PAF (paroxysmal atrial fibrillation) ([facility]) I48.0    UTI (urinary tract infection) N39.0    Diabetic ulcer of both lower extremities ([facility]) E11.622, L97.919, L97.929    Hyponatremia E87.1    Hypomagnesemia E83.42    Hypokalemia E87.6    Severe protein-calorie malnutrition ([facility] E43    Ulcer of right lower extremity, limited to breakdown of skin ([facility] L97.911    Type 2 diabetes mellitus with diabetic neuropathy ([facility] E11.40     Past Surgical History:   Procedure Laterality Date    ABDOMEN SURGERY [address]    Vikas-en-Y    ABDOMEN SURGERY PROC UNLISTED  2000    abdominal hernia repair/mesh    HX CERVICAL LAMINECTOMY      HX COLONOSCOPY      HX HEENT      cataracts removed right eye    HX ORTHOPAEDIC  1982    cervical laminectomy    HX ORTHOPAEDIC Right 1962    knee torn cartilage    HX UROLOGICAL  12/2015    suprapubic catheter in place    VASCULAR SURGERY PROCEDURE UNLIST Bilateral 2015 2017    angio plasty legs  x2    VASCULAR SURGERY PROCEDURE UNLIST Bilateral 2017    legs venogram  stent right leg     Current Outpatient Prescriptions   Medication Sig Dispense Refill    oxyCODONE-acetaminophen (PERCOCET) 5-325 mg per tablet Take 1 Tab by mouth every four (4) hours as needed for Pain for up to 14 days. Max Daily Amount: 6 Tabs. 84 Tab 0    gabapentin (NEURONTIN) 300 mg capsule Take 900 mg by mouth nightly.  insulin aspart U-100 (NOVOLOG FLEXPEN U-100 INSULIN) 100 unit/mL inpn 9 Units by SubCUTAneous route two (2) times a day. With lunch and dinner in addition the sliding scale      insulin glargine (LANTUS SOLOSTAR U-100 INSULIN) 100 unit/mL (3 mL) inpn 27 Units by SubCUTAneous route nightly.  levothyroxine (SYNTHROID) 100 mcg tablet Take 100 mcg by mouth Daily (before breakfast).  cilostazol (PLETAL) 100 mg tablet Take 100 mg by mouth Before breakfast and dinner.  trimethoprim-sulfamethoxazole (BACTRIM DS) 160-800 mg per tablet Take 1 Tab by mouth two (2) times a day.  pregabalin (LYRICA) 50 mg capsule Take 50 mg by mouth two (2) times a day.  collagenase (SANTYL) 250 unit/gram ointment Apply  to affected area daily. 30 g 4    Omeprazole delayed release (PRILOSEC D/R) 20 mg tablet Take 20 mg by mouth daily.  potassium citrate (UROCIT-K10) 10 mEq (1,080 mg) TbER Take 10 mEq by mouth two (2) times a day.  multivitamin, tx-iron-ca-min (THERA-M W/ IRON) 9 mg iron-400 mcg tab tablet Take 1 Tab by mouth daily.  acetaminophen (TYLENOL EXTRA STRENGTH) 500 mg tablet Take 500 mg by mouth every six (6) hours as needed for Pain.  iron aspgly,wz-W-Y71-FA-Ca-suc (FERREX 150 FORTE PLUS) 620-45-41-4 mg-mg-mcg-mg cap cap Take 1 Cap by mouth daily.  insulin aspart (NOVOLOG FLEXPEN) 100 unit/mL inpn 5 Units by SubCUTAneous route daily. Sliding scale  Plus the above dose      metFORMIN (GLUCOPHAGE) 1,000 mg tablet Take 1,000 mg by mouth two (2) times daily (with meals).  atorvastatin (LIPITOR) 20 mg tablet Take  by mouth daily.  lisinopril (PRINIVIL, ZESTRIL) 2.5 mg tablet Take  by mouth daily.  albuterol (PROAIR HFA) 90 mcg/actuation inhaler Take  by inhalation.  clopidogrel (PLAVIX) 75 mg tab Take 75 mg by mouth daily.  cyanocobalamin (VITAMIN B-12) 1,000 mcg/mL injection 1,000 mcg by IntraMUSCular route every thirty (30) days.  aspirin 81 mg tablet Take 81 mg by mouth nightly.  furosemide (LASIX) 40 mg tablet Take 40 mg by mouth daily.  diltiazem (TIAZAC) 360 mg SR capsule Take 360 mg by mouth daily.  tiotropium (SPIRIVA WITH HANDIHALER) 18 mcg inhalation capsule Take 1 Cap by inhalation daily.  loratadine (CLARITIN) 10 mg tablet Take 10 mg by mouth daily.  fluticasone (FLONASE) 50 mcg/actuation nasal spray 2 Sprays by Both Nostrils route daily.  traMADol (ULTRAM) 50 mg tablet Take 50 mg by mouth daily.  gabapentin (NEURONTIN) 300 mg capsule Take 300 mg by mouth daily. One in the am and 2 at hs      LACTASE ENZYME PO Take 1 Cap by mouth daily.  fluticasone-salmeterol (ADVAIR DISKUS) 250-50 mcg/dose diskus inhaler Take 1 Puff by inhalation every twelve (12) hours.          Allergies   Allergen Reactions    Latex Contact Dermatitis     Pt states he is NOT allergic to latex.  Neosporin [Neomycin-Bacitracin-Polymyxin] Rash     Social History     Social History    Marital status:      Spouse name: N/A    Number of children: N/A    Years of education: N/A     Occupational History    Not on file. Social History Main Topics    Smoking status: Former Smoker     Types: Cigarettes     Quit date: 1/1/1997    Smokeless tobacco: Never Used    Alcohol use 0.6 oz/week     1 Glasses of wine per week    Drug use: No    Sexual activity: Not on file     Other Topics Concern    Not on file     Social History Narrative      Family History   Problem Relation Age of Onset    Cancer Mother     Diabetes Father     Heart Disease Sister     Cancer Brother     Diabetes Brother     Hypertension Brother        Review of Systems    Review of Systems   Constitutional: Negative for chills, fever, malaise/fatigue and weight loss. HENT: Negative for ear pain and hearing loss. Eyes: Negative for blurred vision, pain and discharge. Respiratory: Negative for cough, hemoptysis, sputum production and shortness of breath. Cardiovascular: Positive for leg swelling. Negative for chest pain, palpitations and orthopnea. Gastrointestinal: Negative for heartburn, nausea and vomiting. Genitourinary: Negative for dysuria and urgency. Musculoskeletal:        + leg pain   Skin: Negative for itching.        +ulcers and skin tears bilateral legs   Neurological: Negative for dizziness, tingling, weakness and headaches. Endo/Heme/Allergies: Does not bruise/bleed easily. Psychiatric/Behavioral: Negative for depression.        Physical Exam:    Visit Vitals    /60 (BP 1 Location: Left arm, BP Patient Position: Sitting)    Pulse 88    Resp 18    Ht 6' 2\" (1.88 m)    Wt 155 lb (70.3 kg)    BMI 19.9 kg/m2      Physical Examination: General appearance - alert, in no distress, and chronically ill appearing  Mental status - alert, oriented to person, place, and time, normal mood, behavior, speech, dress, motor activity, and thought processes  Eyes - left eye normal, right eye normal  Ears - right ear normal, left ear normal  Mouth - mucous membranes moist  Chest - clear to auscultation, no wheezes  Heart - normal rate and regular rhythm, +murmur  Abdomen - soft, nontender, nondistended  Musculoskeletal - no obvious deformity  Extremities - warm and well perfused, +1 edema to bilateral feet and ankles, +DP signals bilaterally  Skin - scattered skin tears and scabs, right medial ankle ulcer, left lateral ankle ulcer, no signs of infection      Impression and Plan:    ICD-10-CM ICD-9-CM    1. Ulcer of right lower extremity, limited to breakdown of skin ([facility] L97.911 707.10    2. Venous reflux I87.2 459.81    3. Pain in both lower extremities M79.604 729.5 oxyCODONE-acetaminophen (PERCOCET) 5-325 mg per tablet    M79.605     4. Ulcer of left lower extremity, limited to breakdown of skin [facility]) L97.921 707.10      Orders Placed This Encounter    oxyCODONE-acetaminophen (PERCOCET) 5-325 mg per tablet     Mr. Leidy Mariano is scheduled for surgery on 4/27 with Dr. Jake Santoyo. We discussed pre-op instructions. I also refilled his pain medication. I will see Mr. Leidy Mariano one week after his surgery for graft check. Follow-up Disposition:  Return in about 1 week (around 5/1/2018). The treatment plan was reviewed with the patient in detail. The patient voiced understanding of this plan and all questions and concerns were addressed. The patient agrees with this plan. We discussed the signs and symptoms that would require earlier attention or intervention. The patient was given educational material related to his/her visit and the patient has voiced understanding of the material.     I appreciate the opportunity to participate in the care of your patient. I will be sure to keep you informed of any subsequent changes in the treatment plan.   If you have any questions or concerns, please feel free to contact me. Douglas Berger NP    PLEASE NOTE:  This document has been produced using voice recognition software. Unrecognized errors in transcription may be present.

## 2018-04-25 ENCOUNTER — ANESTHESIA EVENT (OUTPATIENT)
Dept: SURGERY | Age: 73
End: 2018-04-25
Payer: MEDICARE

## 2018-04-26 RX ORDER — DEXTROSE 50 % IN WATER (D50W) INTRAVENOUS SYRINGE
25-50 AS NEEDED
Status: CANCELLED | OUTPATIENT
Start: 2018-04-26

## 2018-04-27 ENCOUNTER — HOSPITAL ENCOUNTER (OUTPATIENT)
Age: 73
Setting detail: OUTPATIENT SURGERY
Discharge: HOME | End: 2018-04-27
Attending: SURGERY | Admitting: SURGERY
Payer: MEDICARE

## 2018-04-27 ENCOUNTER — ANESTHESIA (OUTPATIENT)
Dept: SURGERY | Age: 73
End: 2018-04-27
Payer: MEDICARE

## 2018-04-27 VITALS
RESPIRATION RATE: 12 BRPM | SYSTOLIC BLOOD PRESSURE: 101 MMHG | WEIGHT: 155 LBS | DIASTOLIC BLOOD PRESSURE: 40 MMHG | HEART RATE: 78 BPM | HEIGHT: 74 IN | OXYGEN SATURATION: 95 % | TEMPERATURE: 98.3 F | BODY MASS INDEX: 19.89 KG/M2

## 2018-04-27 DIAGNOSIS — L97.921 ULCER OF LEFT LOWER EXTREMITY, LIMITED TO BREAKDOWN OF SKIN (HCC): Primary | ICD-10-CM

## 2018-04-27 LAB
ABO + RH BLD: NORMAL
BLOOD GROUP ANTIBODIES SERPL: NORMAL
GLUCOSE BLD STRIP.AUTO-MCNC: 139 MG/DL (ref 70–110)
GLUCOSE BLD STRIP.AUTO-MCNC: 175 MG/DL (ref 70–110)
GLUCOSE BLD STRIP.AUTO-MCNC: 186 MG/DL (ref 70–110)
SPECIMEN EXP DATE BLD: NORMAL

## 2018-04-27 PROCEDURE — 82962 GLUCOSE BLOOD TEST: CPT

## 2018-04-27 PROCEDURE — 77030028929: Performed by: SURGERY

## 2018-04-27 PROCEDURE — 74011250636 HC RX REV CODE- 250/636: Performed by: SURGERY

## 2018-04-27 PROCEDURE — 77030002933 HC SUT MCRYL J&J -A: Performed by: SURGERY

## 2018-04-27 PROCEDURE — 74011250636 HC RX REV CODE- 250/636

## 2018-04-27 PROCEDURE — 76210000006 HC OR PH I REC 0.5 TO 1 HR: Performed by: SURGERY

## 2018-04-27 PROCEDURE — 76010000138 HC OR TIME 0.5 TO 1 HR: Performed by: SURGERY

## 2018-04-27 PROCEDURE — 74011000250 HC RX REV CODE- 250

## 2018-04-27 PROCEDURE — 76210000026 HC REC RM PH II 1 TO 1.5 HR: Performed by: SURGERY

## 2018-04-27 PROCEDURE — 74011636637 HC RX REV CODE- 636/637: Performed by: ANESTHESIOLOGY

## 2018-04-27 PROCEDURE — 77030020269 HC MISC IMPL: Performed by: SURGERY

## 2018-04-27 PROCEDURE — 77030020754 HC CUF TRNQT 2BLA STRY -B: Performed by: SURGERY

## 2018-04-27 PROCEDURE — 77030031741 HC HNDPC IRR VRSAJET PLS S&N -F: Performed by: SURGERY

## 2018-04-27 PROCEDURE — 76060000032 HC ANESTHESIA 0.5 TO 1 HR: Performed by: SURGERY

## 2018-04-27 PROCEDURE — 77030011640 HC PAD GRND REM COVD -A: Performed by: SURGERY

## 2018-04-27 PROCEDURE — 77030020782 HC GWN BAIR PAWS FLX 3M -B: Performed by: SURGERY

## 2018-04-27 PROCEDURE — 36415 COLL VENOUS BLD VENIPUNCTURE: CPT | Performed by: SURGERY

## 2018-04-27 PROCEDURE — 77030012508 HC MSK AIRWY LMA AMBU -A: Performed by: ANESTHESIOLOGY

## 2018-04-27 PROCEDURE — 86901 BLOOD TYPING SEROLOGIC RH(D): CPT | Performed by: SURGERY

## 2018-04-27 RX ORDER — FENTANYL CITRATE 50 UG/ML
INJECTION, SOLUTION INTRAMUSCULAR; INTRAVENOUS AS NEEDED
Status: DISCONTINUED | OUTPATIENT
Start: 2018-04-27 | End: 2018-04-27 | Stop reason: HOSPADM

## 2018-04-27 RX ORDER — INSULIN LISPRO 100 [IU]/ML
INJECTION, SOLUTION INTRAVENOUS; SUBCUTANEOUS ONCE
Status: COMPLETED | OUTPATIENT
Start: 2018-04-27 | End: 2018-04-27

## 2018-04-27 RX ORDER — PROPOFOL 10 MG/ML
INJECTION, EMULSION INTRAVENOUS AS NEEDED
Status: DISCONTINUED | OUTPATIENT
Start: 2018-04-27 | End: 2018-04-27 | Stop reason: HOSPADM

## 2018-04-27 RX ORDER — HYDROMORPHONE HYDROCHLORIDE 2 MG/ML
0.5 INJECTION, SOLUTION INTRAMUSCULAR; INTRAVENOUS; SUBCUTANEOUS
Status: DISCONTINUED | OUTPATIENT
Start: 2018-04-27 | End: 2018-04-27 | Stop reason: HOSPADM

## 2018-04-27 RX ORDER — OXYCODONE AND ACETAMINOPHEN 5; 325 MG/1; MG/1
2 TABLET ORAL
Qty: 112 TAB | Refills: 0 | Status: SHIPPED | OUTPATIENT
Start: 2018-04-27 | End: 2018-06-20

## 2018-04-27 RX ORDER — ONDANSETRON 2 MG/ML
INJECTION INTRAMUSCULAR; INTRAVENOUS AS NEEDED
Status: DISCONTINUED | OUTPATIENT
Start: 2018-04-27 | End: 2018-04-27 | Stop reason: HOSPADM

## 2018-04-27 RX ORDER — DEXTROSE 50 % IN WATER (D50W) INTRAVENOUS SYRINGE
25-50 AS NEEDED
Status: DISCONTINUED | OUTPATIENT
Start: 2018-04-27 | End: 2018-04-27 | Stop reason: HOSPADM

## 2018-04-27 RX ORDER — LIDOCAINE HYDROCHLORIDE 20 MG/ML
INJECTION, SOLUTION EPIDURAL; INFILTRATION; INTRACAUDAL; PERINEURAL AS NEEDED
Status: DISCONTINUED | OUTPATIENT
Start: 2018-04-27 | End: 2018-04-27 | Stop reason: HOSPADM

## 2018-04-27 RX ORDER — SODIUM CHLORIDE, SODIUM LACTATE, POTASSIUM CHLORIDE, CALCIUM CHLORIDE 600; 310; 30; 20 MG/100ML; MG/100ML; MG/100ML; MG/100ML
125 INJECTION, SOLUTION INTRAVENOUS CONTINUOUS
Status: DISCONTINUED | OUTPATIENT
Start: 2018-04-27 | End: 2018-04-27 | Stop reason: HOSPADM

## 2018-04-27 RX ORDER — CEFAZOLIN SODIUM/WATER 2 G/20 ML
2 SYRINGE (ML) INTRAVENOUS ONCE
Status: COMPLETED | OUTPATIENT
Start: 2018-04-27 | End: 2018-04-27

## 2018-04-27 RX ORDER — SODIUM CHLORIDE 0.9 % (FLUSH) 0.9 %
5-10 SYRINGE (ML) INJECTION AS NEEDED
Status: DISCONTINUED | OUTPATIENT
Start: 2018-04-27 | End: 2018-04-27 | Stop reason: HOSPADM

## 2018-04-27 RX ADMIN — FENTANYL CITRATE 25 MCG: 50 INJECTION, SOLUTION INTRAMUSCULAR; INTRAVENOUS at 07:40

## 2018-04-27 RX ADMIN — LIDOCAINE HYDROCHLORIDE 60 MG: 20 INJECTION, SOLUTION EPIDURAL; INFILTRATION; INTRACAUDAL; PERINEURAL at 07:32

## 2018-04-27 RX ADMIN — PROPOFOL 110 MG: 10 INJECTION, EMULSION INTRAVENOUS at 07:32

## 2018-04-27 RX ADMIN — FENTANYL CITRATE 25 MCG: 50 INJECTION, SOLUTION INTRAMUSCULAR; INTRAVENOUS at 07:48

## 2018-04-27 RX ADMIN — SODIUM CHLORIDE, SODIUM LACTATE, POTASSIUM CHLORIDE, AND CALCIUM CHLORIDE 125 ML/HR: 600; 310; 30; 20 INJECTION, SOLUTION INTRAVENOUS at 06:44

## 2018-04-27 RX ADMIN — INSULIN LISPRO 2 UNITS: 100 INJECTION, SOLUTION INTRAVENOUS; SUBCUTANEOUS at 07:05

## 2018-04-27 RX ADMIN — INSULIN LISPRO 3 UNITS: 100 INJECTION, SOLUTION INTRAVENOUS; SUBCUTANEOUS at 08:36

## 2018-04-27 RX ADMIN — Medication 2 G: at 07:40

## 2018-04-27 RX ADMIN — ONDANSETRON 4 MG: 2 INJECTION INTRAMUSCULAR; INTRAVENOUS at 07:48

## 2018-04-27 NOTE — PERIOP NOTES
TRANSFER - IN REPORT:    Verbal report received from JOSE Vega RN on [facility].  being received from OR for routine post - op      Report consisted of patients Situation, Background, Assessment and   Recommendations(SBAR). Information from the following report(s) SBAR was reviewed with the receiving nurse. Opportunity for questions and clarification was provided. Assessment completed upon patients arrival to unit and care assumed.

## 2018-04-27 NOTE — H&P (VIEW-ONLY)
Natalio Cortes [facility] Chief Complaint   Patient presents with    Wound Check       History and Physical    Mr. Yuridia Sellers is a 74year old male who returns to our office with his wife for continued management of his bilateral leg wounds. Mr. Yuridia Sellers is scheduled for surgical debridement and Theraskin placement to the right medial ankle ulcer with Dr. Jane Calero on 4/27. He states his legs feel about the same, they are draining and are very painful, especially at night. He has no new complaints today. Past Medical History:   Diagnosis Date    Acid reflux     Anemia     Arthritis     Atrial fibrillation ([facility])     CAD (coronary artery disease)     Chronic obstructive pulmonary disease ([facility])     Coagulation disorder ([facility])     anemia    Diabetes ([address] 1997    GERD (gastroesophageal reflux disease)     H/O seasonal allergies     Hypercholesterolemia     Hypertension 1997    Ill-defined condition     hand tremors    PAF (paroxysmal atrial fibrillation) ([facility] 11/5/2017    Peripheral arterial disease ([facility]     Sleep apnea     pt states he has not used bipap \"in years\".     Thyroid disease      Patient Active Problem List   Diagnosis Code    PAD (peripheral artery disease) ([facility]) I73.9    Venous reflux I87.2    Leg ulcer, left ([address] L97.929    Leg ulcer ([address] L97.909    Diabetic ulcer of right midfoot associated with type 2 diabetes mellitus, with fat layer exposed ([address] E11.621, L97.412    Sepsis ([address] A41.9    CAD (coronary artery disease) I25.10    COPD (chronic obstructive pulmonary disease) ([facility]) J44.9    HTN (hypertension) I10    UTI (urinary tract infection) due to urinary indwelling Sher catheter ([facility]) T83.511A, N39.0    PAF (paroxysmal atrial fibrillation) ([facility]) I48.0    UTI (urinary tract infection) N39.0    Diabetic ulcer of both lower extremities ([facility]) E11.622, L97.919, L97.929    Hyponatremia E87.1    Hypomagnesemia E83.42    Hypokalemia E87.6    Severe protein-calorie malnutrition ([facility] E43    Ulcer of right lower extremity, limited to breakdown of skin ([facility] L97.911    Type 2 diabetes mellitus with diabetic neuropathy ([facility] E11.40     Past Surgical History:   Procedure Laterality Date    ABDOMEN SURGERY [address]    [place]ikas-en-Y    ABDOMEN SURGERY PROC UNLISTED  2000    abdominal hernia repair/mesh    HX CERVICAL LAMINECTOMY      HX COLONOSCOPY      HX HEENT      cataracts removed right eye    HX ORTHOPAEDIC  1982    cervical laminectomy    HX ORTHOPAEDIC Right 1962    knee torn cartilage    HX UROLOGICAL  12/2015    suprapubic catheter in place    VASCULAR SURGERY PROCEDURE UNLIST Bilateral 2015 2017    angio plasty legs  x2    VASCULAR SURGERY PROCEDURE UNLIST Bilateral 2017    legs venogram  stent right leg     Current Outpatient Prescriptions   Medication Sig Dispense Refill    oxyCODONE-acetaminophen (PERCOCET) 5-325 mg per tablet Take 1 Tab by mouth every four (4) hours as needed for Pain for up to 14 days. Max Daily Amount: 6 Tabs. 84 Tab 0    gabapentin (NEURONTIN) 300 mg capsule Take 900 mg by mouth nightly.  insulin aspart U-100 (NOVOLOG FLEXPEN U-100 INSULIN) 100 unit/mL inpn 9 Units by SubCUTAneous route two (2) times a day. With lunch and dinner in addition the sliding scale      insulin glargine (LANTUS SOLOSTAR U-100 INSULIN) 100 unit/mL (3 mL) inpn 27 Units by SubCUTAneous route nightly.  levothyroxine (SYNTHROID) 100 mcg tablet Take 100 mcg by mouth Daily (before breakfast).  cilostazol (PLETAL) 100 mg tablet Take 100 mg by mouth Before breakfast and dinner.  trimethoprim-sulfamethoxazole (BACTRIM DS) 160-800 mg per tablet Take 1 Tab by mouth two (2) times a day.  pregabalin (LYRICA) 50 mg capsule Take 50 mg by mouth two (2) times a day.  collagenase (SANTYL) 250 unit/gram ointment Apply  to affected area daily. 30 g 4    Omeprazole delayed release (PRILOSEC D/R) 20 mg tablet Take 20 mg by mouth daily.  potassium citrate (UROCIT-K10) 10 mEq (1,080 mg) TbER Take 10 mEq by mouth two (2) times a day.  multivitamin, tx-iron-ca-min (THERA-M W/ IRON) 9 mg iron-400 mcg tab tablet Take 1 Tab by mouth daily.  acetaminophen (TYLENOL EXTRA STRENGTH) 500 mg tablet Take 500 mg by mouth every six (6) hours as needed for Pain.  iron aspgly,ek-V-B89-FA-Ca-suc (FERREX 150 FORTE PLUS) 504-69-87-2 mg-mg-mcg-mg cap cap Take 1 Cap by mouth daily.  insulin aspart (NOVOLOG FLEXPEN) 100 unit/mL inpn 5 Units by SubCUTAneous route daily. Sliding scale  Plus the above dose      metFORMIN (GLUCOPHAGE) 1,000 mg tablet Take 1,000 mg by mouth two (2) times daily (with meals).  atorvastatin (LIPITOR) 20 mg tablet Take  by mouth daily.  lisinopril (PRINIVIL, ZESTRIL) 2.5 mg tablet Take  by mouth daily.  albuterol (PROAIR HFA) 90 mcg/actuation inhaler Take  by inhalation.  clopidogrel (PLAVIX) 75 mg tab Take 75 mg by mouth daily.  cyanocobalamin (VITAMIN B-12) 1,000 mcg/mL injection 1,000 mcg by IntraMUSCular route every thirty (30) days.  aspirin 81 mg tablet Take 81 mg by mouth nightly.  furosemide (LASIX) 40 mg tablet Take 40 mg by mouth daily.  diltiazem (TIAZAC) 360 mg SR capsule Take 360 mg by mouth daily.  tiotropium (SPIRIVA WITH HANDIHALER) 18 mcg inhalation capsule Take 1 Cap by inhalation daily.  loratadine (CLARITIN) 10 mg tablet Take 10 mg by mouth daily.  fluticasone (FLONASE) 50 mcg/actuation nasal spray 2 Sprays by Both Nostrils route daily.  traMADol (ULTRAM) 50 mg tablet Take 50 mg by mouth daily.  gabapentin (NEURONTIN) 300 mg capsule Take 300 mg by mouth daily. One in the am and 2 at hs      LACTASE ENZYME PO Take 1 Cap by mouth daily.  fluticasone-salmeterol (ADVAIR DISKUS) 250-50 mcg/dose diskus inhaler Take 1 Puff by inhalation every twelve (12) hours.          Allergies   Allergen Reactions    Latex Contact Dermatitis     Pt states he is NOT allergic to latex.  Neosporin [Neomycin-Bacitracin-Polymyxin] Rash     Social History     Social History    Marital status:      Spouse name: N/A    Number of children: N/A    Years of education: N/A     Occupational History    Not on file. Social History Main Topics    Smoking status: Former Smoker     Types: Cigarettes     Quit date: 1/1/1997    Smokeless tobacco: Never Used    Alcohol use 0.6 oz/week     1 Glasses of wine per week    Drug use: No    Sexual activity: Not on file     Other Topics Concern    Not on file     Social History Narrative      Family History   Problem Relation Age of Onset    Cancer Mother     Diabetes Father     Heart Disease Sister     Cancer Brother     Diabetes Brother     Hypertension Brother        Review of Systems    Review of Systems   Constitutional: Negative for chills, fever, malaise/fatigue and weight loss. HENT: Negative for ear pain and hearing loss. Eyes: Negative for blurred vision, pain and discharge. Respiratory: Negative for cough, hemoptysis, sputum production and shortness of breath. Cardiovascular: Positive for leg swelling. Negative for chest pain, palpitations and orthopnea. Gastrointestinal: Negative for heartburn, nausea and vomiting. Genitourinary: Negative for dysuria and urgency. Musculoskeletal:        + leg pain   Skin: Negative for itching.        +ulcers and skin tears bilateral legs   Neurological: Negative for dizziness, tingling, weakness and headaches. Endo/Heme/Allergies: Does not bruise/bleed easily. Psychiatric/Behavioral: Negative for depression.        Physical Exam:    Visit Vitals    /60 (BP 1 Location: Left arm, BP Patient Position: Sitting)    Pulse 88    Resp 18    Ht 6' 2\" (1.88 m)    Wt 155 lb (70.3 kg)    BMI 19.9 kg/m2      Physical Examination: General appearance - alert, in no distress, and chronically ill appearing  Mental status - alert, oriented to person, place, and time, normal mood, behavior, speech, dress, motor activity, and thought processes  Eyes - left eye normal, right eye normal  Ears - right ear normal, left ear normal  Mouth - mucous membranes moist  Chest - clear to auscultation, no wheezes  Heart - normal rate and regular rhythm, +murmur  Abdomen - soft, nontender, nondistended  Musculoskeletal - no obvious deformity  Extremities - warm and well perfused, +1 edema to bilateral feet and ankles, +DP signals bilaterally  Skin - scattered skin tears and scabs, right medial ankle ulcer, left lateral ankle ulcer, no signs of infection      Impression and Plan:    ICD-10-CM ICD-9-CM    1. Ulcer of right lower extremity, limited to breakdown of skin ([facility] L97.911 707.10    2. Venous reflux I87.2 459.81    3. Pain in both lower extremities M79.604 729.5 oxyCODONE-acetaminophen (PERCOCET) 5-325 mg per tablet    M79.605     4. Ulcer of left lower extremity, limited to breakdown of skin [facility]) L97.921 707.10      Orders Placed This Encounter    oxyCODONE-acetaminophen (PERCOCET) 5-325 mg per tablet     Mr. Yan Sheppard is scheduled for surgery on 4/27 with Dr. Luis Maharaj. We discussed pre-op instructions. I also refilled his pain medication. I will see Mr. Yan Sheppard one week after his surgery for graft check. Follow-up Disposition:  Return in about 1 week (around 5/1/2018). The treatment plan was reviewed with the patient in detail. The patient voiced understanding of this plan and all questions and concerns were addressed. The patient agrees with this plan. We discussed the signs and symptoms that would require earlier attention or intervention. The patient was given educational material related to his/her visit and the patient has voiced understanding of the material.     I appreciate the opportunity to participate in the care of your patient. I will be sure to keep you informed of any subsequent changes in the treatment plan.   If you have any questions or concerns, please feel free to contact me. Tammy Patten NP    PLEASE NOTE:  This document has been produced using voice recognition software. Unrecognized errors in transcription may be present.

## 2018-04-27 NOTE — IP AVS SNAPSHOT
[facility] 
 
 
 [address] [address] 
[phone] Patient: Dottie Jiménez Sr. MRN: [id number] UML:0/36/6130 About your hospitalization You were admitted on:  April 27, 2018 You last received care in the:  THE [facility] PACU You were discharged on:  April 27, 2018 Why you were hospitalized Your primary diagnosis was:  Not on File Follow-up Information Follow up With Details Comments Contact Info Stefani Bradford MD   [facility] [address] [facility]  [id number] 
[phone] Your Scheduled Appointments Friday May 04, 2018 10:00 AM EDT Follow Up with NURSE VISIT  
BS Vein/Vascular Spec THE [facility] ([facility])  
 [address] [place] [address],[address] [place] [address]  
[phone] Friday May 04, 2018 11:00 AM EDT  
WOUND CARE NURSE VISIT with WOUND NURSE  
THE [facility] OP WOUND CARE [facility] [address] [address]  
[phone] Patients scheduled for OP Wound Care visits should enter the [facility], 2nd floor, [address] for check in. Wednesday May 16, 2018 10:00 AM EDT Follow Up with Aleks Lopez NP  
BS Vein/Vascular Spec THE [facility] ([facility])  
 [address] [place] [address],[address] [place] [address]  
[phone] Wednesday May 16, 2018 11:30 AM EDT  
WOUND CARE NURSE VISIT with WOUND NURSE  
THE [facility] OP WOUND CARE [facility] [address] [address]  
[phone] Patients scheduled for OP Wound Care visits should enter the [facility], 2nd floor, [address] for check in. Discharge Orders None A check jasson indicates which time of day the medication should be taken. My Medications CHANGE how you take these medications Instructions Each Dose to Equal  
 Morning Noon Evening Bedtime * oxyCODONE-acetaminophen 5-325 mg per tablet Commonly known as:  PERCOCET What changed:  Another medication with the same name was added. Make sure you understand how and when to take each. Your last dose was: Your next dose is: Take 1 Tab by mouth every four (4) hours as needed for Pain for up to 14 days. Max Daily Amount: 6 Tabs. 1 Tab * oxyCODONE-acetaminophen 5-325 mg per tablet Commonly known as:  PERCOCET What changed: You were already taking a medication with the same name, and this prescription was added. Make sure you understand how and when to take each. Your last dose was: Your next dose is: Take 2 Tabs by mouth every six (6) hours as needed for Pain. Max Daily Amount: 8 Tabs. 2 Tab * Notice: This list has 2 medication(s) that are the same as other medications prescribed for you. Read the directions carefully, and ask your doctor or other care provider to review them with you. CONTINUE taking these medications Instructions Each Dose to Equal  
 Morning Noon Evening Bedtime ADVAIR DISKUS 250-50 mcg/dose diskus inhaler Generic drug:  fluticasone-salmeterol Your last dose was: Your next dose is: Take 1 Puff by inhalation every twelve (12) hours. 1 Puff  
    
   
   
   
  
 aspirin 81 mg tablet Your last dose was: Your next dose is: Take 81 mg by mouth nightly. 81 mg  
    
   
   
   
  
 atorvastatin 20 mg tablet Commonly known as:  LIPITOR Your last dose was: Your next dose is: Take  by mouth daily. BACTRIM -800 mg per tablet Generic drug:  trimethoprim-sulfamethoxazole Your last dose was: Your next dose is: Take 1 Tab by mouth two (2) times a day. 1 Tab  
    
   
   
   
  
 cilostazol 100 mg tablet Commonly known as:  PLETAL Your last dose was: Your next dose is: Take 100 mg by mouth Before breakfast and dinner. 100 mg CLARITIN 10 mg tablet Generic drug:  loratadine Your last dose was: Your next dose is: Take 10 mg by mouth daily. 10 mg  
    
   
   
   
  
 collagenase 250 unit/gram ointment Commonly known as:  SANTYL Your last dose was: Your next dose is:    
   
   
 Apply  to affected area daily. FLONASE 50 mcg/actuation nasal spray Generic drug:  fluticasone Your last dose was: Your next dose is: 2 Sprays by Both Nostrils route daily. 2 Spray * gabapentin 300 mg capsule Commonly known as:  NEURONTIN Your last dose was: Your next dose is: Take 300 mg by mouth daily. One in the am and 2 at hs  
 300 mg  
    
   
   
   
  
 * gabapentin 300 mg capsule Commonly known as:  NEURONTIN Your last dose was: Your next dose is: Take 900 mg by mouth nightly. 900 mg  
    
   
   
   
  
 iron aspgly,bi-B-M02-FA-Ca-suc 591-15-76-3 mg-mg-mcg-mg Cap cap Commonly known as:  FERREX [address] Your last dose was: Your next dose is: Take 1 Cap by mouth daily. 1 Cap LACTASE ENZYME PO Your last dose was: Your next dose is: Take 1 Cap by mouth daily. 1 Cap LANTUS SOLOSTAR U-100 INSULIN 100 unit/mL (3 mL) Inpn Generic drug:  insulin glargine Your last dose was: Your next dose is:    
   
   
 27 Units by SubCUTAneous route nightly. 27 Units LASIX 40 mg tablet Generic drug:  furosemide Your last dose was: Your next dose is: Take 40 mg by mouth daily. 40 mg  
    
   
   
   
  
 levothyroxine 100 mcg tablet Commonly known as:  SYNTHROID Your last dose was: Your next dose is: Take 100 mcg by mouth Daily (before breakfast). 100 mcg  
    
   
   
   
  
 lisinopril 2.5 mg tablet Commonly known as:  Efrain Keny Your last dose was: Your next dose is: Take  by mouth daily. LYRICA 50 mg capsule Generic drug:  pregabalin Your last dose was: Your next dose is: Take 50 mg by mouth two (2) times a day. 50 mg  
    
   
   
   
  
 metFORMIN 1,000 mg tablet Commonly known as:  GLUCOPHAGE Your last dose was: Your next dose is: Take 1,000 mg by mouth two (2) times daily (with meals). 1000 mg  
    
   
   
   
  
 multivitamin, tx-iron-ca-min 9 mg iron-400 mcg Tab tablet Commonly known as:  THERA-M w/ IRON Your last dose was: Your next dose is: Take 1 Tab by mouth daily. 1 Tab * NovoLOG Flexpen U-100 Insulin 100 unit/mL Inpn Generic drug:  insulin aspart U-100 Your last dose was: Your next dose is:    
   
   
 5 Units by SubCUTAneous route daily. Sliding scale  Plus the above dose 5 Units * NovoLOG Flexpen U-100 Insulin 100 unit/mL Inpn Generic drug:  insulin aspart U-100 Your last dose was: Your next dose is:    
   
   
 9 Units by SubCUTAneous route two (2) times a day. With lunch and dinner in addition the sliding scale 9 Units Omeprazole delayed release 20 mg tablet Commonly known as:  PRILOSEC D/R Your last dose was: Your next dose is: Take 20 mg by mouth daily. 20 mg  
    
   
   
   
  
 PLAVIX 75 mg Tab Generic drug:  clopidogrel Your last dose was: Your next dose is: Take 75 mg by mouth daily. 75 mg  
    
   
   
   
  
 potassium citrate 10 mEq (1,080 mg) Laila Mistry Commonly known as:  [place] Your last dose was: Your next dose is: Take 10 mEq by mouth two (2) times a day. 10 mEq PROAIR HFA 90 mcg/actuation inhaler Generic drug:  albuterol Your last dose was: Your next dose is: Take  by inhalation. SPIRIVA WITH HANDIHALER 18 mcg inhalation capsule Generic drug:  tiotropium Your last dose was: Your next dose is: Take 1 Cap by inhalation daily. 1 Cap TIAZAC 360 mg SR capsule Generic drug:  dilTIAZem Your last dose was: Your next dose is: Take 360 mg by mouth daily. 360 mg  
    
   
   
   
  
 traMADol 50 mg tablet Commonly known as:  ULTRAM  
   
Your last dose was: Your next dose is: Take 50 mg by mouth daily. 50 mg  
    
   
   
   
  
 TYLENOL EXTRA STRENGTH 500 mg tablet Generic drug:  acetaminophen Your last dose was: Your next dose is: Take 500 mg by mouth every six (6) hours as needed for Pain. 500 mg  
    
   
   
   
  
 VITAMIN B-12 1,000 mcg/mL injection Generic drug:  cyanocobalamin Your last dose was: Your next dose is:    
   
   
 1,000 mcg by IntraMUSCular route every thirty (30) days. 1000 mcg * Notice: This list has 4 medication(s) that are the same as other medications prescribed for you. Read the directions carefully, and ask your doctor or other care provider to review them with you. Where to Get Your Medications Information on where to get these meds will be given to you by the nurse or doctor. ! Ask your nurse or doctor about these medications  
  oxyCODONE-acetaminophen 5-325 mg per tablet Opioid Education  Prescription Opioids: What You Need to Know: 
 
 
 
F-face looks uneven A-arms unable to move or move unevenly S-speech slurred or non-existent T-time-call 911 as soon as signs and symptoms begin-DO NOT go Back to bed or wait to see if you get better-TIME IS BRAIN. Warning Signs of HEART ATTACK Call 911 if you have these symptoms: 
? Chest discomfort. Most heart attacks involve discomfort in the center of the chest that lasts more than a few minutes, or that goes away and comes back. It can feel like uncomfortable pressure, squeezing, fullness, or pain. ? Discomfort in other areas of the upper body. Symptoms can include pain or discomfort in one or both arms, the back, neck, jaw, or stomach. ? Shortness of breath with or without chest discomfort. ? Other signs may include breaking out in a cold sweat, nausea, or lightheadedness. Don't wait more than five minutes to call [address]! Fast action can save your life. Calling 911 is almost always the fastest way to get lifesaving treatment. Emergency Medical Services staff can begin treatment when they arrive  up to an hour sooner than if someone gets to the hospital by car. The discharge information has been reviewed with the patient and caregiver. The patient and caregiver verbalized understanding. Discharge medications reviewed with the patient and caregiver and appropriate educational materials and side effects teaching were provided. Patient armband removed and shredded ___________________________________________________________________________________________________________________________________ Introducing [facility]! Dear Gustavo Connor: Thank you for requesting a [employer] account. Our records indicate that you already have an active [employer] account. You can access your account anytime at [email or web address] Did you know that you can access your hospital and ER discharge instructions at any time in [employer]? You can also review all of your test results from your hospital stay or ER visit. Additional Information If you have questions, please visit the Frequently Asked Questions section of the [employer] website at [email or web address] Remember, [employer]aa is NOT to be used for urgent needs. For medical emergencies, dial 911. Now available from your iPhone and Android! Introducing [employer] As a [facility] patient, I wanted to make you aware of our electronic visit tool called [employer]. [facility] 24/7 allows you to connect within minutes with a medical provider 24 hours a day, seven days a week via a mobile device or tablet or logging into a secure website from your computer. You can access Shorty Fletcher from anywhere in the [place]. A virtual visit might be right for you when you have a simple condition and feel like you just dont want to get out of bed, or cant get away from work for an appointment, when your regular [facility] provider is not available (evenings, weekends or holidays), or when youre out of town and need minor care. Electronic visits cost only $49 and if the [facility] 24/7 provider determines a prescription is needed to treat your condition, one can be electronically transmitted to a nearby pharmacy*. Please take a moment to enroll today if you have not already done so. The enrollment process is free and takes just a few minutes.   To enroll, please download the [facility] [facility] 24/7 becki to your tablet or phone, or visit [email or web address] to enroll on your computer. And, as an [address] [place] [address] patient with a [employer] account, the results of your visits will be scanned into your electronic medical record and your primary care provider will be able to view the scanned results. We urge you to continue to see your regular [facility] provider for your ongoing medical care. And while your primary care provider may not be the one available when you seek a [employer] virtual visit, the peace of mind you get from getting a real diagnosis real time can be priceless. For more information on [employer], view our Frequently Asked Questions (FAQs) at [email or web address]. Sincerely, 
 
Arlen Howell MD 
Chief Medical Officer [facility] Pratibha Coburn *:  certain medications cannot be prescribed via [employer] Providers Seen During Your Hospitalization Provider Specialty Primary office phone Sary Baker MD Vascular Surgery [phone] Your Primary Care Physician (PCP) Primary Care Physician Office Phone Office Fax Dayna Whipple [phone] You are allergic to the following Allergen Reactions Latex Contact Dermatitis Pt states he is NOT allergic to latex. Neosporin (Neomycin-Bacitracin-Polymyxin) Rash Recent Documentation Height Weight BMI Smoking Status 1.88 m 70.3 kg 19.9 kg/m2 Former Smoker Emergency Contacts Name Discharge Info Relation Home Work Mobile Batsheva Hough DISCHARGE CAREGIVER [3] Spouse [3] [id number] [address] CAREGIVER [3] Son [22]   [phone] Patient Belongings  The following personal items are in your possession at time of discharge: 
  Dental Appliances: Uppers, Sent home  Visual Aid: Glasses, Sent home Home Medications: None   Jewelry: None  Clothing: Undergarments, Footwear, Shirt, Pants, Sent home, Socks, Jacket/Coat    Other Valuables: Eyeglasses, Other (comment), Sent home (hearing aides and w/c) Please provide this summary of care documentation to your next provider. Signatures-by signing, you are acknowledging that this After Visit Summary has been reviewed with you and you have received a copy. Patient Signature:  ____________________________________________________________ Date:  ____________________________________________________________  
  
[facility] Provider Signature:  ____________________________________________________________ Date:  ____________________________________________________________

## 2018-04-27 NOTE — DISCHARGE INSTRUCTIONS
DISCHARGE SUMMARY from Nurse    PATIENT INSTRUCTIONS:    After general anesthesia or intravenous sedation, for 24 hours or while taking prescription Narcotics:  · Limit your activities  · Do not drive and operate hazardous machinery  · Do not make important personal or business decisions  · Do  not drink alcoholic beverages  · If you have not urinated within 8 hours after discharge, please contact your surgeon on call. Report the following to your surgeon:  · Excessive pain, swelling, redness or odor of or around the surgical area  · Temperature over 100.5  · Nausea and vomiting lasting longer than 4 hours or if unable to take medications  · Any signs of decreased circulation or nerve impairment to extremity: change in color, persistent  numbness, tingling, coldness or increase pain  · Any questions    What to do at Home:  [address] TO OFFICE IN 2 WEEKS CALL FOR APPT    If you experience any of the following symptoms heavy bleeding, fevrs, severe pain, circulation changes, please follow up with dr Lorena Gregory    *  Please give a list of your current medications to your Primary Care Provider. *  Please update this list whenever your medications are discontinued, doses are      changed, or new medications (including over-the-counter products) are added. *  Please carry medication information at all times in case of emergency situations. These are general instructions for a healthy lifestyle:    No smoking/ No tobacco products/ Avoid exposure to second hand smoke  Surgeon General's Warning:  Quitting smoking now greatly reduces serious risk to your health.     Obesity, smoking, and sedentary lifestyle greatly increases your risk for illness    A healthy diet, regular physical exercise & weight monitoring are important for maintaining a healthy lifestyle    You may be retaining fluid if you have a history of heart failure or if you experience any of the following symptoms:  Weight gain of 3 pounds or more overnight or 5 pounds in a week, increased swelling in our hands or feet or shortness of breath while lying flat in bed. Please call your doctor as soon as you notice any of these symptoms; do not wait until your next office visit. Recognize signs and symptoms of STROKE:    F-face looks uneven    A-arms unable to move or move unevenly    S-speech slurred or non-existent    T-time-call 911 as soon as signs and symptoms begin-DO NOT go       Back to bed or wait to see if you get better-TIME IS BRAIN. Warning Signs of HEART ATTACK     Call 911 if you have these symptoms:   Chest discomfort. Most heart attacks involve discomfort in the center of the chest that lasts more than a few minutes, or that goes away and comes back. It can feel like uncomfortable pressure, squeezing, fullness, or pain.  Discomfort in other areas of the upper body. Symptoms can include pain or discomfort in one or both arms, the back, neck, jaw, or stomach.  Shortness of breath with or without chest discomfort.  Other signs may include breaking out in a cold sweat, nausea, or lightheadedness. Don't wait more than five minutes to call 911 - MINUTES MATTER! Fast action can save your life. Calling 911 is almost always the fastest way to get lifesaving treatment. Emergency Medical Services staff can begin treatment when they arrive -- up to an hour sooner than if someone gets to the hospital by car. The discharge information has been reviewed with the patient and caregiver. The patient and caregiver verbalized understanding. Discharge medications reviewed with the patient and caregiver and appropriate educational materials and side effects teaching were provided.     Patient armband removed and shredded  ___________________________________________________________________________________________________________________________________

## 2018-04-27 NOTE — ANESTHESIA PREPROCEDURE EVALUATION
Anesthetic History   No history of anesthetic complications            Review of Systems / Medical History  Patient summary reviewed, nursing notes reviewed and pertinent labs reviewed    Pulmonary    COPD    Sleep apnea           Neuro/Psych   Within defined limits           Cardiovascular    Hypertension        Dysrhythmias   CAD         GI/Hepatic/Renal     GERD           Endo/Other    Diabetes  Hypothyroidism  Arthritis     Other Findings              Physical Exam    Airway  Mallampati: III  TM Distance: 4 - 6 cm  Neck ROM: decreased range of motion   Mouth opening: Normal     Cardiovascular    Rhythm: regular  Rate: normal         Dental    Dentition: Full upper dentures     Pulmonary  Breath sounds clear to auscultation               Abdominal  GI exam deferred       Other Findings            Anesthetic Plan    ASA: 3  Anesthesia type: general          Induction: Intravenous  Anesthetic plan and risks discussed with: Patient and Spouse      Patient understands he is at increased for cardiac and pulmonary complications periop. He agrees to stay monitored by family postop for RIVKA and understands the risks. He desires to proceed.

## 2018-04-27 NOTE — INTERVAL H&P NOTE
H&P Update:  Mace Homans Sr. was seen and examined. History and physical has been reviewed. The patient has been examined.  There have been no significant clinical changes since the completion of the originally dated History and Physical.    Signed By: Fely Chacon MD     April 27, 2018 7:25 AM

## 2018-04-27 NOTE — ANESTHESIA POSTPROCEDURE EVALUATION
Post-Anesthesia Evaluation and Assessment    Cardiovascular Function/Vital Signs  Visit Vitals    /43    Pulse 77    Temp 36.8 °C (98.3 °F)    Resp 10    Ht 6' 2\" (1.88 m)    Wt 70.3 kg (155 lb)    SpO2 98%    BMI 19.9 kg/m2       Patient is status post Procedure(s):  RIGHT LEG DEBRIDMENT WITH VERSAJET AND THERASKIN APPLICATION, \"SPEC POP\". Nausea/Vomiting: Controlled. Postoperative hydration reviewed and adequate. Pain:  Pain Scale 1: Numeric (0 - 10) (04/27/18 0845)  Pain Intensity 1: 0 (04/27/18 0845)   Managed. Neurological Status:   Neuro (WDL): Within Defined Limits (04/27/18 0830)   At baseline. Mental Status and Level of Consciousness: Arousable. Pulmonary Status:   O2 Device: Nasal cannula (04/27/18 0850)   Adequate oxygenation and airway patent. Complications related to anesthesia: None    Post-anesthesia assessment completed. No concerns. Patient has met all discharge requirements.     Signed By: Kuldeep Rendon CRNA    April 27, 2018

## 2018-04-27 NOTE — PERIOP NOTES
TRANSFER - OUT REPORT:    Verbal report given to RAIZA Kim RN on [facility].  being transferred to Phase II for routine progression of care       Report consisted of patients Situation, Background, Assessment and   Recommendations(SBAR). Information from the following report(s) SBAR was reviewed with the receiving nurse. Lines:   Peripheral IV 04/27/18 Left Hand (Active)   Site Assessment Clean, dry, & intact 4/27/2018  8:26 AM   Phlebitis Assessment 0 4/27/2018  8:26 AM   Infiltration Assessment 0 4/27/2018  8:26 AM   Dressing Status Clean, dry, & intact 4/27/2018  8:26 AM   Dressing Type Tape;Transparent 4/27/2018  8:26 AM   Hub Color/Line Status Pink; Infusing;Patent 4/27/2018  6:43 AM   Alcohol Cap Used No 4/27/2018  6:43 AM        Opportunity for questions and clarification was provided.       Patient transported with:   Registered Nurse   Surgical dressing clean and dry  VSS

## 2018-04-27 NOTE — BRIEF OP NOTE
BRIEF OPERATIVE NOTE    Date of Procedure: 4/27/2018   Preoperative Diagnosis: OPEN  WOUND RIGHT LEG  Postoperative Diagnosis: OPEN  WOUND RIGHT LEG    Procedure(s):  RIGHT LEG DEBRIDMENT WITH VERSAJET AND THERASKIN APPLICATION, \"SPEC POP\"  Surgeon(s) and Role:     * Pramod Kowalski MD - Primary         Surgical Assistant: None    Surgical Staff:  Circ-1: Letha Sánchez RN  Surg Asst-1: Johnna Phlegm. Brown  Event Time In   Incision Start 0750   Incision Close 0812     Anesthesia: General   Estimated Blood Loss: Minimal  Specimens: * No specimens in log *   Findings: Granulation with thin layer of tissue overlying malleolus    Complications: None  Implants:   Implant Name Type Inv.  Item Serial No.  Lot No. LRB No. Used Action   CRYOPRESERVED SKIN THERASKIN     [id number]     Right 1 Implanted

## 2018-04-28 NOTE — OP NOTES
[address] REPORT    Name:Nimisha GERMAN SR.  MR#: [id number]  : 1945  ACCOUNT #: [de-identified]   DATE OF SERVICE: 2018    PREOPERATIVE DIAGNOSIS:  Right medial ankle nonhealing wound. POSTOPERATIVE DIAGNOSIS:  Right medial ankle nonhealing wound. PROCEDURE:  1.  Versajet debridement of right ankle wound of skin and subcutaneous tissue. 2.  Placement of TheraSkin graft. SURGEON:  Rebeca Rodrigues MD.    ASSISTANT:  None       TYPE OF ANESTHESIA:  General with LMA. PACKS AND DRAINS:  None. IMPLANTS:  TheraSkin graft. COMPLICATIONS:  None. CONDITION:  Transferred to recovery, stable. ESTIMATED BLOOD LOSS:  Minimal     SPECIMENS REMOVED:  None     FINDINGS:  Base of wound with areas of fibrinous tissue and granulation tissue in the wound base with a thin layer of tissue overlying the medial malleolus with resultant wound defect after debridement of 13 x 4.5 cm. Hemostasis at completion of procedure. INDICATION FOR THE PROCEDURE:  The patient is a 58-year-old gentleman well known to the vascular service with a chronic wound in his right foot. Patient had his venous reflux disease and arterial inflow disease maximized but continued to develop this right foot wound and given these findings, decision was finally made to take the patient to the operating room for debridement and graft placement. Informed consent was obtained. PROCEDURE IN DETAIL:  On 2018, the patient was taken to the operating room and identified by name and ID bracelet by myself and the entire team.  Once this was done, the patient was placed on the operating table in supine position. After the appropriate depth of anesthesia was obtained, the patient had LMA placed without complication. The patient was then prepped and draped and timeout performed. Patient received a preoperative dose of antibiotics.   At this point using a Versajet, Versajet debridement of skin and subcutaneous tissue of the right medial foot wound was completed. We were able to remove copious amount of fibrous tissue to expose granulation tissue at the base of the wound. However, just overlying the medial malleolus, there was a thin layer of tissue that did not have granulation tissue in it. However, the concern was that if further debridement was taken, this would expose the bone. So at this point, we were happy with our debridement and we placed a TheraSkin graft overlying the wound defect, which was 13 x 4.5 cm, and then we affixed the graft using Monocryl sutures. We then placed a bolster 4 x 4 dressing in the center. After placing an Adaptic gauze overlying the graft, we placed 4 x 4's, ABD, Kerlix, and Coban. At the end of the procedure, all counts were correct x2. I was present and scrubbed for the entire procedure.       MD Lashon Ramirez / ABDI  D: 04/27/2018 12:57     T: 04/27/2018 21:27  JOB #: [id number]

## 2018-04-30 ENCOUNTER — OFFICE VISIT (OUTPATIENT)
Dept: VASCULAR SURGERY | Age: 73
End: 2018-04-30

## 2018-04-30 VITALS
RESPIRATION RATE: 18 BRPM | WEIGHT: 155 LBS | HEART RATE: 68 BPM | DIASTOLIC BLOOD PRESSURE: 64 MMHG | HEIGHT: 74 IN | BODY MASS INDEX: 19.89 KG/M2 | SYSTOLIC BLOOD PRESSURE: 100 MMHG

## 2018-04-30 DIAGNOSIS — L97.911 ULCER OF RIGHT LOWER EXTREMITY, LIMITED TO BREAKDOWN OF SKIN (HCC): Primary | ICD-10-CM

## 2018-04-30 NOTE — PROGRESS NOTES
BS VEIN/VASCULAR [facility] THE [facility]          Chart reviewed for the following:   Amita Haddad RN, have reviewed the medications and updated the Allergic reactions for [address] performed immediately prior to start of procedure:   Amita Haddad RN, have performed the following reviews on [facility]. prior to the start of the procedure:            * Patient was identified by name and date of birth   * Agreement that dressing removed and reapplied   * Procedure site verified   * Patient was positioned for comfort  * Verbal consent was given by patient     Time: 1400      Date of procedure: 4/30/2018    Procedure performed by:  Wendy Rice    How tolerated by patient: pt tolerated well     Comments: patient arrived via wheelchair , old dressing removed from right leg , not much drainage was noted on the outter layer. Wound on shin , beefy red and bleeds easily when washed. Lower ankle wound measuring 13 cm by 14.5 , no drainage and no odor noted . Adaptic removed and replaced with another sheet of adaptic, abd pad, kerlex and coban wrap. Advised we would see Friday but if drainage picks up then to call and come in on Thursday. Patient verbalized understanding.

## 2018-05-04 ENCOUNTER — OFFICE VISIT (OUTPATIENT)
Dept: VASCULAR SURGERY | Age: 73
End: 2018-05-04

## 2018-05-04 ENCOUNTER — HOSPITAL ENCOUNTER (OUTPATIENT)
Dept: WOUND CARE | Age: 73
Discharge: HOME | End: 2018-05-04
Payer: MEDICARE

## 2018-05-04 VITALS
HEART RATE: 97 BPM | TEMPERATURE: 97.9 F | SYSTOLIC BLOOD PRESSURE: 112 MMHG | RESPIRATION RATE: 17 BRPM | OXYGEN SATURATION: 100 % | DIASTOLIC BLOOD PRESSURE: 50 MMHG

## 2018-05-04 DIAGNOSIS — L97.412 DIABETIC ULCER OF RIGHT MIDFOOT ASSOCIATED WITH TYPE 2 DIABETES MELLITUS, WITH FAT LAYER EXPOSED (HCC): Primary | ICD-10-CM

## 2018-05-04 DIAGNOSIS — E11.621 DIABETIC ULCER OF RIGHT MIDFOOT ASSOCIATED WITH TYPE 2 DIABETES MELLITUS, WITH FAT LAYER EXPOSED (HCC): Primary | ICD-10-CM

## 2018-05-04 PROCEDURE — 97602 WOUND(S) CARE NON-SELECTIVE: CPT

## 2018-05-07 VITALS
DIASTOLIC BLOOD PRESSURE: 62 MMHG | HEIGHT: 74 IN | WEIGHT: 155 LBS | SYSTOLIC BLOOD PRESSURE: 98 MMHG | HEART RATE: 70 BPM | RESPIRATION RATE: 18 BRPM | BODY MASS INDEX: 19.89 KG/M2

## 2018-05-07 NOTE — PROGRESS NOTES
BS VEIN/VASCULAR [facility]          Chart reviewed for the following:   Rogerio Farias RN, have reviewed the medications and updated the Allergic reactions for [address] performed immediately prior to start of procedure:   Rogerio Farias RN, have performed the following reviews on [facility]. prior to the start of the procedure:            * Patient was identified by name and date of birth   * Procedure site verified   * Patient was positioned for comfort  * Verbal consent was given by patient     Time: 1100      Date of procedure: 5/7/2018    Procedure performed by:  Nu Thompson    How tolerated by patient: pt tolerated well     Comments: patient arrived via wheelchair , old dressing removed from right leg , not much drainage was noted on the outter layer. Wound on shin , beefy red and bleeds easily when washed. Lower ankle wound , no drainage and no odor noted . Adaptic removed and replaced with another sheet of adaptic, abd pad, kerlex and coban wrap. Advised we would see next wednesday but if drainage picks up then to call and come in on Thursday. Patient verbalized understanding.

## 2018-05-09 ENCOUNTER — OFFICE VISIT (OUTPATIENT)
Dept: VASCULAR SURGERY | Age: 73
End: 2018-05-09

## 2018-05-09 VITALS
HEART RATE: 68 BPM | BODY MASS INDEX: 19.89 KG/M2 | RESPIRATION RATE: 18 BRPM | HEIGHT: 74 IN | SYSTOLIC BLOOD PRESSURE: 122 MMHG | WEIGHT: 155 LBS | DIASTOLIC BLOOD PRESSURE: 70 MMHG

## 2018-05-09 DIAGNOSIS — L97.921 LEG ULCER, LEFT, LIMITED TO BREAKDOWN OF SKIN (HCC): Primary | ICD-10-CM

## 2018-05-09 DIAGNOSIS — L97.911 ULCER OF RIGHT LEG, LIMITED TO BREAKDOWN OF SKIN (HCC): ICD-10-CM

## 2018-05-10 PROBLEM — L97.921 LEG ULCER, LEFT, LIMITED TO BREAKDOWN OF SKIN (HCC): Status: ACTIVE | Noted: 2018-02-27

## 2018-05-11 NOTE — PROGRESS NOTES
Katerina Alicia . Chief Complaint   Patient presents with    Wound Check       History and Physical    Mr. Corene Spurling returns to our office after undergoing a debridement of his medial right ankle wound with graft placement. He states his legs are healing well but his left ankle ulcer has increased slightly in size. He has no other complaints. Past Medical History:   Diagnosis Date    Acid reflux     Anemia     Arthritis     Atrial fibrillation ([facility])     CAD (coronary artery disease)     Chronic obstructive pulmonary disease ([facility])     Coagulation disorder ([facility])     anemia    Diabetes ([address] 1997    GERD (gastroesophageal reflux disease)     H/O seasonal allergies     Hypercholesterolemia     Hypertension 1997    Ill-defined condition     hand tremors    PAF (paroxysmal atrial fibrillation) ([address] 11/5/2017    Peripheral arterial disease [facility]     Sleep apnea     pt states he has not used bipap \"in years\".     Thyroid disease      Patient Active Problem List   Diagnosis Code    PAD (peripheral artery disease) ([facility]) I73.9    Venous reflux I87.2    Leg ulcer, left ([facility] L97.929    Leg ulcer ([facility] L97.909    Diabetic ulcer of right midfoot associated with type 2 diabetes mellitus, with fat layer exposed ([address] E11.621, L97.412    Sepsis ([address] A41.9    CAD (coronary artery disease) I25.10    COPD (chronic obstructive pulmonary disease) ([facility]) J44.9    HTN (hypertension) I10    UTI (urinary tract infection) due to urinary indwelling Sher catheter ([facility]) T83.511A, N39.0    PAF (paroxysmal atrial fibrillation) ([facility]) I48.0    UTI (urinary tract infection) N39.0    Diabetic ulcer of both lower extremities ([facility]) E11.622, L97.919, L97.929    Hyponatremia E87.1    Hypomagnesemia E83.42    Hypokalemia E87.6    Severe protein-calorie malnutrition ([facility]) E43    Ulcer of right leg, limited to breakdown of skin ([address] L97.911    Type 2 diabetes mellitus with diabetic neuropathy ([facility]) E11.40 Past Surgical History:   Procedure Laterality Date    ABDOMEN SURGERY PROC UNLISTED  1997    Vikas-en-Y    ABDOMEN SURGERY PROC UNLISTED  2000    abdominal hernia repair/mesh    HX CERVICAL LAMINECTOMY      HX COLONOSCOPY      HX HEENT      cataracts removed right eye    HX ORTHOPAEDIC  1982    cervical laminectomy    HX ORTHOPAEDIC Right 1962    knee torn cartilage    HX UROLOGICAL  12/2015    suprapubic catheter in place    VASCULAR SURGERY PROCEDURE UNLIST Bilateral 2015 2017    angio plasty legs  x2    VASCULAR SURGERY PROCEDURE UNLIST Bilateral 2017    legs venogram  stent right leg     Current Outpatient Prescriptions   Medication Sig Dispense Refill    oxyCODONE-acetaminophen (PERCOCET) 5-325 mg per tablet Take 2 Tabs by mouth every six (6) hours as needed for Pain. Max Daily Amount: 8 Tabs. 112 Tab 0    gabapentin (NEURONTIN) 300 mg capsule Take 900 mg by mouth nightly.  insulin aspart U-100 (NOVOLOG FLEXPEN U-100 INSULIN) 100 unit/mL inpn 9 Units by SubCUTAneous route two (2) times a day. With lunch and dinner in addition the sliding scale      insulin glargine (LANTUS SOLOSTAR U-100 INSULIN) 100 unit/mL (3 mL) inpn 27 Units by SubCUTAneous route nightly.  levothyroxine (SYNTHROID) 100 mcg tablet Take 100 mcg by mouth Daily (before breakfast).  cilostazol (PLETAL) 100 mg tablet Take 100 mg by mouth Before breakfast and dinner.  trimethoprim-sulfamethoxazole (BACTRIM DS) 160-800 mg per tablet Take 1 Tab by mouth two (2) times a day.  pregabalin (LYRICA) 50 mg capsule Take 50 mg by mouth two (2) times a day.  collagenase (SANTYL) 250 unit/gram ointment Apply  to affected area daily. 30 g 4    Omeprazole delayed release (PRILOSEC D/R) 20 mg tablet Take 20 mg by mouth daily.  potassium citrate (UROCIT-K10) 10 mEq (1,080 mg) TbER Take 10 mEq by mouth two (2) times a day.       multivitamin, tx-iron-ca-min (THERA-M W/ IRON) 9 mg iron-400 mcg tab tablet Take 1 Tab by mouth daily.  acetaminophen (TYLENOL EXTRA STRENGTH) 500 mg tablet Take 500 mg by mouth every six (6) hours as needed for Pain.  iron aspgly,sp-A-A86-FA-Ca-suc (FERREX 150 FORTE PLUS) 689-67-94-8 mg-mg-mcg-mg cap cap Take 1 Cap by mouth daily.  insulin aspart (NOVOLOG FLEXPEN) 100 unit/mL inpn 5 Units by SubCUTAneous route daily. Sliding scale  Plus the above dose      metFORMIN (GLUCOPHAGE) 1,000 mg tablet Take 1,000 mg by mouth two (2) times daily (with meals).  atorvastatin (LIPITOR) 20 mg tablet Take  by mouth daily.  lisinopril (PRINIVIL, ZESTRIL) 2.5 mg tablet Take  by mouth daily.  albuterol (PROAIR HFA) 90 mcg/actuation inhaler Take  by inhalation.  clopidogrel (PLAVIX) 75 mg tab Take 75 mg by mouth daily.  cyanocobalamin (VITAMIN B-12) 1,000 mcg/mL injection 1,000 mcg by IntraMUSCular route every thirty (30) days.  aspirin 81 mg tablet Take 81 mg by mouth nightly.  furosemide (LASIX) 40 mg tablet Take 40 mg by mouth daily.  diltiazem (TIAZAC) 360 mg SR capsule Take 360 mg by mouth daily.  tiotropium (SPIRIVA WITH HANDIHALER) 18 mcg inhalation capsule Take 1 Cap by inhalation daily.  loratadine (CLARITIN) 10 mg tablet Take 10 mg by mouth daily.  fluticasone (FLONASE) 50 mcg/actuation nasal spray 2 Sprays by Both Nostrils route daily.  traMADol (ULTRAM) 50 mg tablet Take 50 mg by mouth daily.  gabapentin (NEURONTIN) 300 mg capsule Take 300 mg by mouth daily. One in the am and 2 at hs      LACTASE ENZYME PO Take 1 Cap by mouth daily.  fluticasone-salmeterol (ADVAIR DISKUS) 250-50 mcg/dose diskus inhaler Take 1 Puff by inhalation every twelve (12) hours. Allergies   Allergen Reactions    Latex Contact Dermatitis     Pt states he is NOT allergic to latex.     Neosporin [Neomycin-Bacitracin-Polymyxin] Rash     Social History     Social History    Marital status:      Spouse name: N/A    Number of children: N/A    Years of education: N/A     Occupational History    Not on file. Social History Main Topics    Smoking status: Former Smoker     Types: Cigarettes     Quit date: 1/1/1997    Smokeless tobacco: Never Used    Alcohol use 0.6 oz/week     1 Glasses of wine per week    Drug use: No    Sexual activity: Not on file     Other Topics Concern    Not on file     Social History Narrative      Family History   Problem Relation Age of Onset    Cancer Mother     Diabetes Father     Heart Disease Sister     Cancer Brother     Diabetes Brother     Hypertension Brother        Review of Systems    Review of Systems   Constitutional: Negative for chills, diaphoresis, fever, malaise/fatigue and weight loss. HENT: Negative for hearing loss and sore throat. Eyes: Negative for blurred vision, photophobia and redness. Respiratory: Negative for cough, hemoptysis, shortness of breath and wheezing. Cardiovascular: Negative for chest pain, palpitations and orthopnea. Gastrointestinal: Negative for abdominal pain, blood in stool, constipation, diarrhea, heartburn, nausea and vomiting. Genitourinary: Negative for dysuria, frequency, hematuria and urgency. Musculoskeletal: Negative for back pain and myalgias. Skin: Negative for itching and rash. Neurological: Negative for dizziness, speech change, focal weakness, weakness and headaches. Endo/Heme/Allergies: Does not bruise/bleed easily. Psychiatric/Behavioral: Negative for depression and suicidal ideas.             Physical Exam:    Visit Vitals    /70 (BP 1 Location: Right arm, BP Patient Position: Sitting)    Pulse 68    Resp 18    Ht 6' 2\" (1.88 m)    Wt 155 lb (70.3 kg)    BMI 19.9 kg/m2      Physical Examination: General appearance - alert, well appearing, and in no distress  Mental status - alert, oriented to person, place, and time  Eyes - sclera anicteric, left eye normal, right eye normal  Ears - right ear normal, left ear normal  Nose - normal and patent, no erythema, discharge or polyps  Mouth - mucous membranes moist, pharynx normal without lesions  Extremities - bilateral lower extremities warm and well perfused. Right mid leg skin tear nearly healed. Medial right ankle wound with good granulation tissue and no exposed tendon or bone. Left leg ulcer with areas of fibrinous tissue. No signs of infection      Impression and Plan:    ICD-10-CM ICD-9-CM    1. Leg ulcer, left, limited to breakdown of skin ([address] L97.921 707.10    2. Ulcer of right leg, limited to breakdown of skin ([address] L97.911 707.10      I am pleased with Mr. Kana Garrett progress in his wound. I do believe he made need an additional graft placed on his right ankle and most likely will require debridement of his left ankle wound. We catarino re evaluate that again in 2 weeks to determine whether an additional debridement and graft placement would be required. Mr. Lalit Abraham agrees with this plan. The treatment plan was reviewed with the patient in detail. The patient voiced understanding of this plan and all questions and concerns were addressed. The patient agrees with this plan. We discussed the signs and symptoms that would require earlier attention or intervention. The patient was given educational material related to his/her visit and the patient has voiced understanding of the material.     I appreciate the opportunity to participate in the care of your patient. I will be sure to keep you informed of any subsequent changes in the treatment plan. If you have any questions or concerns, please feel free to contact me. Fely Chacon MD    PLEASE NOTE:  This document has been produced using voice recognition software. Unrecognized errors in transcription may be present.

## 2018-05-16 ENCOUNTER — APPOINTMENT (OUTPATIENT)
Dept: WOUND CARE | Age: 73
End: 2018-05-16
Payer: MEDICARE

## 2018-05-16 ENCOUNTER — HOSPITAL ENCOUNTER (INPATIENT)
Age: 73
LOS: 2 days | Discharge: HOME | DRG: 813 | End: 2018-05-18
Attending: EMERGENCY MEDICINE | Admitting: HOSPITALIST
Payer: MEDICARE

## 2018-05-16 DIAGNOSIS — J43.9 PULMONARY EMPHYSEMA, UNSPECIFIED EMPHYSEMA TYPE (HCC): ICD-10-CM

## 2018-05-16 DIAGNOSIS — T83.090A MECHANICAL COMPLICATION OF SUPRAPUBIC CATHETER, INITIAL ENCOUNTER (HCC): ICD-10-CM

## 2018-05-16 DIAGNOSIS — Z79.01 CHRONIC ANTICOAGULATION: ICD-10-CM

## 2018-05-16 DIAGNOSIS — I48.0 PAF (PAROXYSMAL ATRIAL FIBRILLATION) (HCC): ICD-10-CM

## 2018-05-16 DIAGNOSIS — R31.0 GROSS HEMATURIA: Primary | ICD-10-CM

## 2018-05-16 PROBLEM — Z66 DNR NO CODE (DO NOT RESUSCITATE): Status: ACTIVE | Noted: 2018-05-16

## 2018-05-16 LAB
ANION GAP SERPL CALC-SCNC: 8 MMOL/L (ref 3–18)
APPEARANCE UR: ABNORMAL
APTT PPP: 27.9 SEC (ref 23–36.4)
ATRIAL RATE: 118 BPM
BACTERIA URNS QL MICRO: ABNORMAL /HPF
BASOPHILS # BLD: 0 K/UL (ref 0–0.06)
BASOPHILS NFR BLD: 0 % (ref 0–2)
BILIRUB UR QL: ABNORMAL
BUN SERPL-MCNC: 9 MG/DL (ref 7–18)
BUN/CREAT SERPL: 12 (ref 12–20)
CALCIUM SERPL-MCNC: 8.7 MG/DL (ref 8.5–10.1)
CALCULATED P AXIS, ECG09: 77 DEGREES
CALCULATED R AXIS, ECG10: -27 DEGREES
CALCULATED T AXIS, ECG11: 100 DEGREES
CHLORIDE SERPL-SCNC: 99 MMOL/L (ref 100–108)
CK MB CFR SERPL CALC: 3.7 % (ref 0–4)
CK MB SERPL-MCNC: 1 NG/ML (ref 5–25)
CK SERPL-CCNC: 27 U/L (ref 39–308)
CO2 SERPL-SCNC: 27 MMOL/L (ref 21–32)
COLOR UR: ABNORMAL
CREAT SERPL-MCNC: 0.77 MG/DL (ref 0.6–1.3)
DIAGNOSIS, 93000: NORMAL
DIFFERENTIAL METHOD BLD: ABNORMAL
EOSINOPHIL # BLD: 0.1 K/UL (ref 0–0.4)
EOSINOPHIL NFR BLD: 1 % (ref 0–5)
ERYTHROCYTE [DISTWIDTH] IN BLOOD BY AUTOMATED COUNT: 15.9 % (ref 11.6–14.5)
EST. AVERAGE GLUCOSE BLD GHB EST-MCNC: 151 MG/DL
GLUCOSE BLD STRIP.AUTO-MCNC: 181 MG/DL (ref 70–110)
GLUCOSE BLD STRIP.AUTO-MCNC: 239 MG/DL (ref 70–110)
GLUCOSE BLD STRIP.AUTO-MCNC: 280 MG/DL (ref 70–110)
GLUCOSE SERPL-MCNC: 178 MG/DL (ref 74–99)
GLUCOSE UR STRIP.AUTO-MCNC: 100 MG/DL
HBA1C MFR BLD: 6.9 % (ref 4.5–5.6)
HCT VFR BLD AUTO: 29 % (ref 36–48)
HCT VFR BLD AUTO: 30.4 % (ref 36–48)
HCT VFR BLD AUTO: 34.4 % (ref 36–48)
HGB BLD-MCNC: 10 G/DL (ref 13–16)
HGB BLD-MCNC: 11.5 G/DL (ref 13–16)
HGB BLD-MCNC: 9.8 G/DL (ref 13–16)
HGB UR QL STRIP: ABNORMAL
INR PPP: 1.1 (ref 0.8–1.2)
KETONES UR QL STRIP.AUTO: ABNORMAL MG/DL
LEUKOCYTE ESTERASE UR QL STRIP.AUTO: ABNORMAL
LYMPHOCYTES # BLD: 1.3 K/UL (ref 0.9–3.6)
LYMPHOCYTES NFR BLD: 11 % (ref 21–52)
MAGNESIUM SERPL-MCNC: 1.4 MG/DL (ref 1.6–2.6)
MCH RBC QN AUTO: 29.1 PG (ref 24–34)
MCHC RBC AUTO-ENTMCNC: 33.4 G/DL (ref 31–37)
MCV RBC AUTO: 87.1 FL (ref 74–97)
MONOCYTES # BLD: 0.7 K/UL (ref 0.05–1.2)
MONOCYTES NFR BLD: 6 % (ref 3–10)
NEUTS SEG # BLD: 9.5 K/UL (ref 1.8–8)
NEUTS SEG NFR BLD: 82 % (ref 40–73)
NITRITE UR QL STRIP.AUTO: NEGATIVE
P-R INTERVAL, ECG05: 146 MS
PH UR STRIP: 8.5 [PH] (ref 5–8)
PLATELET # BLD AUTO: 175 K/UL (ref 135–420)
PMV BLD AUTO: 9.5 FL (ref 9.2–11.8)
POTASSIUM SERPL-SCNC: 3.5 MMOL/L (ref 3.5–5.5)
PROT UR STRIP-MCNC: >300 MG/DL
PROTHROMBIN TIME: 13.3 SEC (ref 11.5–15.2)
Q-T INTERVAL, ECG07: 346 MS
QRS DURATION, ECG06: 104 MS
QTC CALCULATION (BEZET), ECG08: 484 MS
RBC # BLD AUTO: 3.95 M/UL (ref 4.7–5.5)
RBC #/AREA URNS HPF: ABNORMAL /HPF (ref 0–5)
SODIUM SERPL-SCNC: 134 MMOL/L (ref 136–145)
SP GR UR REFRACTOMETRY: 1.02 (ref 1–1.03)
TROPONIN I SERPL-MCNC: <0.02 NG/ML (ref 0–0.06)
UROBILINOGEN UR QL STRIP.AUTO: 1 EU/DL (ref 0.2–1)
VENTRICULAR RATE, ECG03: 118 BPM
WBC # BLD AUTO: 11.6 K/UL (ref 4.6–13.2)
WBC URNS QL MICRO: ABNORMAL /HPF (ref 0–5)

## 2018-05-16 PROCEDURE — 77030018846 HC SOL IRR STRL H20 ICUM -A

## 2018-05-16 PROCEDURE — 74011250636 HC RX REV CODE- 250/636: Performed by: INTERNAL MEDICINE

## 2018-05-16 PROCEDURE — 93005 ELECTROCARDIOGRAM TRACING: CPT

## 2018-05-16 PROCEDURE — 83036 HEMOGLOBIN GLYCOSYLATED A1C: CPT | Performed by: HOSPITALIST

## 2018-05-16 PROCEDURE — 74011250636 HC RX REV CODE- 250/636: Performed by: HOSPITALIST

## 2018-05-16 PROCEDURE — 80048 BASIC METABOLIC PNL TOTAL CA: CPT | Performed by: EMERGENCY MEDICINE

## 2018-05-16 PROCEDURE — 85610 PROTHROMBIN TIME: CPT | Performed by: EMERGENCY MEDICINE

## 2018-05-16 PROCEDURE — 74011250637 HC RX REV CODE- 250/637: Performed by: HOSPITALIST

## 2018-05-16 PROCEDURE — 74011250637 HC RX REV CODE- 250/637: Performed by: NURSE PRACTITIONER

## 2018-05-16 PROCEDURE — 85025 COMPLETE CBC W/AUTO DIFF WBC: CPT | Performed by: EMERGENCY MEDICINE

## 2018-05-16 PROCEDURE — 83735 ASSAY OF MAGNESIUM: CPT | Performed by: HOSPITALIST

## 2018-05-16 PROCEDURE — 99285 EMERGENCY DEPT VISIT HI MDM: CPT

## 2018-05-16 PROCEDURE — 74011250636 HC RX REV CODE- 250/636: Performed by: EMERGENCY MEDICINE

## 2018-05-16 PROCEDURE — 65660000000 HC RM CCU STEPDOWN

## 2018-05-16 PROCEDURE — 36415 COLL VENOUS BLD VENIPUNCTURE: CPT | Performed by: HOSPITALIST

## 2018-05-16 PROCEDURE — 0T2BX0Z CHANGE DRAINAGE DEVICE IN BLADDER, EXTERNAL APPROACH: ICD-10-PCS | Performed by: UROLOGY

## 2018-05-16 PROCEDURE — 82962 GLUCOSE BLOOD TEST: CPT

## 2018-05-16 PROCEDURE — 85730 THROMBOPLASTIN TIME PARTIAL: CPT | Performed by: EMERGENCY MEDICINE

## 2018-05-16 PROCEDURE — 85018 HEMOGLOBIN: CPT | Performed by: HOSPITALIST

## 2018-05-16 PROCEDURE — 82550 ASSAY OF CK (CPK): CPT | Performed by: EMERGENCY MEDICINE

## 2018-05-16 PROCEDURE — 81001 URINALYSIS AUTO W/SCOPE: CPT | Performed by: HOSPITALIST

## 2018-05-16 PROCEDURE — 74011636637 HC RX REV CODE- 636/637: Performed by: HOSPITALIST

## 2018-05-16 RX ORDER — FUROSEMIDE 40 MG/1
40 TABLET ORAL DAILY
Status: DISCONTINUED | OUTPATIENT
Start: 2018-05-16 | End: 2018-05-18 | Stop reason: HOSPADM

## 2018-05-16 RX ORDER — MAGNESIUM SULFATE HEPTAHYDRATE 40 MG/ML
2 INJECTION, SOLUTION INTRAVENOUS
Status: DISPENSED | OUTPATIENT
Start: 2018-05-16 | End: 2018-05-16

## 2018-05-16 RX ORDER — GLUCOSAMINE SULFATE 1500 MG
5000 POWDER IN PACKET (EA) ORAL DAILY
COMMUNITY

## 2018-05-16 RX ORDER — OXYCODONE AND ACETAMINOPHEN 5; 325 MG/1; MG/1
2 TABLET ORAL
Status: DISCONTINUED | OUTPATIENT
Start: 2018-05-16 | End: 2018-05-18 | Stop reason: HOSPADM

## 2018-05-16 RX ORDER — MAGNESIUM SULFATE HEPTAHYDRATE 40 MG/ML
2 INJECTION, SOLUTION INTRAVENOUS ONCE
Status: COMPLETED | OUTPATIENT
Start: 2018-05-16 | End: 2018-05-16

## 2018-05-16 RX ORDER — LEVOTHYROXINE SODIUM 100 UG/1
100 TABLET ORAL
Status: DISCONTINUED | OUTPATIENT
Start: 2018-05-16 | End: 2018-05-18 | Stop reason: HOSPADM

## 2018-05-16 RX ORDER — FLUTICASONE PROPIONATE 50 MCG
2 SPRAY, SUSPENSION (ML) NASAL DAILY
Status: DISCONTINUED | OUTPATIENT
Start: 2018-05-16 | End: 2018-05-18 | Stop reason: HOSPADM

## 2018-05-16 RX ORDER — CILOSTAZOL 50 MG/1
100 TABLET ORAL
Status: DISCONTINUED | OUTPATIENT
Start: 2018-05-16 | End: 2018-05-18 | Stop reason: HOSPADM

## 2018-05-16 RX ORDER — INSULIN LISPRO 100 [IU]/ML
INJECTION, SOLUTION INTRAVENOUS; SUBCUTANEOUS
Status: DISCONTINUED | OUTPATIENT
Start: 2018-05-16 | End: 2018-05-18 | Stop reason: HOSPADM

## 2018-05-16 RX ORDER — LORATADINE 10 MG/1
10 TABLET ORAL DAILY
Status: DISCONTINUED | OUTPATIENT
Start: 2018-05-16 | End: 2018-05-18 | Stop reason: HOSPADM

## 2018-05-16 RX ORDER — POTASSIUM CHLORIDE 20 MEQ/1
40 TABLET, EXTENDED RELEASE ORAL
Status: COMPLETED | OUTPATIENT
Start: 2018-05-16 | End: 2018-05-16

## 2018-05-16 RX ORDER — MAGNESIUM SULFATE 100 %
4 CRYSTALS MISCELLANEOUS AS NEEDED
Status: DISCONTINUED | OUTPATIENT
Start: 2018-05-16 | End: 2018-05-18 | Stop reason: HOSPADM

## 2018-05-16 RX ORDER — ASPIRIN 81 MG/1
81 TABLET ORAL
Status: DISCONTINUED | OUTPATIENT
Start: 2018-05-17 | End: 2018-05-16

## 2018-05-16 RX ORDER — PREGABALIN 75 MG/1
75 CAPSULE ORAL 2 TIMES DAILY
Status: DISCONTINUED | OUTPATIENT
Start: 2018-05-16 | End: 2018-05-18 | Stop reason: HOSPADM

## 2018-05-16 RX ORDER — INSULIN GLARGINE 100 [IU]/ML
0.2 INJECTION, SOLUTION SUBCUTANEOUS
Status: DISCONTINUED | OUTPATIENT
Start: 2018-05-16 | End: 2018-05-18 | Stop reason: HOSPADM

## 2018-05-16 RX ORDER — LISINOPRIL 5 MG/1
2.5 TABLET ORAL DAILY
Status: DISCONTINUED | OUTPATIENT
Start: 2018-05-16 | End: 2018-05-18 | Stop reason: HOSPADM

## 2018-05-16 RX ORDER — DEXTROSE 50 % IN WATER (D50W) INTRAVENOUS SYRINGE
25-50 AS NEEDED
Status: DISCONTINUED | OUTPATIENT
Start: 2018-05-16 | End: 2018-05-18 | Stop reason: HOSPADM

## 2018-05-16 RX ORDER — MELATONIN
5000 DAILY
Status: DISCONTINUED | OUTPATIENT
Start: 2018-05-16 | End: 2018-05-18 | Stop reason: HOSPADM

## 2018-05-16 RX ORDER — DILTIAZEM HYDROCHLORIDE 5 MG/ML
10 INJECTION INTRAVENOUS ONCE
Status: DISPENSED | OUTPATIENT
Start: 2018-05-16 | End: 2018-05-16

## 2018-05-16 RX ORDER — ATORVASTATIN CALCIUM 20 MG/1
20 TABLET, FILM COATED ORAL DAILY
Status: DISCONTINUED | OUTPATIENT
Start: 2018-05-16 | End: 2018-05-18 | Stop reason: HOSPADM

## 2018-05-16 RX ORDER — FLUTICASONE FUROATE AND VILANTEROL 100; 25 UG/1; UG/1
1 POWDER RESPIRATORY (INHALATION) DAILY
Status: DISCONTINUED | OUTPATIENT
Start: 2018-05-16 | End: 2018-05-18 | Stop reason: HOSPADM

## 2018-05-16 RX ORDER — CLOPIDOGREL BISULFATE 75 MG/1
75 TABLET ORAL DAILY
Status: DISCONTINUED | OUTPATIENT
Start: 2018-05-17 | End: 2018-05-16

## 2018-05-16 RX ORDER — CLOPIDOGREL BISULFATE 75 MG/1
75 TABLET ORAL DAILY
Status: DISCONTINUED | OUTPATIENT
Start: 2018-05-16 | End: 2018-05-16

## 2018-05-16 RX ORDER — OMEPRAZOLE 20 MG/1
20 CAPSULE, DELAYED RELEASE ORAL DAILY
Status: DISCONTINUED | OUTPATIENT
Start: 2018-05-16 | End: 2018-05-18 | Stop reason: HOSPADM

## 2018-05-16 RX ORDER — SODIUM CHLORIDE 9 MG/ML
100 INJECTION, SOLUTION INTRAVENOUS CONTINUOUS
Status: DISCONTINUED | OUTPATIENT
Start: 2018-05-16 | End: 2018-05-18 | Stop reason: HOSPADM

## 2018-05-16 RX ADMIN — MAGNESIUM SULFATE HEPTAHYDRATE 2 G: 40 INJECTION, SOLUTION INTRAVENOUS at 10:24

## 2018-05-16 RX ADMIN — LEVOTHYROXINE SODIUM 100 MCG: 100 TABLET ORAL at 10:26

## 2018-05-16 RX ADMIN — FLUTICASONE PROPIONATE 2 SPRAY: 50 SPRAY, METERED NASAL at 10:53

## 2018-05-16 RX ADMIN — SODIUM CHLORIDE 1000 ML: 900 INJECTION, SOLUTION INTRAVENOUS at 13:00

## 2018-05-16 RX ADMIN — SODIUM CHLORIDE 500 ML: 900 INJECTION, SOLUTION INTRAVENOUS at 23:52

## 2018-05-16 RX ADMIN — SODIUM CHLORIDE 250 ML: 900 INJECTION, SOLUTION INTRAVENOUS at 21:00

## 2018-05-16 RX ADMIN — MAGNESIUM SULFATE HEPTAHYDRATE 2 G: 40 INJECTION, SOLUTION INTRAVENOUS at 11:46

## 2018-05-16 RX ADMIN — INSULIN GLARGINE 14 UNITS: 100 INJECTION, SOLUTION SUBCUTANEOUS at 21:48

## 2018-05-16 RX ADMIN — PREGABALIN 75 MG: 75 CAPSULE ORAL at 10:26

## 2018-05-16 RX ADMIN — SODIUM CHLORIDE 100 ML/HR: 900 INJECTION, SOLUTION INTRAVENOUS at 10:27

## 2018-05-16 RX ADMIN — INSULIN LISPRO 4 UNITS: 100 INJECTION, SOLUTION INTRAVENOUS; SUBCUTANEOUS at 17:40

## 2018-05-16 RX ADMIN — INSULIN LISPRO 2 UNITS: 100 INJECTION, SOLUTION INTRAVENOUS; SUBCUTANEOUS at 11:01

## 2018-05-16 RX ADMIN — SODIUM CHLORIDE 500 ML: 900 INJECTION, SOLUTION INTRAVENOUS at 05:52

## 2018-05-16 RX ADMIN — INSULIN LISPRO 9 UNITS: 100 INJECTION, SOLUTION INTRAVENOUS; SUBCUTANEOUS at 21:47

## 2018-05-16 RX ADMIN — OXYCODONE HYDROCHLORIDE AND ACETAMINOPHEN 2 TABLET: 5; 325 TABLET ORAL at 17:45

## 2018-05-16 RX ADMIN — OMEPRAZOLE 20 MG: 20 CAPSULE, DELAYED RELEASE ORAL at 10:25

## 2018-05-16 RX ADMIN — POTASSIUM CHLORIDE 40 MEQ: 20 TABLET, EXTENDED RELEASE ORAL at 05:51

## 2018-05-16 RX ADMIN — FUROSEMIDE 40 MG: 40 TABLET ORAL at 10:25

## 2018-05-16 RX ADMIN — LORATADINE 10 MG: 10 TABLET ORAL at 10:26

## 2018-05-16 RX ADMIN — PREGABALIN 75 MG: 75 CAPSULE ORAL at 21:48

## 2018-05-16 RX ADMIN — FLUTICASONE FUROATE AND VILANTEROL TRIFENATATE 1 PUFF: 100; 25 POWDER RESPIRATORY (INHALATION) at 10:54

## 2018-05-16 RX ADMIN — MULTIPLE VITAMINS W/ MINERALS TAB 1 TABLET: TAB at 10:26

## 2018-05-16 RX ADMIN — DILTIAZEM HYDROCHLORIDE 90 MG: 60 TABLET, FILM COATED ORAL at 17:40

## 2018-05-16 RX ADMIN — CILOSTAZOL 100 MG: 50 TABLET ORAL at 10:25

## 2018-05-16 RX ADMIN — CILOSTAZOL 100 MG: 50 TABLET ORAL at 17:40

## 2018-05-16 RX ADMIN — DILTIAZEM HYDROCHLORIDE 90 MG: 60 TABLET, FILM COATED ORAL at 11:01

## 2018-05-16 RX ADMIN — LISINOPRIL 2.5 MG: 5 TABLET ORAL at 10:26

## 2018-05-16 RX ADMIN — UMECLIDINIUM 1 PUFF: 62.5 AEROSOL, POWDER ORAL at 10:54

## 2018-05-16 RX ADMIN — ATORVASTATIN CALCIUM 20 MG: 20 TABLET, FILM COATED ORAL at 10:25

## 2018-05-16 RX ADMIN — COLLAGENASE SANTYL: 250 OINTMENT TOPICAL at 11:49

## 2018-05-16 RX ADMIN — VITAMIN D, TAB 1000IU (100/BT) 5000 UNITS: 25 TAB at 10:25

## 2018-05-16 NOTE — WOUND CARE
Wound Care Progress Note       New consult placed by Dr. Moose Spangler for wounds bilaterally to lower legs. Assessment:   Communication: A&O x 4, communicative  Wearing briefs for incontinence, has a suprapubic catheter  Requires partial assists in repositioning. Patient reports no pain. Bilateral heel, buttocks, and sacral skin intact and without erythema. 1. POA, left lower lateral leg wound = multiple open areas measuring area of 9 x 8 x 0.2 cm    Base is pink/yellow tissue. Moderate amount of yellow/tan exudate. Periwound macerated & with erythema. Treatment: Santyl to lateral ankle wounds, Saline moist gauze, Soft roll, Tubi-; Bacitracin and proshield to leg, Adaptic, soft roll, Tubi-    2. POA, right lower medial leg wound = 7 x 6.2 x 0.2. Previous graft partially in place. Base is pink/yellow tissue. Moderate amount of yellow/tan exudate. Periwound macerated & with erythema. Treatment: Santyl to medial ankle wounds, Saline moist gauze, Soft roll, Tubi-; Bacitracin and proshield to leg, Adaptic, soft roll, Tubi-        Patient repositioned on side with pillow between the knees. Heels offloaded on pillow. Wound Recommendations:    Continue with wound care per Dr. Casandra Kimble. Skin Care & Pressure Relief Recommendations:  Minimize layers of linen/pads under patient to optimize support surface. Turn/reposition approximately every 2 hours and offload heels pillows or Prevalon boots.   Manage incontinence / promote continence; Proshield to buttocks and sacrum daily and as needed with incontinence care    Discussed above plan with patient & wife and NP Elena Su    Transition of Care: Plan to follow weekly and per product recommendations while admitted to hospital.

## 2018-05-16 NOTE — PROGRESS NOTES
Patient was visited by [facility]. Volunteer followed up with patient and/or family and reported no needs to this . Chaplains will continue to follow and will provide pastoral care as needed or requested. [address], M.Div.   Board Certified   [phone] - Office

## 2018-05-16 NOTE — PROGRESS NOTES
1930: Report received from [facility]. Pt has CBI infusing. Clear output from sierra. Nursing misc. Order to stop CBI at 3am if output remains clean. Pt appears to be in stable condition. No acute distress noted. Hypotension addressed on previous shift. Pt with continuous fluids infusing. Tolerated well. Resting. Will monitor. 2030: Pt mews score increased to a 3. HR 120s and BP low. Pt asymptomatic. Spoke with Dr. Mauri Quintero about pts vital signs. Agreed with previous order to hold bp meds. Orders given for a bolus 250mg once and to monitor pt.     2330: Pt Mews score still elevated. This time increased to 4. Pt asymptomatic. HR 90/30. Pt requesting pain medication at this time as well. HR still elevated. Notified Dr. Mauri Quintero about pts BP and requests for pain meds. 500 cc Bolus ordered and to administer 1 percocet instead on 2, Stat H&H and to monitor. 0030: Pt resting at this time. No acute distress noted. Pt mews now 2. BP recovered post 500cc bolus. Temp is going down along with HR. Will monitor. 0300: Sierra draining clear. CBI stopped per MD orders. Pt held NPO since midnight. Will monitor. 0400: Pt Mews a 3. MD previously made aware of HR. HR currently 110s. No distress noted, Pt resting at this time. WIll monitor. 0600: Pt resting. No distress noted. No requests at this time.

## 2018-05-16 NOTE — ED NOTES
Pt suprapubic catheter flushed as per order. 620ml of blood urine with clots withdrawn. PT tolerated procedure. Urine with no change in color and continued to be thick with clots. MD Danyel Caba aware. Contiue to monitor. Maintain safety precautions.

## 2018-05-16 NOTE — ROUTINE PROCESS
0700: TRANSFER - IN REPORT:    Verbal report received from [facility] (name) on [facility]  being received from ED(unit) for routine progression of care      Report consisted of patients Situation, Background, Assessment and   Recommendations(SBAR). Information from the following report(s) SBAR, Kardex, Intake/Output, MAR and Recent Results was reviewed with the receiving nurse. Opportunity for questions and clarification was provided. Assessment completed upon patients arrival to unit and care assumed.

## 2018-05-16 NOTE — ROUTINE PROCESS
TRANSFER - OUT REPORT:    Verbal report given to Ifeanyi RN(name) on [facility].  being transferred to tele(unit) for routine progression of care       Report consisted of patients Situation, Background, Assessment and   Recommendations(SBAR). Information from the following report(s) SBAR, ED Summary, Procedure Summary, MAR, Recent Results and Cardiac Rhythm sinus tach-irregular was reviewed with the receiving nurse. Lines:   Peripheral IV 05/16/18 Left Antecubital (Active)       Peripheral IV 05/16/18 Left Antecubital (Active)   Site Assessment Clean, dry, & intact 5/16/2018  5:33 AM   Phlebitis Assessment 0 5/16/2018  5:33 AM   Infiltration Assessment 0 5/16/2018  5:33 AM   Dressing Type Transparent 5/16/2018  5:33 AM        Opportunity for questions and clarification was provided.       Patient transported with:   Monitor  Registered Nurse

## 2018-05-16 NOTE — PROGRESS NOTES
1213:  Assumed care for patient, received bedside report from [facility], RN. Patient lying in bed quietly with no complaints of pain or discomfort at the time. Patient has suprapubic catheter draining bright red blood. Urology to come and irrigate catheter and insert 3 way sierra. Patient oriented to room, bed, television, telephone, and bathroom. Whiteboard updated, bed at the lowest position with call bell within reach. Patient wife at bedside. 2824:  Dr. Nora Palacios at bedside, CBI started. 1233:  Dr. Larisa Wise paged in regard to patient SBP in the 80's after being repeated 3 times. Patient assessed, asymptomatic. 1236:  Spoke with Dr. Larisa Wise, given telephone order for 1L bolus of NS and hold BP medication \"for now. \"    [id number]: Informed that patient is having abdominal discomfort, CBI not draining. Drain increased, no improvement. Patient stated that pain increased. Asked nurse for assistance. 1401:  Advised that patient was having abdominal pain again and abdomen distended. Called Dr. Nora Palacios and given telephone order to irrigate and aspirate sierra until nothing come out. Informed not to stop CBI and would come to unit later. 1439:  Irrigated 250ml of sterile water, aspirated 900ml of output. Large clot passed, CBI draining clear with small clots. Patient no longer has abdominal discomfort. 1454:  Vitals taken,  O2 97% on RA, P 99, BP 96/41, T 98.2, R 18.     1749:  Dr. Nora Palacios at bedside, given verbal order to pass to oncoming nurse, if patient urine continue to drain clear yellow then to turn CBI off at 3am.  Patient is to be NPO after midnight just in case something goes wrong, per Dr. Nora Palacios. Bedside and Verbal shift change report given to Sonam Trent RN (oncoming nurse) by Apurva House RN   (offgoing nurse).  Report included the following information SBAR, Kardex, ED Summary, Procedure Summary, Intake/Output, MAR, Med Rec Status, Cardiac Rhythm ST with BBB and Alarm Parameters .

## 2018-05-16 NOTE — ADVANCED PRACTICE NURSE
08: 34 Stopped by the patient's room to assist primary nurse with CBI set up. Patient's wife at bedside. She requested we call PAT to inform that they would not be home to receive call scheduled for Friday. Called PAT and spoke to Carla Johnson RN, who communicated that she would try to reschedule call for next Tuesday. She indicated that she would call their home phone and leave a message to leave more directions. This information was passed on to the patient and his wife. 13:41 Primary nurse communicated that the patient was voiding through his penis and his bed was wet. Assessed the patient and lower abd boardlike. Three way sierra briskly irrigated with 40 ml normal saline via sterile syringe. Immediately returned 550 ml fluid. Some sediment noted but no overt clots noted. Allowed entire bladder to drain, then restarted CBI. Bed changed. Patient instructed to alert the nurse as soon as he noticed urine flowing from his penis, or from around suprapubic catheter. He verbalized understanding.

## 2018-05-16 NOTE — IP AVS SNAPSHOT
[address] [place] [id number] 
[phone] Patient: Kemi Walton Sr. MRN: [id number] AQX:[birth year] A check jasson indicates which time of day the medication should be taken. My Medications START taking these medications Instructions Each Dose to Equal  
 Morning Noon Evening Bedtime  
 ciprofloxacin HCl 250 mg tablet Commonly known as:  CIPRO Your last dose was: Your next dose is: Take 1 Tab by mouth every twelve (12) hours for 3 days. 250 mg CONTINUE taking these medications Instructions Each Dose to Equal  
 Morning Noon Evening Bedtime ADVAIR DISKUS 250-50 mcg/dose diskus inhaler Generic drug:  fluticasone-salmeterol Your last dose was: Your next dose is: Take 2 Puffs by inhalation every twelve (12) hours. 2 Puff  
    
   
   
   
  
 aspirin 81 mg tablet Your last dose was: Your next dose is: Take 81 mg by mouth nightly. 81 mg  
    
   
   
   
  
 atorvastatin 20 mg tablet Commonly known as:  LIPITOR Your last dose was: Your next dose is: Take  by mouth daily. cilostazol 100 mg tablet Commonly known as:  PLETAL Your last dose was: Your next dose is: Take 100 mg by mouth Before breakfast and dinner. 100 mg CLARITIN 10 mg tablet Generic drug:  loratadine Your last dose was: Your next dose is: Take 10 mg by mouth daily. 10 mg  
    
   
   
   
  
 FLONASE 50 mcg/actuation nasal spray Generic drug:  fluticasone Your last dose was: Your next dose is: 2 Sprays by Both Nostrils route daily. 2 Spray  
    
   
   
   
  
 iron aspgly,[id number] mg-mg-mcg-mg Cap cap Commonly known as:  FERREX [address]  
   
 Your last dose was: Your next dose is: Take 1 Cap by mouth daily. 1 Cap LANTUS SOLOSTAR U-100 INSULIN 100 unit/mL (3 mL) In Generic drug:  insulin glargine Your last dose was: Your next dose is:    
   
   
 27 Units by SubCUTAneous route nightly. 27 Units LASIX 40 mg tablet Generic drug:  furosemide Your last dose was: Your next dose is: Take 40 mg by mouth daily. 40 mg  
    
   
   
   
  
 levothyroxine 100 mcg tablet Commonly known as:  SYNTHROID Your last dose was: Your next dose is: Take 100 mcg by mouth Daily (before breakfast). 100 mcg  
    
   
   
   
  
 lisinopril 2.5 mg tablet Commonly known as:  Lollie Jump Your last dose was: Your next dose is: Take  by mouth daily. LYRICA 50 mg capsule Generic drug:  pregabalin Your last dose was: Your next dose is: Take 75 mg by mouth two (2) times a day. 75 mg  
    
   
   
   
  
 metFORMIN 1,000 mg tablet Commonly known as:  GLUCOPHAGE Your last dose was: Your next dose is: Take 1,000 mg by mouth two (2) times daily (with meals). 1000 mg  
    
   
   
   
  
 multivitamin, tx-iron-ca-min 9 mg iron-400 mcg Tab tablet Commonly known as:  THERA-M w/ IRON Your last dose was: Your next dose is: Take 1 Tab by mouth daily. 1 Tab * NovoLOG Flexpen U-100 Insulin 100 unit/mL Inpn Generic drug:  insulin aspart U-100 Your last dose was: Your next dose is:    
   
   
 5 Units by SubCUTAneous route daily. Sliding scale  Plus the above dose 5 Units * NovoLOG Flexpen U-100 Insulin 100 unit/mL Inpn Generic drug:  insulin aspart U-100 Your last dose was: Your next dose is: 9 Units by SubCUTAneous route two (2) times a day. With lunch and dinner in addition the sliding scale 9 Units Omeprazole delayed release 20 mg tablet Commonly known as:  PRILOSEC D/R Your last dose was: Your next dose is: Take 20 mg by mouth daily. 20 mg  
    
   
   
   
  
 oxyCODONE-acetaminophen 5-325 mg per tablet Commonly known as:  PERCOCET Your last dose was: Your next dose is: Take 2 Tabs by mouth every six (6) hours as needed for Pain. Max Daily Amount: 8 Tabs. 2 Tab PLAVIX 75 mg Tab Generic drug:  clopidogrel Your last dose was: Your next dose is: Take 75 mg by mouth daily. 75 mg  
    
   
   
   
  
 potassium citrate 10 mEq (1,080 mg) Robertha Slates Commonly known as:  [place] Your last dose was: Your next dose is: Take 10 mEq by mouth two (2) times a day. 10 mEq SPIRIVA WITH HANDIHALER 18 mcg inhalation capsule Generic drug:  tiotropium Your last dose was: Your next dose is: Take 1 Cap by inhalation daily. 1 Cap TIAZAC 360 mg SR capsule Generic drug:  dilTIAZem Your last dose was: Your next dose is: Take 360 mg by mouth daily. 360 mg  
    
   
   
   
  
 VITAMIN D3 1,000 unit Cap Generic drug:  cholecalciferol Your last dose was: Your next dose is: Take 5,000 Units by mouth daily. 5000 Units * Notice: This list has 2 medication(s) that are the same as other medications prescribed for you. Read the directions carefully, and ask your doctor or other care provider to review them with you. Where to Get Your Medications These medications were sent to [place], [place] - [address]  [address], [address] Hours:  M-F 930am-6pm Phone:  [phone]  
  ciprofloxacin HCl 250 mg tablet

## 2018-05-16 NOTE — ROUTINE PROCESS
0730: Bedside and Verbal shift change report given to Charles (oncoming nurse) by Jg Perez RN   (offgoing nurse). Report included the following information SBAR, Kardex, Intake/Output, MAR and Recent Results.

## 2018-05-16 NOTE — ED NOTES
Pt with numerous skin tears. Bilateral lower extremity bandages in place by wound care nurse for healing diabetic ulcers. Pt with left hip area wound in place. Bandage dry and intact. Sacrum with non blanchable redness.

## 2018-05-16 NOTE — PROGRESS NOTES
Reason for Admission:   Kyle Dumont is a 74 y.o. male with PMHx of A-fib, CAD, anemia, COPD, enlarged prostate who presents via EMS to the emergency department C/O bleeding surrounding his suprapubic catheter, onset tonight. Associated sxs include hematuria and suprapubic pressure (rated 4/10). Pt reports that he had a coughing episode last night and then noticed blood in his urine. The catheter was flushed  at home with initially good flow but reclotted. Pt woke up in pain 4 hours ago. Pt has his suprapubic catheter replaced every month and is normally seen at [facility]. Reports FHx of MI in father at 76 and borderline DM; CHF in sister; DM, HTN, high cholesterol in brother. Pt takes Plavix and ASA 81 mg. Pt denies SOB, fever, chills, or any other sxs or complaints. Patient admitted for Gross hematuria               RRAT Score:    36              Resources/supports as identified by patient/family:   Patient lives with his wife and would like to return home when medically cleared for d/c                Top Challenges facing patient (as identified by patient/family and CM): Patient elderly has numerous medical conditions                     Finances/Medication cost?     tbd              Transportation?  tbd              Support system or lack thereof? Living arrangements? Lives with wife           Self-care/ADLs/Cognition? Current Advanced Directive/Advance Care Plan:  DNR                          Plan for utilizing home health:    Patient has used [facility] in the past and if he needs them when d/evie he would like to use them again. Patient will need HHC orders                      Likelihood of readmission: tbd                 Transition of Care Plan:       Met with patient at bedside along with wife. She informed cm that he has a suprapubic catheter. States he has a wheel chiar, rollator, stair lift. RW.  She did inform cm patint is to have phone conversation with the nurse about the the debrideent of patients ankles. wund care consult has been placed. Dr. Sudeep Osman will follow for rigo presently with SBI in progress. Informed cm that when he is d/c ed he would like to return home with wife. He does shower without assistance once he gets in the special made shower.  They informed cm when he is ready for d/c would like to use [facility]

## 2018-05-16 NOTE — ED PROVIDER NOTES
EMERGENCY DEPARTMENT HISTORY AND PHYSICAL EXAM    Date: 5/16/2018  Patient Name: Russ Odonnell.    History of Presenting Illness     Chief Complaint   Patient presents with    Urinary Catheter Problem         History Provided By: Patient    Chief Complaint: Bleeding surrounding suprapubic catheter  Duration: Tonight   Timing:  Acute  Location: Suprapubic   Severity: N/A  Associated Symptoms: hematuria and suprapubic pressure (rated 4/10)    Additional History (Context):   3:50 AM  Russ Phillips is a 74 y.o. male with PMHx of A-fib, CAD, anemia, COPD, enlarged prostate who presents via EMS to the emergency department C/O bleeding surrounding his suprapubic catheter, onset tonight. Associated sxs include hematuria and suprapubic pressure (rated 4/10). Pt reports that he had a coughing episode last night and then noticed blood in his urine. The catheter was flushed  at home with initially good flow but reclotted. Pt woke up in pain 4 hours ago. Pt has his suprapubic catheter replaced every month and is normally seen at [facility]. Reports FHx of MI in father at 76 and borderline DM; CHF in sister; DM, HTN, high cholesterol in brother. Pt takes Plavix and ASA 81 mg. Pt denies SOB, fever, chills, or any other sxs or complaints.      PCP: Arnold Mistry MD    Current Facility-Administered Medications   Medication Dose Route Frequency Provider Last Rate Last Dose    dilTIAZem (CARDIZEM) injection 10 mg  10 mg IntraVENous ONCE Starleen Goodpasture, MD        potassium chloride (K-DUR, KLOR-CON) SR tablet 40 mEq  40 mEq Oral NOW Lucila Ochoa MD        sodium chloride 0.9 % bolus infusion 500 mL  500 mL IntraVENous ONCE Starleen Goodpasture, MD        cholecalciferol (VITAMIN D3) capsule 5,000 Units  5,000 Units Oral DAILY Lucila Ochoa MD        cilostazol (PLETAL) tablet 100 mg  100 mg Oral ACB&D Lucila Ochoa MD        levothyroxine (SYNTHROID) tablet 100 mcg  100 mcg Oral ACB Lucila Ochoa MD        [facility]) capsule 75 mg  75 mg Oral BID William Johnson MD        Omeprazole delayed release (PRILOSEC D/R) tablet 20 mg  20 mg Oral DAILY William Johnson MD        multivitamin, tx-iron-ca-min (THERA-M w/ IRON) tablet 1 Tab  1 Tab Oral DAILY William Jonhson MD        iron aspgly,[id number]-FA-Ca-suc (FERREX 150 FORTE PLUS) cap 1 Cap  1 Cap Oral DAILY William Johnson MD        atorvastatin (LIPITOR) tablet 20 mg  20 mg Oral DAILY William Johnson MD        lisinopril (PRINIVIL, ZESTRIL) tablet 2.5 mg  2.5 mg Oral DAILY William Johnson MD        fluticasone [facility]) 50 mcg/actuation nasal spray 2 Spray  2 Spray Both Nostrils DAILY William Johnson MD        loratadine (CLARITIN) tablet 10 mg  10 mg Oral DAILY William Johnson MD        tiotropium [facility]) inhalation capsule 18 mcg  1 Cap Inhalation DAILY William Johnson MD        fluticasone-salmeterol (ADVAIR) 250mcg-50mcg/puff  2 Puff Inhalation Q12H William Johnson MD        furosemide (LASIX) tablet 40 mg  40 mg Oral DAILY William Johnson MD        oxyCODONE-acetaminophen (PERCOCET) 5-325 mg per tablet 2 Tab  2 Tab Oral Q6H PRN William Johnson MD        dilTIAZem (CARDIZEM) IR tablet 90 mg  90 mg Oral AC&HS William Johnson MD        [START ON 5/17/2018] clopidogrel (PLAVIX) tablet 75 mg  75 mg Oral DAILY William Johnson MD        [START ON 5/17/2018] aspirin tablet 81 mg  81 mg Oral QHS William Johnson MD         Current Outpatient Prescriptions   Medication Sig Dispense Refill    cholecalciferol (VITAMIN D3) 1,000 unit cap Take 5,000 Units by mouth daily.  oxyCODONE-acetaminophen (PERCOCET) 5-325 mg per tablet Take 2 Tabs by mouth every six (6) hours as needed for Pain. Max Daily Amount: 8 Tabs. 112 Tab 0    insulin aspart U-100 (NOVOLOG FLEXPEN U-100 INSULIN) 100 unit/mL inpn 9 Units by SubCUTAneous route two (2) times a day. With lunch and dinner in addition the sliding scale      insulin glargine (LANTUS SOLOSTAR U-100 INSULIN) 100 unit/mL (3 mL) inpn 27 Units by SubCUTAneous route nightly.       levothyroxine (SYNTHROID) 100 mcg tablet Take 100 mcg by mouth Daily (before breakfast).  cilostazol (PLETAL) 100 mg tablet Take 100 mg by mouth Before breakfast and dinner.  pregabalin (LYRICA) 50 mg capsule Take 75 mg by mouth two (2) times a day.  Omeprazole delayed release (PRILOSEC D/R) 20 mg tablet Take 20 mg by mouth daily.  potassium citrate (UROCIT-K10) 10 mEq (1,080 mg) TbER Take 10 mEq by mouth two (2) times a day.  multivitamin, tx-iron-ca-min (THERA-M W/ IRON) 9 mg iron-400 mcg tab tablet Take 1 Tab by mouth daily.  iron aspgly,tq-C-D49-FA-Ca-suc (FERREX 150 FORTE PLUS) 355-21-38-6 mg-mg-mcg-mg cap cap Take 1 Cap by mouth daily.  insulin aspart (NOVOLOG FLEXPEN) 100 unit/mL inpn 5 Units by SubCUTAneous route daily. Sliding scale  Plus the above dose      metFORMIN (GLUCOPHAGE) 1,000 mg tablet Take 1,000 mg by mouth two (2) times daily (with meals).  atorvastatin (LIPITOR) 20 mg tablet Take  by mouth daily.  lisinopril (PRINIVIL, ZESTRIL) 2.5 mg tablet Take  by mouth daily.  clopidogrel (PLAVIX) 75 mg tab Take 75 mg by mouth daily.  aspirin 81 mg tablet Take 81 mg by mouth nightly.  furosemide (LASIX) 40 mg tablet Take 40 mg by mouth daily.  diltiazem (TIAZAC) 360 mg SR capsule Take 360 mg by mouth daily.  tiotropium (SPIRIVA WITH HANDIHALER) 18 mcg inhalation capsule Take 1 Cap by inhalation daily.  loratadine (CLARITIN) 10 mg tablet Take 10 mg by mouth daily.  fluticasone (FLONASE) 50 mcg/actuation nasal spray 2 Sprays by Both Nostrils route daily.  fluticasone-salmeterol (ADVAIR DISKUS) 250-50 mcg/dose diskus inhaler Take 2 Puffs by inhalation every twelve (12) hours.          Past History     Past Medical History:  Past Medical History:   Diagnosis Date    Acid reflux     Anemia     Arthritis     Atrial fibrillation (HCC)     CAD (coronary artery disease)     Chronic obstructive pulmonary disease (HCC)     Coagulation disorder (HCC)     anemia    Diabetes ([facility] [address] 1997    GERD (gastroesophageal reflux disease)     H/O seasonal allergies     Hypercholesterolemia     Hypertension 1997    Ill-defined condition     hand tremors    PAF (paroxysmal atrial fibrillation) ([facility] [address] 11/5/2017    Peripheral arterial disease ([facility] [address]     Sleep apnea     pt states he has not used bipap \"in years\".  Thyroid disease        Past Surgical History:  Past Surgical History:   Procedure Laterality Date    ABDOMEN SURGERY PROC UNLISTED  1997    Vikas-en-[id number]    ABDOMEN SURGERY PROC UNLISTED  2000    abdominal hernia repair/mesh    HX CERVICAL LAMINECTOMY      HX COLONOSCOPY      HX HEENT      cataracts removed right eye    HX ORTHOPAEDIC  1982    cervical laminectomy    HX ORTHOPAEDIC Right 1962    knee torn cartilage    HX UROLOGICAL  12/2015    suprapubic catheter in place    VASCULAR SURGERY PROCEDURE UNLIST Bilateral 2015 2017    angio plasty legs  x2    VASCULAR SURGERY PROCEDURE UNLIST Bilateral 2017    legs venogram  stent right leg       Family History:  Family History   Problem Relation Age of Onset    Cancer Mother     Diabetes Father     Heart Disease Sister     Cancer Brother     Diabetes Brother     Hypertension Brother        Social History:  Social History   Substance Use Topics    Smoking status: Former Smoker     Types: Cigarettes     Quit date: 1/1/1997    Smokeless tobacco: Never Used    Alcohol use 0.6 oz/week     1 Glasses of wine per week       Allergies: Allergies   Allergen Reactions    Latex Contact Dermatitis     Pt states he is NOT allergic to latex.  Neosporin [Neomycin-Bacitracin-Polymyxin] Rash         Review of Systems   Review of Systems   Constitutional: Negative for chills, diaphoresis, fever and unexpected weight change. HENT: Negative for congestion, drooling, ear pain, rhinorrhea, sore throat, tinnitus and trouble swallowing. Eyes: Negative for photophobia, pain, redness and visual disturbance.    Respiratory: Negative for cough, choking, chest tightness, shortness of breath, wheezing and stridor. Cardiovascular: Negative for chest pain, palpitations and leg swelling. Gastrointestinal: Positive for abdominal pain (suprapubic pressure). Negative for abdominal distention, anal bleeding, blood in stool, constipation, diarrhea, nausea and vomiting. Genitourinary: Positive for hematuria. Negative for difficulty urinating, dysuria, flank pain, frequency and urgency. Musculoskeletal: Negative for arthralgias, back pain and neck pain. Skin: Negative for color change, rash and wound. Neurological: Negative for dizziness, seizures, syncope, speech difficulty, light-headedness and headaches. Hematological: Does not bruise/bleed easily. Psychiatric/Behavioral: Negative for agitation, behavioral problems, hallucinations, self-injury and suicidal ideas. The patient is not hyperactive. Physical Exam     Vitals:    05/16/18 0339 05/16/18 0414 05/16/18 0530   BP: 140/47 114/48 (!) 100/38   Pulse: (!) 126 (!) 132 (!) 119   Resp: 18 20 15   Temp: 98.4 °F (36.9 °C)     SpO2: 97% 94% 94%   Weight: 68 kg (150 lb)     Height: 6' 2\" (1.88 m)       Physical Exam   Constitutional: He is oriented to person, place, and time. He appears well-developed and well-nourished. Non-toxic appearance. No distress. Nontoxic appearing   HENT:   Head: Normocephalic and atraumatic. Right Ear: External ear normal.   Left Ear: External ear normal.   Mouth/Throat: Oropharynx is clear and moist. No oropharyngeal exudate. Eyes: Conjunctivae and EOM are normal. Pupils are equal, round, and reactive to light. No scleral icterus. No pallor   Neck: Normal range of motion. Neck supple. No JVD present. No tracheal deviation present. No thyromegaly present. Cardiovascular: An irregularly irregular rhythm present. Tachycardia present. Murmur heard.    Systolic murmur is present with a grade of 3/6   Irregularly irregular with a rumbling systolic murmur 5-1/6, heard best left lower border of the heart   Pulmonary/Chest: Effort normal and breath sounds normal. No stridor. No respiratory distress. Abdominal: Soft. Bowel sounds are normal. He exhibits no distension. There is no tenderness. There is no rebound and no guarding. End dwelling suprapubic catheter with little to no peripheral drainage but dark red blood coming from his catheter. The blood is free flowing without clot but consistency of water or urine diluted blood   Musculoskeletal: Normal range of motion. He exhibits no edema or tenderness. No soft tissue injuries. Both legs in unna boots   Lymphadenopathy:     He has no cervical adenopathy. Neurological: He is alert and oriented to person, place, and time. He has normal reflexes. He displays atrophy (generalized). No cranial nerve deficit. Coordination normal.   Skin: Skin is warm and dry. No rash noted. He is not diaphoretic. No erythema. Slightly decreased turgor and few scattered areas of ulcerations, otherwise unremarkable   Psychiatric: He has a normal mood and affect. His behavior is normal. Judgment and thought content normal.   Nursing note and vitals reviewed. Diagnostic Study Results     Labs -     Recent Results (from the past 12 hour(s))   CBC WITH AUTOMATED DIFF    Collection Time: 05/16/18  4:15 AM   Result Value Ref Range    WBC 11.6 4.6 - 13.2 K/uL    RBC 3.95 (L) 4.70 - 5.50 M/uL    HGB 11.5 (L) 13.0 - 16.0 g/dL    HCT 34.4 (L) 36.0 - 48.0 %    MCV 87.1 74.0 - 97.0 FL    MCH 29.1 24.0 - 34.0 PG    MCHC 33.4 31.0 - 37.0 g/dL    RDW 15.9 (H) 11.6 - 14.5 %    PLATELET 317 666 - 570 K/uL    MPV 9.5 9.2 - 11.8 FL    NEUTROPHILS 82 (H) 40 - 73 %    LYMPHOCYTES 11 (L) 21 - 52 %    MONOCYTES 6 3 - 10 %    EOSINOPHILS 1 0 - 5 %    BASOPHILS 0 0 - 2 %    ABS. NEUTROPHILS 9.5 (H) 1.8 - 8.0 K/UL    ABS. LYMPHOCYTES 1.3 0.9 - 3.6 K/UL    ABS. MONOCYTES 0.7 0.05 - 1.2 K/UL    ABS. EOSINOPHILS 0.1 0.0 - 0.4 K/UL    ABS.  BASOPHILS 0.0 0.0 - 0.06 K/UL    DF AUTOMATED     METABOLIC PANEL, BASIC    Collection Time: 05/16/18  4:15 AM   Result Value Ref Range    Sodium 134 (L) 136 - 145 mmol/L    Potassium 3.5 3.5 - 5.5 mmol/L    Chloride 99 (L) 100 - 108 mmol/L    CO2 27 21 - 32 mmol/L    Anion gap 8 3.0 - 18 mmol/L    Glucose 178 (H) 74 - 99 mg/dL    BUN 9 7.0 - 18 MG/DL    Creatinine 0.77 0.6 - 1.3 MG/DL    BUN/Creatinine ratio 12 12 - 20      GFR est AA >60 >60 ml/min/1.73m2    GFR est non-AA >60 >60 ml/min/1.73m2    Calcium 8.7 8.5 - 10.1 MG/DL   PROTHROMBIN TIME + INR    Collection Time: 05/16/18  4:15 AM   Result Value Ref Range    Prothrombin time 13.3 11.5 - 15.2 sec    INR 1.1 0.8 - 1.2     PTT    Collection Time: 05/16/18  4:15 AM   Result Value Ref Range    aPTT 27.9 23.0 - 36.4 SEC   CARDIAC PANEL,(CK, CKMB & TROPONIN)    Collection Time: 05/16/18  4:15 AM   Result Value Ref Range    CK 27 (L) 39 - 308 U/L    CK - MB 1.0 <3.6 ng/ml    CK-MB Index 3.7 0.0 - 4.0 %    Troponin-I, Qt. <0.02 0.00 - 0.06 NG/ML   EKG, 12 LEAD, INITIAL    Collection Time: 05/16/18  4:49 AM   Result Value Ref Range    Ventricular Rate 118 BPM    Atrial Rate 118 BPM    P-R Interval 146 ms    QRS Duration 104 ms    Q-T Interval 346 ms    QTC Calculation (Bezet) 484 ms    Calculated P Axis 77 degrees    Calculated R Axis -27 degrees    Calculated T Axis 100 degrees    Diagnosis       Sinus tachycardia with premature atrial complexes  T wave abnormality, consider lateral ischemia  Abnormal ECG  When compared with ECG of 01-FEB-2018 07:36,  premature atrial complexes are now present         Radiologic Studies -    No orders to display     CT Results  (Last 48 hours)    None        CXR Results  (Last 48 hours)    None            Medical Decision Making   I am the first provider for this patient. I reviewed the vital signs, available nursing notes, past medical history, past surgical history, family history and social history. Vital Signs-Reviewed the patient's vital signs.     Pulse Oximetry Analysis - 97% on RA     EKG interpretation: (Preliminary)  4:49 AM   Sinus tachycardia with PAC's at 118 bpm. T wave abnormality. EKG read by Celeste. Елена Gasca MD at 4:49 AM    Records Reviewed: Nursing Notes    Provider Notes (Medical Decision Making):   Ddx: Bleeding likely from combination of vascular disruption though unlikely arterial. Pt is on antiplatelet thinners due to chronic a-fib associated with cough. Unlikely to be new tumor though spontaneous erosions also unlikely. Failed irrigation and clearing here. He has no signs of obstruction. Will treat arrhythmia and tachy, consult urology for intervention as needed. No sign of CV instability at this time.     Procedures:  Procedures    MEDICATIONS GIVEN:  Medications   dilTIAZem (CARDIZEM) injection 10 mg (not administered)   potassium chloride (K-DUR, KLOR-CON) SR tablet 40 mEq (not administered)   sodium chloride 0.9 % bolus infusion 500 mL (not administered)   cholecalciferol (VITAMIN D3) capsule 5,000 Units (not administered)   cilostazol (PLETAL) tablet 100 mg (not administered)   levothyroxine (SYNTHROID) tablet 100 mcg (not administered)   pregabalin (LYRICA) capsule 75 mg (not administered)   Omeprazole delayed release (PRILOSEC D/R) tablet 20 mg (not administered)   multivitamin, tx-iron-ca-min (THERA-M w/ IRON) tablet 1 Tab (not administered)   iron aspgly,[id number]-V-H68-FA-Ca-suc (FERREX 150 FORTE PLUS) cap 1 Cap (not administered)   atorvastatin (LIPITOR) tablet 20 mg (not administered)   lisinopril (PRINIVIL, ZESTRIL) tablet 2.5 mg (not administered)   fluticasone (FLONASE) 50 mcg/actuation nasal spray 2 Spray (not administered)   loratadine (CLARITIN) tablet 10 mg (not administered)   tiotropium (SPIRIVA) inhalation capsule 18 mcg (not administered)   fluticasone-salmeterol (ADVAIR) 250mcg-50mcg/puff (not administered)   furosemide (LASIX) tablet 40 mg (not administered)   oxyCODONE-acetaminophen (PERCOCET) 5-325 mg per tablet 2 Tab (not administered)   dilTIAZem (CARDIZEM) IR tablet 90 mg (not administered)   clopidogrel (PLAVIX) tablet 75 mg (not administered)   aspirin tablet 81 mg (not administered)       ED Course:   3:50 AM   Initial assessment performed. The patients presenting problems have been discussed, and they are in agreement with the care plan formulated and outlined with them. I have encouraged them to ask questions as they arise throughout their visit.    5:08 AM   Bloody urine not improving after copious irrigation. Will consult urology for possible for cath and surgical or laser approach for bleeding. Tumor possible but unlikely. Possible combination of blood thinner and rupture of small vein. 5:14 AM Discussed patient's history, exam, and available diagnostics results with Nathalie Acuna, urology, who agree to consult the patient. 5:24 AM Discussed patient's history, exam, and available diagnostics results with Senait Lovelace MD, internal medicine, who agree with admission. Diagnosis and Disposition     Critical Care Time: 45 mins  5:43 AM  I have spent 45 minutes of critical care time involved in lab review, consultations with specialist, family decision-making, and documentation. During this entire length of time I was immediately available to the patient. Critical Care: The reason for providing this level of medical care for this critically ill patient was due a critical illness that impaired one or more vital organ systems such that there was a high probability of imminent or life threatening deterioration in the patients condition. This care involved high complexity decision making to assess, manipulate, and support vital system functions, to treat this degree vital organ system failure and to prevent further life threatening deterioration of the patients condition. Core Measures:  For Hospitalized Patients:    1.  Hospitalization Decision Time:  The decision to hospitalize the patient was made by Magnus Mena. Magdaleno Dunn MD at 5:44 AM on 5/16/2018    2. Aspirin: Aspirin was not given because the patient is a bleeding risk    5:44 AM  Patient is being admitted to the hospital by Joseph Rubio MD. The results of their tests and reasons for their admission have been discussed with them and/or available family. They convey agreement and understanding for the need to be admitted and for their admission diagnosis. CONDITIONS ON ADMISSION:  Sepsis is not present at the time of admission. Deep Vein Thrombosis is not present at the time of admission. Thrombosis is not present at the time of admission. Urinary Tract Infection is not present at the time of admission. Pneumonia is not present at the time of admission. MRSA is not present at the time of admission. Wound infection is not present at the time of admission. Pressure Ulcer is not present at the time of admission. CLINICAL IMPRESSION:    1. Gross hematuria    2. Chronic anticoagulation    3. Mechanical complication of suprapubic catheter, initial encounter ([facility] [address]    4. PAF (paroxysmal atrial fibrillation) ([facility] [address]    5. Pulmonary emphysema, unspecified emphysema type ([facility] [address]        PLAN:  1. Admit to telemetry  __________________________    Attestations: This note is prepared by Sully Gautam, acting as Scribe for Celeste. Magdaleno Dunn MD.    Celeste. Magdaleno Dunn MD:  The scribe's documentation has been prepared under my direction and personally reviewed by me in its entirety.   I confirm that the note above accurately reflects all work, treatment, procedures, and medical decision making performed by me.  _______________________________

## 2018-05-16 NOTE — ED NOTES
Pt alert and oriented x4. No signs of acute distress. Pt with bloody urine from suprapubic catheter tonight. Pt states he noticed no urine had been draining into urine bag so irrigated at home and large blood clots were being pulled. Pt's wife irrigated with 1L of sterile water but continued to have bloody urine. No bleeding from site. Pt with diaper in place and bloody urine also leaking from penis. No complaint of pain at this time. Continue to monitor. Maintain safety precautions.

## 2018-05-16 NOTE — CONSULTS
Surgery Consult      Patient: Bobbi Jaramillo Sr. MRN: [id number]  CSN: [id number]      YOB: 1945    Age: 74 y.o. Sex: male      DOA: 5/16/2018       HPI:     Gucci Sweeney is a 74 y.o. male with a history of Afib, DM, HTN, CAD, COPD, PAD, and BPH with SP catheter who was admitted to [facility] on 5/16 for hematuria. He states he had a coughing spell and thinks he ruptured a blood vessel causing bleeding. Mr. Montemayor Shock had a right ankle ulcer debridement with Theraskin graft placement on 4/27. Vascular surgery is consulted for wound management. Past Medical History:   Diagnosis Date    Acid reflux     Anemia     Arthritis     Atrial fibrillation (HCC)     CAD (coronary artery disease)     Chronic obstructive pulmonary disease (HCC)     Coagulation disorder (HCC)     anemia    Diabetes ([facility] [address] 1997    GERD (gastroesophageal reflux disease)     H/O seasonal allergies     Hypercholesterolemia     Hypertension 1997    Ill-defined condition     hand tremors    PAF (paroxysmal atrial fibrillation) ([facility] [address] 11/5/2017    Peripheral arterial disease ([facility] [address]     Sleep apnea     pt states he has not used bipap \"in years\".     Thyroid disease        Past Surgical History:   Procedure Laterality Date    ABDOMEN SURGERY PROC UNLISTED  1997    Vikas-en-Y    ABDOMEN SURGERY PROC UNLISTED  2000    abdominal hernia repair/mesh    HX CERVICAL LAMINECTOMY      HX COLONOSCOPY      HX HEENT      cataracts removed right eye    HX ORTHOPAEDIC  1982    cervical laminectomy    HX ORTHOPAEDIC Right 1962    knee torn cartilage    HX UROLOGICAL  12/2015    suprapubic catheter in place    VASCULAR SURGERY PROCEDURE UNLIST Bilateral 2015 2017    angio plasty legs  x2    VASCULAR SURGERY PROCEDURE UNLIST Bilateral 2017    legs venogram  stent right leg       Family History   Problem Relation Age of Onset    Cancer Mother     Diabetes Father     Heart Disease Sister     Cancer Brother     Diabetes Brother     Hypertension Brother        Social History     Social History    Marital status:      Spouse name: N/A    Number of children: N/A    Years of education: N/A     Social History Main Topics    Smoking status: Former Smoker     Types: Cigarettes     Quit date: 1/1/1997    Smokeless tobacco: Never Used    Alcohol use 0.6 oz/week     1 Glasses of wine per week    Drug use: No    Sexual activity: Not Asked     Other Topics Concern    None     Social History Narrative       Prior to Admission medications    Medication Sig Start Date End Date Taking? Authorizing Provider   cholecalciferol (VITAMIN D3) 1,000 unit cap Take 5,000 Units by mouth daily. Yes Tera Akers MD   oxyCODONE-acetaminophen (PERCOCET) 5-325 mg per tablet Take 2 Tabs by mouth every six (6) hours as needed for Pain. Max Daily Amount: 8 Tabs. 4/27/18  Yes Linh Amador MD   insulin aspart U-100 (NOVOLOG FLEXPEN U-100 INSULIN) 100 unit/mL inpn 9 Units by SubCUTAneous route two (2) times a day. With lunch and dinner in addition the sliding scale   Yes Historical Provider   insulin glargine (LANTUS SOLOSTAR U-100 INSULIN) 100 unit/mL (3 mL) inpn 27 Units by SubCUTAneous route nightly. Yes Historical Provider   levothyroxine (SYNTHROID) 100 mcg tablet Take 100 mcg by mouth Daily (before breakfast). Yes Historical Provider   cilostazol (PLETAL) 100 mg tablet Take 100 mg by mouth Before breakfast and dinner. Yes Historical Provider   pregabalin (LYRICA) 50 mg capsule Take 75 mg by mouth two (2) times a day. Yes Historical Provider   Omeprazole delayed release (PRILOSEC D/R) 20 mg tablet Take 20 mg by mouth daily. Yes Historical Provider   potassium citrate (UROCIT-K10) 10 mEq (1,080 mg) TbER Take 10 mEq by mouth two (2) times a day. Yes Historical Provider   multivitamin, tx-iron-ca-min (THERA-M W/ IRON) 9 mg iron-400 mcg tab tablet Take 1 Tab by mouth daily.    Yes Historical Provider   iron aspgly,na-M-C35-FA-Ca-suc (FERREX 150 FORTE PLUS) 946-78-92-1 mg-mg-mcg-mg cap cap Take 1 Cap by mouth daily. Yes Tera Akers MD   insulin aspart (NOVOLOG FLEXPEN) 100 unit/mL inpn 5 Units by SubCUTAneous route daily. Sliding scale  Plus the above dose   Yes Historical Provider   metFORMIN (GLUCOPHAGE) 1,000 mg tablet Take 1,000 mg by mouth two (2) times daily (with meals). Yes Historical Provider   atorvastatin (LIPITOR) 20 mg tablet Take  by mouth daily. Yes Historical Provider   lisinopril (PRINIVIL, ZESTRIL) 2.5 mg tablet Take  by mouth daily. Yes Historical Provider   clopidogrel (PLAVIX) 75 mg tab Take 75 mg by mouth daily. Yes Historical Provider   aspirin 81 mg tablet Take 81 mg by mouth nightly. Yes Historical Provider   furosemide (LASIX) 40 mg tablet Take 40 mg by mouth daily. Yes Historical Provider   diltiazem (TIAZAC) 360 mg SR capsule Take 360 mg by mouth daily. Yes Historical Provider   tiotropium (SPIRIVA WITH HANDIHALER) 18 mcg inhalation capsule Take 1 Cap by inhalation daily. Yes Historical Provider   loratadine (CLARITIN) 10 mg tablet Take 10 mg by mouth daily. Yes Historical Provider   fluticasone (FLONASE) 50 mcg/actuation nasal spray 2 Sprays by Both Nostrils route daily. Yes Historical Provider   fluticasone-salmeterol (ADVAIR DISKUS) 250-50 mcg/dose diskus inhaler Take 2 Puffs by inhalation every twelve (12) hours. Yes Historical Provider       Allergies   Allergen Reactions    Latex Contact Dermatitis     Pt states he is NOT allergic to latex.  Neosporin [Neomycin-Bacitracin-Polymyxin] Rash       Physical Exam:      Visit Vitals    /67    Pulse (!) 115    Temp 98.4 °F (36.9 °C)    Resp 16    Ht 6' 2\" (1.88 m)    Wt 150 lb (68 kg)    SpO2 96%    BMI 19.26 kg/m2       GENERAL: alert, cooperative, no distress, appears stated age, LUNG: clear to auscultation bilaterally, HEART: irregularly irregular rhythm, ABDOMEN: soft, non-tender.  Bowel sounds normal. EXTREMITIES: warm and well perfused, SKIN: multiple wounds to bilateral lower extremities    ROS:  Constitutional: negative for fevers, chills, sweats and malaise  Respiratory: negative for wheezing or dyspnea on exertion  Cardiovascular: negative for chest pain, chest pressure/discomfort, palpitations  Gastrointestinal: negative for nausea and vomiting  Genitourinary:negative except for hematuria  Integument/breast: negative except for skin lesion(s)  Unless otherwise mentioned in the HPI. Data Review:    CBC:   Lab Results   Component Value Date/Time    WBC 11.6 05/16/2018 04:15 AM    RBC 3.95 (L) 05/16/2018 04:15 AM    HGB 11.5 (L) 05/16/2018 04:15 AM    HCT 34.4 (L) 05/16/2018 04:15 AM    PLATELET 643 39/44/1311 04:15 AM      BMP:   Lab Results   Component Value Date/Time    Glucose 178 (H) 05/16/2018 04:15 AM    Sodium 134 (L) 05/16/2018 04:15 AM    Potassium 3.5 05/16/2018 04:15 AM    Chloride 99 (L) 05/16/2018 04:15 AM    CO2 27 05/16/2018 04:15 AM    BUN 9 05/16/2018 04:15 AM    Creatinine 0.77 05/16/2018 04:15 AM    Calcium 8.7 05/16/2018 04:15 AM     Coagulation:   Lab Results   Component Value Date/Time    Prothrombin time 13.3 05/16/2018 04:15 AM    INR 1.1 05/16/2018 04:15 AM    aPTT 27.9 05/16/2018 04:15 AM         Assessment/Plan     Continue local wound care, no vascular intervention needed at this time. Discussed with wound care nurse. Plan for debridement and graft placement on 6/6 as outpatient.       Principal Problem:    Gross hematuria (5/16/2018)    Active Problems:    PAD (peripheral artery disease) ([facility] [address] (5/9/2017)      Leg ulcer ([facility] [address] (5/9/2017)      CAD (coronary artery disease) (11/4/2017)      COPD (chronic obstructive pulmonary disease) ([facility] [address] (11/4/2017)      HTN (hypertension) (11/4/2017)      PAF (paroxysmal atrial fibrillation) ([facility] [address] (11/5/2017)      Hypomagnesemia (2/1/2018)      Type 2 diabetes mellitus with diabetic neuropathy ([facility] [address] (3/16/2018)      Chronic anticoagulation (5/16/2018)      Mechanical complication of suprapubic catheter ([facility] [address] (5/16/2018)      DNR no code (do not resuscitate) (5/16/2018)        Liborio Mckeon NP  May 16, 2018

## 2018-05-16 NOTE — IP AVS SNAPSHOT
[address] [facility] 
 
 
 [address] [address] 
[phone] Patient: Mace Homans Sr. MRN: [id number] [facility]:7/16/[birth year]00 About your hospitalization You were admitted on:  May 16, 2018 You last received care in the:  [facility] [facility] You were discharged on:  May 18, 2018 Why you were hospitalized Your primary diagnosis was:  Gross Hematuria Your diagnoses also included:  Paf (Paroxysmal Atrial Fibrillation) (Hcc), Chronic Anticoagulation, Mechanical Complication Of Suprapubic Catheter (Hcc), Cad (Coronary Artery Disease), Copd (Chronic Obstructive Pulmonary Disease) (Hcc), Htn (Hypertension), Type 2 Diabetes Mellitus With Diabetic Neuropathy (Hcc), Pad (Peripheral Artery Disease) (Hcc), Leg Ulcer (Hcc), Hypomagnesemia, Dnr No Code (Do Not Resuscitate) Follow-up Information Follow up With Details Comments Contact Info Jose De La Torre MD   [facility] 75 [address] [facility] [address] 
[phone] 42 [facility] [id number] [phone] Fely Chacon MD On 6/6/2018 Follow-up appointment @ 9:00 a.m. [address] [place] [address] [address] 1700 [facility] 
[phone] Pt prefers to schedule his own follow-up appointments. Your Scheduled Appointments Tuesday May 22, 2018  2:30 PM EDT  
60 MIN PAT APPT (PHONE) with THE [facility] PAT ROOM P5 Sutri Preadmit Testing [facility]) [address] [phone] Wednesday May 23, 2018 10:30 AM EDT Follow Up with Hernan Caba NP  
BS Vein/Vascular Spec THE [facility] ([address])  
 [address] 6590 [facility]  
[phone] Wednesday May 23, 2018 11:30 AM EDT  
WOUND CARE NURSE VISIT with WOUND NURSE  
THE [facility] OP WOUND CARE [facility]) [address] [address]  
[phone] Patients scheduled for OP Wound Care visits should enter the [facility], 2nd floor, [address] for check in. Wednesday June 06, 2018 FOOT DEBRIDEMENT with Hermelinda Centeno MD  
THE [facility] SURGERY [facility]) [place]y [place]ada[place] [address]  
[phone] Discharge Orders None A check jasson indicates which time of day the medication should be taken. My Medications START taking these medications Instructions Each Dose to Equal  
 Morning Noon Evening Bedtime  
 ciprofloxacin HCl 250 mg tablet Commonly known as:  CIPRO Your last dose was: Your next dose is: Take 1 Tab by mouth every twelve (12) hours for 3 days. 250 mg CONTINUE taking these medications Instructions Each Dose to Equal  
 Morning Noon Evening Bedtime ADVAIR DISKUS 250-50 mcg/dose diskus inhaler Generic drug:  fluticasone-salmeterol Your last dose was: Your next dose is: Take 2 Puffs by inhalation every twelve (12) hours. 2 Puff  
    
   
   
   
  
 aspirin 81 mg tablet Your last dose was: Your next dose is: Take 81 mg by mouth nightly. 81 mg  
    
   
   
   
  
 atorvastatin 20 mg tablet Commonly known as:  LIPITOR Your last dose was: Your next dose is: Take  by mouth daily. cilostazol 100 mg tablet Commonly known as:  PLETAL Your last dose was: Your next dose is: Take 100 mg by mouth Before breakfast and dinner. 100 mg CLARITIN 10 mg tablet Generic drug:  loratadine Your last dose was: Your next dose is: Take 10 mg by mouth daily. 10 mg  
    
   
   
   
  
 FLONASE 50 mcg/actuation nasal spray Generic drug:  fluticasone Your last dose was: Your next dose is: 2 Sprays by Both Nostrils route daily. 2 Spray  
    
   
   
   
  
 iron aspgly,[id number] mg-mg-mcg-mg Cap cap Commonly known as:  FERREX [address] Your last dose was: Your next dose is: Take 1 Cap by mouth daily. 1 Cap LANTUS SOLOSTAR U-100 INSULIN 100 unit/mL (3 mL) Inpn Generic drug:  insulin glargine Your last dose was: Your next dose is:    
   
   
 27 Units by SubCUTAneous route nightly. 27 Units LASIX 40 mg tablet Generic drug:  furosemide Your last dose was: Your next dose is: Take 40 mg by mouth daily. 40 mg  
    
   
   
   
  
 levothyroxine 100 mcg tablet Commonly known as:  SYNTHROID Your last dose was: Your next dose is: Take 100 mcg by mouth Daily (before breakfast). 100 mcg  
    
   
   
   
  
 lisinopril 2.5 mg tablet Commonly known as:  Mesfin Boehringer Your last dose was: Your next dose is: Take  by mouth daily. LYRICA 50 mg capsule Generic drug:  pregabalin Your last dose was: Your next dose is: Take 75 mg by mouth two (2) times a day. 75 mg  
    
   
   
   
  
 metFORMIN 1,000 mg tablet Commonly known as:  GLUCOPHAGE Your last dose was: Your next dose is: Take 1,000 mg by mouth two (2) times daily (with meals). 1000 mg  
    
   
   
   
  
 multivitamin, tx-iron-ca-min 9 mg iron-400 mcg Tab tablet Commonly known as:  THERA-M w/ IRON Your last dose was: Your next dose is: Take 1 Tab by mouth daily. 1 Tab * NovoLOG Flexpen U-100 Insulin 100 unit/mL Inpn Generic drug:  insulin aspart U-100 Your last dose was: Your next dose is:    
   
   
 5 Units by SubCUTAneous route daily. Sliding scale  Plus the above dose 5 Units * NovoLOG Flexpen U-100 Insulin 100 unit/mL Inpn Generic drug:  insulin aspart U-100 Your last dose was: Your next dose is:    
   
   
 9 Units by SubCUTAneous route two (2) times a day. With lunch and dinner in addition the sliding scale 9 Units Omeprazole delayed release 20 mg tablet Commonly known as:  PRILOSEC D/R Your last dose was: Your next dose is: Take 20 mg by mouth daily. 20 mg  
    
   
   
   
  
 oxyCODONE-acetaminophen 5-325 mg per tablet Commonly known as:  PERCOCET Your last dose was: Your next dose is: Take 2 Tabs by mouth every six (6) hours as needed for Pain. Max Daily Amount: 8 Tabs. 2 Tab PLAVIX 75 mg Tab Generic drug:  clopidogrel Your last dose was: Your next dose is: Take 75 mg by mouth daily. 75 mg  
    
   
   
   
  
 potassium citrate 10 mEq (1,080 mg) [facility] Doctor Commonly known as:  Thompson Your last dose was: Your next dose is: Take 10 mEq by mouth two (2) times a day. 10 mEq SPIRIVA WITH HANDIHALER 18 mcg inhalation capsule Generic drug:  tiotropium Your last dose was: Your next dose is: Take 1 Cap by inhalation daily. 1 Cap TIAZAC 360 mg SR capsule Generic drug:  dilTIAZem Your last dose was: Your next dose is: Take 360 mg by mouth daily. 360 mg  
    
   
   
   
  
 VITAMIN D3 1,000 unit Cap Generic drug:  cholecalciferol Your last dose was: Your next dose is: Take 5,000 Units by mouth daily. 5000 Units * Notice: This list has 2 medication(s) that are the same as other medications prescribed for you.  Read the directions carefully, and ask your doctor or other care provider to review them with you. Where to Get Your Medications These medications were sent to [facility] [place] [place] - [address]  [address] [address] Hours:  M-F 930am-6pm Phone:  [phone]  
  ciprofloxacin HCl 250 mg tablet Opioid Education Prescription Opioids: What You Need to Know: 
 
Prescription opioids can be used to help relieve moderate-to-severe pain and are often prescribed following a surgery or injury, or for certain health conditions. These medications can be an important part of treatment but also come with serious risks. Opioids are strong pain medicines. Examples include hydrocodone, oxycodone, fentanyl, and morphine. Heroin is an example of an illegal opioid. It is important to work with your health care provider to make sure you are getting the safest, most effective care. WHAT ARE THE RISKS AND SIDE EFFECTS OF OPIOID USE? Prescription opioids carry serious risks of addiction and overdose, especially with prolonged use. An opioid overdose, often marked by slow breathing, can cause sudden death. The use of prescription opioids can have a number of side effects as well, even when taken as directed. · Tolerance-meaning you might need to take more of a medication for the same pain relief · Physical dependence-meaning you have symptoms of withdrawal when the medication is stopped. Withdrawal symptoms can include nausea, sweating, chills, diarrhea, stomach cramps, and muscle aches. Withdrawal can last up to several weeks, depending on which drug you took and how long you took it. · Increased sensitivity to pain · Constipation · Nausea, vomiting, and dry mouth · Sleepiness and dizziness · Confusion · Depression · Low levels of testosterone that can result in lower sex drive, energy, and strength · Itching and sweating RISKS ARE GREATER WITH:      
 · History of drug misuse, substance use disorder, or overdose · Mental health conditions (such as depression or anxiety) · Sleep apnea · Older age (72 years or older) · Pregnancy Avoid alcohol while taking prescription opioids. Also, unless specifically advised by your health care provider, medications to avoid include: · Benzodiazepines (such as Xanax or Valium) · Muscle relaxants (such as Soma or Flexeril) · Hypnotics (such as Ambien or Lunesta) · Other prescription opioids KNOW YOUR OPTIONS Talk to your health care provider about ways to manage your pain that don't involve prescription opioids. Some of these options may actually work better and have fewer risks and side effects. Options may include: 
· Pain relievers such as acetaminophen, ibuprofen, and naproxen · Some medications that are also used for depression or seizures · Physical therapy and exercise · Counseling to help patients learn how to cope better with triggers of pain and stress. · Application of heat or cold compress · Massage therapy · Relaxation techniques Be Informed Make sure you know the name of your medication, how much and how often to take it, and its potential risks & side effects. IF YOU ARE PRESCRIBED OPIOIDS FOR PAIN: 
· Never take opioids in greater amounts or more often than prescribed. Remember the goal is not to be pain-free but to manage your pain at a tolerable level. · Follow up with your primary care provider to: · Work together to create a plan on how to manage your pain. · Talk about ways to help manage your pain that don't involve prescription opioids. · Talk about any and all concerns and side effects. · Help prevent misuse and abuse. · Never sell or share prescription opioids · Help prevent misuse and abuse. · Store prescription opioids in a secure place and out of reach of others (this may include visitors, children, friends, and family). · Safely dispose of unused/unwanted prescription opioids: Find your community drug take-back program or your pharmacy mail-back program, or flush them down the toilet, following guidance from the Food and Drug Administration (www.fda.gov/Drugs/ResourcesForYou). · Visit www.cdc.gov/drugoverdose to learn about the risks of opioid abuse and overdose. · If you believe you may be struggling with addiction, tell your health care provider and ask for guidance or call [address] [place] [address] at [phone]-TUCC. Discharge Instructions None MemBlaze Announcement We are excited to announce that we are making your provider's discharge notes available to you in MemBlaze. You will see these notes when they are completed and signed by the physician that discharged you from your recent hospital stay. If you have any questions or concerns about any information you see in MemBlaze, please call the Health Information Department where you were seen or reach out to your Primary Care Provider for more information about your plan of care. Introducing [facility]! Dear Yeimi Thorpe: Thank you for requesting a MemBlaze account. Our records indicate that you already have an active MemBlaze account. You can access your account anytime at [email or web address]. Cardiovascular Decisions/[employer] Did you know that you can access your hospital and ER discharge instructions at any time in MemBlaze? You can also review all of your test results from your hospital stay or ER visit. Additional Information If you have questions, please visit the Frequently Asked Questions section of the MemBlaze website at [email or web address] [employer] [email or web address]/[email or web address]/. Remember, MemBlaze is NOT to be used for urgent needs. For medical emergencies, dial 911. Now available from your iPhone and Android! Introducing Shorty Fletcher As a [address] [place] [address] patient, I wanted to make you aware of our electronic visit tool called Shorty BarreraFrontierre. [facility] [address] 24/7 allows you to connect within minutes with a medical provider 24 hours a day, seven days a week via a mobile device or tablet or logging into a secure website from your computer. You can access Shorty Barrerafin from anywhere in the [place]. A virtual visit might be right for you when you have a simple condition and feel like you just dont want to get out of bed, or cant get away from work for an appointment, when your regular [address] [place] [address] provider is not available (evenings, weekends or holidays), or when youre out of town and need minor care. Electronic visits cost only $49 and if the [address] [place] [address] 24/7 provider determines a prescription is needed to treat your condition, one can be electronically transmitted to a nearby pharmacy*. Please take a moment to enroll today if you have not already done so. The enrollment process is free and takes just a few minutes. To enroll, please download the [employer] 24/7 becki to your tablet or phone, or visit [email or web address] to enroll on your computer. And, as an [address] [place] [address] patient with a [place] account, the results of your visits will be scanned into your electronic medical record and your primary care provider will be able to view the scanned results. We urge you to continue to see your regular [address] [place] [address] provider for your ongoing medical care. And while your primary care provider may not be the one available when you seek a Shorty Hernandezlizetfin virtual visit, the peace of mind you get from getting a real diagnosis real time can be priceless. For more information on Shorty Davidlizetfin, view our Frequently Asked Questions (FAQs) at [email or web address]. Sincerely, 
 
Carolyn Green MD 
Chief Medical Officer Leanna Coburn *:  certain medications cannot be prescribed via Shorty Fletcher Unresulted Labs-Please follow up with your PCP about these lab tests Order Current Status CULTURE, BLOOD Preliminary result CULTURE, URINE Preliminary result Providers Seen During Your Hospitalization Provider Specialty Primary office phone Meghan Connors MD Emergency Medicine [phone] Mary Cervantes MD Internal Medicine [phone] Your Primary Care Physician (PCP) Primary Care Physician Office Phone Office Fax Neli Wolf [phone] You are allergic to the following Allergen Reactions Latex Contact Dermatitis Pt states he is NOT allergic to latex. Neosporin (Neomycin-Bacitracin-Polymyxin) Rash Recent Documentation Height Weight BMI Smoking Status 1.88 m 67.6 kg 19.13 kg/m2 Former Smoker Emergency Contacts Name Discharge Info Relation Home Work Mobile Batsheva Hough DISCHARGE CAREGIVER [3] Spouse [3] [id number] [address] CAREGIVER [3] Son [22]   [phone] Patient Belongings The following personal items are in your possession at time of discharge: 
     Visual Aid: Glasses, With patient Please provide this summary of care documentation to your next provider. Signatures-by signing, you are acknowledging that this After Visit Summary has been reviewed with you and you have received a copy. Patient Signature:  ____________________________________________________________ Date:  ____________________________________________________________  
  
Yossi Harper Provider Signature:  ____________________________________________________________ Date:  ____________________________________________________________

## 2018-05-16 NOTE — PROGRESS NOTES
History & Physical    Patient: Zoey Harding Sr. MRN: [id number]  CSN: [id number]    YOB: 1945  Age: 74 y.o.   Sex: male      DOA: 5/16/2018  Primary Care Provider:  Yves Guadarrama MD      Assessment/Plan     Hospital Problems  Date Reviewed: 5/10/2018          Codes Class Noted POA    Chronic anticoagulation ICD-10-CM: Z79.01  ICD-9-CM: V58.61  5/16/2018 Unknown        * (Principal)Gross hematuria ICD-10-CM: R31.0  ICD-9-CM: 599.71  5/16/2018 Unknown        Mechanical complication of suprapubic catheter ([facility] ICD-10-CM: T83.090A  ICD-9-CM: 996.31  5/16/2018 Unknown        DNR no code (do not resuscitate) ICD-10-CM: Z66  ICD-9-CM: V49.86  5/16/2018 Unknown        Type 2 diabetes mellitus with diabetic neuropathy ([facility] ICD-10-CM: E11.40  ICD-9-CM: 250.60, 357.2  3/16/2018 Yes        Hypomagnesemia ICD-10-CM: E83.42  ICD-9-CM: 275.2  2/1/2018 Yes        PAF (paroxysmal atrial fibrillation) ([facility] ICD-10-CM: I48.0  ICD-9-CM: 427.31  11/5/2017 Unknown        CAD (coronary artery disease) ICD-10-CM: I25.10  ICD-9-CM: 414.00  11/4/2017 Yes        COPD (chronic obstructive pulmonary disease) ([facility] ICD-10-CM: J44.9  ICD-9-CM: 293  11/4/2017 Yes        HTN (hypertension) ICD-10-CM: I10  ICD-9-CM: 401.9  11/4/2017 Yes        PAD (peripheral artery disease) ([facility] ICD-10-CM: I73.9  ICD-9-CM: 443.9  5/9/2017 Yes        Leg ulcer ([facility] ICD-10-CM: L97.909  ICD-9-CM: 707.10  5/9/2017 Yes                Admit to tele     Hematuria   Will continue bladder irrigation,   Dr. Peñaloza Reason was called per Dr. Елена Gasca   Will hold plavix and aspirin for today, will restart per urology recommendation   H/h Q12     Mechanical complication from  Super pubic catheter   F/u [place] [facility]    Diabatic ulcer of edwin LE and pad   Received skin graft per dr. Elizabeth Beltrán, will have scheduled surgery in June, having an appointment with vascular today   Will talk with vascular  Wound care consult also   Will continue pletal      DM type II , with complication,  -long term insulin , Complication with ulcer   -on lantus, ssi, diabetic diet , hypoglycemia protocol        HTN, accelerated  Continue home medication.-cardizem       hyponatremia/hypomagenesemia   Ns infusion, mg replacement         PAF, HR up   Continue cardizem, optimized the electrolytes     copd   Stable. will continue home medication          AC:  I have had a discussion with patient and family regarding code status. Particularly, described potential options in event of cardiac or respiratory arrest. I have explained what being full code entails, including cardiorespiratory resuscitation attempts with chest compressions, potential cariodioversion/ \" shocks\" as well as intubation. I have also explained Do not resuscitate which would mean we allow a natural death with out aggressive interventions. DNR/I  AC 32 min     DVT : scd . ppi proph  CC: blood in the urine        HPI:     Sujata Lord. is a 74 y.o. male who hx of afib, cad, copd, super pubic catheter due to BPH was sent to ER per ems due to hematuria. He noted some blood around the cathter area after cough spell first, then blood come out for his penis/catheter. He self irrigated at home, but still having blood come out. He takes  Plavix and ASA 81 mg at home and f/u with dr. Maikol Napier care for graft of ulcer. He supposed to have appointment with vascular and wound care clinic. Denies any slurred speech/headache/cp/n/v/blurred vission/d/c/palpitation/gait change/bleeding. Denies smoking/ any alcohol or drug use.        Visit Vitals    /49    Pulse (!) 111    Temp 98.4 °F (36.9 °C)    Resp 16    Ht 6' 2\" (1.88 m)    Wt 68 kg (150 lb)    SpO2 96%    BMI 19.26 kg/m2      O2 Device: Room air      Past Medical History:   Diagnosis Date    Acid reflux     Anemia     Arthritis     Atrial fibrillation (HCC)     CAD (coronary artery disease)     Chronic obstructive pulmonary disease (HCC)     Coagulation disorder ([facility]     anemia    Diabetes ([facility] 1997    GERD (gastroesophageal reflux disease)     H/O seasonal allergies     Hypercholesterolemia     Hypertension 1997    Ill-defined condition     hand tremors    PAF (paroxysmal atrial fibrillation) ([facility] 11/5/2017    Peripheral arterial disease ([facility]     Sleep apnea     pt states he has not used bipap \"in years\".  Thyroid disease        Past Surgical History:   Procedure Laterality Date    ABDOMEN SURGERY PROC UNLISTED  1997    Vikas-en-Y    ABDOMEN SURGERY PROC UNLISTED  2000    abdominal hernia repair/mesh    HX CERVICAL LAMINECTOMY      HX COLONOSCOPY      HX HEENT      cataracts removed right eye    HX ORTHOPAEDIC  1982    cervical laminectomy    HX ORTHOPAEDIC Right 1962    knee torn cartilage    HX UROLOGICAL  12/2015    suprapubic catheter in place    VASCULAR SURGERY PROCEDURE UNLIST Bilateral 2015 2017    angio plasty legs  x2    VASCULAR SURGERY PROCEDURE UNLIST Bilateral 2017    legs venogram  stent right leg       Family History   Problem Relation Age of Onset    Cancer Mother     Diabetes Father     Heart Disease Sister     Cancer Brother     Diabetes Brother     Hypertension Brother        Social History     Social History    Marital status:      Spouse name: N/A    Number of children: N/A    Years of education: N/A     Social History Main Topics    Smoking status: Former Smoker     Types: Cigarettes     Quit date: 1/1/1997    Smokeless tobacco: Never Used    Alcohol use 0.6 oz/week     1 Glasses of wine per week    Drug use: No    Sexual activity: Not Asked     Other Topics Concern    None     Social History Narrative       Prior to Admission medications    Medication Sig Start Date End Date Taking? Authorizing Provider   cholecalciferol (VITAMIN D3) 1,000 unit cap Take 5,000 Units by mouth daily.    Yes Phys Other, MD   oxyCODONE-acetaminophen (PERCOCET) 5-325 mg per tablet Take 2 Tabs by mouth every six (6) hours as needed for Pain. Max Daily Amount: 8 Tabs. 4/27/18  Yes Anne Marie Quesada MD   insulin aspart U-100 (NOVOLOG FLEXPEN U-100 INSULIN) 100 unit/mL inpn 9 Units by SubCUTAneous route two (2) times a day. With lunch and dinner in addition the sliding scale   Yes Historical Provider   insulin glargine (LANTUS SOLOSTAR U-100 INSULIN) 100 unit/mL (3 mL) inpn 27 Units by SubCUTAneous route nightly. Yes Historical Provider   levothyroxine (SYNTHROID) 100 mcg tablet Take 100 mcg by mouth Daily (before breakfast). Yes Historical Provider   cilostazol (PLETAL) 100 mg tablet Take 100 mg by mouth Before breakfast and dinner. Yes Historical Provider   pregabalin (LYRICA) 50 mg capsule Take 75 mg by mouth two (2) times a day. Yes Historical Provider   Omeprazole delayed release (PRILOSEC D/R) 20 mg tablet Take 20 mg by mouth daily. Yes Historical Provider   potassium citrate (UROCIT-K10) 10 mEq (1,080 mg) TbER Take 10 mEq by mouth two (2) times a day. Yes Historical Provider   multivitamin, tx-iron-ca-min (THERA-M W/ IRON) 9 mg iron-400 mcg tab tablet Take 1 Tab by mouth daily. Yes Historical Provider   iron aspgly,eg-Z-F46-FA-Ca-suc (FERREX 150 FORTE PLUS) 482-36-50-8 mg-mg-mcg-mg cap cap Take 1 Cap by mouth daily. Yes Tera Akers MD   insulin aspart (NOVOLOG FLEXPEN) 100 unit/mL inpn 5 Units by SubCUTAneous route daily. Sliding scale  Plus the above dose   Yes Historical Provider   metFORMIN (GLUCOPHAGE) 1,000 mg tablet Take 1,000 mg by mouth two (2) times daily (with meals). Yes Historical Provider   atorvastatin (LIPITOR) 20 mg tablet Take  by mouth daily. Yes Historical Provider   lisinopril (PRINIVIL, ZESTRIL) 2.5 mg tablet Take  by mouth daily. Yes Historical Provider   clopidogrel (PLAVIX) 75 mg tab Take 75 mg by mouth daily. Yes Historical Provider   aspirin 81 mg tablet Take 81 mg by mouth nightly. Yes Historical Provider   furosemide (LASIX) 40 mg tablet Take 40 mg by mouth daily.    Yes Historical Provider   diltiazem (TIAZAC) 360 mg SR capsule Take 360 mg by mouth daily. Yes Historical Provider   tiotropium (SPIRIVA WITH HANDIHALER) 18 mcg inhalation capsule Take 1 Cap by inhalation daily. Yes Historical Provider   loratadine (CLARITIN) 10 mg tablet Take 10 mg by mouth daily. Yes Historical Provider   fluticasone (FLONASE) 50 mcg/actuation nasal spray 2 Sprays by Both Nostrils route daily. Yes Historical Provider   fluticasone-salmeterol (ADVAIR DISKUS) 250-50 mcg/dose diskus inhaler Take 2 Puffs by inhalation every twelve (12) hours. Yes Historical Provider       Allergies   Allergen Reactions    Latex Contact Dermatitis     Pt states he is NOT allergic to latex.  Neosporin [Neomycin-Bacitracin-Polymyxin] Rash       Review of Systems  Gen: No fever, chills, malaise, weight loss/gain. Heent: No headache, rhinorrhea, epistaxis, ear pain, hearing loss, sinus pain, neck pain/stiffness, sore throat. Heart: No chest pain, palpitations, ISABEL, pnd, or orthopnea. Resp: No cough, hemoptysis, wheezing and shortness of breath. GI: No nausea, vomiting, diarrhea, constipation, melena or hematochezia. : + hematuria. Derm: skin graft   Musc/skeletal: no bone or joint complains. Vasc: No edema, cyanosis or claudication. Endo: No heat/cold intolerance, no polyuria,polydipsia or polyphagia. Neuro: No unilateral weakness, numbness, tingling. No seizures. Heme: No easy bruising or bleeding. Physical Exam:     Physical Exam:  Visit Vitals    /49    Pulse (!) 111    Temp 98.4 °F (36.9 °C)    Resp 16    Ht 6' 2\" (1.88 m)    Wt 68 kg (150 lb)    SpO2 96%    BMI 19.26 kg/m2      O2 Device: Room air    Temp (24hrs), Av.4 °F (36.9 °C), Min:98.4 °F (36.9 °C), Max:98.4 °F (36.9 °C)    05/15 1901 -  0700  In: -   Out: 139 [XDIWU:[id number]]        General:  Awake, cooperative, no distress. Head:  Normocephalic, without obvious abnormality, atraumatic.    Eyes: Conjunctivae/corneas clear, sclera anicteric, PERRL, EOMs intact. Nose: Nares normal. No drainage or sinus tenderness. Throat: Lips, mucosa, and tongue normal. .   Neck: Supple, symmetrical, trachea midline, no adenopathy. Lungs:   Clear to auscultation bilaterally. Heart:  Irregular irregular , S1, S2 normal, +murmur, click, rub or gallop. Abdomen: Soft, non-tender. Bowel sounds normal. No masses,  No organomegaly. super pubic blood in the catheter, full of blood , skin around catheter intact, no blood noted    Extremities: Extremities normal, ulnar boots noted, skin lesion noted in rt knee    Pulses: 2+ and symmetric all extremities. Skin: Skin color-pink, texture, turgor normal.. Capillary refill normal    Neurologic: CNII-XII intact. No focal motor or sensory deficit.        Labs Reviewed:    BMP:   Lab Results   Component Value Date/Time     (L) 05/16/2018 04:15 AM    K 3.5 05/16/2018 04:15 AM    CL 99 (L) 05/16/2018 04:15 AM    CO2 27 05/16/2018 04:15 AM    AGAP 8 05/16/2018 04:15 AM     (H) 05/16/2018 04:15 AM    BUN 9 05/16/2018 04:15 AM    CREA 0.77 05/16/2018 04:15 AM    GFRAA >60 05/16/2018 04:15 AM    GFRNA >60 05/16/2018 04:15 AM     CMP:   Lab Results   Component Value Date/Time     (L) 05/16/2018 04:15 AM    K 3.5 05/16/2018 04:15 AM    CL 99 (L) 05/16/2018 04:15 AM    CO2 27 05/16/2018 04:15 AM    AGAP 8 05/16/2018 04:15 AM     (H) 05/16/2018 04:15 AM    BUN 9 05/16/2018 04:15 AM    CREA 0.77 05/16/2018 04:15 AM    GFRAA >60 05/16/2018 04:15 AM    GFRNA >60 05/16/2018 04:15 AM    CA 8.7 05/16/2018 04:15 AM    MG 1.4 (L) 05/16/2018 04:15 AM     CBC:   Lab Results   Component Value Date/Time    WBC 11.6 05/16/2018 04:15 AM    HGB 11.5 (L) 05/16/2018 04:15 AM    HCT 34.4 (L) 05/16/2018 04:15 AM     05/16/2018 04:15 AM     All Cardiac Markers in the last 24 hours:   Lab Results   Component Value Date/Time    CPK 27 (L) 05/16/2018 04:15 AM    CKMB 1.0 05/16/2018 04:15 AM    CKND1 3.7 05/16/2018 04:15 AM    TROIQ <0.02 05/16/2018 04:15 AM     Recent Glucose Results:   Lab Results   Component Value Date/Time     (H) 05/16/2018 04:15 AM     ABG: No results found for: PH, PHI, PCO2, PCO2I, PO2, PO2I, HCO3, HCO3I, FIO2, FIO2I  COAGS:   Lab Results   Component Value Date/Time    APTT 27.9 05/16/2018 04:15 AM    PTP 13.3 05/16/2018 04:15 AM    INR 1.1 05/16/2018 04:15 AM     Liver Panel: No results found for: ALB, CBIL, TBIL, TP, GLOB, AGRAT, SGOT, ASTPOC, ALTPOC, ALT, GPT, AP  Pancreatic Markers: No results found for: AMYLPOCT, AML, LIPPOCT, LPSE    Procedures/imaging: see electronic medical records for all procedures/Xrays and details which were not copied into this note but were reviewed prior to creation of Jc Pham MD, Internal Medicine     CC: Jose De La Torre MD

## 2018-05-16 NOTE — ROUTINE PROCESS
Pt stable. No acute distress. Pt being admitted to tele. Awaiting urology for 3 way catheter placement.

## 2018-05-16 NOTE — PROGRESS NOTES
0700: Received report from [employer]. White board updated. Wife with pt at bedside. Dual skin assessment complete with Ki Saul RN from ED and [facility] from tele. Pt on tele monitor. Pt does not report any pain or respiratory distress. Questions and concerns of the pt and wife addressed at this time. Report given to day shift nurse Colin Barajas.

## 2018-05-16 NOTE — CONSULTS
See dictation for details.:  Patient with gross hematuria on anti-platelet meds. He has an SP tube that clotted off. Irrigated manually with 1000ml of sterile saline after a fresh SP tube was placed. Urine sent for culture. -- Keep CBI going.    -- hold anti-coagulation / antiplatelet  --  If urine doesn't clear then he may need a cysto with bladder fulguration.     -- serial H and H.

## 2018-05-17 LAB
ANION GAP SERPL CALC-SCNC: 5 MMOL/L (ref 3–18)
BASOPHILS # BLD: 0 K/UL (ref 0–0.06)
BASOPHILS NFR BLD: 0 % (ref 0–2)
BUN SERPL-MCNC: 7 MG/DL (ref 7–18)
BUN/CREAT SERPL: 13 (ref 12–20)
CALCIUM SERPL-MCNC: 7.4 MG/DL (ref 8.5–10.1)
CHLORIDE SERPL-SCNC: 103 MMOL/L (ref 100–108)
CO2 SERPL-SCNC: 27 MMOL/L (ref 21–32)
CREAT SERPL-MCNC: 0.52 MG/DL (ref 0.6–1.3)
DIFFERENTIAL METHOD BLD: ABNORMAL
EOSINOPHIL # BLD: 0.5 K/UL (ref 0–0.4)
EOSINOPHIL NFR BLD: 7 % (ref 0–5)
ERYTHROCYTE [DISTWIDTH] IN BLOOD BY AUTOMATED COUNT: 16.1 % (ref 11.6–14.5)
GLUCOSE BLD STRIP.AUTO-MCNC: 152 MG/DL (ref 70–110)
GLUCOSE BLD STRIP.AUTO-MCNC: 165 MG/DL (ref 70–110)
GLUCOSE BLD STRIP.AUTO-MCNC: 278 MG/DL (ref 70–110)
GLUCOSE BLD STRIP.AUTO-MCNC: 284 MG/DL (ref 70–110)
GLUCOSE SERPL-MCNC: 141 MG/DL (ref 74–99)
HCT VFR BLD AUTO: 25.4 % (ref 36–48)
HCT VFR BLD AUTO: 25.7 % (ref 36–48)
HCT VFR BLD AUTO: 26.5 % (ref 36–48)
HCT VFR BLD AUTO: 27.6 % (ref 36–48)
HGB BLD-MCNC: 8.6 G/DL (ref 13–16)
HGB BLD-MCNC: 8.7 G/DL (ref 13–16)
HGB BLD-MCNC: 8.8 G/DL (ref 13–16)
HGB BLD-MCNC: 9.4 G/DL (ref 13–16)
LACTATE SERPL-SCNC: 0.5 MMOL/L (ref 0.4–2)
LYMPHOCYTES # BLD: 1 K/UL (ref 0.9–3.6)
LYMPHOCYTES NFR BLD: 13 % (ref 21–52)
MAGNESIUM SERPL-MCNC: 1.7 MG/DL (ref 1.6–2.6)
MCH RBC QN AUTO: 28.7 PG (ref 24–34)
MCHC RBC AUTO-ENTMCNC: 33.2 G/DL (ref 31–37)
MCV RBC AUTO: 86.3 FL (ref 74–97)
MONOCYTES # BLD: 0.6 K/UL (ref 0.05–1.2)
MONOCYTES NFR BLD: 8 % (ref 3–10)
NEUTS SEG # BLD: 5.4 K/UL (ref 1.8–8)
NEUTS SEG NFR BLD: 72 % (ref 40–73)
PLATELET # BLD AUTO: 118 K/UL (ref 135–420)
PMV BLD AUTO: 9.4 FL (ref 9.2–11.8)
POTASSIUM SERPL-SCNC: 3.6 MMOL/L (ref 3.5–5.5)
RBC # BLD AUTO: 3.07 M/UL (ref 4.7–5.5)
SODIUM SERPL-SCNC: 135 MMOL/L (ref 136–145)
WBC # BLD AUTO: 7.6 K/UL (ref 4.6–13.2)

## 2018-05-17 PROCEDURE — 87186 SC STD MICRODIL/AGAR DIL: CPT | Performed by: HOSPITALIST

## 2018-05-17 PROCEDURE — 65660000000 HC RM CCU STEPDOWN

## 2018-05-17 PROCEDURE — 87040 BLOOD CULTURE FOR BACTERIA: CPT | Performed by: HOSPITALIST

## 2018-05-17 PROCEDURE — 83605 ASSAY OF LACTIC ACID: CPT | Performed by: HOSPITALIST

## 2018-05-17 PROCEDURE — 74011250636 HC RX REV CODE- 250/636: Performed by: HOSPITALIST

## 2018-05-17 PROCEDURE — 74011250637 HC RX REV CODE- 250/637: Performed by: INTERNAL MEDICINE

## 2018-05-17 PROCEDURE — 74011250637 HC RX REV CODE- 250/637: Performed by: HOSPITALIST

## 2018-05-17 PROCEDURE — 74011636637 HC RX REV CODE- 636/637: Performed by: HOSPITALIST

## 2018-05-17 PROCEDURE — 85018 HEMOGLOBIN: CPT | Performed by: HOSPITALIST

## 2018-05-17 PROCEDURE — 77030027138 HC INCENT SPIROMETER -A

## 2018-05-17 PROCEDURE — 80048 BASIC METABOLIC PNL TOTAL CA: CPT | Performed by: HOSPITALIST

## 2018-05-17 PROCEDURE — 87086 URINE CULTURE/COLONY COUNT: CPT | Performed by: HOSPITALIST

## 2018-05-17 PROCEDURE — 87077 CULTURE AEROBIC IDENTIFY: CPT | Performed by: HOSPITALIST

## 2018-05-17 PROCEDURE — 85025 COMPLETE CBC W/AUTO DIFF WBC: CPT | Performed by: HOSPITALIST

## 2018-05-17 PROCEDURE — 82962 GLUCOSE BLOOD TEST: CPT

## 2018-05-17 PROCEDURE — 36415 COLL VENOUS BLD VENIPUNCTURE: CPT | Performed by: HOSPITALIST

## 2018-05-17 PROCEDURE — 83735 ASSAY OF MAGNESIUM: CPT | Performed by: HOSPITALIST

## 2018-05-17 RX ORDER — OXYCODONE AND ACETAMINOPHEN 5; 325 MG/1; MG/1
1 TABLET ORAL ONCE
Status: COMPLETED | OUTPATIENT
Start: 2018-05-17 | End: 2018-05-17

## 2018-05-17 RX ADMIN — COLLAGENASE SANTYL: 250 OINTMENT TOPICAL at 08:50

## 2018-05-17 RX ADMIN — CILOSTAZOL 100 MG: 50 TABLET ORAL at 18:30

## 2018-05-17 RX ADMIN — OXYCODONE HYDROCHLORIDE AND ACETAMINOPHEN 2 TABLET: 5; 325 TABLET ORAL at 13:20

## 2018-05-17 RX ADMIN — LEVOTHYROXINE SODIUM 100 MCG: 100 TABLET ORAL at 06:40

## 2018-05-17 RX ADMIN — SODIUM CHLORIDE 100 ML/HR: 900 INJECTION, SOLUTION INTRAVENOUS at 21:18

## 2018-05-17 RX ADMIN — DILTIAZEM HYDROCHLORIDE 90 MG: 60 TABLET, FILM COATED ORAL at 21:04

## 2018-05-17 RX ADMIN — SODIUM CHLORIDE 100 ML/HR: 900 INJECTION, SOLUTION INTRAVENOUS at 06:41

## 2018-05-17 RX ADMIN — VITAMIN D, TAB 1000IU (100/BT) 5000 UNITS: 25 TAB at 09:00

## 2018-05-17 RX ADMIN — LISINOPRIL 2.5 MG: 5 TABLET ORAL at 09:00

## 2018-05-17 RX ADMIN — OMEPRAZOLE 20 MG: 20 CAPSULE, DELAYED RELEASE ORAL at 09:00

## 2018-05-17 RX ADMIN — PREGABALIN 75 MG: 75 CAPSULE ORAL at 09:00

## 2018-05-17 RX ADMIN — INSULIN LISPRO 9 UNITS: 100 INJECTION, SOLUTION INTRAVENOUS; SUBCUTANEOUS at 22:12

## 2018-05-17 RX ADMIN — FLUTICASONE PROPIONATE 2 SPRAY: 50 SPRAY, METERED NASAL at 08:58

## 2018-05-17 RX ADMIN — OXYCODONE HYDROCHLORIDE AND ACETAMINOPHEN 2 TABLET: 5; 325 TABLET ORAL at 21:03

## 2018-05-17 RX ADMIN — PREGABALIN 75 MG: 75 CAPSULE ORAL at 21:03

## 2018-05-17 RX ADMIN — ATORVASTATIN CALCIUM 20 MG: 20 TABLET, FILM COATED ORAL at 09:00

## 2018-05-17 RX ADMIN — DILTIAZEM HYDROCHLORIDE 90 MG: 60 TABLET, FILM COATED ORAL at 13:11

## 2018-05-17 RX ADMIN — FUROSEMIDE 40 MG: 40 TABLET ORAL at 09:00

## 2018-05-17 RX ADMIN — INSULIN LISPRO 3 UNITS: 100 INJECTION, SOLUTION INTRAVENOUS; SUBCUTANEOUS at 06:40

## 2018-05-17 RX ADMIN — DILTIAZEM HYDROCHLORIDE 90 MG: 60 TABLET, FILM COATED ORAL at 08:47

## 2018-05-17 RX ADMIN — UMECLIDINIUM 1 PUFF: 62.5 AEROSOL, POWDER ORAL at 08:58

## 2018-05-17 RX ADMIN — LORATADINE 10 MG: 10 TABLET ORAL at 09:00

## 2018-05-17 RX ADMIN — FLUTICASONE FUROATE AND VILANTEROL TRIFENATATE 1 PUFF: 100; 25 POWDER RESPIRATORY (INHALATION) at 08:58

## 2018-05-17 RX ADMIN — DILTIAZEM HYDROCHLORIDE 90 MG: 60 TABLET, FILM COATED ORAL at 18:30

## 2018-05-17 RX ADMIN — OXYCODONE HYDROCHLORIDE AND ACETAMINOPHEN 1 TABLET: 5; 325 TABLET ORAL at 00:35

## 2018-05-17 RX ADMIN — INSULIN LISPRO 2 UNITS: 100 INJECTION, SOLUTION INTRAVENOUS; SUBCUTANEOUS at 13:13

## 2018-05-17 RX ADMIN — MULTIPLE VITAMINS W/ MINERALS TAB 1 TABLET: TAB at 09:00

## 2018-05-17 RX ADMIN — CILOSTAZOL 100 MG: 50 TABLET ORAL at 06:40

## 2018-05-17 RX ADMIN — INSULIN GLARGINE 14 UNITS: 100 INJECTION, SOLUTION SUBCUTANEOUS at 22:12

## 2018-05-17 RX ADMIN — INSULIN LISPRO 9 UNITS: 100 INJECTION, SOLUTION INTRAVENOUS; SUBCUTANEOUS at 18:29

## 2018-05-17 NOTE — PROGRESS NOTES
Hospitalist Progress Note    Patient: Claudeen Calkins Sr. MRN: [id number]  [facility]: [id number]    YOB: 1945  Age: 74 y.o. Sex: male    DOA: 5/16/2018 LOS:  LOS: 1 day                Assessment/Plan     Patient Active Problem List   Diagnosis Code    PAD (peripheral artery disease) ([facility]) I73.9    Venous reflux I87.2    Leg ulcer, left ([address] L97.929    Leg ulcer ([address] L97.909    Diabetic ulcer of right midfoot associated with type 2 diabetes mellitus, with fat layer exposed ([address] E11.621, L97.412    Sepsis ([facility] A41.9    CAD (coronary artery disease) I25.10    COPD (chronic obstructive pulmonary disease) ([facility] J44.9    HTN (hypertension) I10    UTI (urinary tract infection) due to urinary indwelling Sher catheter ([facility]) T83.511A, N39.0    PAF (paroxysmal atrial fibrillation) ([facility]) I48.0    UTI (urinary tract infection) N39.0    Diabetic ulcer of both lower extremities ([facility] E11.622, L97.919, L97.929    Hyponatremia E87.1    Hypomagnesemia E83.42    Hypokalemia E87.6    Severe protein-calorie malnutrition ([facility]) E43    Ulcer of right leg, limited to breakdown of skin ([facility] L97.911    Type 2 diabetes mellitus with diabetic neuropathy ([facility]) E11.40    Chronic anticoagulation Z79.01    Gross hematuria R31.0    Mechanical complication of suprapubic catheter ([facility]) T83.090A    DNR no code (do not resuscitate) Z66            70 yo male is admitted for hematuria    Hematuria - resolved with CBI, seen by urology, aspirin and plavix on hold. Restart once ok from urology. Ok to discharge per urology. [facility]    A-fib - with RVR, HR improving with cardizem. Continue oral cardizem. DM - continue on insulin    HTN - BP on lower side, monitor    PAD - followed by vascular, continue with local wound care. Low grade fever, wbc normal range. HR elevation secondary to A-fib, unlikely sepsis, blood and urine cultures ordered will hold on to antibiotics.     Disposition : 1-2 days    Review of systems  General: No fevers or chills. Cardiovascular: No chest pain or pressure. No palpitations. Pulmonary: No shortness of breath. Gastrointestinal: No nausea, vomiting. Physical Exam:  General: Awake, cooperative, no acute distress    HEENT: NC, Atraumatic. PERRLA, anicteric sclerae. Lungs: CTA Bilaterally. No Wheezing/Rhonchi/Rales. Heart:  Regular  rhythm,  No murmur, No Rubs, No Gallops  Abdomen: Soft, Non distended, Non tender.  +Bowel sounds,   Extremities: No c/c/e  Psych:   Not anxious or agitated. Neurologic:  No acute neurological deficit. Vital signs/Intake and Output:  Visit Vitals    BP (!) 100/37 (BP 1 Location: Right arm, BP Patient Position: At rest)    Pulse 97    Temp 99.6 °F (37.6 °C)    Resp 16    Ht 6' 2\" (1.88 m)    Wt 69 kg (152 lb 1.9 oz)    SpO2 93%    BMI 19.53 kg/m2     Current Shift:  05/17 0701 - 05/17 1900  In: 240 [P.O.:240]  Out: 1000 [Urine:1000]  Last three shifts:  05/15 1901 - 05/17 0700  In: 58461 [I.V.:1060]  Out: 15472 [Urine:77621]            Labs: Results:       Chemistry Recent Labs      05/17/18   0440  05/16/18   0415   GLU  141*  178*   NA  135*  134*   K  3.6  3.5   CL  103  99*   CO2  27  27   BUN  7  9   CREA  0.52*  0.77   CA  7.4*  8.7   AGAP  5  8   BUCR  13  12      CBC w/Diff Recent Labs      05/17/18   1330  05/17/18   0440  05/16/18   2350   05/16/18   0415   WBC   --   7.6   --    --   11.6   RBC   --   3.07*   --    --   3.95*   HGB  9.4*  8.8*  8.6*   < >  11.5*   HCT  27.6*  26.5*  25.4*   < >  34.4*   PLT   --   118*   --    --   175   GRANS   --   72   --    --   82*   LYMPH   --   13*   --    --   11*   EOS   --   7*   --    --   1    < > = values in this interval not displayed.       Cardiac Enzymes Recent Labs      05/16/18 0415   CPK  27*   CKND1  3.7      Coagulation Recent Labs      05/16/18 0415   PTP  13.3   INR  1.1   APTT  27.9       Lipid Panel No results found for: CHOL, CHOLPOCT, [address], CHLST, CHOLV, [id number], HDL, LDL, LDLC, DLDLP, [id number], VLDLC, VLDL, TGLX, TRIGL, TRIGP, TGLPOCT, CHHD, CHHDX   BNP No results for input(s): BNPP in the last 72 hours. Liver Enzymes No results for input(s): TP, ALB, TBIL, AP, SGOT, GPT in the last 72 hours.     No lab exists for component: DBIL   Thyroid Studies Lab Results   Component Value Date/Time    TSH 0.90 11/04/2017 06:23 PM        Procedures/imaging: see electronic medical records for all procedures/Xrays and details which were not copied into this note but were reviewed prior to creation of Plan

## 2018-05-17 NOTE — PROGRESS NOTES
Problem: Falls - Risk of  Goal: *Absence of Falls  Document Donald Fall Risk and appropriate interventions in the flowsheet.    Outcome: Progressing Towards Goal  Fall Risk Interventions:                 Elimination Interventions: Call light in reach

## 2018-05-17 NOTE — PROGRESS NOTES
0710 Assumed patient care from off going nurse DEDE Love RN. Patient is resting in bed and appears to be in no sign of distress. Bed left in lowest position with bed alarm set and call bell left within reach.

## 2018-05-17 NOTE — CONSULTS
[id number] [facility]    Name:Luciana GERMAN SR.  MR#: [id number]  : 1945  ACCOUNT #: [de-identified]   DATE OF SERVICE: 2018    PHYSICIAN REQUESTING CONSULTATION:  Dr. Davon Coates. REASON FOR CONSULTATION:  Gross hematuria. HISTORY OF PRESENT ILLNESS:  The patient is a 77-year-old gentleman with a history of paroxysmal atrial fibrillation and coronary artery disease who is on chronic Plavix therapy, who has a history of an atonic urinary bladder managed at [facility] with a suprapubic catheter that was placed 3 years ago. He had a sudden onset of one day of gross hematuria. The hematuria became thicker and the catheter had more difficulty draining. There were problems of inability to adequately flush the catheter and there were problems of clots clogging the catheter. He had increasing lower abdominal discomfort and presents. He denies any fevers. His appetite has been fine. He has not had any weight loss. He does feel a little lethargic. PAST MEDICAL HISTORY:  Acid reflux, anemia, arthritis, atrial fibrillation, coronary artery disease, COPD, anemia, diabetes, GERD, seasonal allergies, hypercholesterolemia, hypertension, hand tremors, paroxysmal atrial fibrillation, peripheral artery disease, sleep apnea but has not used BiPAP in years and thyroid disease. PAST SURGICAL HISTORY:  He has a Vikas-en-Y gastric bypass, hernia repair with mesh cervical laminectomy, colonoscopy, cataract removed in the right eye, history of torn cartilage in his knee lower extremity angioplasties and stents. FAMILY HISTORY:  Significant for cancer, diabetes, heart disease and hypertension. SOCIAL HISTORY:  He is . Former smoker, quit in . Drinks socially.     HOME MEDICATIONS:  Vitamin D, Percocet, insulin, Synthroid, Pletal, Lyrica, omeprazole, Urocit-K, multivitamin, iron, metformin, Lipitor, lisinopril, Plavix, aspirin, Lasix, diltiazem, Spiriva, Claritin, Flonase, Advair. ALLERGIES:  INCLUDE LATEX gets contact dermatitis. REVIEW OF SYSTEMS:  GENERAL:  No fevers, chills. Positive lethargy. No weight gain or loss. HEENT:  No headache, rhinorrhea, epistaxis, ear pain, hearing loss, neck pain or stiffness. CARDIOVASCULAR:  No chest pain, no palpitations, no dyspnea on exertion, orthopnea. RESPIRATORY:  No cough or hemoptysis or wheezing. GASTROINTESTINAL:  No nausea, vomiting, diarrhea, constipation. GENITOURINARY:  Positive hematuria. DERMATOLOGIC:  No itching. MUSCULOSKELETAL:  No bone or joint complaints. VASCULAR:  No edema, cyanosis. Positive poor wound healing in the lower extremity. ENDOCRINE:  No heat or cold intolerance. NEUROLOGIC:  No unilateral weakness. HEMATOLOGIC:  Positive easy bruising. Positive bleeding. PHYSICAL EXAMINATION:  VITAL SIGNS:  Temperature 99.7, pulse 122, respirations 16, blood pressure 101/41. GENERAL:   He is awake, he is alert. He is cooperative and conversational.    HEENT:  Normocephalic, atraumatic. Extraocular movements are intact. He has got a midline nasal septum. No sinus tenderness. Throat, lips and mucosa and tongue are normal.  His neck is supple symmetrical with a midline trachea. LUNGS:  He is breathing without difficulty. Lungs are clear. HEART:  He has got an irregularly irregular rhythm. ABDOMEN:  Soft, nondistended. Positive SP tender at [facility] site. No granulation tissue at [facility] site. EXTREMITIES:  Normal.  Ulnar boots are noted. Right knee skin lesion. GENITOURINARY:  He has got a normal phallus. Scrotum is normal.  Testicles are normal.  NEUROLOGIC:  No focal motor or sensory deficits. LABORATORY DATA:  Creatinine 0.77, hematocrit 34.4 on admission. ASSESSMENT AND PLAN:  This is a gentleman with gross hematuria and clot urinary retention.     PROCEDURE:  Under sterile conditions, I removed the prior catheter and under sterile conditions placed a new suprapubic catheter with a 24-Syriac 3-way Sher without difficulty. The bladder was then manually irrigated with some difficulty using 1000 mL of sterile saline. Once I evacuated the bladder of significant amount of clot he was clear and started on continuous bladder irrigation. PLAN:  He will continue normal saline continuous bladder irrigation with the idea of titrating to off. We will hold the Plavix for the time being. We will continue to follow.       Bryce Mcintosh MD       [facility] [place] [facility] /   D: 05/16/2018 22:57     T: 05/16/2018 23:34  JOB #: [id number]

## 2018-05-17 NOTE — CDMP QUERY
Please clarify if this patient is being treated/managed for:    => Hemorrhagic disorder d/t extrinsic circulating anticoagulants with hematuria   =>Other Explanation of clinical findings  =>Unable to Determine (no explanation of clinical findings)    The medical record reflects the following:    Risk:HX coagulation disorder    Clinical Indicators: Presented with hematuria and clots in sierra . Patient hx of coagulation  d/o and is  on plavix     Treatment:  Hold anticoagulation/antiplatelet  as documented in progress note 5-16 by urology. Bladder irrigation     Please clarify and document your clinical opinion in the progress notes and discharge summary including the definitive and/or presumptive diagnosis, (suspected or probable), related to the above clinical findings. Please include clinical findings supporting your diagnosis. If you DECLINE this query or would like to communicate with [facility], please utilize the \"B[employer] Dash message box\" at the TOP of the Progress Note on the right.       Thank you,  Sonu Rayo RN [facility] [phone]

## 2018-05-17 NOTE — PROGRESS NOTES
D/c plan Home when medically cleared  Met with patient  At bedside states he is not feeling well he is hungry and waiting on the doctor. Met with patient and his wife yesterday he has a suprapubic catheter. States he has a wheel chiar, rollator, stair lift. RW. She did inform cm patint is to have phone conversation with the nurse about the the debrideent of patients ankles. wujnd care consult has been placed. Dr. Terrell Landis will follow for rigo presently with SBI in progress. Informed cm that when he is d/c ed he would like to return home with wife. He does shower without assistance once he gets in the special made shower. They informed cm when he is ready for d/c would like to use [facility] . Dr. Michael Radford to see patient and will allow patient to eat per Florence Cabrales RN. Cm will continue to follow  Care Management Interventions  PCP Verified by CM: Yes  Transition of Care Consult (CM Consult): [address]: No  Reason Outside [place]: Patient already serviced by other home care/hospice agency (has used [facility] and would like to use them when d/evie)  Current Support Network: Lives with Spouse  Confirm Follow Up Transport: Family  Plan discussed with Pt/Family/Caregiver: Yes  Freedom of Choice Offered:  Yes

## 2018-05-17 NOTE — ROUTINE PROCESS
Bedside and Verbal shift change report given to NONI Souza (oncoming nurse) by RAIZA Pickard (offgoing nurse). Report included the following information SBAR, Kardex, Intake/Output and MAR.

## 2018-05-17 NOTE — H&P
History & Physical     Patient: Royce Evans Sr. MRN: [id number]  CSN: [id number]    YOB: 1945  Age: 74 y.o. Sex: male       DOA: 5/16/2018  Primary Care Provider:  Valentine Quevedo MD        Assessment/Plan      Hospital Problems  Date Reviewed: 5/10/2018    Natan Duke    Codes Class Noted POA     Chronic anticoagulation ICD-10-CM: Z79.01  ICD-9-CM: V58.61   5/16/2018 Unknown           * (Principal)Gross hematuria ICD-10-CM: R31.0  ICD-9-CM: 599.71   5/16/2018 Unknown           Mechanical complication of suprapubic catheter (HCC) ICD-10-CM: T83.090A  ICD-9-CM: 996.31   5/16/2018 Unknown           DNR no code (do not resuscitate) ICD-10-CM: Z66  ICD-9-CM: V49.86   5/16/2018 Unknown           Type 2 diabetes mellitus with diabetic neuropathy ([facility] ICD-10-CM: E11.40  ICD-9-CM: 250.60, 357.2   3/16/2018 Yes           Hypomagnesemia ICD-10-CM: S58.10  ICD-9-CM: 275.2   2/1/2018 Yes           PAF (paroxysmal atrial fibrillation) ([facility] ICD-10-CM: I48.0  ICD-9-CM: 427.31   11/5/2017 Unknown           CAD (coronary artery disease) ICD-10-CM: I25.10  ICD-9-CM: 414.00   11/4/2017 Yes           COPD (chronic obstructive pulmonary disease) ([facility] ICD-10-CM: J44.9  ICD-9-CM: 496   11/4/2017 Yes           HTN (hypertension) ICD-10-CM: I10  ICD-9-CM: 401. 9   11/4/2017 Yes           PAD (peripheral artery disease) ([facility] ICD-10-CM: I73.9  ICD-9-CM: 443. 9   5/9/2017 Yes           Leg ulcer ([facility] ICD-10-CM: L97.909  ICD-9-CM: 707.10   5/9/2017 Yes                     Admit to tele      Hematuria   Will continue bladder irrigation,   Dr. Lelia Dakin was called per Dr. Junior Us   Will hold plavix and aspirin for today, will restart per urology recommendation   H/h Q12      Mechanical complication from  Super pubic catheter   F/u [facility]     Diabatic ulcer of edwin LE and pad   Received skin graft per dr. Radha Villarreal, will have scheduled surgery in June, having an appointment with vascular today   Will talk with vascular  Wound care consult also   Will continue pletal       DM type II , with complication,  -long term insulin , Complication with ulcer   -on lantus, ssi, diabetic diet , hypoglycemia protocol         HTN, accelerated  Continue home medication.-cardizem        hyponatremia/hypomagenesemia   Ns infusion, mg replacement           PAF, HR up   Continue cardizem, optimized the electrolytes    copd   Stable. will continue home medication             AC: I have had a discussion with patient and family regarding code status. Particularly, described potential options in event of cardiac or respiratory arrest. I have explained what being full code entails, including cardiorespiratory resuscitation attempts with chest compressions, potential cariodioversion/ \" shocks\" as well as intubation. I have also explained Do not resuscitate which would mean we allow a natural death with out aggressive interventions. DNR/I  AC 32 min      DVT : scd . ppi proph  CC: blood in the urine       HPI:      Rubi Roland is a 74 y.o. male who hx of afib, cad, copd, super pubic catheter due to BPH was sent to ER per ems due to hematuria. He noted some blood around the cathter area after cough spell first, then blood come out for his penis/catheter. He self irrigated at home, but still having blood come out. He takes  Plavix and ASA 81 mg at home and f/u with dr. Diana Peña care for graft of ulcer. He supposed to have appointment with vascular and wound care clinic. Denies any slurred speech/headache/cp/n/v/blurred vission/d/c/palpitation/gait change/bleeding.  Denies smoking/ any alcohol or drug use.              Visit Vitals    /49    Pulse (!) 111    Temp 98.4 °F (36.9 °C)    Resp 16    Ht 6' 2\" (1.88 m)    Wt 68 kg (150 lb)    SpO2 96%    BMI 19.26 kg/m2      O2 Device: Room air             Past Medical History:   Diagnosis Date    Acid reflux      Anemia      Arthritis      Atrial fibrillation (HCC)      CAD (coronary artery disease)      Chronic obstructive pulmonary disease (HCC)      Coagulation disorder (HCC)       anemia    Diabetes ([facility] 1997    GERD (gastroesophageal reflux disease)      H/O seasonal allergies      Hypercholesterolemia      Hypertension 1997    Ill-defined condition       hand tremors    PAF (paroxysmal atrial fibrillation) ([facility] 11/5/2017    Peripheral arterial disease (HCC)      Sleep apnea       pt states he has not used bipap \"in years\".  Thyroid disease                 Past Surgical History:   Procedure Laterality Date    ABDOMEN SURGERY PROC UNLISTED   1997     Vikas-en-Y    ABDOMEN SURGERY PROC UNLISTED   2000     abdominal hernia repair/mesh    HX CERVICAL LAMINECTOMY        HX COLONOSCOPY        HX HEENT         cataracts removed right eye    HX ORTHOPAEDIC   1982     cervical laminectomy    HX ORTHOPAEDIC Right 1962     knee torn cartilage    HX UROLOGICAL   12/2015     suprapubic catheter in place    VASCULAR SURGERY PROCEDURE UNLIST Bilateral 2015 2017     angio plasty legs  x2    VASCULAR SURGERY PROCEDURE UNLIST Bilateral 2017     legs venogram  stent right leg               Family History   Problem Relation Age of Onset    Cancer Mother      Diabetes Father      Heart Disease Sister      Cancer Brother      Diabetes Brother      Hypertension Brother            Social History                Social History    Marital status:        Spouse name: N/A    Number of children: N/A    Years of education: N/A             Social History Main Topics    Smoking status: Former Smoker       Types: Cigarettes       Quit date: 1/1/1997    Smokeless tobacco: Never Used    Alcohol use 0.6 oz/week        1 Glasses of wine per week     Drug use: No    Sexual activity: Not Asked           Other Topics Concern    None      Social History Narrative                    Prior to Admission medications    Medication Sig Start Date End Date Taking?  Authorizing Provider   cholecalciferol (VITAMIN D3) 1,000 unit cap Take 5,000 Units by mouth daily.     Yes Tera Akers MD   oxyCODONE-acetaminophen (PERCOCET) 5-325 mg per tablet Take 2 Tabs by mouth every six (6) hours as needed for Pain. Max Daily Amount: 8 Tabs. 4/27/18   Yes Britany Parks MD   insulin aspart U-100 (NOVOLOG FLEXPEN U-100 INSULIN) 100 unit/mL inpn 9 Units by SubCUTAneous route two (2) times a day. With lunch and dinner in addition the sliding scale     Yes Historical Provider   insulin glargine (LANTUS SOLOSTAR U-100 INSULIN) 100 unit/mL (3 mL) inpn 27 Units by SubCUTAneous route nightly.     Yes Historical Provider   levothyroxine (SYNTHROID) 100 mcg tablet Take 100 mcg by mouth Daily (before breakfast).     Yes Historical Provider   cilostazol (PLETAL) 100 mg tablet Take 100 mg by mouth Before breakfast and dinner.     Yes Historical Provider   pregabalin (LYRICA) 50 mg capsule Take 75 mg by mouth two (2) times a day.     Yes Historical Provider   Omeprazole delayed release (PRILOSEC D/R) 20 mg tablet Take 20 mg by mouth daily.     Yes Historical Provider   potassium citrate (UROCIT-K10) 10 mEq (1,080 mg) TbER Take 10 mEq by mouth two (2) times a day.     Yes Historical Provider   multivitamin, tx-iron-ca-min (THERA-M W/ IRON) 9 mg iron-400 mcg tab tablet Take 1 Tab by mouth daily.     Yes Historical Provider   iron aspgly,nd-K-G81-FA-Ca-suc (FERREX 150 FORTE PLUS) 787-26-21-6 mg-mg-mcg-mg cap cap Take 1 Cap by mouth daily.     Yes Tera Akers MD   insulin aspart (NOVOLOG FLEXPEN) 100 unit/mL inpn 5 Units by SubCUTAneous route daily.  Sliding scale  Plus the above dose     Yes Historical Provider   metFORMIN (GLUCOPHAGE) 1,000 mg tablet Take 1,000 mg by mouth two (2) times daily (with meals).     Yes Historical Provider   atorvastatin (LIPITOR) 20 mg tablet Take  by mouth daily.     Yes Historical Provider   lisinopril (PRINIVIL, ZESTRIL) 2.5 mg tablet Take  by mouth daily.     Yes Historical Provider clopidogrel (PLAVIX) 75 mg tab Take 75 mg by mouth daily.     Yes Historical Provider   aspirin 81 mg tablet Take 81 mg by mouth nightly.     Yes Historical Provider   furosemide (LASIX) 40 mg tablet Take 40 mg by mouth daily.     Yes Historical Provider   diltiazem (TIAZAC) 360 mg SR capsule Take 360 mg by mouth daily.     Yes Historical Provider   tiotropium (SPIRIVA WITH HANDIHALER) 18 mcg inhalation capsule Take 1 Cap by inhalation daily.     Yes Historical Provider   loratadine (CLARITIN) 10 mg tablet Take 10 mg by mouth daily.     Yes Historical Provider   fluticasone (FLONASE) 50 mcg/actuation nasal spray 2 Sprays by Both Nostrils route daily.     Yes Historical Provider   fluticasone-salmeterol (ADVAIR DISKUS) 250-50 mcg/dose diskus inhaler Take 2 Puffs by inhalation every twelve (12) hours.     Yes Historical Provider               Allergies   Allergen Reactions    Latex Contact Dermatitis       Pt states he is NOT allergic to latex.  Neosporin [Neomycin-Bacitracin-Polymyxin] Rash         Review of Systems  Gen: No fever, chills, malaise, weight loss/gain. Heent: No headache, rhinorrhea, epistaxis, ear pain, hearing loss, sinus pain, neck pain/stiffness, sore throat. Heart: No chest pain, palpitations, ISABEL, pnd, or orthopnea. Resp: No cough, hemoptysis, wheezing and shortness of breath. GI: No nausea, vomiting, diarrhea, constipation, melena or hematochezia. : + hematuria. Derm: skin graft   Musc/skeletal: no bone or joint complains. Vasc: No edema, cyanosis or claudication. Endo: No heat/cold intolerance, no polyuria,polydipsia or polyphagia. Neuro: No unilateral weakness, numbness, tingling. No seizures.    Heme: No easy bruising or bleeding.              Physical Exam:      Physical Exam:       Visit Vitals    /49    Pulse (!) 111    Temp 98.4 °F (36.9 °C)    Resp 16    Ht 6' 2\" (1.88 m)    Wt 68 kg (150 lb)    SpO2 96%    BMI 19.26 kg/m2      O2 Device: Room air     Temp (24hrs), Av.4 °F (36.9 °C), Min:98.4 °F (36.9 °C), Max:98.4 °F (36.9 °C)    05/15 1901 -  0700  In: -   Out: 183 [KNLOO:852]         General:  Awake, cooperative, no distress. Head:  Normocephalic, without obvious abnormality, atraumatic. Eyes:  Conjunctivae/corneas clear, sclera anicteric, PERRL, EOMs intact. Nose: Nares normal. No drainage or sinus tenderness. Throat: Lips, mucosa, and tongue normal. .   Neck: Supple, symmetrical, trachea midline, no adenopathy.         Lungs:   Clear to auscultation bilaterally.         Heart:  Irregular irregular , S1, S2 normal, +murmur, click, rub or gallop. Abdomen: Soft, non-tender. Bowel sounds normal. No masses,  No organomegaly. super pubic blood in the catheter, full of blood , skin around catheter intact, no blood noted    Extremities: Extremities normal, ulnar boots noted, skin lesion noted in rt knee    Pulses: 2+ and symmetric all extremities. Skin: Skin color-pink, texture, turgor normal.. Capillary refill normal    Neurologic: CNII-XII intact.  No focal motor or sensory deficit.         Labs Reviewed:     BMP:         Lab Results   Component Value Date/Time      (L) 2018 04:15 AM     K 3.5 2018 04:15 AM     CL 99 (L) 2018 04:15 AM     CO2 27 2018 04:15 AM     AGAP 8 2018 04:15 AM      (H) 2018 04:15 AM     BUN 9 2018 04:15 AM     CREA 0.77 2018 04:15 AM     GFRAA >60 2018 04:15 AM     GFRNA >60 2018 04:15 AM      CMP:         Lab Results   Component Value Date/Time      (L) 2018 04:15 AM     K 3.5 2018 04:15 AM     CL 99 (L) 2018 04:15 AM     CO2 27 2018 04:15 AM     AGAP 8 2018 04:15 AM      (H) 2018 04:15 AM     BUN 9 2018 04:15 AM     CREA 0.77 2018 04:15 AM     GFRAA >60 2018 04:15 AM     GFRNA >60 2018 04:15 AM     CA 8.7 2018 04:15 AM     MG 1.4 (L) 2018 04:15 AM    CBC:         Lab Results   Component Value Date/Time     WBC 11.6 05/16/2018 04:15 AM     HGB 11.5 (L) 05/16/2018 04:15 AM     HCT 34.4 (L) 05/16/2018 04:15 AM      05/16/2018 04:15 AM      All Cardiac Markers in the last 24 hours:         Lab Results   Component Value Date/Time     CPK 27 (L) 05/16/2018 04:15 AM     CKMB 1.0 05/16/2018 04:15 AM     CKND1 3.7 05/16/2018 04:15 AM     TROIQ <0.02 05/16/2018 04:15 AM      Recent Glucose Results:         Lab Results   Component Value Date/Time      (H) 05/16/2018 04:15 AM      ABG: No results found for: PH, PHI, PCO2, PCO2I, PO2, PO2I, HCO3, HCO3I, FIO2, FIO2I  COAGS:         Lab Results   Component Value Date/Time     APTT 27.9 05/16/2018 04:15 AM     PTP 13.3 05/16/2018 04:15 AM     INR 1.1 05/16/2018 04:15 AM      Liver Panel: No results found for: ALB, CBIL, TBIL, TP, GLOB, AGRAT, SGOT, ASTPOC, ALTPOC, ALT, GPT, AP  Pancreatic Markers: No results found for: AMYLPOCT, AML, LIPPOCT, LPSE     Procedures/imaging: see electronic medical records for all procedures/Xrays and details which were not copied into this note but were reviewed prior to creation of Quirino Clement MD, Internal Medicine      CC: Philip Thomas MD          Revision History       Date/Time User Provider Type Action     05/16/18 0646 Mercy Hernandez MD Physician Addend     05/16/18 1324 Mercy Hernandez MD Physician Sign     View Details Report

## 2018-05-17 NOTE — PROGRESS NOTES
Urology Progress Note    Patient: Faiza High Sr. MRN: [id number] SSN: [id number]    YOB: 1945  Age: 74 y.o. Sex: male    DOA: 2018 LOS:  LOS: 1 day              Subjective:   Pt doing better this am.  His urine is draining well, clear off CBI no clots. Objective:      Visit Vitals    /41 (BP 1 Location: Right arm, BP Patient Position: At rest)    Pulse (!) 116    Temp 99.5 °F (37.5 °C)    Resp 16    Ht 6' 2\" (1.88 m)    Wt 69 kg (152 lb 1.9 oz)    SpO2 96%    BMI 19.53 kg/m2     Temp (24hrs), Av.4 °F (37.4 °C), Min:98.7 °F (37.1 °C), Max:99.8 °F (37.7 °C)      Intake and Output:  05/15 1901 -  0700  In: 37330 [I.V.:1060]  Out: 24769 [Urine:12495]   0701 -  1900  In: 240 [P.O.:240]  Out: 1000 [Urine:1000]    Physical Exam:  SPT: clear urine    Medications Reviewed. Assessment/Plan:   Principal Problem:    Gross hematuria (2018)    Active Problems:    PAD (peripheral artery disease) ([address] (2017)      Leg ulcer ([address] (2017)      CAD (coronary artery disease) (2017)      COPD (chronic obstructive pulmonary disease) ([address] (2017)      HTN (hypertension) (2017)      PAF (paroxysmal atrial fibrillation) ([address] (2017)      Hypomagnesemia (2018)      Type 2 diabetes mellitus with diabetic neuropathy (HCC) (3/16/2018)      Chronic anticoagulation (2018)      Mechanical complication of suprapubic catheter ([facility] (2018)      DNR no code (do not resuscitate) (2018)        Status Post:     Impression: Pt with resolved gross hematuria, SPT draining clear urine    Plan:  1. Pt cleared from  standpoint for discharge  2.  [facility]    Kecia Camacho MD  May 17, 2018

## 2018-05-17 NOTE — PROGRESS NOTES
Bedside and Verbal shift change report given to LISS Artis (oncoming nurse) by Orlin Coyle RN   (offgoing nurse). Report included the following information SBAR, Kardex and MAR.    EKG rhythm verified

## 2018-05-17 NOTE — PROGRESS NOTES
65 Notified Dr. Olivia Collazo concerning patient's development of a low grade temp throughout the night, elevated -120's and the patient not currently on any antx in spite of urinalysis revealing presence of bacteria. Scheduled Cardizem was placed on hold due to patient's BP/MAP decreasing. Fluid challenges administered over night with slight improvement. Patient's MEWS is currently a 3. Dr. Olivia Collazo ordered to resume scheduled Cardizem now that BP can support and  to control HR, BC x's 1, urine culture per suprapubic and that antx will be initiated upon his arrival to visit patient. Verbal orders read back and verified, orders carried out accordingly. Patient is currently sitting up in bed without any pain/distress, IS placed at bedside, encouraged patient to use. Patient stated he is familiar with IS and will utilize as directed.

## 2018-05-18 VITALS
OXYGEN SATURATION: 98 % | BODY MASS INDEX: 19.13 KG/M2 | DIASTOLIC BLOOD PRESSURE: 40 MMHG | HEART RATE: 101 BPM | SYSTOLIC BLOOD PRESSURE: 115 MMHG | WEIGHT: 149.03 LBS | HEIGHT: 74 IN | TEMPERATURE: 98.4 F | RESPIRATION RATE: 18 BRPM

## 2018-05-18 LAB
ANION GAP SERPL CALC-SCNC: 4 MMOL/L (ref 3–18)
BASOPHILS # BLD: 0 K/UL (ref 0–0.06)
BASOPHILS NFR BLD: 0 % (ref 0–2)
BUN SERPL-MCNC: 6 MG/DL (ref 7–18)
BUN/CREAT SERPL: 10 (ref 12–20)
CALCIUM SERPL-MCNC: 7.8 MG/DL (ref 8.5–10.1)
CHLORIDE SERPL-SCNC: 103 MMOL/L (ref 100–108)
CO2 SERPL-SCNC: 27 MMOL/L (ref 21–32)
CREAT SERPL-MCNC: 0.6 MG/DL (ref 0.6–1.3)
DIFFERENTIAL METHOD BLD: ABNORMAL
EOSINOPHIL # BLD: 0.7 K/UL (ref 0–0.4)
EOSINOPHIL NFR BLD: 9 % (ref 0–5)
ERYTHROCYTE [DISTWIDTH] IN BLOOD BY AUTOMATED COUNT: 16.2 % (ref 11.6–14.5)
GLUCOSE BLD STRIP.AUTO-MCNC: 218 MG/DL (ref 70–110)
GLUCOSE BLD STRIP.AUTO-MCNC: 379 MG/DL (ref 70–110)
GLUCOSE SERPL-MCNC: 178 MG/DL (ref 74–99)
HCT VFR BLD AUTO: 24.4 % (ref 36–48)
HCT VFR BLD AUTO: 27 % (ref 36–48)
HGB BLD-MCNC: 8.1 G/DL (ref 13–16)
HGB BLD-MCNC: 8.9 G/DL (ref 13–16)
LYMPHOCYTES # BLD: 1 K/UL (ref 0.9–3.6)
LYMPHOCYTES NFR BLD: 12 % (ref 21–52)
MAGNESIUM SERPL-MCNC: 1.7 MG/DL (ref 1.6–2.6)
MCH RBC QN AUTO: 28.8 PG (ref 24–34)
MCHC RBC AUTO-ENTMCNC: 33 G/DL (ref 31–37)
MCV RBC AUTO: 87.4 FL (ref 74–97)
MONOCYTES # BLD: 0.8 K/UL (ref 0.05–1.2)
MONOCYTES NFR BLD: 10 % (ref 3–10)
NEUTS SEG # BLD: 5.7 K/UL (ref 1.8–8)
NEUTS SEG NFR BLD: 69 % (ref 40–73)
PLATELET # BLD AUTO: 89 K/UL (ref 135–420)
PMV BLD AUTO: 9.1 FL (ref 9.2–11.8)
POTASSIUM SERPL-SCNC: 3.6 MMOL/L (ref 3.5–5.5)
RBC # BLD AUTO: 3.09 M/UL (ref 4.7–5.5)
SODIUM SERPL-SCNC: 134 MMOL/L (ref 136–145)
WBC # BLD AUTO: 8.2 K/UL (ref 4.6–13.2)

## 2018-05-18 PROCEDURE — 83735 ASSAY OF MAGNESIUM: CPT | Performed by: HOSPITALIST

## 2018-05-18 PROCEDURE — 85014 HEMATOCRIT: CPT | Performed by: HOSPITALIST

## 2018-05-18 PROCEDURE — 74011250637 HC RX REV CODE- 250/637: Performed by: HOSPITALIST

## 2018-05-18 PROCEDURE — 74011636637 HC RX REV CODE- 636/637: Performed by: HOSPITALIST

## 2018-05-18 PROCEDURE — 82962 GLUCOSE BLOOD TEST: CPT

## 2018-05-18 PROCEDURE — 80048 BASIC METABOLIC PNL TOTAL CA: CPT | Performed by: HOSPITALIST

## 2018-05-18 PROCEDURE — 74011000250 HC RX REV CODE- 250: Performed by: HOSPITALIST

## 2018-05-18 PROCEDURE — 85025 COMPLETE CBC W/AUTO DIFF WBC: CPT | Performed by: HOSPITALIST

## 2018-05-18 PROCEDURE — 74011250636 HC RX REV CODE- 250/636: Performed by: HOSPITALIST

## 2018-05-18 PROCEDURE — 77030037878 HC DRSG MEPILEX >48IN BORD MOLN -B

## 2018-05-18 PROCEDURE — 36415 COLL VENOUS BLD VENIPUNCTURE: CPT | Performed by: HOSPITALIST

## 2018-05-18 RX ORDER — CIPROFLOXACIN 250 MG/1
250 TABLET, FILM COATED ORAL EVERY 12 HOURS
Qty: 6 TAB | Refills: 0 | Status: SHIPPED | OUTPATIENT
Start: 2018-05-18 | End: 2018-05-21

## 2018-05-18 RX ORDER — DILTIAZEM HYDROCHLORIDE 180 MG/1
360 CAPSULE, COATED, EXTENDED RELEASE ORAL DAILY
Status: DISCONTINUED | OUTPATIENT
Start: 2018-05-18 | End: 2018-05-18 | Stop reason: HOSPADM

## 2018-05-18 RX ADMIN — WATER 1 G: 1 INJECTION INTRAMUSCULAR; INTRAVENOUS; SUBCUTANEOUS at 12:40

## 2018-05-18 RX ADMIN — MULTIPLE VITAMINS W/ MINERALS TAB 1 TABLET: TAB at 09:45

## 2018-05-18 RX ADMIN — UMECLIDINIUM 1 PUFF: 62.5 AEROSOL, POWDER ORAL at 09:47

## 2018-05-18 RX ADMIN — OXYCODONE HYDROCHLORIDE AND ACETAMINOPHEN 2 TABLET: 5; 325 TABLET ORAL at 10:09

## 2018-05-18 RX ADMIN — LISINOPRIL 2.5 MG: 5 TABLET ORAL at 09:44

## 2018-05-18 RX ADMIN — LORATADINE 10 MG: 10 TABLET ORAL at 09:45

## 2018-05-18 RX ADMIN — OXYCODONE HYDROCHLORIDE AND ACETAMINOPHEN 2 TABLET: 5; 325 TABLET ORAL at 04:08

## 2018-05-18 RX ADMIN — INSULIN LISPRO 6 UNITS: 100 INJECTION, SOLUTION INTRAVENOUS; SUBCUTANEOUS at 08:03

## 2018-05-18 RX ADMIN — FLUTICASONE PROPIONATE 2 SPRAY: 50 SPRAY, METERED NASAL at 09:47

## 2018-05-18 RX ADMIN — FUROSEMIDE 40 MG: 40 TABLET ORAL at 09:45

## 2018-05-18 RX ADMIN — DILTIAZEM HYDROCHLORIDE 90 MG: 60 TABLET, FILM COATED ORAL at 08:03

## 2018-05-18 RX ADMIN — PREGABALIN 75 MG: 75 CAPSULE ORAL at 09:43

## 2018-05-18 RX ADMIN — CILOSTAZOL 100 MG: 50 TABLET ORAL at 08:03

## 2018-05-18 RX ADMIN — DILTIAZEM HYDROCHLORIDE 360 MG: 180 CAPSULE, COATED, EXTENDED RELEASE ORAL at 12:39

## 2018-05-18 RX ADMIN — LEVOTHYROXINE SODIUM 100 MCG: 100 TABLET ORAL at 08:03

## 2018-05-18 RX ADMIN — SODIUM CHLORIDE 100 ML/HR: 900 INJECTION, SOLUTION INTRAVENOUS at 08:04

## 2018-05-18 RX ADMIN — COLLAGENASE SANTYL: 250 OINTMENT TOPICAL at 09:46

## 2018-05-18 RX ADMIN — FLUTICASONE FUROATE AND VILANTEROL TRIFENATATE 1 PUFF: 100; 25 POWDER RESPIRATORY (INHALATION) at 09:47

## 2018-05-18 RX ADMIN — INSULIN LISPRO 12 UNITS: 100 INJECTION, SOLUTION INTRAVENOUS; SUBCUTANEOUS at 12:40

## 2018-05-18 RX ADMIN — OMEPRAZOLE 20 MG: 20 CAPSULE, DELAYED RELEASE ORAL at 09:43

## 2018-05-18 RX ADMIN — ATORVASTATIN CALCIUM 20 MG: 20 TABLET, FILM COATED ORAL at 09:44

## 2018-05-18 RX ADMIN — VITAMIN D, TAB 1000IU (100/BT) 5000 UNITS: 25 TAB at 09:42

## 2018-05-18 NOTE — PROGRESS NOTES
0730 Assumed responsibility for patient from [facility], [address] [place] [address] Morning assessment performed and medications administered. Wound care performed on bilateral ankles. Dual AVS reviewed with Pablo Domingo RN. All medications reviewed individually with patient. Opportunities for questions and concerns provided. Patient discharged via (mode of transport ie. Car, ambulance or air transport) car  Patient's arm band appropriately discarded.

## 2018-05-18 NOTE — PROGRESS NOTES
D/c plan: home today  Met with patient at bedside during IDR's states he has been cleared by Dr. Marge Kawasaki for d/c. Patient states he does not want to follow up with Dr. Marge Kawasaki he willl have his wife make follow up appointment with Pham Alvarez. Patient goes to wound care clinic weekly and has follow up appointment with Dr. Deon Lord on 6/6/2018. He does not want cm to make any appointments/ Refuses HHC states he has assistance with wife and family denies other needs or DME. Plan d/c home today with wife. Care Management Interventions  PCP Verified by CM:  Yes  Transition of Care Consult (CM Consult): Discharge Planning  [address] [address]: No  Reason Outside [place]: Patient declined ordered home care/hospice services  Current Support Network: Lives with Spouse  Confirm Follow Up Transport: Family  Plan discussed with Pt/Family/Caregiver: Yes  Freedom of Choice Offered: Yes  Discharge Location  Discharge Placement: Home with family assistance

## 2018-05-18 NOTE — DISCHARGE SUMMARY
Discharge Summary    Patient: Dmitry Parish Sr. MRN: [id number]  CSN: [id number]    YOB: 1945  Age: 74 y.o.   Sex: male    DOA: 5/16/2018 LOS:  LOS: 2 days   Discharge Date:      Primary Care Provider:  Jessica Yang MD    Admission Diagnoses: Gross hematuria  Mechanical complication of suprapubic catheter (HCC)  PAF (paroxysmal atrial fibrillation) (HCC)  Chronic anticoagulation    Discharge Diagnoses:    Problem List as of 5/18/2018  Date Reviewed: 5/10/2018          Codes Class Noted - Resolved    Chronic anticoagulation ICD-10-CM: Z79.01  ICD-9-CM: V58.61  5/16/2018 - Present        * (Principal)Gross hematuria ICD-10-CM: R31.0  ICD-9-CM: 599.71  5/16/2018 - Present        Mechanical complication of suprapubic catheter ([facility] ICD-10-CM: T83.090A  ICD-9-CM: 996.31  5/16/2018 - Present        DNR no code (do not resuscitate) ICD-10-CM: Z66  ICD-9-CM: V49.86  5/16/2018 - Present        Type 2 diabetes mellitus with diabetic neuropathy ([facility] ICD-10-CM: E11.40  ICD-9-CM: 250.60, 357.2  3/16/2018 - Present        Ulcer of right leg, limited to breakdown of skin ([facility] ICD-10-CM: L97.911  ICD-9-CM: 707.10  2/27/2018 - Present        Hypokalemia ICD-10-CM: E87.6  ICD-9-CM: 276.8  2/2/2018 - Present        Severe protein-calorie malnutrition ([facility] ICD-10-CM: E43  ICD-9-CM: 691  2/2/2018 - Present        UTI (urinary tract infection) ICD-10-CM: N39.0  ICD-9-CM: 599.0  2/1/2018 - Present        Diabetic ulcer of both lower extremities ([facility] ICD-10-CM: E11.622, L97.919, L97.929  ICD-9-CM: 250.80, 707.10  2/1/2018 - Present        Hyponatremia ICD-10-CM: E87.1  ICD-9-CM: 276.1  2/1/2018 - Present        Hypomagnesemia ICD-10-CM: E83.42  ICD-9-CM: 275.2  2/1/2018 - Present        PAF (paroxysmal atrial fibrillation) ([facility] ICD-10-CM: I48.0  ICD-9-CM: 427.31  11/5/2017 - Present        Sepsis ([facility] ICD-10-CM: A41.9  ICD-9-CM: 038.9, 995.91  11/4/2017 - Present        CAD (coronary artery disease) ICD-10-CM: I25.10  ICD-9-CM: 414.00  11/4/2017 - Present        COPD (chronic obstructive pulmonary disease) ([facility] ICD-10-CM: J44.9  ICD-9-CM: 894  11/4/2017 - Present        HTN (hypertension) ICD-10-CM: I10  ICD-9-CM: 401.9  11/4/2017 - Present        UTI (urinary tract infection) due to urinary indwelling Sher catheter [facility]) ICD-10-CM: T83.511A, N39.0  ICD-9-CM: 996.64, 599.0  11/4/2017 - Present        Diabetic ulcer of right midfoot associated with type 2 diabetes mellitus, with fat layer exposed ([facility] ICD-10-CM: E11.621, D12.032  ICD-9-CM: 250.80, 707.14  10/4/2017 - Present        PAD (peripheral artery disease) ([facility] ICD-10-CM: I73.9  ICD-9-CM: 443.9  5/9/2017 - Present        Venous reflux ICD-10-CM: I87.2  ICD-9-CM: 459.81  5/9/2017 - Present        Leg ulcer, left ([facility] ICD-10-CM: X43.232  ICD-9-CM: 707.10  5/9/2017 - Present        Leg ulcer ([facility] ICD-10-CM: L97.909  ICD-9-CM: 707.10  5/9/2017 - Present              Discharge Medications:     Current Discharge Medication List      CONTINUE these medications which have NOT CHANGED    Details   cholecalciferol (VITAMIN D3) 1,000 unit cap Take 5,000 Units by mouth daily. oxyCODONE-acetaminophen (PERCOCET) 5-325 mg per tablet Take 2 Tabs by mouth every six (6) hours as needed for Pain. Max Daily Amount: 8 Tabs. Qty: 112 Tab, Refills: 0    Associated Diagnoses: Ulcer of left lower extremity, limited to breakdown of skin ([facility]      ! ! insulin aspart U-100 (NOVOLOG FLEXPEN U-100 INSULIN) 100 unit/mL inpn 9 Units by SubCUTAneous route two (2) times a day. With lunch and dinner in addition the sliding scale      insulin glargine (LANTUS SOLOSTAR U-100 INSULIN) 100 unit/mL (3 mL) inpn 27 Units by SubCUTAneous route nightly. levothyroxine (SYNTHROID) 100 mcg tablet Take 100 mcg by mouth Daily (before breakfast). cilostazol (PLETAL) 100 mg tablet Take 100 mg by mouth Before breakfast and dinner.       pregabalin (LYRICA) 50 mg capsule Take 75 mg by mouth two (2) times a day. Omeprazole delayed release (PRILOSEC D/R) 20 mg tablet Take 20 mg by mouth daily. potassium citrate (UROCIT-K10) 10 mEq (1,080 mg) TbER Take 10 mEq by mouth two (2) times a day. multivitamin, tx-iron-ca-min (THERA-M W/ IRON) 9 mg iron-400 mcg tab tablet Take 1 Tab by mouth daily. iron aspgly,im-Z-L71-FA-Ca-suc (FERREX 150 FORTE PLUS) 987-52-15-3 mg-mg-mcg-mg cap cap Take 1 Cap by mouth daily. !! insulin aspart (NOVOLOG FLEXPEN) 100 unit/mL inpn 5 Units by SubCUTAneous route daily. Sliding scale  Plus the above dose      metFORMIN (GLUCOPHAGE) 1,000 mg tablet Take 1,000 mg by mouth two (2) times daily (with meals). atorvastatin (LIPITOR) 20 mg tablet Take  by mouth daily. lisinopril (PRINIVIL, ZESTRIL) 2.5 mg tablet Take  by mouth daily. clopidogrel (PLAVIX) 75 mg tab Take 75 mg by mouth daily. aspirin 81 mg tablet Take 81 mg by mouth nightly. furosemide (LASIX) 40 mg tablet Take 40 mg by mouth daily. diltiazem (TIAZAC) 360 mg SR capsule Take 360 mg by mouth daily. tiotropium (SPIRIVA WITH HANDIHALER) 18 mcg inhalation capsule Take 1 Cap by inhalation daily. loratadine (CLARITIN) 10 mg tablet Take 10 mg by mouth daily. fluticasone (FLONASE) 50 mcg/actuation nasal spray 2 Sprays by Both Nostrils route daily. fluticasone-salmeterol (ADVAIR DISKUS) 250-50 mcg/dose diskus inhaler Take 2 Puffs by inhalation every twelve (12) hours. !! - Potential duplicate medications found. Please discuss with provider. Discharge Condition: Good    Procedures : CBI    Consults: Urology      PHYSICAL EXAM   Visit Vitals    /40    Pulse (!) 101    Temp 98.4 °F (36.9 °C)    Resp 18    Ht 6' 2\" (1.88 m)    Wt 67.6 kg (149 lb 0.5 oz)    SpO2 98%    BMI 19.13 kg/m2     General: Awake, cooperative, no acute distress    HEENT: NC, Atraumatic. PERRLA, EOMI. Anicteric sclerae. Lungs:  CTA Bilaterally.  No Wheezing/Rhonchi/Rales. Heart:  Regular  rhythm,  No murmur, No Rubs, No Gallops  Abdomen: Soft, Non distended, Non tender. +Bowel sounds,   Extremities: Bilateral LE wounds with dressing  Psych:   Not anxious or agitated. Neurologic:  No acute neurological deficits. Admission HPI :   Davis Rodriguez Sr. is a 74 y. o. male who hx of afib, cad, copd, super pubic catheter due to BPH was sent to ER per ems due to hematuria. He noted some blood around the cathter area after cough spell first, then blood come out for his penis/catheter. He self irrigated at home, but still having blood come out.    He takes  Plavix and ASA 81 mg at home and f/u with dr. Wan Gao care for graft of ulcer. He supposed to have appointment with vascular and wound care clinic.    Denies any slurred speech/headache/cp/n/v/blurred vission/d/c/palpitation/gait change/bleeding. Denies smoking/ any alcohol or drug use. Hospital Course :   Hematuria - in a patient with chronic suprapubic catheter complicated by aspirin and plavix. seen and followed by urology, aspirin and plavix  Was placed on hold. He was placed on CBI, his hematuria resolved and his urine is clear. Nichole Pelletier from urology to restart aspirin and plavix. Ok to discharge per urology. Follow up with his urologist at [address] [place] [address]. A-fib with RVR - he required to be on cardizem drip transiently, he is now rate controlled on oral cardizem. Follow up with cardiology. PAD - with bilatral LE ulcers. Followed by vascular, he has debridement scheduled in OR by vascular next month. UTI - urine cultures showed growth of GNR. He received ceftriaxone during hospitalization. Will discharge on cipro to complete 3 day course.        Activity: Activity as tolerated    Diet: Diabetic Diet    Follow-up: PCP    Disposition: home    Minutes spent on discharge: 45       Labs: Results:       Chemistry Recent Labs      05/18/18   0446  05/17/18   0440  05/16/18 0415   GLU  178*  141*  178*   NA  134*  135*  134*   K  3.6  3.6  3.5   CL  103  103  99*   CO2  27  27  27   BUN  6*  7  9   CREA  0.60  0.52*  0.77   CA  7.8*  7.4*  8.7   AGAP  4  5  8   BUCR  10*  13  12      CBC w/Diff Recent Labs      05/18/18   0446  05/17/18   2155  05/17/18   1330  05/17/18   0440   05/16/18   0415   WBC  8.2   --    --   7.6   --   11.6   RBC  3.09*   --    --   3.07*   --   3.95*   HGB  8.9*  8.7*  9.4*  8.8*   < >  11.5*   HCT  27.0*  25.7*  27.6*  26.5*   < >  34.4*   PLT  89*   --    --   118*   --   175   GRANS  69   --    --   72   --   82*   LYMPH  12*   --    --   13*   --   11*   EOS  9*   --    --   7*   --   1    < > = values in this interval not displayed. Cardiac Enzymes Recent Labs      05/16/18 0415   CPK  27*   CKND1  3.7      Coagulation Recent Labs      05/16/18 0415   PTP  13.3   INR  1.1   APTT  27.9       Lipid Panel No results found for: CHOL, CHOLPOCT, CHOLX, CHLST, CHOLV, 171750, HDL, LDL, LDLC, DLDLP, 667782, VLDLC, VLDL, TGLX, TRIGL, TRIGP, TGLPOCT, CHHD, CHHDX   BNP No results for input(s): BNPP in the last 72 hours. Liver Enzymes No results for input(s): TP, ALB, TBIL, AP, SGOT, GPT in the last 72 hours. No lab exists for component: DBIL   Thyroid Studies Lab Results   Component Value Date/Time    TSH 0.90 11/04/2017 06:23 PM            Significant Diagnostic Studies: No results found. No results found for this or any previous visit.         CC: Philip Thomas MD

## 2018-05-18 NOTE — PROGRESS NOTES
1910: Verbal shift change report received from to RAIZA Hayden (off-going nurse) to NONI Jacobs RN (oncoming nurse) Report included the following information SBAR, Kardex, Intake/Output, MAR, Recent Results, Med Rec Status and anticipated POC. Pt stable. In NAD.      0730: Shift was uneventful; bedside and Verbal shift change report given to Andrew Guillen RN (oncoming nurse) by Gracie Leonard RN (off-going nurse). Report included the following information SBAR, Kardex, Intake/Output, MAR, Recent Results, Med Rec Status and anticipated POC. Pt stable. In NAD.       Latesha Redding RN

## 2018-05-19 LAB
BACTERIA SPEC CULT: ABNORMAL
BACTERIA SPEC CULT: ABNORMAL
SERVICE CMNT-IMP: ABNORMAL

## 2018-05-22 NOTE — PROGRESS NOTES
To clarify: The Gross hematuria was due to an etiology as of yet undiagnosed as it will have to wait until he is evaluated as an outpatient by his primary urologist at [facility].   However, the hematuria was made worse by the anti-platelet drug, Plavix

## 2018-05-23 ENCOUNTER — OFFICE VISIT (OUTPATIENT)
Dept: VASCULAR SURGERY | Age: 73
End: 2018-05-23

## 2018-05-23 ENCOUNTER — HOSPITAL ENCOUNTER (OUTPATIENT)
Dept: GENERAL RADIOLOGY | Age: 73
Discharge: HOME | End: 2018-05-23
Payer: MEDICARE

## 2018-05-23 ENCOUNTER — HOSPITAL ENCOUNTER (OUTPATIENT)
Dept: WOUND CARE | Age: 73
Discharge: HOME | End: 2018-05-23
Payer: MEDICARE

## 2018-05-23 ENCOUNTER — HOSPITAL ENCOUNTER (OUTPATIENT)
Dept: PREADMISSION TESTING | Age: 73
Discharge: HOME | End: 2018-05-23
Payer: MEDICARE

## 2018-05-23 VITALS
HEART RATE: 68 BPM | SYSTOLIC BLOOD PRESSURE: 128 MMHG | RESPIRATION RATE: 18 BRPM | WEIGHT: 150 LBS | HEIGHT: 74 IN | DIASTOLIC BLOOD PRESSURE: 70 MMHG | BODY MASS INDEX: 19.25 KG/M2

## 2018-05-23 DIAGNOSIS — I87.2 VENOUS REFLUX: ICD-10-CM

## 2018-05-23 DIAGNOSIS — L97.921 LEG ULCER, LEFT, LIMITED TO BREAKDOWN OF SKIN (HCC): Primary | ICD-10-CM

## 2018-05-23 DIAGNOSIS — L97.911 NON-PRESSURE CHRONIC ULCER OF RIGHT LOWER LEG, LIMITED TO BREAKDOWN OF SKIN (HCC): ICD-10-CM

## 2018-05-23 DIAGNOSIS — L97.911 ULCER OF RIGHT LEG, LIMITED TO BREAKDOWN OF SKIN (HCC): ICD-10-CM

## 2018-05-23 LAB
ALBUMIN SERPL-MCNC: 2.6 G/DL (ref 3.4–5)
ALBUMIN/GLOB SERPL: 0.6 {RATIO} (ref 0.8–1.7)
ALP SERPL-CCNC: 82 U/L (ref 45–117)
ALT SERPL-CCNC: 21 U/L (ref 16–61)
ANION GAP SERPL CALC-SCNC: 5 MMOL/L (ref 3–18)
APTT PPP: 37.6 SEC (ref 23–36.4)
AST SERPL-CCNC: 15 U/L (ref 15–37)
BACTERIA SPEC CULT: NORMAL
BASOPHILS # BLD: 0 K/UL (ref 0–0.06)
BASOPHILS NFR BLD: 1 % (ref 0–2)
BILIRUB SERPL-MCNC: 0.3 MG/DL (ref 0.2–1)
BUN SERPL-MCNC: 6 MG/DL (ref 7–18)
BUN/CREAT SERPL: 10 (ref 12–20)
CALCIUM SERPL-MCNC: 9.4 MG/DL (ref 8.5–10.1)
CHLORIDE SERPL-SCNC: 96 MMOL/L (ref 100–108)
CO2 SERPL-SCNC: 29 MMOL/L (ref 21–32)
CREAT SERPL-MCNC: 0.62 MG/DL (ref 0.6–1.3)
DIFFERENTIAL METHOD BLD: ABNORMAL
EOSINOPHIL # BLD: 0.9 K/UL (ref 0–0.4)
EOSINOPHIL NFR BLD: 15 % (ref 0–5)
ERYTHROCYTE [DISTWIDTH] IN BLOOD BY AUTOMATED COUNT: 15.6 % (ref 11.6–14.5)
GLOBULIN SER CALC-MCNC: 4.3 G/DL (ref 2–4)
GLUCOSE SERPL-MCNC: 128 MG/DL (ref 74–99)
HCT VFR BLD AUTO: 27.7 % (ref 36–48)
HGB BLD-MCNC: 9.2 G/DL (ref 13–16)
INR PPP: 1 (ref 0.8–1.2)
LYMPHOCYTES # BLD: 0.9 K/UL (ref 0.9–3.6)
LYMPHOCYTES NFR BLD: 15 % (ref 21–52)
MCH RBC QN AUTO: 28.7 PG (ref 24–34)
MCHC RBC AUTO-ENTMCNC: 33.2 G/DL (ref 31–37)
MCV RBC AUTO: 86.3 FL (ref 74–97)
MONOCYTES # BLD: 0.6 K/UL (ref 0.05–1.2)
MONOCYTES NFR BLD: 11 % (ref 3–10)
NEUTS SEG # BLD: 3.5 K/UL (ref 1.8–8)
NEUTS SEG NFR BLD: 58 % (ref 40–73)
PLATELET # BLD AUTO: 219 K/UL (ref 135–420)
PMV BLD AUTO: 9.3 FL (ref 9.2–11.8)
POTASSIUM SERPL-SCNC: 4.2 MMOL/L (ref 3.5–5.5)
PROT SERPL-MCNC: 6.9 G/DL (ref 6.4–8.2)
PROTHROMBIN TIME: 13 SEC (ref 11.5–15.2)
RBC # BLD AUTO: 3.21 M/UL (ref 4.7–5.5)
SERVICE CMNT-IMP: NORMAL
SODIUM SERPL-SCNC: 130 MMOL/L (ref 136–145)
WBC # BLD AUTO: 6 K/UL (ref 4.6–13.2)

## 2018-05-23 PROCEDURE — 85730 THROMBOPLASTIN TIME PARTIAL: CPT | Performed by: SURGERY

## 2018-05-23 PROCEDURE — 85025 COMPLETE CBC W/AUTO DIFF WBC: CPT | Performed by: SURGERY

## 2018-05-23 PROCEDURE — 80053 COMPREHEN METABOLIC PANEL: CPT | Performed by: SURGERY

## 2018-05-23 PROCEDURE — 71046 X-RAY EXAM CHEST 2 VIEWS: CPT

## 2018-05-23 PROCEDURE — 97602 WOUND(S) CARE NON-SELECTIVE: CPT

## 2018-05-23 PROCEDURE — 85610 PROTHROMBIN TIME: CPT | Performed by: SURGERY

## 2018-05-23 PROCEDURE — 36415 COLL VENOUS BLD VENIPUNCTURE: CPT | Performed by: SURGERY

## 2018-05-23 NOTE — MR AVS SNAPSHOT
[facility] 
 
 
 [address] [facility] 
[phone] Patient: Angelica Malhotra Sr. MRN: [id number] GKF:[id number] Visit Information Date & Time Provider Department Dept. Phone Encounter #  
 5/23/2018 10:30 AM Aracely Newsome NP [facility] [address] [id number] Your Appointments 6/13/2018 10:15 AM  
Follow Up with NURSE VISIT  
[facility] Appt Note: 1 week post op from 06/06/18  
 [place] [id number] [facility]  
  
   
 [address] [place] [address] Upcoming Health Maintenance Date Due  
 LIPID PANEL Q1 1945 FOOT EXAM Q1 5/16/1953 MICROALBUMIN Q1 5/16/1953 EYE EXAM RETINAL OR DILATED Q1 5/16/1953 DTaP/Tdap/Td series (1 - Tdap) 5/16/1964 FOBT Q 1 YEAR AGE 50-75 5/16/1993 ZOSTER VACCINE AGE 60> 3/16/2003 GLAUCOMA SCREENING Q2Y 5/16/2008 Pneumococcal 65+ Low/Medium Risk (1 of 2 - PCV13) 5/16/2008 MEDICARE YEARLY EXAM 3/14/2018 Influenza Age 5 to Adult 8/1/2018 HEMOGLOBIN A1C Q6M 11/16/2018 Allergies as of 5/23/2018  Review Complete On: 5/23/2018 By: Kishan Chew RN Severity Noted Reaction Type Reactions Latex  05/28/2013    Contact Dermatitis Pt states he is NOT allergic to latex. Neosporin [Neomycin-bacitracin-polymyxin] High 05/09/2017   Systemic Rash Current Immunizations  Reviewed on 2/1/2018 Name Date Influenza Vaccine 9/15/2017 Not reviewed this visit Vitals BP Pulse Resp Height(growth percentile) Weight(growth percentile) BMI  
 128/70 (BP 1 Location: Left arm, BP Patient Position: Sitting) 68 18 6' 2\" (1.88 m) 150 lb (68 kg) 19.26 kg/m2 Smoking Status Former Smoker BMI and BSA Data Body Mass Index Body Surface Area  
 19.26 kg/m 2 1.88 m 2 Preferred Pharmacy Pharmacy Name Phone [address] [facility], [address] [facility] [place] [phone] Your Updated Medication List  
  
   
This list is accurate as of 5/23/18 10:37 AM.  Always use your most recent med list.  
  
  
  
  
 ADVAIR DISKUS 250-50 mcg/dose diskus inhaler Generic drug:  fluticasone-salmeterol Take 2 Puffs by inhalation every twelve (12) hours. albuterol sulfate 90 mcg/actuation Aepb Take 2 Puffs by inhalation every four (4) hours as needed. Indications: Chronic Obstructive Pulmonary Disease  
  
 aspirin 81 mg tablet Take 81 mg by mouth nightly. atorvastatin 20 mg tablet Commonly known as:  LIPITOR Take  by mouth nightly. cilostazol 100 mg tablet Commonly known as:  PLETAL Take 100 mg by mouth Before breakfast and dinner. CLARITIN 10 mg tablet Generic drug:  loratadine Take 10 mg by mouth daily. FLONASE 50 mcg/actuation nasal spray Generic drug:  fluticasone 2 Sprays by Both Nostrils route daily. iron aspgly,au-B-M52-FA-Ca-suc 269-05-12-1 mg-mg-mcg-mg Cap cap Commonly known as:  FERREX [address] Take 1 Cap by mouth two (2) times a day. Indications: anemia LANTUS SOLOSTAR U-100 INSULIN 100 unit/mL (3 mL) Inpn Generic drug:  insulin glargine 20 Units by SubCUTAneous route nightly. LASIX 40 mg tablet Generic drug:  furosemide Take 40 mg by mouth daily. Indications: hypertension  
  
 levothyroxine 100 mcg tablet Commonly known as:  SYNTHROID Take 100 mcg by mouth Daily (before breakfast). lisinopril 2.5 mg tablet Commonly known as:  [place] Take  by mouth daily. LYRICA 50 mg capsule Generic drug:  pregabalin Take 75 mg by mouth two (2) times a day. metFORMIN 1,000 mg tablet Commonly known as:  GLUCOPHAGE Take 1,000 mg by mouth two (2) times daily (with meals). multivitamin, tx-iron-ca-min 9 mg iron-400 mcg Tab tablet Commonly known as:  THERA-M w/ IRON Take 1 Tab by mouth daily. * NovoLOG Flexpen U-100 Insulin 100 unit/mL Inpn Generic drug:  insulin aspart U-100  
5 Units by SubCUTAneous route daily. Sliding scale  Plus the above dose * NovoLOG Flexpen U-100 Insulin 100 unit/mL Inpn Generic drug:  insulin aspart U-100  
9 Units by SubCUTAneous route two (2) times a day. With lunch and dinner in addition the sliding scale Omeprazole delayed release 20 mg tablet Commonly known as:  PRILOSEC D/R Take 20 mg by mouth daily. oxyCODONE-acetaminophen 5-325 mg per tablet Commonly known as:  PERCOCET Take 2 Tabs by mouth every six (6) hours as needed for Pain. Max Daily Amount: 8 Tabs. PLAVIX 75 mg Tab Generic drug:  clopidogrel Take 75 mg by mouth daily. Indications: circulation problem  
  
 potassium citrate 10 mEq (1,080 mg) [place] Commonly known as:  [place] Take 10 mEq by mouth two (2) times a day. SPIRIVA WITH HANDIHALER 18 mcg inhalation capsule Generic drug:  tiotropium Take 1 Cap by inhalation daily. TIAZAC 360 mg SR capsule Generic drug:  dilTIAZem Take 360 mg by mouth daily. VITAMIN D3 1,000 unit Cap Generic drug:  cholecalciferol Take 5,000 Units by mouth daily. * Notice: This list has 2 medication(s) that are the same as other medications prescribed for you. Read the directions carefully, and ask your doctor or other care provider to review them with you. To-Do List   
 05/23/2018 11:30 AM  
  Appointment with WOUND NURSE at [facility] ([phone]) Introducing [address]! Dear Consuelo Dumont: Thank you for requesting a [employer] account. Our records indicate that you already have an active [employer] account. You can access your account anytime at [email or web address] Did you know that you can access your hospital and ER discharge instructions at any time in [employer]? You can also review all of your test results from your hospital stay or ER visit. Additional Information If you have questions, please visit the Frequently Asked Questions section of the [employer] website at [email or web address] Remember, [employer] is NOT to be used for urgent needs. For medical emergencies, dial 911. Now available from your iPhone and Android! Please provide this summary of care documentation to your next provider. Your primary care clinician is listed as Arnold Mistry. If you have any questions after today's visit, please call [phone].

## 2018-05-23 NOTE — PROGRESS NOTES
Davis Rodriguez . Chief Complaint   Patient presents with    Wound Check       History and Physical    Mr. Miller Schilder is a 74year old man who returns to our office with is son for continued management of his bilateral leg wounds due to diabetes and venous reflux. He was recently hospitalized for hematuria, which has improved after CBI. He sees urology tomorrow for follow up. He is scheduled for surgical debridement and Theraskin placement of his right medial ankle and left lateral ankle wounds on 6/6. He states he developed a new wound on his right heel when attempting to push him self in a rolling chair using his heels. He has no further complaints today. Past Medical History:   Diagnosis Date    Acid reflux     Anemia     Arthritis     Atrial fibrillation ([facility])     CAD (coronary artery disease)     Chronic obstructive pulmonary disease ([facility])     Coagulation disorder ([facility])     anemia    Diabetes ([address] 1997    GERD (gastroesophageal reflux disease)     H/O seasonal allergies     Hypercholesterolemia     Hypertension 1997    Ill-defined condition     hand tremors    PAF (paroxysmal atrial fibrillation) ([address] 11/5/2017    Peripheral arterial disease ([address]     Sleep apnea     pt states he has not used bipap \"in years\".     Thyroid disease      Patient Active Problem List   Diagnosis Code    PAD (peripheral artery disease) ([facility]) I73.9    Venous reflux I87.2    Leg ulcer, left ([address] L97.929    Leg ulcer ([address] L97.909    Diabetic ulcer of right midfoot associated with type 2 diabetes mellitus, with fat layer exposed ([address] E11.621, L97.412    Sepsis ([address] A41.9    CAD (coronary artery disease) I25.10    COPD (chronic obstructive pulmonary disease) ([facility]) J44.9    HTN (hypertension) I10    UTI (urinary tract infection) due to urinary indwelling Sher catheter ([facility]) T83.511A, N39.0    PAF (paroxysmal atrial fibrillation) ([facility]) I48.0    UTI (urinary tract infection) N39.0    Diabetic ulcer of both lower extremities ([address] E11.622, L97.919, L97.929    Hyponatremia E87.1    Hypomagnesemia E83.42    Hypokalemia E87.6    Severe protein-calorie malnutrition (HCC) E43    Ulcer of right leg, limited to breakdown of skin ([address] L97.911    Type 2 diabetes mellitus with diabetic neuropathy ([facility] E11.40    Chronic anticoagulation Z79.01    Gross hematuria R31.0    Mechanical complication of suprapubic catheter ([facility] T83.090A    DNR no code (do not resuscitate) Z66     Past Surgical History:   Procedure Laterality Date    ABDOMEN SURGERY [facility] [address]    [facility]    ABDOMEN SURGERY PROC UNLISTED  2000    abdominal hernia repair/mesh    HX APPENDECTOMY      HX CERVICAL LAMINECTOMY      HX COLONOSCOPY      HX HEENT  2004    cataracts removed right eye    HX ORTHOPAEDIC  1982    cervical laminectomy    HX ORTHOPAEDIC Right 1962    knee torn cartilage    HX TONSILLECTOMY      HX UROLOGICAL  12/2015    suprapubic catheter in place    VASCULAR SURGERY PROCEDURE UNLIST Bilateral 2015 2017    angio plasty legs  x2    VASCULAR SURGERY PROCEDURE UNLIST Bilateral 2017    legs venogram  stent right leg     Current Outpatient Prescriptions   Medication Sig Dispense Refill    albuterol sulfate 90 mcg/actuation aepb Take 2 Puffs by inhalation every four (4) hours as needed. Indications: Chronic Obstructive Pulmonary Disease      cholecalciferol (VITAMIN D3) 1,000 unit cap Take 5,000 Units by mouth daily.  oxyCODONE-acetaminophen (PERCOCET) 5-325 mg per tablet Take 2 Tabs by mouth every six (6) hours as needed for Pain. Max Daily Amount: 8 Tabs. 112 Tab 0    insulin aspart U-100 (NOVOLOG FLEXPEN U-100 INSULIN) 100 unit/mL inpn 9 Units by SubCUTAneous route two (2) times a day. With lunch and dinner in addition the sliding scale      insulin glargine (LANTUS SOLOSTAR U-100 INSULIN) 100 unit/mL (3 mL) inpn 20 Units by SubCUTAneous route nightly.       levothyroxine (SYNTHROID) 100 mcg tablet Take 100 mcg by mouth Daily (before breakfast).  cilostazol (PLETAL) 100 mg tablet Take 100 mg by mouth Before breakfast and dinner.  pregabalin (LYRICA) 50 mg capsule Take 75 mg by mouth two (2) times a day.  Omeprazole delayed release (PRILOSEC D/R) 20 mg tablet Take 20 mg by mouth daily.  potassium citrate (UROCIT-K10) 10 mEq (1,080 mg) TbER Take 10 mEq by mouth two (2) times a day.  multivitamin, tx-iron-ca-min (THERA-M W/ IRON) 9 mg iron-400 mcg tab tablet Take 1 Tab by mouth daily.  iron aspgly,iv-L-V82-FA-Ca-suc (FERREX 150 FORTE PLUS) 520-55-82-7 mg-mg-mcg-mg cap cap Take 1 Cap by mouth two (2) times a day. Indications: anemia      insulin aspart (NOVOLOG FLEXPEN) 100 unit/mL inpn 5 Units by SubCUTAneous route daily. Sliding scale  Plus the above dose      metFORMIN (GLUCOPHAGE) 1,000 mg tablet Take 1,000 mg by mouth two (2) times daily (with meals).  atorvastatin (LIPITOR) 20 mg tablet Take  by mouth nightly.  lisinopril (PRINIVIL, ZESTRIL) 2.5 mg tablet Take  by mouth daily.  clopidogrel (PLAVIX) 75 mg tab Take 75 mg by mouth daily. Indications: circulation problem      aspirin 81 mg tablet Take 81 mg by mouth nightly.  furosemide (LASIX) 40 mg tablet Take 40 mg by mouth daily. Indications: hypertension      diltiazem (TIAZAC) 360 mg SR capsule Take 360 mg by mouth daily.  tiotropium (SPIRIVA WITH HANDIHALER) 18 mcg inhalation capsule Take 1 Cap by inhalation daily.  loratadine (CLARITIN) 10 mg tablet Take 10 mg by mouth daily.  fluticasone (FLONASE) 50 mcg/actuation nasal spray 2 Sprays by Both Nostrils route daily.  fluticasone-salmeterol (ADVAIR DISKUS) 250-50 mcg/dose diskus inhaler Take 2 Puffs by inhalation every twelve (12) hours. Allergies   Allergen Reactions    Latex Contact Dermatitis     Pt states he is NOT allergic to latex.     Neosporin [Neomycin-Bacitracin-Polymyxin] Rash     Social History     Social History    Marital status:      Spouse name: N/A    Number of children: N/A    Years of education: N/A     Occupational History    Not on file. Social History Main Topics    Smoking status: Former Smoker     Types: Cigarettes     Quit date: 1/1/1997    Smokeless tobacco: Never Used    Alcohol use 0.6 oz/week     1 Glasses of wine per week    Drug use: No    Sexual activity: Not on file     Other Topics Concern    Not on file     Social History Narrative      Family History   Problem Relation Age of Onset    Cancer Mother     Diabetes Father     Heart Disease Sister     Cancer Brother     Diabetes Brother     Hypertension Brother        Review of Systems    Review of Systems   Constitutional: Negative for chills, fever, malaise/fatigue and weight loss. HENT: Negative for ear pain and hearing loss. Eyes: Negative for blurred vision, pain and discharge. Respiratory: Negative for cough, hemoptysis, sputum production and shortness of breath. Cardiovascular: Negative for chest pain, palpitations and orthopnea. Gastrointestinal: Negative for heartburn, nausea and vomiting. Genitourinary: Negative for dysuria, hematuria and urgency. Musculoskeletal: Positive for myalgias. Skin: Negative for itching and rash. +scattered wounds to both lower legs   Neurological: Negative for dizziness, tingling, weakness and headaches. Endo/Heme/Allergies: Does not bruise/bleed easily. Psychiatric/Behavioral: Negative for depression.        Physical Exam:    Visit Vitals    /70 (BP 1 Location: Left arm, BP Patient Position: Sitting)    Pulse 68    Resp 18    Ht 6' 2\" (1.88 m)    Wt 150 lb (68 kg)    BMI 19.26 kg/m2      Physical Examination: General appearance - alert, in no distress, and chronically ill appearing  Mental status - alert, oriented to person, place, and time, normal mood, behavior, speech, dress, motor activity, and thought processes  Eyes - left eye normal, right eye normal  Ears - right ear normal, left ear normal  Mouth - mucous membranes moist  Chest - clear to auscultation, no wheezes  Heart - normal rate and regular rhythm, + murmur  Abdomen - soft, nontender, nondistended  Musculoskeletal - no obvious deformity  Extremities - warm and well perfused, 1+ edema to bilateral lower legs and feet  Skin- Scattered skin tears, right medial ankle wound with pink wound bed, left lateral ankle wound with layer of biofilm, no signs of infection, right heel wound beefy red and bleeding      Impression and Plan:    ICD-10-CM ICD-9-CM    1. Leg ulcer, left, limited to breakdown of skin ([address] L97.921 707.10    2. Ulcer of right leg, limited to breakdown of skin ([address] L97.911 707.10    3. Venous reflux I87.2 459.81      No orders of the defined types were placed in this encounter. I removed the layer of biofilm from the left ankle wound with a curette. He is seeing wound care after he leaves our office so they will dress his wounds. He is scheduled for surgical debridement by Dr. Ketan Tran, questions regarding blood thinners and NPO orders answered. I instructed Mr. Meet Perry to avoid pressure on his heels, he states he will wear his pressure relieving boots more routinely. We will see Mr. Meet Perry for follow up after his surgery. Follow-up Disposition:  Return in about 3 weeks (around 6/13/2018). The treatment plan was reviewed with the patient in detail. The patient voiced understanding of this plan and all questions and concerns were addressed. The patient agrees with this plan. We discussed the signs and symptoms that would require earlier attention or intervention. The patient was given educational material related to his/her visit and the patient has voiced understanding of the material.     I appreciate the opportunity to participate in the care of your patient. I will be sure to keep you informed of any subsequent changes in the treatment plan.   If you have any questions or concerns, please feel free to contact me. Gagan ELSA Wu    PLEASE NOTE:  This document has been produced using voice recognition software. Unrecognized errors in transcription may be present.

## 2018-05-30 ENCOUNTER — HOSPITAL ENCOUNTER (OUTPATIENT)
Dept: WOUND CARE | Age: 73
Discharge: HOME | End: 2018-05-30
Payer: MEDICARE

## 2018-05-30 PROCEDURE — 97602 WOUND(S) CARE NON-SELECTIVE: CPT

## 2018-06-06 ENCOUNTER — ANESTHESIA EVENT (OUTPATIENT)
Dept: SURGERY | Age: 73
End: 2018-06-06
Payer: MEDICARE

## 2018-06-06 ENCOUNTER — HOSPITAL ENCOUNTER (OUTPATIENT)
Age: 73
Setting detail: OUTPATIENT SURGERY
Discharge: HOME | End: 2018-06-06
Attending: SURGERY | Admitting: SURGERY
Payer: MEDICARE

## 2018-06-06 ENCOUNTER — ANESTHESIA (OUTPATIENT)
Dept: SURGERY | Age: 73
End: 2018-06-06
Payer: MEDICARE

## 2018-06-06 VITALS
SYSTOLIC BLOOD PRESSURE: 99 MMHG | BODY MASS INDEX: 16.68 KG/M2 | OXYGEN SATURATION: 99 % | TEMPERATURE: 97.1 F | HEART RATE: 84 BPM | WEIGHT: 130 LBS | HEIGHT: 74 IN | DIASTOLIC BLOOD PRESSURE: 48 MMHG | RESPIRATION RATE: 16 BRPM

## 2018-06-06 DIAGNOSIS — G89.18 POST-OPERATIVE PAIN: Primary | ICD-10-CM

## 2018-06-06 LAB
ABO + RH BLD: NORMAL
BLOOD GROUP ANTIBODIES SERPL: NORMAL
GLUCOSE BLD STRIP.AUTO-MCNC: 123 MG/DL (ref 70–110)
GLUCOSE BLD STRIP.AUTO-MCNC: 157 MG/DL (ref 70–110)
SPECIMEN EXP DATE BLD: NORMAL

## 2018-06-06 PROCEDURE — 74011000250 HC RX REV CODE- 250

## 2018-06-06 PROCEDURE — 76210000006 HC OR PH I REC 0.5 TO 1 HR: Performed by: SURGERY

## 2018-06-06 PROCEDURE — 77030002933 HC SUT MCRYL J&J -A: Performed by: SURGERY

## 2018-06-06 PROCEDURE — 36415 COLL VENOUS BLD VENIPUNCTURE: CPT | Performed by: SURGERY

## 2018-06-06 PROCEDURE — 76010000138 HC OR TIME 0.5 TO 1 HR: Performed by: SURGERY

## 2018-06-06 PROCEDURE — 74011250637 HC RX REV CODE- 250/637: Performed by: ANESTHESIOLOGY

## 2018-06-06 PROCEDURE — 74011250636 HC RX REV CODE- 250/636

## 2018-06-06 PROCEDURE — 77030020269 HC MISC IMPL: Performed by: SURGERY

## 2018-06-06 PROCEDURE — 77030020754 HC CUF TRNQT 2BLA STRY -B: Performed by: SURGERY

## 2018-06-06 PROCEDURE — 82962 GLUCOSE BLOOD TEST: CPT

## 2018-06-06 PROCEDURE — 77030031741 HC HNDPC IRR VRSAJET PLS S&N -F: Performed by: SURGERY

## 2018-06-06 PROCEDURE — 77030012508 HC MSK AIRWY LMA AMBU -A: Performed by: ANESTHESIOLOGY

## 2018-06-06 PROCEDURE — 77030011640 HC PAD GRND REM COVD -A: Performed by: SURGERY

## 2018-06-06 PROCEDURE — 86901 BLOOD TYPING SEROLOGIC RH(D): CPT | Performed by: SURGERY

## 2018-06-06 PROCEDURE — 74011636637 HC RX REV CODE- 636/637: Performed by: ANESTHESIOLOGY

## 2018-06-06 PROCEDURE — 77030020782 HC GWN BAIR PAWS FLX 3M -B: Performed by: SURGERY

## 2018-06-06 PROCEDURE — 77030028929: Performed by: SURGERY

## 2018-06-06 PROCEDURE — 74011250636 HC RX REV CODE- 250/636: Performed by: SURGERY

## 2018-06-06 PROCEDURE — 76060000032 HC ANESTHESIA 0.5 TO 1 HR: Performed by: SURGERY

## 2018-06-06 PROCEDURE — 76210000022 HC REC RM PH II 1.5 TO 2 HR: Performed by: SURGERY

## 2018-06-06 PROCEDURE — 74011250636 HC RX REV CODE- 250/636: Performed by: ANESTHESIOLOGY

## 2018-06-06 RX ORDER — SODIUM CHLORIDE 0.9 % (FLUSH) 0.9 %
5-10 SYRINGE (ML) INJECTION AS NEEDED
Status: DISCONTINUED | OUTPATIENT
Start: 2018-06-06 | End: 2018-06-06 | Stop reason: HOSPADM

## 2018-06-06 RX ORDER — FENTANYL CITRATE 50 UG/ML
INJECTION, SOLUTION INTRAMUSCULAR; INTRAVENOUS AS NEEDED
Status: DISCONTINUED | OUTPATIENT
Start: 2018-06-06 | End: 2018-06-06 | Stop reason: HOSPADM

## 2018-06-06 RX ORDER — FENTANYL CITRATE 50 UG/ML
25 INJECTION, SOLUTION INTRAMUSCULAR; INTRAVENOUS
Status: ACTIVE | OUTPATIENT
Start: 2018-06-06 | End: 2018-06-06

## 2018-06-06 RX ORDER — MAGNESIUM SULFATE 100 %
4 CRYSTALS MISCELLANEOUS AS NEEDED
Status: DISCONTINUED | OUTPATIENT
Start: 2018-06-06 | End: 2018-06-06 | Stop reason: HOSPADM

## 2018-06-06 RX ORDER — CEFAZOLIN SODIUM/WATER 2 G/20 ML
2 SYRINGE (ML) INTRAVENOUS ONCE
Status: DISCONTINUED | OUTPATIENT
Start: 2018-06-06 | End: 2018-06-06 | Stop reason: HOSPADM

## 2018-06-06 RX ORDER — ONDANSETRON 2 MG/ML
INJECTION INTRAMUSCULAR; INTRAVENOUS AS NEEDED
Status: DISCONTINUED | OUTPATIENT
Start: 2018-06-06 | End: 2018-06-06 | Stop reason: HOSPADM

## 2018-06-06 RX ORDER — NALOXONE HYDROCHLORIDE 0.4 MG/ML
0.1 INJECTION, SOLUTION INTRAMUSCULAR; INTRAVENOUS; SUBCUTANEOUS
Status: DISCONTINUED | OUTPATIENT
Start: 2018-06-06 | End: 2018-06-06 | Stop reason: HOSPADM

## 2018-06-06 RX ORDER — INSULIN LISPRO 100 [IU]/ML
INJECTION, SOLUTION INTRAVENOUS; SUBCUTANEOUS ONCE
Status: DISCONTINUED | OUTPATIENT
Start: 2018-06-06 | End: 2018-06-06 | Stop reason: HOSPADM

## 2018-06-06 RX ORDER — INSULIN LISPRO 100 [IU]/ML
INJECTION, SOLUTION INTRAVENOUS; SUBCUTANEOUS ONCE
Status: COMPLETED | OUTPATIENT
Start: 2018-06-06 | End: 2018-06-06

## 2018-06-06 RX ORDER — OXYCODONE AND ACETAMINOPHEN 5; 325 MG/1; MG/1
2 TABLET ORAL
Qty: 112 TAB | Refills: 0 | Status: SHIPPED | OUTPATIENT
Start: 2018-06-06 | End: 2018-06-14

## 2018-06-06 RX ORDER — DEXTROSE 50 % IN WATER (D50W) INTRAVENOUS SYRINGE
25-50 AS NEEDED
Status: DISCONTINUED | OUTPATIENT
Start: 2018-06-06 | End: 2018-06-06 | Stop reason: HOSPADM

## 2018-06-06 RX ORDER — HYDROMORPHONE HYDROCHLORIDE 1 MG/ML
0.5 INJECTION, SOLUTION INTRAMUSCULAR; INTRAVENOUS; SUBCUTANEOUS
Status: DISCONTINUED | OUTPATIENT
Start: 2018-06-06 | End: 2018-06-06 | Stop reason: HOSPADM

## 2018-06-06 RX ORDER — LIDOCAINE HYDROCHLORIDE 20 MG/ML
INJECTION, SOLUTION EPIDURAL; INFILTRATION; INTRACAUDAL; PERINEURAL AS NEEDED
Status: DISCONTINUED | OUTPATIENT
Start: 2018-06-06 | End: 2018-06-06 | Stop reason: HOSPADM

## 2018-06-06 RX ORDER — SODIUM CHLORIDE, SODIUM LACTATE, POTASSIUM CHLORIDE, CALCIUM CHLORIDE 600; 310; 30; 20 MG/100ML; MG/100ML; MG/100ML; MG/100ML
125 INJECTION, SOLUTION INTRAVENOUS CONTINUOUS
Status: DISCONTINUED | OUTPATIENT
Start: 2018-06-06 | End: 2018-06-06 | Stop reason: HOSPADM

## 2018-06-06 RX ORDER — MIDAZOLAM HYDROCHLORIDE 1 MG/ML
INJECTION, SOLUTION INTRAMUSCULAR; INTRAVENOUS AS NEEDED
Status: DISCONTINUED | OUTPATIENT
Start: 2018-06-06 | End: 2018-06-06 | Stop reason: HOSPADM

## 2018-06-06 RX ORDER — ONDANSETRON 2 MG/ML
4 INJECTION INTRAMUSCULAR; INTRAVENOUS ONCE
Status: DISCONTINUED | OUTPATIENT
Start: 2018-06-06 | End: 2018-06-06 | Stop reason: HOSPADM

## 2018-06-06 RX ORDER — SODIUM CHLORIDE, SODIUM LACTATE, POTASSIUM CHLORIDE, CALCIUM CHLORIDE 600; 310; 30; 20 MG/100ML; MG/100ML; MG/100ML; MG/100ML
50 INJECTION, SOLUTION INTRAVENOUS CONTINUOUS
Status: DISCONTINUED | OUTPATIENT
Start: 2018-06-06 | End: 2018-06-06 | Stop reason: HOSPADM

## 2018-06-06 RX ORDER — OXYCODONE AND ACETAMINOPHEN 5; 325 MG/1; MG/1
1 TABLET ORAL AS NEEDED
Status: DISCONTINUED | OUTPATIENT
Start: 2018-06-06 | End: 2018-06-06 | Stop reason: HOSPADM

## 2018-06-06 RX ORDER — PROPOFOL 10 MG/ML
INJECTION, EMULSION INTRAVENOUS AS NEEDED
Status: DISCONTINUED | OUTPATIENT
Start: 2018-06-06 | End: 2018-06-06 | Stop reason: HOSPADM

## 2018-06-06 RX ADMIN — OXYCODONE HYDROCHLORIDE AND ACETAMINOPHEN 1 TABLET: 5; 325 TABLET ORAL at 10:37

## 2018-06-06 RX ADMIN — PROPOFOL 150 MG: 10 INJECTION, EMULSION INTRAVENOUS at 09:00

## 2018-06-06 RX ADMIN — MIDAZOLAM HYDROCHLORIDE 2 MG: 1 INJECTION, SOLUTION INTRAMUSCULAR; INTRAVENOUS at 08:51

## 2018-06-06 RX ADMIN — SODIUM CHLORIDE, SODIUM LACTATE, POTASSIUM CHLORIDE, AND CALCIUM CHLORIDE 50 ML/HR: 600; 310; 30; 20 INJECTION, SOLUTION INTRAVENOUS at 10:26

## 2018-06-06 RX ADMIN — ONDANSETRON 4 MG: 2 INJECTION INTRAMUSCULAR; INTRAVENOUS at 09:43

## 2018-06-06 RX ADMIN — FENTANYL CITRATE 100 MCG: 50 INJECTION, SOLUTION INTRAMUSCULAR; INTRAVENOUS at 09:00

## 2018-06-06 RX ADMIN — SODIUM CHLORIDE, SODIUM LACTATE, POTASSIUM CHLORIDE, AND CALCIUM CHLORIDE 125 ML/HR: 600; 310; 30; 20 INJECTION, SOLUTION INTRAVENOUS at 08:08

## 2018-06-06 RX ADMIN — INSULIN LISPRO 2 UNITS: 100 INJECTION, SOLUTION INTRAVENOUS; SUBCUTANEOUS at 08:36

## 2018-06-06 RX ADMIN — LIDOCAINE HYDROCHLORIDE 60 MG: 20 INJECTION, SOLUTION EPIDURAL; INFILTRATION; INTRACAUDAL; PERINEURAL at 09:00

## 2018-06-06 NOTE — H&P (VIEW-ONLY)
Royce Evans . Chief Complaint   Patient presents with    Wound Check       History and Physical    Mr. Olive Araujo is a 74year old man who returns to our office with is son for continued management of his bilateral leg wounds due to diabetes and venous reflux. He was recently hospitalized for hematuria, which has improved after CBI. He sees urology tomorrow for follow up. He is scheduled for surgical debridement and Theraskin placement of his right medial ankle and left lateral ankle wounds on 6/6. He states he developed a new wound on his right heel when attempting to push him self in a rolling chair using his heels. He has no further complaints today. Past Medical History:   Diagnosis Date    Acid reflux     Anemia     Arthritis     At[facility] fibrillation ([facility])     CAD (coronary artery disease)     Chronic obstructive pulmonary disease ([facility])     Coagulation disorder ([facility])     anemia    Diabetes ([address] 1997    GERD (gastroesophageal reflux disease)     H/O seasonal allergies     Hypercholesterolemia     Hypertension 1997    Ill-defined condition     hand tremors    PAF (paroxysmal atrial fibrillation) ([address] 11/5/2017    Peripheral arterial disease ([address]     Sleep apnea     pt states he has not used bipap \"in years\".     Thyroid disease      Patient Active Problem List   Diagnosis Code    PAD (peripheral artery disease) ([facility]) I73.9    Venous reflux I87.2    Leg ulcer, left ([address] L97.929    Leg ulcer ([address] L97.909    Diabetic ulcer of right midfoot associated with type 2 diabetes mellitus, with fat layer exposed ([address] E11.621, L97.412    Sepsis ([address] A41.9    CAD (coronary artery disease) I25.10    COPD (chronic obstructive pulmonary disease) ([facility]) J44.9    HTN (hypertension) I10    UTI (urinary tract infection) due to urinary indwelling Sher catheter ([facility]) T83.511A, N39.0    PAF (paroxysmal atrial fibrillation) ([facility]) I48.0    UTI (urinary tract infection) N39.0    Diabetic ulcer of both lower extremities ([address] E11.622, L97.919, L97.929    Hyponatremia E87.1    Hypomagnesemia E83.42    Hypokalemia E87.6    Severe protein-calorie malnutrition (HCC) E43    Ulcer of right leg, limited to breakdown of skin ([address] L97.911    Type 2 diabetes mellitus with diabetic neuropathy ([facility] E11.40    Chronic anticoagulation Z79.01    Gross hematuria R31.0    Mechanical complication of suprapubic catheter ([facility] T83.090A    DNR no code (do not resuscitate) Z66     Past Surgical History:   Procedure Laterality Date    ABDOMEN SURGERY [facility] [address]    Vikas-en-[place]    ABDOMEN SURGERY PROC UNLISTED  2000    abdominal hernia repair/mesh    HX APPENDECTOMY      HX CERVICAL LAMINECTOMY      HX COLONOSCOPY      HX HEENT  2004    cataracts removed right eye    HX ORTHOPAEDIC  1982    cervical laminectomy    HX ORTHOPAEDIC Right 1962    knee torn cartilage    HX TONSILLECTOMY      HX UROLOGICAL  12/2015    suprapubic catheter in place    VASCULAR SURGERY PROCEDURE UNLIST Bilateral 2015 2017    angio plasty legs  x2    VASCULAR SURGERY PROCEDURE UNLIST Bilateral 2017    legs venogram  stent right leg     Current Outpatient Prescriptions   Medication Sig Dispense Refill    albuterol sulfate 90 mcg/actuation aepb Take 2 Puffs by inhalation every four (4) hours as needed. Indications: Chronic Obstructive Pulmonary Disease      cholecalciferol (VITAMIN D3) 1,000 unit cap Take 5,000 Units by mouth daily.  oxyCODONE-acetaminophen (PERCOCET) 5-325 mg per tablet Take 2 Tabs by mouth every six (6) hours as needed for Pain. Max Daily Amount: 8 Tabs. 112 Tab 0    insulin aspart U-100 (NOVOLOG FLEXPEN U-100 INSULIN) 100 unit/mL inpn 9 Units by SubCUTAneous route two (2) times a day. With lunch and dinner in addition the sliding scale      insulin glargine (LANTUS SOLOSTAR U-100 INSULIN) 100 unit/mL (3 mL) inpn 20 Units by SubCUTAneous route nightly.       levothyroxine (SYNTHROID) 100 mcg tablet Take 100 mcg by mouth Daily (before breakfast).  cilostazol (PLETAL) 100 mg tablet Take 100 mg by mouth Before breakfast and dinner.  pregabalin (LYRICA) 50 mg capsule Take 75 mg by mouth two (2) times a day.  Omeprazole delayed release (PRILOSEC D/R) 20 mg tablet Take 20 mg by mouth daily.  potassium citrate (UROCIT-K10) 10 mEq (1,080 mg) TbER Take 10 mEq by mouth two (2) times a day.  multivitamin, tx-iron-ca-min (THERA-M W/ IRON) 9 mg iron-400 mcg tab tablet Take 1 Tab by mouth daily.  iron aspgly,cd-H-J74-FA-Ca-suc (FERREX 150 FORTE PLUS) 838-19-91-4 mg-mg-mcg-mg cap cap Take 1 Cap by mouth two (2) times a day. Indications: anemia      insulin aspart (NOVOLOG FLEXPEN) 100 unit/mL inpn 5 Units by SubCUTAneous route daily. Sliding scale  Plus the above dose      metFORMIN (GLUCOPHAGE) 1,000 mg tablet Take 1,000 mg by mouth two (2) times daily (with meals).  atorvastatin (LIPITOR) 20 mg tablet Take  by mouth nightly.  lisinopril (PRINIVIL, ZESTRIL) 2.5 mg tablet Take  by mouth daily.  clopidogrel (PLAVIX) 75 mg tab Take 75 mg by mouth daily. Indications: circulation problem      aspirin 81 mg tablet Take 81 mg by mouth nightly.  furosemide (LASIX) 40 mg tablet Take 40 mg by mouth daily. Indications: hypertension      diltiazem (TIAZAC) 360 mg SR capsule Take 360 mg by mouth daily.  tiotropium (SPIRIVA WITH HANDIHALER) 18 mcg inhalation capsule Take 1 Cap by inhalation daily.  loratadine (CLARITIN) 10 mg tablet Take 10 mg by mouth daily.  fluticasone (FLONASE) 50 mcg/actuation nasal spray 2 Sprays by Both Nostrils route daily.  fluticasone-salmeterol (ADVAIR DISKUS) 250-50 mcg/dose diskus inhaler Take 2 Puffs by inhalation every twelve (12) hours. Allergies   Allergen Reactions    Latex Contact Dermatitis     Pt states he is NOT allergic to latex.     Neosporin [Neomycin-Bacitracin-Polymyxin] Rash     Social History     Social History    Marital status:      Spouse name: N/A    Number of children: N/A    Years of education: N/A     Occupational History    Not on file. Social History Main Topics    Smoking status: Former Smoker     Types: Cigarettes     Quit date: 1/1/1997    Smokeless tobacco: Never Used    Alcohol use 0.6 oz/week     1 Glasses of wine per week    Drug use: No    Sexual activity: Not on file     Other Topics Concern    Not on file     Social History Narrative      Family History   Problem Relation Age of Onset    Cancer Mother     Diabetes Father     Heart Disease Sister     Cancer Brother     Diabetes Brother     Hypertension Brother        Review of Systems    Review of Systems   Constitutional: Negative for chills, fever, malaise/fatigue and weight loss. HENT: Negative for ear pain and hearing loss. Eyes: Negative for blurred vision, pain and discharge. Respiratory: Negative for cough, hemoptysis, sputum production and shortness of breath. Cardiovascular: Negative for chest pain, palpitations and orthopnea. Gastrointestinal: Negative for heartburn, nausea and vomiting. Genitourinary: Negative for dysuria, hematuria and urgency. Musculoskeletal: Positive for myalgias. Skin: Negative for itching and rash. +scattered wounds to both lower legs   Neurological: Negative for dizziness, tingling, weakness and headaches. Endo/Heme/Allergies: Does not bruise/bleed easily. Psychiatric/Behavioral: Negative for depression.        Physical Exam:    Visit Vitals    /70 (BP 1 Location: Left arm, BP Patient Position: Sitting)    Pulse 68    Resp 18    Ht 6' 2\" (1.88 m)    Wt 150 lb (68 kg)    BMI 19.26 kg/m2      Physical Examination: General appearance - alert, in no distress, and chronically ill appearing  Mental status - alert, oriented to person, place, and time, normal mood, behavior, speech, dress, motor activity, and thought processes  Eyes - left eye normal, right eye normal  Ears - right ear normal, left ear normal  Mouth - mucous membranes moist  Chest - clear to auscultation, no wheezes  Heart - normal rate and regular rhythm, + murmur  Abdomen - soft, nontender, nondistended  Musculoskeletal - no obvious deformity  Extremities - warm and well perfused, 1+ edema to bilateral lower legs and feet  Skin- Scattered skin tears, right medial ankle wound with pink wound bed, left lateral ankle wound with layer of biofilm, no signs of infection, right heel wound beefy red and bleeding      Impression and Plan:    ICD-10-CM ICD-9-CM    1. Leg ulcer, left, limited to breakdown of skin ([address] L97.921 707.10    2. Ulcer of right leg, limited to breakdown of skin ([address] L97.911 707.10    3. Venous reflux I87.2 459.81      No orders of the defined types were placed in this encounter. I removed the layer of biofilm from the left ankle wound with a curette. He is seeing wound care after he leaves our office so they will dress his wounds. He is scheduled for surgical debridement by Dr. Charles Gutierrez, questions regarding blood thinners and NPO orders answered. I instructed Mr. Toby Rich to avoid pressure on his heels, he states he will wear his pressure relieving boots more routinely. We will see Mr. Toby Rich for follow up after his surgery. Follow-up Disposition:  Return in about 3 weeks (around 6/13/2018). The treatment plan was reviewed with the patient in detail. The patient voiced understanding of this plan and all questions and concerns were addressed. The patient agrees with this plan. We discussed the signs and symptoms that would require earlier attention or intervention. The patient was given educational material related to his/her visit and the patient has voiced understanding of the material.     I appreciate the opportunity to participate in the care of your patient. I will be sure to keep you informed of any subsequent changes in the treatment plan.   If you have any questions or concerns, please feel free to contact me. Juju Akbar NP    PLEASE NOTE:  This document has been produced using voice recognition software. Unrecognized errors in transcription may be present.

## 2018-06-06 NOTE — PERIOP NOTES
No c/o offered - tolerating po fluids and crackers - BP now 96/48 will page Dr. Lindsay Ron for discharge

## 2018-06-06 NOTE — ANESTHESIA PREPROCEDURE EVALUATION
Anesthetic History   No history of anesthetic complications            Review of Systems / Medical History  Patient summary reviewed, nursing notes reviewed and pertinent labs reviewed    Pulmonary    COPD: moderate    Sleep apnea           Neuro/Psych              Cardiovascular    Hypertension        Dysrhythmias   CAD         GI/Hepatic/Renal     GERD           Endo/Other    Diabetes  Hypothyroidism  Arthritis     Other Findings              Physical Exam    Airway  Mallampati: II  TM Distance: 4 - 6 cm  Neck ROM: normal range of motion   Mouth opening: Normal     Cardiovascular  Regular rate and rhythm,  S1 and S2 normal,  no murmur, click, rub, or gallop             Dental    Dentition: Full upper dentures and Lower partial plate     Pulmonary  Breath sounds clear to auscultation               Abdominal  GI exam deferred       Other Findings            Anesthetic Plan    ASA: 3  Anesthesia type: general          Induction: Intravenous  Anesthetic plan and risks discussed with: Family and Patient

## 2018-06-06 NOTE — ANESTHESIA POSTPROCEDURE EVALUATION
Post-Anesthesia Evaluation and Assessment    Cardiovascular Function/Vital Signs  Visit Vitals    BP 96/48    Pulse 84    Temp 36.2 °C (97.1 °F)    Resp 16    Ht 6' 2\" (1.88 m)    Wt 59 kg (130 lb)    SpO2 98%    BMI 16.69 kg/m2     Pt states this BP is in his normal range. Patient is status post Procedure(s):  LEFT LEG AND RIGHT LEG DEBRIDMENT OF ANKLE WOUNDS AND APPLICATION OF THERASKIN, \"SPEC POP\" . Nausea/Vomiting: Controlled. Postoperative hydration reviewed and adequate. Pain:  Pain Scale 1: Numeric (0 - 10) (06/06/18 1055)  Pain Intensity 1: 2 (06/06/18 1055)   Managed. Neurological Status:   Neuro (WDL): Exceptions to WDL (06/06/18 1055)   At baseline. Mental Status and Level of Consciousness: Arousable. Pulmonary Status:   O2 Device: Room air (06/06/18 1209)   Adequate oxygenation and airway patent. Complications related to anesthesia: None    Post-anesthesia assessment completed. No concerns. Patient has met all discharge requirements.     Signed By: Rodolfo Trimble MD    June 6, 2018

## 2018-06-06 NOTE — INTERVAL H&P NOTE
H&P Update:  Katerina Alicia Sr. was seen and examined. History and physical has been reviewed. Significant clinical changes have occurred as noted:   Will perform bilateral ankle debridement with theraskin graft placement    Signed By: Kameron Ag MD     June 6, 2018 8:39 AM

## 2018-06-06 NOTE — PERIOP NOTES
Lower extremities - warm to touch, brisk cap refill - difficulty palpating pedal pulse dur to surgical dressings

## 2018-06-06 NOTE — PERIOP NOTES
Patient is awake and alert asking for something to drink has dry mouth, also complian of pain 3/10 agrees to take po pain medication for pain control.

## 2018-06-06 NOTE — OP NOTES
[address] REPORT    Name:Mee GERMAN SR.  MR#: [id number]  : 1945  ACCOUNT #: [de-identified]   DATE OF SERVICE: 2018    PREOPERATIVE DIAGNOSIS:  Nonhealing bilateral ankle wounds. POSTOPERATIVE DIAGNOSIS:  Nonhealing bilateral ankle wounds. PROCEDURE: Verasjet and sharp excisional debridement of skin and subcutaneous tissue bilateral foot wounds, placement of TheraSkin graft. SURGEON:  Leonard Mora MD    ASSISTANT:  Josh Welch    ANESTHESIA:  General.    ESTIMATED BLOOD LOSS:  Minimal.    PACKS AND DRAINS:  None. IMPLANTS:  TheraSkin graft. COMPLICATIONS:  None. CONDITION:  Transferred to recovery, stable. FINDINGS:  Evidence of granulation tissue at the base of the wound. No obvious signs of infection. No exposed bones or tendons. Hemostasis at the completion of procedure. 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 Despite constant aggressive wound care, the patient's wounds are slow to heal.  Decision was made to take the patient to the operating room for debridement and graft placement. Informed consent was obtained. PROCEDURE IN DETAIL:  On 2018, the patient presented to the operating room identified by name on bracelet by operative team.  Once this was done, the patient was placed on the operating table in supine position. After appropriate depth of anesthesia was obtained, an LMA was placed without complications. The patient received preoperative antibiotics. The patient was then prepped and draped and timeout was performed. At this point, we used the Versajet to debride the lateral left ankle wound. The wound measured 4.5 x 3 cm. We debrided the skin and subcutaneous tissue to expose the granulation tissue at the base of the wound.   Once this was completed, we applied the TheraSkin graft and secured it with Monocryl sutures. Once this was completed, we turned our attention to the right leg wound, which measures 7 x 6 cm and repeated the process. The graft was then secured with a Xeroform layer for a noncontact layer followed by 4 x 4's, soft roll and Coban at the end of the procedure. I was present for the entire procedure and all counts were correct x2.       MD Ross Pack / José Metcalf  D: 06/06/2018 09:48     T: 06/06/2018 10:43  JOB #: [id number]

## 2018-06-06 NOTE — PERIOP NOTES
Waiting to call report to phase 2 nurse family member updated and patient was given a percocet for pain.

## 2018-06-06 NOTE — IP AVS SNAPSHOT
[address] [facility] 
 
 
 [address] [facility] [address] 
[phone] Patient: Lavonne Taylor Sr. MRN: [id number] LMD:5/80/5319 About your hospitalization You were admitted on:  June 6, 2018 You last received care in the:  [facility] PACU You were discharged on:  June 6, 2018 Why you were hospitalized Your primary diagnosis was:  Not on File Follow-up Information Follow up With Details Comments Contact Info Gerhardt Sauer, MD   [address]  [id number] 
[phone] Edgar Thapa MD Follow up in 2 week(s)  [address] [facility] [address] [facility] 
[phone] Your Scheduled Appointments Wednesday June 13, 2018 10:15 AM EDT Follow Up with NURSE VISIT  
BS Vein/V[facility] (LILIANA SCHEDULING)  
 [facility] [address] [facility]  
[phone] Discharge Orders None A check jasson indicates which time of day the medication should be taken. My Medications CHANGE how you take these medications Instructions Each Dose to Equal  
 Morning Noon Evening Bedtime * oxyCODONE-acetaminophen 5-325 mg per tablet Commonly known as:  PERCOCET What changed:  Another medication with the same name was added. Make sure you understand how and when to take each. Your last dose was: Your next dose is: Take 2 Tabs by mouth every six (6) hours as needed for Pain. Max Daily Amount: 8 Tabs. 2 Tab * oxyCODONE-acetaminophen 5-325 mg per tablet Commonly known as:  PERCOCET What changed: You were already taking a medication with the same name, and this prescription was added. Make sure you understand how and when to take each. Your last dose was: Your next dose is: Take 2 Tabs by mouth every six (6) hours as needed for Pain. Max Daily Amount: 8 Tabs. 2 Tab * Notice: This list has 2 medication(s) that are the same as other medications prescribed for you. Read the directions carefully, and ask your doctor or other care provider to review them with you. CONTINUE taking these medications Instructions Each Dose to Equal  
 Morning Noon Evening Bedtime ADVAIR DISKUS 250-50 mcg/dose diskus inhaler Generic drug:  fluticasone-salmeterol Your last dose was: Your next dose is: Take 2 Puffs by inhalation every twelve (12) hours. 2 Puff  
    
   
   
   
  
 albuterol sulfate 90 mcg/actuation Aepb Your last dose was: Your next dose is: Take 2 Puffs by inhalation every four (4) hours as needed. Indications: Chronic Obstructive Pulmonary Disease 2 Puff  
    
   
   
   
  
 aspirin 81 mg tablet Your last dose was: Your next dose is: Take 81 mg by mouth nightly. 81 mg  
    
   
   
   
  
 atorvastatin 20 mg tablet Commonly known as:  LIPITOR Your last dose was: Your next dose is: Take  by mouth nightly. cilostazol 100 mg tablet Commonly known as:  PLETAL Your last dose was: Your next dose is: Take 100 mg by mouth Before breakfast and dinner. 100 mg CLARITIN 10 mg tablet Generic drug:  loratadine Your last dose was: Your next dose is: Take 10 mg by mouth daily. 10 mg  
    
   
   
   
  
 FLONASE 50 mcg/actuation nasal spray Generic drug:  fluticasone Your last dose was: Your next dose is: 2 Sprays by Both Nostrils route daily. 2 Spray  
    
   
   
   
  
 iron aspgly,hr-Q-T13-FA-Ca-suc [id number] mg-mg-mcg-mg Cap cap Commonly known as:  FERREX [address] Your last dose was: Your next dose is: Take 1 Cap by mouth two (2) times a day. Indications: anemia 1 Cap LANTUS SOLOSTAR U-100 INSULIN 100 unit/mL (3 mL) Inpn Generic drug:  insulin glargine Your last dose was: Your next dose is:    
   
   
 20 Units by SubCUTAneous route nightly. 20 Units LASIX 40 mg tablet Generic drug:  furosemide Your last dose was: Your next dose is: Take 40 mg by mouth daily. Indications: hypertension 40 mg  
    
   
   
   
  
 levothyroxine 100 mcg tablet Commonly known as:  SYNTHROID Your last dose was: Your next dose is: Take 100 mcg by mouth Daily (before breakfast). 100 mcg  
    
   
   
   
  
 lisinopril 2.5 mg tablet Commonly known as:  Dawit Home Your last dose was: Your next dose is: Take  by mouth daily. LYRICA 50 mg capsule Generic drug:  pregabalin Your last dose was: Your next dose is: Take 75 mg by mouth two (2) times a day. 75 mg  
    
   
   
   
  
 metFORMIN 1,000 mg tablet Commonly known as:  GLUCOPHAGE Your last dose was: Your next dose is: Take 1,000 mg by mouth two (2) times daily (with meals). 1000 mg  
    
   
   
   
  
 multivitamin, tx-iron-ca-min 9 mg iron-400 mcg Tab tablet Commonly known as:  THERA-M w/ IRON Your last dose was: Your next dose is: Take 1 Tab by mouth daily. 1 Tab * NovoLOG Flexpen U-100 Insulin 100 unit/mL In Generic drug:  insulin aspart U-100 Your last dose was: Your next dose is:    
   
   
 5 Units by SubCUTAneous route daily. Sliding scale  Plus the above dose 5 Units * NovoLOG Flexpen U-100 Insulin 100 unit/mL Inpn Generic drug:  insulin aspart U-100 Your last dose was: Your next dose is: 9 Units by SubCUTAneous route two (2) times a day. With lunch and dinner in addition the sliding scale 9 Units Omeprazole delayed release 20 mg tablet Commonly known as:  PRILOSEC D/R Your last dose was: Your next dose is: Take 20 mg by mouth daily. 20 mg  
    
   
   
   
  
 PLAVIX 75 mg Tab Generic drug:  clopidogrel Your last dose was: Your next dose is: Take 75 mg by mouth daily. Indications: circulation problem 75 mg  
    
   
   
   
  
 potassium citrate 10 mEq (1,080 mg) Glenny Tian Commonly known as:  [place] Your last dose was: Your next dose is: Take 10 mEq by mouth two (2) times a day. 10 mEq SPIRIVA WITH HANDIHALER 18 mcg inhalation capsule Generic drug:  tiotropium Your last dose was: Your next dose is: Take 1 Cap by inhalation daily. 1 Cap TIAZAC 360 mg SR capsule Generic drug:  dilTIAZem Your last dose was: Your next dose is: Take 360 mg by mouth daily. 360 mg  
    
   
   
   
  
 VITAMIN D3 1,000 unit Cap Generic drug:  cholecalciferol Your last dose was: Your next dose is: Take 5,000 Units by mouth daily. 5000 Units * Notice: This list has 2 medication(s) that are the same as other medications prescribed for you. Read the directions carefully, and ask your doctor or other care provider to review them with you. Where to Get Your Medications Information on where to get these meds will be given to you by the nurse or doctor. ! Ask your nurse or doctor about these medications  
  oxyCODONE-acetaminophen 5-325 mg per tablet Opioid Education  Prescription Opioids: What You Need to Know: 
 
 
 
F-face looks uneven A-arms unable to move or move unevenly S-speech slurred or non-existent T-time-call 911 as soon as signs and symptoms begin-DO NOT go Back to bed or wait to see if you get better-TIME IS BRAIN. Warning Signs of HEART ATTACK Call 911 if you have these symptoms: 
? Chest discomfort. Most heart attacks involve discomfort in the center of the chest that lasts more than a few minutes, or that goes away and comes back. It can feel like uncomfortable pressure, squeezing, fullness, or pain. ? Discomfort in other areas of the upper body. Symptoms can include pain or discomfort in one or both arms, the back, neck, jaw, or stomach. ? Shortness of breath with or without chest discomfort. ? Other signs may include breaking out in a cold sweat, nausea, or lightheadedness. Don't wait more than five minutes to call [address]! Fast action can save your life. Calling 911 is almost always the fastest way to get lifesaving treatment. Emergency Medical Services staff can begin treatment when they arrive  up to an hour sooner than if someone gets to the hospital by car. Patient armband removed and shredded The discharge information has been reviewed with the patient and caregiver. The patient and caregiver verbalized understanding. Discharge medications reviewed with the patient and caregiver and appropriate educational materials and side effects teaching were provided. ___________________________________________________________________________________________________________________________________ Introducing [facility]! Dear Leonard Sebastian: Thank you for requesting a [employer] account. Our records indicate that you already have an active [employer] account. You can access your account anytime at [email or web address] Did you know that you can access your hospital and ER discharge instructions at any time in [employer]? You can also review all of your test results from your hospital stay or ER visit. Additional Information If you have questions, please visit the Frequently Asked Questions section of the [employer] website at [email or web address] Remember, [employer] is NOT to be used for urgent needs. For medical emergencies, dial 911. Now available from your iPhone and Android! Introducing Shorty Fletcher As a [facility] patient, I wanted to make you aware of our electronic visit tool called Shorty Fletcher. [facility] 24/7 allows you to connect within minutes with a medical provider 24 hours a day, seven days a week via a mobile device or tablet or logging into a secure website from your computer. You can access Shorty Fletcher from anywhere in the [place]. A virtual visit might be right for you when you have a simple condition and feel like you just dont want to get out of bed, or cant get away from work for an appointment, when your regular [facility] provider is not available (evenings, weekends or holidays), or when youre out of town and need minor care. Electronic visits cost only $49 and if the [facility] provider determines a prescription is needed to treat your condition, one can be electronically transmitted to a nearby pharmacy*. Please take a moment to enroll today if you have not already done so. The enrollment process is free and takes just a few minutes. To enroll, please download the [employer] 24/7 becki to your tablet or phone, or visit [email or web address] to enroll on your computer. And, as an [address] [place] [address] patient with a [employer] account, the results of your visits will be scanned into your electronic medical record and your primary care provider will be able to view the scanned results. We urge you to continue to see your regular [employer] provider for your ongoing medical care. And while your primary care provider may not be the one available when you seek a [employer] virtual visit, the peace of mind you get from getting a real diagnosis real time can be priceless. For more information on [employer], view our Frequently Asked Questions (FAQs) at [email or web address]. Sincerely, 
 
Tatum Soler MD 
Chief Medical Officer [facility] Pratibha Coburn *:  certain medications cannot be prescribed via [employer] Providers Seen During Your Hospitalization Provider Specialty Primary office phone Anne Marie Quesada MD Vascular Surgery [phone] Your Primary Care Physician (PCP) Primary Care Physician Office Phone Office Fax Daniel Adams [phone] You are allergic to the following Allergen Reactions Neosporin (Neomycin-Bacitracin-Polymyxin) Rash Recent Documentation Height Weight BMI Smoking Status 1.88 m 59 kg 16.69 kg/m2 Former Smoker Emergency Contacts Name Discharge Info Relation Home Work Mobile [employer] Garn[place]va TANIA DISCHARGE CAREGIVER [3] Spouse [3] [phone] [address] [place] “B” [place] CAREGIVER [3] Son [22]   [phone] Batsheva LIN  Spouse [3] [phone] Patient Belongings The following personal items are in your possession at time of discharge: 
  Dental Appliances:  (with Jhoana Comber)         Home Medications: None   Jewelry: None  Clothing: Undergarments, Footwear, Socks, Shirt, Pants (with Jhoana Comber)    Other Valuables: Eyeglasses, Wallet, Other (comment) (and hearing aids with Jhoana Comber) Please provide this summary of care documentation to your next provider. Signatures-by signing, you are acknowledging that this After Visit Summary has been reviewed with you and you have received a copy. Patient Signature:  ____________________________________________________________ Date:  ____________________________________________________________  
  
[facility] Provider Signature:  ____________________________________________________________ Date:  ____________________________________________________________

## 2018-06-06 NOTE — DISCHARGE INSTRUCTIONS
Follow all instructions from dr. Guido Screws Dr. Chuck Tinsley office with any Post op questions or concerns  Take prescriptions as directed  Follow up with Dr. Jordana Kearney in 2 weks          DISCHARGE SUMMARY from Nurse    PATIENT INSTRUCTIONS:    After general anesthesia or intravenous sedation, for 24 hours or while taking prescription Narcotics:  · Limit your activities  · Do not drive and operate hazardous machinery  · Do not make important personal or business decisions  · Do  not drink alcoholic beverages  · If you have not urinated within 8 hours after discharge, please contact your surgeon on call. Report the following to your surgeon:  · Excessive pain, swelling, redness or odor of or around the surgical area  · Temperature over 100.5  · Nausea and vomiting lasting longer than 4 hours or if unable to take medications  · Any signs of decreased circulation or nerve impairment to extremity: change in color, persistent  numbness, tingling, coldness or increase pain  · Any questions    What to do at Home:  Recommended activity: Ambulate in house and No driving while on analgesics,     If you experience any of the following symptoms fever, chills, uncontrollable pain, active bleeding or drainage, please follow up with Dr. Jordana Kearney. *  Please give a list of your current medications to your Primary Care Provider. *  Please update this list whenever your medications are discontinued, doses are      changed, or new medications (including over-the-counter products) are added. *  Please carry medication information at all times in case of emergency situations. These are general instructions for a healthy lifestyle:    No smoking/ No tobacco products/ Avoid exposure to second hand smoke  Surgeon General's Warning:  Quitting smoking now greatly reduces serious risk to your health.     Obesity, smoking, and sedentary lifestyle greatly increases your risk for illness    A healthy diet, regular physical exercise & weight monitoring are important for maintaining a healthy lifestyle    You may be retaining fluid if you have a history of heart failure or if you experience any of the following symptoms:  Weight gain of 3 pounds or more overnight or 5 pounds in a week, increased swelling in our hands or feet or shortness of breath while lying flat in bed. Please call your doctor as soon as you notice any of these symptoms; do not wait until your next office visit. Recognize signs and symptoms of STROKE:    F-face looks uneven    A-arms unable to move or move unevenly    S-speech slurred or non-existent    T-time-call 911 as soon as signs and symptoms begin-DO NOT go       Back to bed or wait to see if you get better-TIME IS BRAIN. Warning Signs of HEART ATTACK     Call 911 if you have these symptoms:   Chest discomfort. Most heart attacks involve discomfort in the center of the chest that lasts more than a few minutes, or that goes away and comes back. It can feel like uncomfortable pressure, squeezing, fullness, or pain.  Discomfort in other areas of the upper body. Symptoms can include pain or discomfort in one or both arms, the back, neck, jaw, or stomach.  Shortness of breath with or without chest discomfort.  Other signs may include breaking out in a cold sweat, nausea, or lightheadedness. Don't wait more than five minutes to call 911 - MINUTES MATTER! Fast action can save your life. Calling 911 is almost always the fastest way to get lifesaving treatment. Emergency Medical Services staff can begin treatment when they arrive -- up to an hour sooner than if someone gets to the hospital by car. Patient armband removed and shredded    The discharge information has been reviewed with the patient and caregiver. The patient and caregiver verbalized understanding.   Discharge medications reviewed with the patient and caregiver and appropriate educational materials and side effects teaching were provided.   ___________________________________________________________________________________________________________________________________

## 2018-06-06 NOTE — PERIOP NOTES
I called back to phase 2 to give update the patient has a sierra and is on contact isolation  Due to bacteria in urine (ESBL).

## 2018-06-06 NOTE — BRIEF OP NOTE
BRIEF OPERATIVE NOTE    Date of Procedure: 6/6/2018   Preoperative Diagnosis: NON HEALING LEG WOUNDS  Postoperative Diagnosis: * No post-op diagnosis entered *    Procedure(s):  LEFT LEG AND RIGHT LEG DEBRIDMENT OF ANKLE WOUNDS AND APPLICATION OF Graydon Spitz, \"SPEC POP\"   Surgeon(s) and Role:     * Sary Baker MD - Primary         Surgical Assistant: Preston Greene    Surgical Staff:  Circ-1: Clark Whipple RN  Surg Asst-1: Meg Schaeffer  Event Time In   Incision Start 0915   Incision Close 0940     Anesthesia: Other   Estimated Blood Loss: Minimal  Specimens: * No specimens in log *   Findings: Good granulation tissue at the base of the wound   Complications: None  Implants:   Implant Name Type Inv.  Item Serial No.  Lot No. LRB No. Used Action   Graydon Spitz [employer]   [id number]  [id number] Left 1 Implanted   Graydon Spitz [employer]     [id number] [facility] [id number] Right 1 Implanted

## 2018-06-06 NOTE — ANESTHESIA POSTPROCEDURE EVALUATION
Post-Anesthesia Evaluation & Assessment    Visit Vitals    /65 (BP 1 Location: Right arm, BP Patient Position: At rest)    Pulse 81    Temp 36.4 °C (97.5 °F)    Resp 14    Ht 6' 2\" (1.88 m)    Wt 59 kg (130 lb)    SpO2 100%    BMI 16.69 kg/m2       Nausea/Vomiting: no nausea    Post-operative hydration adequate. Pain score (VAS): 2    Mental status & Level of consciousness: alert and oriented x 3    Neurological status: moves all extremities, sensation grossly intact    Pulmonary status: airway patent, no supplemental oxygen required    Complications related to anesthesia: none    Patient has met all discharge requirements.     Additional comments:        Clementina Castro MD

## 2018-06-11 ENCOUNTER — TELEPHONE (OUTPATIENT)
Dept: VASCULAR SURGERY | Age: 73
End: 2018-06-11

## 2018-06-11 NOTE — TELEPHONE ENCOUNTER
Called and spoke with patient , advised that Dr. Jake Santoyo would send orders to wound clinic for eval and treatment of sacral decubitus. Faxed order and received confirmation.

## 2018-06-11 NOTE — TELEPHONE ENCOUNTER
Patients wife called and said he has three pressure sores and discussed Wound care downstairs for him with Dr. Radha Villarreal during his last visit. She would like to know if an order can be faxed down there for him. She may be reached back at [phone].

## 2018-06-13 ENCOUNTER — HOSPITAL ENCOUNTER (OUTPATIENT)
Dept: WOUND CARE | Age: 73
Discharge: HOME | End: 2018-06-13
Payer: MEDICARE

## 2018-06-13 ENCOUNTER — OFFICE VISIT (OUTPATIENT)
Dept: VASCULAR SURGERY | Age: 73
End: 2018-06-13

## 2018-06-13 VITALS
RESPIRATION RATE: 18 BRPM | OXYGEN SATURATION: 99 % | TEMPERATURE: 97.6 F | HEART RATE: 100 BPM | SYSTOLIC BLOOD PRESSURE: 106 MMHG | DIASTOLIC BLOOD PRESSURE: 44 MMHG

## 2018-06-13 DIAGNOSIS — L97.412 DIABETIC ULCER OF RIGHT MIDFOOT ASSOCIATED WITH TYPE 2 DIABETES MELLITUS, WITH FAT LAYER EXPOSED (HCC): ICD-10-CM

## 2018-06-13 DIAGNOSIS — L97.911 ULCER OF RIGHT LEG, LIMITED TO BREAKDOWN OF SKIN (HCC): Primary | ICD-10-CM

## 2018-06-13 DIAGNOSIS — E11.621 DIABETIC ULCER OF RIGHT MIDFOOT ASSOCIATED WITH TYPE 2 DIABETES MELLITUS, WITH FAT LAYER EXPOSED (HCC): ICD-10-CM

## 2018-06-13 PROCEDURE — 97602 WOUND(S) CARE NON-SELECTIVE: CPT

## 2018-06-14 ENCOUNTER — APPOINTMENT (OUTPATIENT)
Dept: CT IMAGING | Age: 73
DRG: 385 | End: 2018-06-14
Attending: PHYSICIAN ASSISTANT
Payer: MEDICARE

## 2018-06-14 ENCOUNTER — HOSPITAL ENCOUNTER (EMERGENCY)
Age: 73
Discharge: HOME | DRG: 385 | End: 2018-06-14
Attending: INTERNAL MEDICINE
Payer: MEDICARE

## 2018-06-14 ENCOUNTER — APPOINTMENT (OUTPATIENT)
Dept: GENERAL RADIOLOGY | Age: 73
DRG: 385 | End: 2018-06-14
Attending: PHYSICIAN ASSISTANT
Payer: MEDICARE

## 2018-06-14 VITALS
TEMPERATURE: 98.7 F | RESPIRATION RATE: 14 BRPM | SYSTOLIC BLOOD PRESSURE: 127 MMHG | HEIGHT: 74 IN | DIASTOLIC BLOOD PRESSURE: 51 MMHG | OXYGEN SATURATION: 99 % | HEART RATE: 108 BPM | WEIGHT: 135 LBS | BODY MASS INDEX: 17.32 KG/M2

## 2018-06-14 DIAGNOSIS — R00.0 SINUS TACHYCARDIA: ICD-10-CM

## 2018-06-14 DIAGNOSIS — K59.00 CONSTIPATION, UNSPECIFIED CONSTIPATION TYPE: Primary | ICD-10-CM

## 2018-06-14 LAB
ALBUMIN SERPL-MCNC: 2.7 G/DL (ref 3.4–5)
ALBUMIN/GLOB SERPL: 0.6 {RATIO} (ref 0.8–1.7)
ALP SERPL-CCNC: 98 U/L (ref 45–117)
ALT SERPL-CCNC: 18 U/L (ref 16–61)
ANION GAP SERPL CALC-SCNC: 8 MMOL/L (ref 3–18)
APTT PPP: 43.4 SEC (ref 23–36.4)
AST SERPL-CCNC: 18 U/L (ref 15–37)
ATRIAL RATE: 121 BPM
BASOPHILS # BLD: 0 K/UL (ref 0–0.06)
BASOPHILS NFR BLD: 1 % (ref 0–2)
BILIRUB SERPL-MCNC: 0.4 MG/DL (ref 0.2–1)
BUN SERPL-MCNC: 8 MG/DL (ref 7–18)
BUN/CREAT SERPL: 11 (ref 12–20)
CALCIUM SERPL-MCNC: 8.7 MG/DL (ref 8.5–10.1)
CALCULATED P AXIS, ECG09: 73 DEGREES
CALCULATED R AXIS, ECG10: -29 DEGREES
CALCULATED T AXIS, ECG11: 92 DEGREES
CHLORIDE SERPL-SCNC: 97 MMOL/L (ref 100–108)
CK MB CFR SERPL CALC: 2.3 % (ref 0–4)
CK MB SERPL-MCNC: 1.1 NG/ML (ref 5–25)
CK SERPL-CCNC: 48 U/L (ref 39–308)
CO2 SERPL-SCNC: 29 MMOL/L (ref 21–32)
CREAT SERPL-MCNC: 0.7 MG/DL (ref 0.6–1.3)
DIAGNOSIS, 93000: NORMAL
DIFFERENTIAL METHOD BLD: ABNORMAL
EOSINOPHIL # BLD: 0.5 K/UL (ref 0–0.4)
EOSINOPHIL NFR BLD: 8 % (ref 0–5)
ERYTHROCYTE [DISTWIDTH] IN BLOOD BY AUTOMATED COUNT: 15.8 % (ref 11.6–14.5)
GLOBULIN SER CALC-MCNC: 4.6 G/DL (ref 2–4)
GLUCOSE SERPL-MCNC: 190 MG/DL (ref 74–99)
HCT VFR BLD AUTO: 33.8 % (ref 36–48)
HGB BLD-MCNC: 10.7 G/DL (ref 13–16)
INR PPP: 1.1 (ref 0.8–1.2)
LYMPHOCYTES # BLD: 0.7 K/UL (ref 0.9–3.6)
LYMPHOCYTES NFR BLD: 11 % (ref 21–52)
MCH RBC QN AUTO: 27.4 PG (ref 24–34)
MCHC RBC AUTO-ENTMCNC: 31.7 G/DL (ref 31–37)
MCV RBC AUTO: 86.4 FL (ref 74–97)
MONOCYTES # BLD: 0.6 K/UL (ref 0.05–1.2)
MONOCYTES NFR BLD: 9 % (ref 3–10)
NEUTS SEG # BLD: 4.6 K/UL (ref 1.8–8)
NEUTS SEG NFR BLD: 71 % (ref 40–73)
P-R INTERVAL, ECG05: 140 MS
PLATELET # BLD AUTO: 176 K/UL (ref 135–420)
PMV BLD AUTO: 9.6 FL (ref 9.2–11.8)
POTASSIUM SERPL-SCNC: 4.6 MMOL/L (ref 3.5–5.5)
PROT SERPL-MCNC: 7.3 G/DL (ref 6.4–8.2)
PROTHROMBIN TIME: 13.3 SEC (ref 11.5–15.2)
Q-T INTERVAL, ECG07: 354 MS
QRS DURATION, ECG06: 108 MS
QTC CALCULATION (BEZET), ECG08: 502 MS
RBC # BLD AUTO: 3.91 M/UL (ref 4.7–5.5)
SODIUM SERPL-SCNC: 134 MMOL/L (ref 136–145)
TROPONIN I SERPL-MCNC: <0.02 NG/ML (ref 0–0.06)
VENTRICULAR RATE, ECG03: 121 BPM
WBC # BLD AUTO: 6.5 K/UL (ref 4.6–13.2)

## 2018-06-14 PROCEDURE — 82550 ASSAY OF CK (CPK): CPT | Performed by: PHYSICIAN ASSISTANT

## 2018-06-14 PROCEDURE — 74022 RADEX COMPL AQT ABD SERIES: CPT

## 2018-06-14 PROCEDURE — 93005 ELECTROCARDIOGRAM TRACING: CPT

## 2018-06-14 PROCEDURE — 74177 CT ABD & PELVIS W/CONTRAST: CPT

## 2018-06-14 PROCEDURE — 85025 COMPLETE CBC W/AUTO DIFF WBC: CPT | Performed by: PHYSICIAN ASSISTANT

## 2018-06-14 PROCEDURE — 99285 EMERGENCY DEPT VISIT HI MDM: CPT

## 2018-06-14 PROCEDURE — 85730 THROMBOPLASTIN TIME PARTIAL: CPT | Performed by: PHYSICIAN ASSISTANT

## 2018-06-14 PROCEDURE — 74011636320 HC RX REV CODE- 636/320: Performed by: INTERNAL MEDICINE

## 2018-06-14 PROCEDURE — 85610 PROTHROMBIN TIME: CPT | Performed by: PHYSICIAN ASSISTANT

## 2018-06-14 PROCEDURE — 80053 COMPREHEN METABOLIC PANEL: CPT | Performed by: PHYSICIAN ASSISTANT

## 2018-06-14 RX ADMIN — IOPAMIDOL 100 ML: 612 INJECTION, SOLUTION INTRAVENOUS at 20:49

## 2018-06-14 NOTE — ED PROVIDER NOTES
EMERGENCY DEPARTMENT HISTORY AND PHYSICAL EXAM    Date: 6/14/2018  Patient Name: Becca Roldan.    History of Presenting Illness     Chief Complaint   Patient presents with    Abdominal Pain         History Provided By: Patient    Chief Complaint: abd pain  Duration: 3 Days  Timing:  Acute  Quality: Sore  Severity: N/A  Modifying Factors: No modifying factors  Associated Symptoms: rectal pain, abd pain, and diarrhea/leakage    Additional History (Context):   5:52 PM  Becca Bautista is a 74 y.o. male with PMHx of COPD, GERD, HLD, DM, a fib, HTN, arthritis, and anemia and SHx of appendectomy presents to the emergency department C/O constipation onset 3 days. Associated sxs include rectal pain. Pt states that for the past few days he has been unable to have a BM. Pt notes that he was diagnosed with a UTI a few weeks ago and given Cipro. Pt does take Percocet for chronic pain. The patient also notes that his still is black at baseline as he is anemic and takes iron suppliments. The patient cites his tachycardia as baseline and has a hx of sinus tach and has had episodes of a fib. Miralax was taken earlier today for tx, this was the first dose however. Pt denies CP, SOB, nausea, vomiting, or fever, and any other sxs or complaints. PCP: Zoey Farr MD    Current Outpatient Prescriptions   Medication Sig Dispense Refill    oxyCODONE-acetaminophen (PERCOCET) 5-325 mg per tablet Take 2 Tabs by mouth every six (6) hours as needed for Pain. Max Daily Amount: 8 Tabs. 112 Tab 0    albuterol sulfate 90 mcg/actuation aepb Take 2 Puffs by inhalation every four (4) hours as needed. Indications: Chronic Obstructive Pulmonary Disease      cholecalciferol (VITAMIN D3) 1,000 unit cap Take 5,000 Units by mouth daily.  insulin aspart U-100 (NOVOLOG FLEXPEN U-100 INSULIN) 100 unit/mL inpn 9 Units by SubCUTAneous route two (2) times a day.  With lunch and dinner in addition the sliding scale      insulin glargine (LANTUS SOLOSTAR U-100 INSULIN) 100 unit/mL (3 mL) inpn 20 Units by SubCUTAneous route nightly.  levothyroxine (SYNTHROID) 100 mcg tablet Take 100 mcg by mouth Daily (before breakfast).  cilostazol (PLETAL) 100 mg tablet Take 100 mg by mouth Before breakfast and dinner.  pregabalin (LYRICA) 50 mg capsule Take 75 mg by mouth two (2) times a day.  Omeprazole delayed release (PRILOSEC D/R) 20 mg tablet Take 20 mg by mouth daily.  potassium citrate (UROCIT-K10) 10 mEq (1,080 mg) TbER Take 10 mEq by mouth two (2) times a day.  multivitamin, tx-iron-ca-min (THERA-M W/ IRON) 9 mg iron-400 mcg tab tablet Take 1 Tab by mouth daily.  iron aspgly,rd-I-Z68-FA-Ca-suc (FERREX 150 FORTE PLUS) 621-94-28-8 mg-mg-mcg-mg cap cap Take 1 Cap by mouth two (2) times a day. Indications: anemia      insulin aspart (NOVOLOG FLEXPEN) 100 unit/mL inpn 5 Units by SubCUTAneous route daily. Sliding scale  Plus the above dose      metFORMIN (GLUCOPHAGE) 1,000 mg tablet Take 1,000 mg by mouth two (2) times daily (with meals).  atorvastatin (LIPITOR) 20 mg tablet Take  by mouth nightly.  lisinopril (PRINIVIL, ZESTRIL) 2.5 mg tablet Take  by mouth daily.  clopidogrel (PLAVIX) 75 mg tab Take 75 mg by mouth daily. Indications: circulation problem      aspirin 81 mg tablet Take 81 mg by mouth nightly.  furosemide (LASIX) 40 mg tablet Take 40 mg by mouth daily. Indications: hypertension      diltiazem (TIAZAC) 360 mg SR capsule Take 360 mg by mouth daily.  tiotropium (SPIRIVA WITH HANDIHALER) 18 mcg inhalation capsule Take 1 Cap by inhalation daily.  loratadine (CLARITIN) 10 mg tablet Take 10 mg by mouth daily.  fluticasone (FLONASE) 50 mcg/actuation nasal spray 2 Sprays by Both Nostrils route daily.  fluticasone-salmeterol (ADVAIR DISKUS) 250-50 mcg/dose diskus inhaler Take 2 Puffs by inhalation every twelve (12) hours.          Past History     Past Medical History:  Past Medical History:   Diagnosis Date    Acid reflux     Anemia     Arthritis     Atrial fibrillation (HCC)     CAD (coronary artery disease)     Chronic obstructive pulmonary disease (HCC)     Coagulation disorder (HCC)     anemia    Diabetes ([facility] [address] 1997    GERD (gastroesophageal reflux disease)     H/O seasonal allergies     Hypercholesterolemia     Hypertension 1997    Ill-defined condition     hand tremors    PAF (paroxysmal atrial fibrillation) ([facility] [address] 11/5/2017    Peripheral arterial disease ([facility] [address]     Sleep apnea     pt states he has not used bipap \"in years\".  Thyroid disease        Past Surgical History:  Past Surgical History:   Procedure Laterality Date    ABDOMEN SURGERY PROC UNLISTED  1997    Vikas-en-Y    ABDOMEN SURGERY PROC UNLISTED  2000    abdominal hernia repair/mesh    HX APPENDECTOMY      HX CERVICAL LAMINECTOMY      HX COLONOSCOPY      HX HEENT  2004    cataracts removed right eye    HX ORTHOPAEDIC  1982    cervical laminectomy    HX ORTHOPAEDIC Right 1962    knee torn cartilage    HX TONSILLECTOMY      HX UROLOGICAL  12/2015    suprapubic catheter in place    VASCULAR SURGERY PROCEDURE UNLIST Bilateral 2015 2017    angio plasty legs  x2    VASCULAR SURGERY PROCEDURE UNLIST Bilateral 2017    legs venogram  stent right leg       Family History:  Family History   Problem Relation Age of Onset    Cancer Mother     Diabetes Father     Heart Disease Sister     Cancer Brother     Diabetes Brother     Hypertension Brother        Social History:  Social History   Substance Use Topics    Smoking status: Former Smoker     Types: Cigarettes     Quit date: 1/1/1997    Smokeless tobacco: Never Used    Alcohol use 0.6 oz/week     1 Glasses of wine per week       Allergies: Allergies   Allergen Reactions    Neosporin [Neomycin-Bacitracin-Polymyxin] Rash         Review of Systems   Review of Systems   Constitutional: Negative for fever.    Respiratory: Negative for shortness of breath. Cardiovascular: Negative for chest pain. Gastrointestinal: Positive for abdominal pain, constipation, diarrhea and rectal pain. Negative for nausea and vomiting. All other systems reviewed and are negative. Physical Exam     Vitals:    06/14/18 2200 06/14/18 2215 06/14/18 2230 06/14/18 2250   BP: 127/48 124/52 127/51 127/51   Pulse:    (!) 108   Resp:    14   Temp:    98.7 °F (37.1 °C)   SpO2: 99% 98% 98% 99%   Weight:       Height:         Physical Exam   Constitutional: He is oriented to person, place, and time. He appears well-developed and well-nourished. Very thin elderly male, lying on stretcher, NAD    HENT:   Head: Normocephalic and atraumatic. Neck: Normal range of motion. Neck supple. Cardiovascular: Regular rhythm, normal heart sounds and intact distal pulses. Tachycardia present. No murmur heard. Pulmonary/Chest: Effort normal and breath sounds normal. No respiratory distress. He has no wheezes. He has no rales. Abdominal: Soft. Bowel sounds are normal. He exhibits no distension. There is no hepatosplenomegaly. There is tenderness (mild) in the suprapubic area. There is no rigidity, no rebound, no guarding and no CVA tenderness. Suprapubic cath in place, no erythema, swelling or drainage to surrounding area   abd soft      Genitourinary:   Genitourinary Comments: Large amount of soft dark brown/black stool in rectum, stool collected heme negative, large external hemorrhoid  3 decubitus ulcers in various stages   Neurological: He is alert and oriented to person, place, and time. Skin: Skin is warm and dry. Psychiatric: He has a normal mood and affect. Judgment normal.   Nursing note and vitals reviewed.         Diagnostic Study Results     Labs -     Recent Results (from the past 12 hour(s))   CBC WITH AUTOMATED DIFF    Collection Time: 06/14/18  5:50 PM   Result Value Ref Range    WBC 6.5 4.6 - 13.2 K/uL    RBC 3.91 (L) 4.70 - 5.50 M/uL    HGB 10.7 (L) 13.0 - 16.0 g/dL    HCT 33.8 (L) 36.0 - 48.0 %    MCV 86.4 74.0 - 97.0 FL    MCH 27.4 24.0 - 34.0 PG    MCHC 31.7 31.0 - 37.0 g/dL    RDW 15.8 (H) 11.6 - 14.5 %    PLATELET 890 841 - 768 K/uL    MPV 9.6 9.2 - 11.8 FL    NEUTROPHILS 71 40 - 73 %    LYMPHOCYTES 11 (L) 21 - 52 %    MONOCYTES 9 3 - 10 %    EOSINOPHILS 8 (H) 0 - 5 %    BASOPHILS 1 0 - 2 %    ABS. NEUTROPHILS 4.6 1.8 - 8.0 K/UL    ABS. LYMPHOCYTES 0.7 (L) 0.9 - 3.6 K/UL    ABS. MONOCYTES 0.6 0.05 - 1.2 K/UL    ABS. EOSINOPHILS 0.5 (H) 0.0 - 0.4 K/UL    ABS. BASOPHILS 0.0 0.0 - 0.06 K/UL    DF AUTOMATED     METABOLIC PANEL, COMPREHENSIVE    Collection Time: 06/14/18  5:50 PM   Result Value Ref Range    Sodium 134 (L) 136 - 145 mmol/L    Potassium 4.6 3.5 - 5.5 mmol/L    Chloride 97 (L) 100 - 108 mmol/L    CO2 29 21 - 32 mmol/L    Anion gap 8 3.0 - 18 mmol/L    Glucose 190 (H) 74 - 99 mg/dL    BUN 8 7.0 - 18 MG/DL    Creatinine 0.70 0.6 - 1.3 MG/DL    BUN/Creatinine ratio 11 (L) 12 - 20      GFR est AA >60 >60 ml/min/1.73m2    GFR est non-AA >60 >60 ml/min/1.73m2    Calcium 8.7 8.5 - 10.1 MG/DL    Bilirubin, total 0.4 0.2 - 1.0 MG/DL    ALT (SGPT) 18 16 - 61 U/L    AST (SGOT) 18 15 - 37 U/L    Alk.  phosphatase 98 45 - 117 U/L    Protein, total 7.3 6.4 - 8.2 g/dL    Albumin 2.7 (L) 3.4 - 5.0 g/dL    Globulin 4.6 (H) 2.0 - 4.0 g/dL    A-G Ratio 0.6 (L) 0.8 - 1.7     PROTHROMBIN TIME + INR    Collection Time: 06/14/18  5:50 PM   Result Value Ref Range    Prothrombin time 13.3 11.5 - 15.2 sec    INR 1.1 0.8 - 1.2     PTT    Collection Time: 06/14/18  5:50 PM   Result Value Ref Range    aPTT 43.4 (H) 23.0 - 36.4 SEC   CARDIAC PANEL,(CK, CKMB & TROPONIN)    Collection Time: 06/14/18  5:50 PM   Result Value Ref Range    CK 48 39 - 308 U/L    CK - MB 1.1 <3.6 ng/ml    CK-MB Index 2.3 0.0 - 4.0 %    Troponin-I, Qt. <0.02 0.00 - 0.06 NG/ML   EKG, 12 LEAD, INITIAL    Collection Time: 06/14/18  8:18 PM   Result Value Ref Range    Ventricular Rate 121 BPM    Atrial Rate 121 BPM    P-R Interval 140 ms    QRS Duration 108 ms    Q-T Interval 354 ms    QTC Calculation (Bezet) 502 ms    Calculated P Axis 73 degrees    Calculated R Axis -29 degrees    Calculated T Axis 92 degrees    Diagnosis       Poor data quality, interpretation may be adversely affected  Sinus tachycardia with fusion complexes  Minimal voltage criteria for LVH, may be normal variant  Nonspecific ST and T wave abnormality  Abnormal ECG    Confirmed by Demetrio Irving MD, Neil Fischer ([id number]) on 6/14/2018 10:17:25 PM         Radiologic Studies -   CT ABD PELV W CONT   Final Result      XR ABD ACUTE W 1 V CHEST   Final Result        CT Results  (Last 48 hours)               06/14/18 2109  CT ABD PELV W CONT Final result    Impression:  IMPRESSION:       1. There is severe pan colitis suggesting infectious or inflammatory etiology. There is intrahepatic bile duct dilatation of the right and left hepatic ducts   without dilatation of the common bile duct. Exact etiology of this is unclear. In addition there appears to be atrophy of the pancreas with pancreatic duct   dilatation. I would advise MRI of the pancreas and biliary tree with MRCP for   further evaluation on a nonemergent basis with and without contrast exclude any   pancreatic mass. Probable 4 mm polyp in the gallbladder       Splenomegaly           Narrative:  EXAM: CT of the abdomen and pelvis       INDICATION: Abdominal pain       COMPARISON: None. TECHNIQUE: Axial CT imaging of the abdomen and pelvis was performed with   intravenous contrast. Multiplanar reformats were generated. DOSE REDUCTION:  One or more dose reduction techniques were used on this CT:   automated exposure control, adjustment of the mAs and/or kVp according to   patient's size, and iterative reconstruction techniques.  The specific techniques   utilized on this CT exam have been documented in the patient's electronic   medical record.       _______________       FINDINGS: LOWER CHEST: There is scarring and interstitial lung changes at the right lung   base. There is an elevated left hemidiaphragm. LIVER, BILIARY: There is a liver granuloma at the tip of the liver. There is a   15 x 8 mm low-attenuation in the posterior segment of the right hepatic lobe   suggesting a hepatic cyst. Mild hepatic steatosis present. There is intrahepatic   bile duct dilatation common bile duct however is not dilated. Exact cause of the   intrahepatic bile duct dilatations is unclear. Advise MRCP for further   evaluation on a nonemergent basis. There is a 4 mm polyp in the fundus of the   gallbladder. PANCREAS: There is atrophy of the pancreatic body and tail. There appears to be   pancreatic duct dilatation measuring up to 16 mm. This raises the possibility of   an occult pancreatic mass. I would advise MRI for further evaluation on a   nonemergent basis. SPLEEN: Spleen is enlarged measuring 15.6 cm       ADRENALS: There is thickening of the adrenals. KIDNEYS/URETERS: Cortical cysts seen in the midpole the left kidney posteriorly. Right kidney is unremarkable. VASCULATURE: Severe calcific atherosclerosis present. There is chronic type B   dissection of the infrarenal abdominal aorta. There is a right iliac vein stent   present. LYMPH NODES: . There are borderline size left para-aortic lymph nodes measuring   up to 8 mm. GASTRO INTESTINAL TRACT: There is diffuse colonic wall thickening of the entire   colon with pericolonic inflammation suggesting pan colitis. There is fluid in   the rectum with an air-fluid level. Appendectomy. Fluid seen along the right   paracolic gutter. No pneumatosis or portal venous gas. No free air. There is a   ventral hernia measuring 9 x 2.7 x 6.7 cm containing loop of small bowel without   obstruction. PELVIC ORGANS/BLADDER: There is a suprapubic catheter with balloon in the   stomach.  There is a bladder calculus measuring 8 mm. Evaluation the bladder is   limited. No free fluid seen. BONES: No acute or aggressive osseous abnormalities identified. There is   osteopenia and multilevel degenerative disc disease. OTHER: None.       _______________               CXR Results  (Last 48 hours)               06/14/18 0640  XR ABD ACUTE W 1 V CHEST Final result    Impression:  IMPRESSION:       Chest: No acute process       ABDOMEN: No free air. Dilated bowel loops likely representing small bowel. Findings suggestive of   ileus versus partial small bowel obstruction. Follow-up recommended. If   clinically warranted consider CT           Narrative:  EXAM: AP chest x-ray with flat and upright views of the abdomen       INDICATION: Pain constipation       COMPARISON: May 23, 2018       _______________       FINDINGS:       Chest: Heart and mediastinal contours are normal. There is an elevated left   hemidiaphragm. Lungs are clear. There are no effusions. There is no free air   under the diaphragm. ABDOMEN: No free air seen on the decubitus view. There are dilated small bowel   loops in the lower abdomen measuring up to 3 cm suggesting ileus versus small   bowel obstruction. Follow-up recommended. Large amount of stool present in the   rectum. There is a catheter projecting over the pelvis. Right iliac stent   present.       _______________                   Medical Decision Making   I am the first provider for this patient. I reviewed the vital signs, available nursing notes, past medical history, past surgical history, family history and social history. Vital Signs-Reviewed the patient's vital signs. Pulse Oximetry Analysis - 97% on RA     Cardiac Monitor:  Rate: 113 bpm  Rhythm: Tachycardia    EKG interpretation: (Preliminary)  8:18 PM   121 bpm tachycardia with fusion complexes. Similar as old.   EKG read by Mynor Ho PA-C at 9:20 PM    Records Reviewed: Nursing Notes and Old Medical Records    Provider Notes (Medical Decision Making):   constipation secondary to Percocet use, SBO, LBO, ileus, fecal impaction    Pt is presenting to ED after not having a BM for 3 days. Pt is tachycardia but indicates that it is his baseline ( bpm). Black stool was found on rectal exam but this is normal as the patient is anemic and takes iron supplement. Has been taking Percocet for the past week as prescribed from a recent procedure, and recently took Cipro after being diagnosed with a UTI 3 weeks ago. Is currently denying CP and SOB. Is able to pass gas. Procedures:  Procedures  PROCEDURE NOTE - RECTAL EXAM:   5:59PM  Performed by: June Chisholm PA-C  Rectal exam performed. black stool was collected. Stool was Hemoccult tested, and found to be heme Negative. The procedure took 1-15 minutes, and pt tolerated well. Written by ELZA Arenas, ED Scribe, as dictated by June Chisholm PA-C.    ED Course:   5:52 PM Initial assessment performed. The patients presenting problems have been discussed, and they are in agreement with the care plan formulated and outlined with them. I have encouraged them to ask questions as they arise throughout their visit. 7:34 PM Discussed patient's history, exam, and available diagnostics results with Leopold Rouse, MD, Emergency Medicine, who recommends a CT abd with IV contrast.    10:24 PM Pt has no abd pain, states that he states much better since BM, HR was down to 108 bpm. Informed him of all results and tx plan, he was given strict return precautions. Pt and wife understand and agree. Ct was preformed after the enema which could be cause of findings of pancolitis. Pt is not having abd pain, diarrhea, is afebrile, and has no elevated WBC. Less likely inflammatory or infectious     10:32 PM Discussed patient's history, exam, and available diagnostics results with Sheila Young MD, Emergency Medicine, who reviewed CT findings. Agrees with plan to d/c.        Diagnosis and Disposition       DISCHARGE NOTE:  10:46 PM  Kirstin Martel 's  results have been reviewed with him. He has been counseled regarding his diagnosis, treatment, and plan. He verbally conveys understanding and agreement of the signs, symptoms, diagnosis, treatment and prognosis and additionally agrees to follow up as discussed. He also agrees with the care-plan and conveys that all of his questions have been answered. I have also provided discharge instructions for him that include: educational information regarding their diagnosis and treatment, and list of reasons why they would want to return to the ED prior to their follow-up appointment, should his condition change. He has been provided with education for proper emergency department utilization. CLINICAL IMPRESSION:    1. Constipation, unspecified constipation type    2. Sinus tachycardia        Discussion:    PLAN:  1. D/C Home  2. Discharge Medication List as of 6/14/2018 10:47 PM        3. Follow-up Information     Follow up With Details Comments Jamil Davenport MD Schedule an appointment as soon as possible for a visit call for follow up  [address]      Aubrey Willis MD  call for follow up  [id number] [facility] [id number]  [phone]      THE [facility] EMERGENCY DEPT  If symptoms worsen [address] [facility] [id number]  [phone]        _______________________________    Attestations: This note is prepared by Pramod Saldivar, acting as Scribe for Debora Saleh PA-C. Debora Saleh PA-C:  The scribe's documentation has been prepared under my direction and personally reviewed by me in its entirety.   I confirm that the note above accurately reflects all work, treatment, procedures, and medical decision making performed by me.  _______________________________

## 2018-06-14 NOTE — ED NOTES
Assuming care of pt from [facility]. Have been informed that pt reports no BM in 72 hrs. Loyd Wong RN reports pt out of room for xray so pt still needs the soap suds enema.

## 2018-06-14 NOTE — ED NOTES
Soap suds enema been adm. Large amt of stool of rectum. Pt up to [facility] with the assistance of staff. Pt feeling like he can go. Spouse at bedside.

## 2018-06-15 ENCOUNTER — APPOINTMENT (OUTPATIENT)
Dept: GENERAL RADIOLOGY | Age: 73
DRG: 385 | End: 2018-06-15
Attending: NURSE PRACTITIONER
Payer: MEDICARE

## 2018-06-15 ENCOUNTER — HOSPITAL ENCOUNTER (INPATIENT)
Age: 73
LOS: 4 days | Discharge: NURSING, REHAB OR LONG-TERM CARE | DRG: 385 | End: 2018-06-20
Attending: INTERNAL MEDICINE | Admitting: INTERNAL MEDICINE
Payer: MEDICARE

## 2018-06-15 VITALS
WEIGHT: 135 LBS | RESPIRATION RATE: 18 BRPM | BODY MASS INDEX: 17.32 KG/M2 | HEART RATE: 84 BPM | DIASTOLIC BLOOD PRESSURE: 68 MMHG | SYSTOLIC BLOOD PRESSURE: 100 MMHG | HEIGHT: 74 IN

## 2018-06-15 DIAGNOSIS — N30.01 ACUTE CYSTITIS WITH HEMATURIA: ICD-10-CM

## 2018-06-15 DIAGNOSIS — K52.9 COLITIS: Primary | ICD-10-CM

## 2018-06-15 DIAGNOSIS — R53.1 WEAKNESS: ICD-10-CM

## 2018-06-15 PROBLEM — L89.153 SACRAL DECUBITUS ULCER, STAGE III (HCC): Status: ACTIVE | Noted: 2018-06-15

## 2018-06-15 PROBLEM — N39.0 URINARY TRACT INFECTION: Status: ACTIVE | Noted: 2018-06-15

## 2018-06-15 LAB
ALBUMIN SERPL-MCNC: 2.4 G/DL (ref 3.4–5)
ALBUMIN/GLOB SERPL: 0.6 {RATIO} (ref 0.8–1.7)
ALP SERPL-CCNC: 82 U/L (ref 45–117)
ALT SERPL-CCNC: 17 U/L (ref 16–61)
AMORPH CRY URNS QL MICRO: ABNORMAL
ANION GAP SERPL CALC-SCNC: 8 MMOL/L (ref 3–18)
APPEARANCE UR: ABNORMAL
AST SERPL-CCNC: 14 U/L (ref 15–37)
BACTERIA URNS QL MICRO: ABNORMAL /HPF
BASOPHILS # BLD: 0 K/UL (ref 0–0.06)
BASOPHILS NFR BLD: 0 % (ref 0–2)
BILIRUB SERPL-MCNC: 0.3 MG/DL (ref 0.2–1)
BILIRUB UR QL: NEGATIVE
BUN SERPL-MCNC: 9 MG/DL (ref 7–18)
BUN/CREAT SERPL: 16 (ref 12–20)
CALCIUM SERPL-MCNC: 8 MG/DL (ref 8.5–10.1)
CHLORIDE SERPL-SCNC: 100 MMOL/L (ref 100–108)
CK MB CFR SERPL CALC: ABNORMAL % (ref 0–4)
CK MB SERPL-MCNC: <1 NG/ML (ref 5–25)
CK SERPL-CCNC: 27 U/L (ref 39–308)
CO2 SERPL-SCNC: 28 MMOL/L (ref 21–32)
COLOR UR: YELLOW
CREAT SERPL-MCNC: 0.56 MG/DL (ref 0.6–1.3)
DIFFERENTIAL METHOD BLD: ABNORMAL
EOSINOPHIL # BLD: 0 K/UL (ref 0–0.4)
EOSINOPHIL NFR BLD: 1 % (ref 0–5)
ERYTHROCYTE [DISTWIDTH] IN BLOOD BY AUTOMATED COUNT: 15.9 % (ref 11.6–14.5)
GLOBULIN SER CALC-MCNC: 4.1 G/DL (ref 2–4)
GLUCOSE SERPL-MCNC: 202 MG/DL (ref 74–99)
GLUCOSE UR STRIP.AUTO-MCNC: NEGATIVE MG/DL
HCT VFR BLD AUTO: 31.4 % (ref 36–48)
HGB BLD-MCNC: 10.2 G/DL (ref 13–16)
HGB UR QL STRIP: ABNORMAL
INR PPP: 1.1 (ref 0.8–1.2)
KETONES UR QL STRIP.AUTO: 15 MG/DL
LACTATE SERPL-SCNC: 1.1 MMOL/L (ref 0.4–2)
LEUKOCYTE ESTERASE UR QL STRIP.AUTO: ABNORMAL
LIPASE SERPL-CCNC: 63 U/L (ref 73–393)
LYMPHOCYTES # BLD: 0.5 K/UL (ref 0.9–3.6)
LYMPHOCYTES NFR BLD: 12 % (ref 21–52)
MCH RBC QN AUTO: 27.9 PG (ref 24–34)
MCHC RBC AUTO-ENTMCNC: 32.5 G/DL (ref 31–37)
MCV RBC AUTO: 85.8 FL (ref 74–97)
MONOCYTES # BLD: 0.2 K/UL (ref 0.05–1.2)
MONOCYTES NFR BLD: 5 % (ref 3–10)
NEUTS SEG # BLD: 3.5 K/UL (ref 1.8–8)
NEUTS SEG NFR BLD: 82 % (ref 40–73)
NITRITE UR QL STRIP.AUTO: POSITIVE
PH UR STRIP: >8.5 [PH] (ref 5–8)
PLATELET # BLD AUTO: 169 K/UL (ref 135–420)
PMV BLD AUTO: 9.6 FL (ref 9.2–11.8)
POTASSIUM SERPL-SCNC: 3.5 MMOL/L (ref 3.5–5.5)
PROT SERPL-MCNC: 6.5 G/DL (ref 6.4–8.2)
PROT UR STRIP-MCNC: 100 MG/DL
PROTHROMBIN TIME: 13.4 SEC (ref 11.5–15.2)
RBC # BLD AUTO: 3.66 M/UL (ref 4.7–5.5)
RBC #/AREA URNS HPF: ABNORMAL /HPF (ref 0–5)
SODIUM SERPL-SCNC: 136 MMOL/L (ref 136–145)
SP GR UR REFRACTOMETRY: 1.01 (ref 1–1.03)
TRI-PHOS CRY URNS QL MICRO: ABNORMAL
TROPONIN I SERPL-MCNC: <0.02 NG/ML (ref 0–0.06)
UROBILINOGEN UR QL STRIP.AUTO: 0.2 EU/DL (ref 0.2–1)
WBC # BLD AUTO: 4.3 K/UL (ref 4.6–13.2)
WBC URNS QL MICRO: ABNORMAL /HPF (ref 0–5)

## 2018-06-15 PROCEDURE — 87077 CULTURE AEROBIC IDENTIFY: CPT | Performed by: NURSE PRACTITIONER

## 2018-06-15 PROCEDURE — 85025 COMPLETE CBC W/AUTO DIFF WBC: CPT | Performed by: NURSE PRACTITIONER

## 2018-06-15 PROCEDURE — 83690 ASSAY OF LIPASE: CPT | Performed by: NURSE PRACTITIONER

## 2018-06-15 PROCEDURE — 87086 URINE CULTURE/COLONY COUNT: CPT | Performed by: NURSE PRACTITIONER

## 2018-06-15 PROCEDURE — 74011250637 HC RX REV CODE- 250/637: Performed by: INTERNAL MEDICINE

## 2018-06-15 PROCEDURE — 85610 PROTHROMBIN TIME: CPT | Performed by: NURSE PRACTITIONER

## 2018-06-15 PROCEDURE — 82550 ASSAY OF CK (CPK): CPT | Performed by: NURSE PRACTITIONER

## 2018-06-15 PROCEDURE — 87186 SC STD MICRODIL/AGAR DIL: CPT | Performed by: NURSE PRACTITIONER

## 2018-06-15 PROCEDURE — 74011000250 HC RX REV CODE- 250: Performed by: NURSE PRACTITIONER

## 2018-06-15 PROCEDURE — 81001 URINALYSIS AUTO W/SCOPE: CPT | Performed by: NURSE PRACTITIONER

## 2018-06-15 PROCEDURE — 80053 COMPREHEN METABOLIC PANEL: CPT | Performed by: NURSE PRACTITIONER

## 2018-06-15 PROCEDURE — 87040 BLOOD CULTURE FOR BACTERIA: CPT | Performed by: NURSE PRACTITIONER

## 2018-06-15 PROCEDURE — 96375 TX/PRO/DX INJ NEW DRUG ADDON: CPT

## 2018-06-15 PROCEDURE — 96361 HYDRATE IV INFUSION ADD-ON: CPT

## 2018-06-15 PROCEDURE — 71045 X-RAY EXAM CHEST 1 VIEW: CPT

## 2018-06-15 PROCEDURE — 93005 ELECTROCARDIOGRAM TRACING: CPT

## 2018-06-15 PROCEDURE — 96365 THER/PROPH/DIAG IV INF INIT: CPT

## 2018-06-15 PROCEDURE — 99285 EMERGENCY DEPT VISIT HI MDM: CPT

## 2018-06-15 PROCEDURE — 74011000258 HC RX REV CODE- 258: Performed by: NURSE PRACTITIONER

## 2018-06-15 PROCEDURE — 74011250636 HC RX REV CODE- 250/636: Performed by: NURSE PRACTITIONER

## 2018-06-15 PROCEDURE — 83605 ASSAY OF LACTIC ACID: CPT | Performed by: NURSE PRACTITIONER

## 2018-06-15 RX ORDER — SODIUM CHLORIDE 9 MG/ML
1000 INJECTION, SOLUTION INTRAVENOUS ONCE
Status: COMPLETED | OUTPATIENT
Start: 2018-06-15 | End: 2018-06-15

## 2018-06-15 RX ORDER — OXYCODONE AND ACETAMINOPHEN 5; 325 MG/1; MG/1
2 TABLET ORAL
Status: DISCONTINUED | OUTPATIENT
Start: 2018-06-15 | End: 2018-06-20 | Stop reason: HOSPADM

## 2018-06-15 RX ADMIN — CEFTRIAXONE SODIUM 1 G: 1 INJECTION, POWDER, FOR SOLUTION INTRAMUSCULAR; INTRAVENOUS at 22:39

## 2018-06-15 RX ADMIN — OXYCODONE HYDROCHLORIDE AND ACETAMINOPHEN 2 TABLET: 5; 325 TABLET ORAL at 23:44

## 2018-06-15 RX ADMIN — PIPERACILLIN AND TAZOBACTAM 2.25 G: 2; .25 INJECTION, POWDER, LYOPHILIZED, FOR SOLUTION INTRAVENOUS; PARENTERAL at 22:55

## 2018-06-15 RX ADMIN — SODIUM CHLORIDE 1000 ML: 900 INJECTION, SOLUTION INTRAVENOUS at 20:53

## 2018-06-15 NOTE — ED NOTES
Pt saying he is feeling much better. Gotten relief from the soap suds enema with + results. Pt's spouse reporting that she can transport pt to home. That she has notified her son to get him into the house. Awaiting disposition of pt from the physician. NAD observed.

## 2018-06-15 NOTE — ED NOTES
[facility] reporting that he has completed EKG, and pt remains on the [facility] attempting to have BM- some + results.

## 2018-06-15 NOTE — ED NOTES
Pt feeling \"much better. \"  Pt denying any pain issues/or abd pain/discomfort at the present. Pt being d/c home with his wife. Staff assisted pt to get dressed. Total of 500 ml urine emptied from cath bag from suprapubic cath while in ED. Emptied urine bag prior to d/c. D/C instructions reviewed with pt's wife/understanding acknowledged by his wife. Pt d/c home. EDT Maureen Marquez, and EDT Tammy wheeling pt from ED via [place] pt into car. Pt's wife stating that her son will be assisting with getting him into the house. NAD observed upon d/c home. Pt A/O, stood with the assistance of staff.

## 2018-06-15 NOTE — ED PROVIDER NOTES
EMERGENCY DEPARTMENT HISTORY AND PHYSICAL EXAM    Date: 6/15/2018  Patient Name: Leobardo Dunn.    History of Presenting Illness     Chief Complaint   Patient presents with    Fatigue         History Provided By: Patient    Chief Complaint: weakness  Timing:  Worsening  Location: generalized  Severity: 5 out of 10  Associated Symptoms: decreased energy, loss of appetite, 3 pressure ulcers on the buttocks, buttock pain    Additional History (Context):   5:56 PM  Leobardo Freeman is a 74 y.o. male with PMHX COPD who presents with to the emergency department C/O generalized weakness. Pt was seen her last night for the same. Associated sxs include decreased energy, loss of appetite, 3 pressure ulcers on the buttocks, buttock pain. Pt has a Sher catheter. Hx of several UTI's recently. Pt denies fever, n/v/d, abdominal pain, and any other sxs or complaints.      PCP: Waqar Villa MD    Current Facility-Administered Medications   Medication Dose Route Frequency Provider Last Rate Last Dose    fluticasone-vilanterol (BREO ELLIPTA) 100mcg-25mcg/puff  1 Puff Inhalation DAILY Renella Holter, MD        aspirin delayed-release tablet 81 mg  81 mg Oral QHS Steven Lou MD   81 mg at 06/16/18 0037    furosemide (LASIX) tablet 40 mg  40 mg Oral DAILY Renella Holter, MD        dilTIAZem CD (CARDIZEM CD) capsule 360 mg  360 mg Oral DAILY Renella Holter, MD        loratadine (CLARITIN) tablet 10 mg  10 mg Oral DAILY Steven Lou MD        fluticasone (FLONASE) 50 mcg/actuation nasal spray 2 Spray  2 Spray Both Nostrils DAILY Steven Negrete MD        atorvastatin (LIPITOR) tablet 20 mg  20 mg Oral QHS Steven Lou MD   20 mg at 06/16/18 0037    clopidogrel (PLAVIX) tablet 75 mg  75 mg Oral DAILY Renella Holter, MD        iron aspgly,wc-X-C72-FA-Ca-suc (FERREX 150 FORTE PLUS) cap 1 Cap  1 Cap Oral BID Steven Lou MD        potassium citrate (UROCIT-K10) tablet 10 mEq  10 mEq Oral BID Renella Holter, MD        multivitamin, tx-iron-ca-min (THERA-M w/ IRON) tablet 1 Tab  1 Tab Oral DAILY Baljeet Tsai MD        omeprazole (PRILOSEC) capsule 20 mg  20 mg Oral DAILY Baljeet Tsai MD        pregabalin (LYRICA) capsule 75 mg  75 mg Oral BID Baljeet Tsai MD        insulin glargine (LANTUS) injection 20 Units  20 Units SubCUTAneous QHS Baljeet Tsai MD   20 Units at 06/16/18 0048    levothyroxine (SYNTHROID) tablet 100 mcg  100 mcg Oral ACB Baljeet Tsai MD        cilostazol (PLETAL) tablet 100 mg  100 mg Oral ACB&D Baljeet Tsai MD        cholecalciferol (VITAMIN D3) tablet 5,000 Units  5,000 Units Oral DAILY Baljeet Tsai MD        albuterol (PROVENTIL HFA, VENTOLIN HFA, PROAIR HFA) inhaler 2 Puff  2 Puff Inhalation Q4H PRN Baljeet Tsai MD        umeclidinium (INCRUSE ELLIPTA) 62.5 mcg/actuation  1 Puff Inhalation DAILY Baljeet Tsai MD        dextrose 5% and 0.9% NaCl infusion  75 mL/hr IntraVENous CONTINUOUS Baljeet Tsai MD 75 mL/hr at 06/16/18 0036 75 mL/hr at 06/16/18 0036    acetaminophen (TYLENOL) tablet 650 mg  650 mg Oral Q4H PRN Steven Lou MD        ondansetron (ZOFRAN) injection 4 mg  4 mg IntraVENous Q4H PRN Baljeet Tsai MD        docusate sodium (COLACE) capsule 100 mg  100 mg Oral BID PRN Baljeet Tsai MD        piperacillin-tazobactam (ZOSYN) 3.375 g in 0.9% sodium chloride (MBP/ADV) 100 mL MBP  3.375 g IntraVENous Q8H Steven Yeung  mL/hr at 06/16/18 0037 3.375 g at 06/16/18 0037    oxyCODONE-acetaminophen (PERCOCET) 5-325 mg per tablet 2 Tab  2 Tab Oral Q6H PRN Baljeet Tsai MD   2 Tab at 06/15/18 6404       Past History     Past Medical History:  Past Medical History:   Diagnosis Date    Acid reflux     Anemia     Arthritis     [facility]rial fibrillation [facility] [address]     CAD (coronary artery disease)     [facility]     Coagulation disorder ([facility]     anemia    Diabetes ([facility] 1997    GERD (gastroesophageal reflux disease)     H/O seasonal allergies     Hypercholesterolemia     Hypertension 1997    Ill-defined condition     hand tremors    PAF (paroxysmal atrial fibrillation) ([facility] [address] 11/5/2017    Peripheral arterial disease ([facility] [address]     Sleep apnea     pt states he has not used bipap \"in years\".  Thyroid disease        Past Surgical History:  Past Surgical History:   Procedure Laterality Date    ABDOMEN SURGERY PROC UNLISTED  1997    Vikas-en-Y    ABDOMEN SURGERY PROC UNLISTED  2000    abdominal hernia repair/mesh    HX APPENDECTOMY      HX CERVICAL LAMINECTOMY      HX COLONOSCOPY      HX HEENT  2004    cataracts removed right eye    HX ORTHOPAEDIC  1982    cervical laminectomy    HX ORTHOPAEDIC Right 1962    knee torn cartilage    HX TONSILLECTOMY      HX UROLOGICAL  12/2015    suprapubic catheter in place    VASCULAR SURGERY PROCEDURE UNLIST Bilateral 2015 2017    angio plasty legs  x2    VASCULAR SURGERY PROCEDURE UNLIST Bilateral 2017    legs venogram  stent right leg       Family History:  Family History   Problem Relation Age of Onset    Cancer Mother     Diabetes Father     Heart Disease Sister     Cancer Brother     Diabetes Brother     Hypertension Brother        Social History:  Social History   Substance Use Topics    Smoking status: Former Smoker     Types: Cigarettes     Quit date: 1/1/1997    Smokeless tobacco: Never Used    Alcohol use 0.6 oz/week     1 Glasses of wine per week       Allergies: Allergies   Allergen Reactions    Neosporin [Neomycin-Bacitracin-Polymyxin] Rash         Review of Systems   Review of Systems   Constitutional: Positive for appetite change (decreased). Negative for fever. Gastrointestinal: Negative for abdominal pain, diarrhea, nausea and vomiting. Musculoskeletal: Positive for myalgias (buttock pain). Skin: Positive for wound (pressure ulcers, butocks). Neurological: Positive for weakness (generalized). All other systems reviewed and are negative.       Physical Exam     Vitals:    06/15/18 2240 06/15/18 2305 06/15/18 2308 06/16/18 0039   BP: 143/80 127/64 127/46 121/46   Pulse: (!) 117 (!) 110 (!) 114 (!) 108   Resp: 20 16 20 18   Temp: 98.8 °F (37.1 °C) 98.6 °F (37 °C)  98.5 °F (36.9 °C)   SpO2: 97% 96% 95% 96%   Weight:    61.9 kg (136 lb 6.4 oz)     Physical Exam   Constitutional:   Frail and elderly in appearance, cachectic. Pt's legs are wrapped because of foot ulcers, BLE up to the knees are covered. Eyes: EOM are normal. Pupils are equal, round, and reactive to light. Conjunctiva are pale   Neck: Normal range of motion. Cardiovascular: Regular rhythm. Tachycardia present. No murmur heard. Pulmonary/Chest: Effort normal and breath sounds normal.   Abdominal: Soft. Bowel sounds are normal. There is no tenderness. Genitourinary:   Genitourinary Comments: Suprapubic catheter; no redness, draining clear yellow urine     Musculoskeletal:   Strong equal strength b/l upper extremities   Neurological: He has normal strength. A sensory deficit (sensation of the BLE impaired ) is present. No cranial nerve deficit. Skin: Skin is warm and dry. No erythema. Diagnostic Study Results     Labs -     Recent Results (from the past 12 hour(s))   CBC WITH AUTOMATED DIFF    Collection Time: 06/15/18  8:20 PM   Result Value Ref Range    WBC 4.3 (L) 4.6 - 13.2 K/uL    RBC 3.66 (L) 4.70 - 5.50 M/uL    HGB 10.2 (L) 13.0 - 16.0 g/dL    HCT 31.4 (L) 36.0 - 48.0 %    MCV 85.8 74.0 - 97.0 FL    MCH 27.9 24.0 - 34.0 PG    MCHC 32.5 31.0 - 37.0 g/dL    RDW 15.9 (H) 11.6 - 14.5 %    PLATELET 666 412 - 350 K/uL    MPV 9.6 9.2 - 11.8 FL    NEUTROPHILS 82 (H) 40 - 73 %    LYMPHOCYTES 12 (L) 21 - 52 %    MONOCYTES 5 3 - 10 %    EOSINOPHILS 1 0 - 5 %    BASOPHILS 0 0 - 2 %    ABS. NEUTROPHILS 3.5 1.8 - 8.0 K/UL    ABS. LYMPHOCYTES 0.5 (L) 0.9 - 3.6 K/UL    ABS. MONOCYTES 0.2 0.05 - 1.2 K/UL    ABS. EOSINOPHILS 0.0 0.0 - 0.4 K/UL    ABS.  BASOPHILS 0.0 0.0 - 0.06 K/UL    DF AUTOMATED     METABOLIC PANEL, COMPREHENSIVE    Collection Time: 06/15/18  8:20 PM   Result Value Ref Range    Sodium 136 136 - 145 mmol/L    Potassium 3.5 3.5 - 5.5 mmol/L    Chloride 100 100 - 108 mmol/L    CO2 28 21 - 32 mmol/L    Anion gap 8 3.0 - 18 mmol/L    Glucose 202 (H) 74 - 99 mg/dL    BUN 9 7.0 - 18 MG/DL    Creatinine 0.56 (L) 0.6 - 1.3 MG/DL    BUN/Creatinine ratio 16 12 - 20      GFR est AA >60 >60 ml/min/1.73m2    GFR est non-AA >60 >60 ml/min/1.73m2    Calcium 8.0 (L) 8.5 - 10.1 MG/DL    Bilirubin, total 0.3 0.2 - 1.0 MG/DL    ALT (SGPT) 17 16 - 61 U/L    AST (SGOT) 14 (L) 15 - 37 U/L    Alk.  phosphatase 82 45 - 117 U/L    Protein, total 6.5 6.4 - 8.2 g/dL    Albumin 2.4 (L) 3.4 - 5.0 g/dL    Globulin 4.1 (H) 2.0 - 4.0 g/dL    A-G Ratio 0.6 (L) 0.8 - 1.7     LIPASE    Collection Time: 06/15/18  8:20 PM   Result Value Ref Range    Lipase 63 (L) 73 - 393 U/L   CARDIAC PANEL,(CK, CKMB & TROPONIN)    Collection Time: 06/15/18  8:20 PM   Result Value Ref Range    CK 27 (L) 39 - 308 U/L    CK - MB <1.0 <3.6 ng/ml    CK-MB Index  0.0 - 4.0 %     CALCULATION NOT PERFORMED WHEN RESULT IS BELOW LINEAR LIMIT    Troponin-I, Qt. <0.02 0.00 - 0.06 NG/ML   PROTHROMBIN TIME + INR    Collection Time: 06/15/18  8:20 PM   Result Value Ref Range    Prothrombin time 13.4 11.5 - 15.2 sec    INR 1.1 0.8 - 1.2     LACTIC ACID    Collection Time: 06/15/18  8:20 PM   Result Value Ref Range    Lactic acid 1.1 0.4 - 2.0 MMOL/L   EKG, 12 LEAD, INITIAL    Collection Time: 06/15/18  9:00 PM   Result Value Ref Range    Ventricular Rate 113 BPM    Atrial Rate 113 BPM    P-R Interval 162 ms    QRS Duration 112 ms    Q-T Interval 366 ms    QTC Calculation (Bezet) 502 ms    Calculated P Axis 67 degrees    Calculated R Axis 1 degrees    Calculated T Axis 103 degrees    Diagnosis       Sinus tachycardia  ST & T wave abnormality, consider lateral ischemia  Abnormal ECG  When compared with ECG of 14-JUN-2018 20:18,  fusion complexes are no longer present     URINALYSIS W/ RFLX MICROSCOPIC    Collection Time: 06/15/18 9:30 PM   Result Value Ref Range    Color YELLOW      Appearance TURBID      Specific gravity 1.012 1.005 - 1.030      pH (UA) >8.5 (H) 5.0 - 8.0    Protein 100 (A) NEG mg/dL    Glucose NEGATIVE  NEG mg/dL    Ketone 15 (A) NEG mg/dL    Bilirubin NEGATIVE  NEG      Blood MODERATE (A) NEG      Urobilinogen 0.2 0.2 - 1.0 EU/dL    Nitrites POSITIVE (A) NEG      Leukocyte Esterase LARGE (A) NEG     URINE MICROSCOPIC ONLY    Collection Time: 06/15/18  9:30 PM   Result Value Ref Range    WBC TOO NUMEROUS TO COUNT 0 - 5 /hpf    RBC 4 to 10 0 - 5 /hpf    Bacteria 4+ (A) NEG /hpf    Amorphous Crystals 3+ (A) NEG    Triple Phosphate crystals 4 to 10 NEG   GLUCOSE, POC    Collection Time: 06/16/18 12:46 AM   Result Value Ref Range    Glucose (POC) 162 (H) 70 - 110 mg/dL       Radiologic Studies -   XR CHEST PORT   Final Result     IMPRESSION:     No acute process     As read by the radiologist.     7:12 PM  RADIOLOGY FINDINGS  Chest X-ray shows, no acute pulmonary or cardiac finding  Pending review by Radiologist  Recorded by Jackson Batista. Tollis, ED Scribe, as dictated by Lorraine Huang NP      CT Results  (Last 48 hours)               06/14/18 2109  CT ABD PELV W CONT Final result    Impression:  IMPRESSION:       1. There is severe pan colitis suggesting infectious or inflammatory etiology. There is intrahepatic bile duct dilatation of the right and left hepatic ducts   without dilatation of the common bile duct. Exact etiology of this is unclear. In addition there appears to be atrophy of the pancreas with pancreatic duct   dilatation. I would advise MRI of the pancreas and biliary tree with MRCP for   further evaluation on a nonemergent basis with and without contrast exclude any   pancreatic mass. Probable 4 mm polyp in the gallbladder       Splenomegaly           Narrative:  EXAM: CT of the abdomen and pelvis       INDICATION: Abdominal pain       COMPARISON: None.        TECHNIQUE: Axial CT imaging of the abdomen and pelvis was performed with   intravenous contrast. Multiplanar reformats were generated. DOSE REDUCTION:  One or more dose reduction techniques were used on this CT:   automated exposure control, adjustment of the mAs and/or kVp according to   patient's size, and iterative reconstruction techniques. The specific techniques   utilized on this CT exam have been documented in the patient's electronic   medical record.       _______________       FINDINGS:       LOWER CHEST: There is scarring and interstitial lung changes at the right lung   base. There is an elevated left hemidiaphragm. LIVER, BILIARY: There is a liver granuloma at the tip of the liver. There is a   15 x 8 mm low-attenuation in the posterior segment of the right hepatic lobe   suggesting a hepatic cyst. Mild hepatic steatosis present. There is intrahepatic   bile duct dilatation common bile duct however is not dilated. Exact cause of the   intrahepatic bile duct dilatations is unclear. Advise MRCP for further   evaluation on a nonemergent basis. There is a 4 mm polyp in the fundus of the   gallbladder. PANCREAS: There is atrophy of the pancreatic body and tail. There appears to be   pancreatic duct dilatation measuring up to 16 mm. This raises the possibility of   an occult pancreatic mass. I would advise MRI for further evaluation on a   nonemergent basis. SPLEEN: Spleen is enlarged measuring 15.6 cm       ADRENALS: There is thickening of the adrenals. KIDNEYS/URETERS: Cortical cysts seen in the midpole the left kidney posteriorly. Right kidney is unremarkable. VASCULATURE: Severe calcific atherosclerosis present. There is chronic type B   dissection of the infrarenal abdominal aorta. There is a right iliac vein stent   present. LYMPH NODES: . There are borderline size left para-aortic lymph nodes measuring   up to 8 mm.        GASTRO INTESTINAL TRACT: There is diffuse colonic wall thickening of the entire   colon with pericolonic inflammation suggesting pan colitis. There is fluid in   the rectum with an air-fluid level. Appendectomy. Fluid seen along the right   paracolic gutter. No pneumatosis or portal venous gas. No free air. There is a   ventral hernia measuring 9 x 2.7 x 6.7 cm containing loop of small bowel without   obstruction. PELVIC ORGANS/BLADDER: There is a suprapubic catheter with balloon in the   stomach. There is a bladder calculus measuring 8 mm. Evaluation the bladder is   limited. No free fluid seen. BONES: No acute or aggressive osseous abnormalities identified. There is   osteopenia and multilevel degenerative disc disease. OTHER: None.       _______________               CXR Results  (Last 48 hours)               06/15/18 1830  XR CHEST PORT Final result    Impression:  IMPRESSION:       No acute process           Narrative:  EXAM: AP portable upright chest       INDICATION: Weakness       COMPARISON: None.       _______________       FINDINGS:       Heart and mediastinal contours are normal. Lungs are clear. There are no   effusions or pneumothorax.       _______________           06/14/18 0640  XR ABD ACUTE W 1 V CHEST Final result    Impression:  IMPRESSION:       Chest: No acute process       ABDOMEN: No free air. Dilated bowel loops likely representing small bowel. Findings suggestive of   ileus versus partial small bowel obstruction. Follow-up recommended. If   clinically warranted consider CT           Narrative:  EXAM: AP chest x-ray with flat and upright views of the abdomen       INDICATION: Pain constipation       COMPARISON: May 23, 2018       _______________       FINDINGS:       Chest: Heart and mediastinal contours are normal. There is an elevated left   hemidiaphragm. Lungs are clear. There are no effusions. There is no free air   under the diaphragm. ABDOMEN: No free air seen on the decubitus view.  There are dilated small bowel   loops in the lower abdomen measuring up to 3 cm suggesting ileus versus small   bowel obstruction. Follow-up recommended. Large amount of stool present in the   rectum. There is a catheter projecting over the pelvis. Right iliac stent   present.       _______________                   Medical Decision Making   I am the first provider for this patient. I reviewed the vital signs, available nursing notes, past medical history, past surgical history, family history and social history. Vital Signs-Reviewed the patient's vital signs. Pulse Oximetry Analysis - 97% on RA    EKG interpretation: (Preliminary)  Read at 9:00 PM by Ermias Lucas NP  Sinus Tachycardia; 113 bpm; No STEMI; Nonspecific ST changes  Written by Glynn [facility]      Records Reviewed: Nursing Notes, Old Medical Records, Previous Radiology Studies and Previous Laboratory Studies   Pt was seen yesterday c/o constipation for 3 days and rectal pain. Recently dxed with UTI and put on Cipro. CT a/p was performed with contrast, which revealed intrahepatic bile duct dilation of the left hepatic ducts, and severe pan colitis. Provider Notes (Medical Decision Making): Patient with recent pan colitis diagnosis and increasing weakness with dehydration. Positive UTI. Will be admitted for IV fluids and abx. Patient and family are agreeable to plan of care. Patient was stable during his stay in the ED    Procedures:  Procedures    ED Course:   5:56 PM Initial assessment performed. The patients presenting problems have been discussed, and they are in agreement with the care plan formulated and outlined with them. I have encouraged them to ask questions as they arise throughout their visit. CONSULT NOTE:  11:23 PM  Discussed pt's hx and available diagnostic and imaging results with Lynn Harris MD, the pt's attending. Reviewed care plans and Lynn Harris MD is in agreement with the plan of care.  Written by SANDRA Delgado, as dictated by Bettye Curtis NP.   CONSULT NOTE:   10:40 PM  Bettye Curtis NP spoke with Iona Alpers, MD   Specialty: Hospitalist  Discussed pt's hx, disposition, and available diagnostic and imaging results  in person. Reviewed care plans. Consulting physician agrees with plans as outlined. Agrees to admit the pt. Recommends giving the pt zosyn. .      Diagnosis and Disposition     Core Measures:  For Hospitalized Patients:    1. Hospitalization Decision Time:  The decision to hospitalize the patient was made by Bettye Curtis NP at 10:40 PM on 6/15/2018    2. Aspirin: Aspirin was not given because the patient did not present with a stroke at the time of their Emergency Department evaluation    10:42 PM  Patient is being admitted to the hospital by Bettye Curtis NP. The results of their tests and reasons for their admission have been discussed with them and/or available family. They convey agreement and understanding for the need to be admitted and for their admission diagnosis. 10:50 PM: Dr. Darlene Disla, recommends clear liquid diet and stool culture. Does not believe he needs to consult on patient     CONDITIONS ON ADMISSION:  Sepsis is not present at the time of admission. Deep Vein Thrombosis is not present at the time of admission. Thrombosis is not present at the time of admission. Urinary Tract Infection is present at the time of admission. Pneumonia is not present at the time of admission. MRSA is not present at the time of admission. Wound infection is not present at the time of admission. Pressure Ulcer is not present at the time of admission. CLINICAL IMPRESSION:    1. Colitis    2. Weakness    3. Acute cystitis with hematuria        PLAN:  1. ADMIT  _______________________________    Attestations: This note is prepared by Shannon Jiménez. Lindsey, acting as Scribe for Lucas Ceballos NP.     Keely Villagran NP:  The scribe's documentation has been prepared under my direction and personally reviewed by me in its entirety.   I confirm that the note above accurately reflects all work, treatment, procedures, and medical decision making performed by me.  _______________________________

## 2018-06-15 NOTE — DISCHARGE INSTRUCTIONS
Constipation: Care Instructions  Your Care Instructions    Constipation means that you have a hard time passing stools (bowel movements). People pass stools from 3 times a day to once every 3 days. What is normal for you may be different. Constipation may occur with pain in the rectum and cramping. The pain may get worse when you try to pass stools. Sometimes there are small amounts of bright red blood on toilet paper or the surface of stools. This is because of enlarged veins near the rectum (hemorrhoids). A few changes in your diet and lifestyle may help you avoid ongoing constipation. Your doctor may also prescribe medicine to help loosen your stool. Some medicines can cause constipation. These include pain medicines and antidepressants. Tell your doctor about all the medicines you take. Your doctor may want to make a medicine change to ease your symptoms. Follow-up care is a key part of your treatment and safety. Be sure to make and go to all appointments, and call your doctor if you are having problems. It's also a good idea to know your test results and keep a list of the medicines you take. How can you care for yourself at home? · Drink plenty of fluids, enough so that your urine is light yellow or clear like water. If you have kidney, heart, or liver disease and have to limit fluids, talk with your doctor before you increase the amount of fluids you drink. · Include high-fiber foods in your diet each day. These include fruits, vegetables, beans, and whole grains. · Get at least 30 minutes of exercise on most days of the week. Walking is a good choice. You also may want to do other activities, such as running, swimming, cycling, or playing tennis or team sports. · Take a fiber supplement, such as Citrucel or Metamucil, every day. Read and follow all instructions on the label. · Schedule time each day for a bowel movement. A daily routine may help.  Take your time having your bowel movement. · Support your feet with a small step stool when you sit on the toilet. This helps flex your hips and places your pelvis in a squatting position. · Your doctor may recommend an over-the-counter laxative to relieve your constipation. Examples are Milk of Magnesia and MiraLax. Read and follow all instructions on the label. Do not use laxatives on a long-term basis. When should you call for help? Call your doctor now or seek immediate medical care if:  ? · You have new or worse belly pain. ? · You have new or worse nausea or vomiting. ? · You have blood in your stools. ? Watch closely for changes in your health, and be sure to contact your doctor if:  ? · Your constipation is getting worse. ? · You do not get better as expected. Where can you learn more? Go to http://mariella-kenyon.info/. Enter 21 [phone] in the search box to learn more about \"Constipation: Care Instructions. \"  Current as of: March 20, 2017  Content Version: 11.4  © 9530-6525 [employer]. Care instructions adapted under license by [employer] (which disclaims liability or warranty for this information). If you have questions about a medical condition or this instruction, always ask your healthcare professional. [address] any warranty or liability for your use of this information.

## 2018-06-15 NOTE — PROGRESS NOTES
BS VEIN/VASCULAR [facility] REHABILITATION THE [facility]          Chart reviewed for the following:   Leola Metz RN, have reviewed the medications and updated the Allergic reactions for [address] performed immediately prior to start of procedure:   Leola Metz RN, have performed the following reviews on Kemi Human Sr. prior to the start of the procedure:            * Patient was identified by name and date of birth   * Agreement that [facility] boot removed and reapplied   * Procedure site verified   * Patient was positioned for comfort  * Verbal consent was given by patient     Time: 1100       Date of procedure: 6/15/2018    Procedure performed by:  Wendy Borja    How tolerated by patient: pt tolerated well    Comments: Pt placed in room old dressing removed and legs washed with wound , adaptic applied to heal and inner ankle wounds on the right and adaptic applied to left outter ankle wound. Theraskin intake with granulation noted . Pt states that drainage is better.  Applied kerlex and padded with ABDs , then covered with another kerlex and covered with coban and tudi

## 2018-06-15 NOTE — IP AVS SNAPSHOT
[address] [place] [id number] 
[phone] Patient: Mariia Glover Sr. MRN: [id number] SYU:[birth year] A check jasson indicates which time of day the medication should be taken. My Medications START taking these medications Instructions Each Dose to Equal  
 Morning Noon Evening Bedtime  
 ertapenem 1 gram 1 g IVPB Start taking on:  6/21/2018 Your last dose was: Your next dose is:    
   
   
 1 g by IntraVENous route every twenty-four (24) hours for 8 days. 1 g  
    
   
   
   
  
 insulin glargine 100 unit/mL injection Commonly known as:  LANTUS Replaces:  LANTUS SOLOSTAR U-100 INSULIN 100 unit/mL (3 mL) Inpn Your last dose was: Your next dose is:    
   
   
 30 Units by SubCUTAneous route nightly. 30 Units CHANGE how you take these medications Instructions Each Dose to Equal  
 Morning Noon Evening Bedtime NovoLOG Flexpen U-100 Insulin 100 unit/mL Inpn Generic drug:  insulin aspart U-100 What changed:  Another medication with the same name was removed. Continue taking this medication, and follow the directions you see here. Your last dose was: Your next dose is:    
   
   
 5 Units by SubCUTAneous route daily. Sliding scale  Plus the above dose 5 Units CONTINUE taking these medications Instructions Each Dose to Equal  
 Morning Noon Evening Bedtime ADVAIR DISKUS 250-50 mcg/dose diskus inhaler Generic drug:  fluticasone-salmeterol Your last dose was: Your next dose is: Take 2 Puffs by inhalation every twelve (12) hours. 2 Puff  
    
   
   
   
  
 albuterol sulfate 90 mcg/actuation Aepb Your last dose was: Your next dose is: Take 2 Puffs by inhalation every four (4) hours as needed. Indications: Chronic Obstructive Pulmonary Disease 2 Puff  
    
   
   
   
  
 aspirin 81 mg tablet Your last dose was: Your next dose is: Take 81 mg by mouth nightly. 81 mg  
    
   
   
   
  
 atorvastatin 20 mg tablet Commonly known as:  LIPITOR Your last dose was: Your next dose is: Take  by mouth nightly. cilostazol 100 mg tablet Commonly known as:  PLETAL Your last dose was: Your next dose is: Take 100 mg by mouth Before breakfast and dinner. 100 mg CLARITIN 10 mg tablet Generic drug:  loratadine Your last dose was: Your next dose is: Take 10 mg by mouth daily. 10 mg  
    
   
   
   
  
 iron aspgly,[id number]-[id number] [id number] mg-mg-mcg-mg Cap cap Commonly known as:  FERREX [address] Your last dose was: Your next dose is: Take 1 Cap by mouth two (2) times a day. Indications: anemia 1 Cap LASIX 40 mg tablet Generic drug:  furosemide Your last dose was: Your next dose is: Take 40 mg by mouth daily. Indications: hypertension 40 mg  
    
   
   
   
  
 levothyroxine 100 mcg tablet Commonly known as:  SYNTHROID Your last dose was: Your next dose is: Take 100 mcg by mouth Daily (before breakfast). 100 mcg  
    
   
   
   
  
 lisinopril 2.5 mg tablet Commonly known as:  [place] Your last dose was: Your next dose is: Take  by mouth daily. LYRICA 50 mg capsule Generic drug:  pregabalin Your last dose was: Your next dose is: Take 75 mg by mouth two (2) times a day. 75 mg  
    
   
   
   
  
 metFORMIN 1,000 mg tablet Commonly known as:  GLUCOPHAGE Your last dose was: Your next dose is: Take 1,000 mg by mouth two (2) times daily (with meals). 1000 mg  
    
   
   
   
  
 multivitamin, tx-iron-ca-min 9 mg iron-400 mcg Tab tablet Commonly known as:  THERA-M w/ IRON Your last dose was: Your next dose is: Take 1 Tab by mouth daily. 1 Tab Omeprazole delayed release 20 mg tablet Commonly known as:  PRILOSEC D/R Your last dose was: Your next dose is: Take 20 mg by mouth daily. 20 mg  
    
   
   
   
  
 PLAVIX 75 mg Tab Generic drug:  clopidogrel Your last dose was: Your next dose is: Take 75 mg by mouth daily. Indications: circulation problem 75 mg  
    
   
   
   
  
 potassium citrate 10 mEq (1,080 mg) Janet Lita Commonly known as:  [place] Your last dose was: Your next dose is: Take 10 mEq by mouth two (2) times a day. 10 mEq SPIRIVA WITH HANDIHALER 18 mcg inhalation capsule Generic drug:  tiotropium Your last dose was: Your next dose is: Take 1 Cap by inhalation daily. 1 Cap TIAZAC 360 mg SR capsule Generic drug:  dilTIAZem Your last dose was: Your next dose is: Take 360 mg by mouth daily. 360 mg  
    
   
   
   
  
 VITAMIN D3 1,000 unit Cap Generic drug:  cholecalciferol Your last dose was: Your next dose is: Take 5,000 Units by mouth daily. 5000 Units STOP taking these medications FLONASE 50 mcg/actuation nasal spray Generic drug:  fluticasone LANTUS SOLOSTAR U-100 INSULIN 100 unit/mL (3 mL) Inpn Generic drug:  insulin glargine Replaced by:  insulin glargine 100 unit/mL injection  
   
  
 oxyCODONE-acetaminophen 5-325 mg per tablet Commonly known as:  PERCOCET Where to Get Your Medications These medications were sent to [facility], [place] - [address]  [address], [address] Hours:  M-F 930am-6pm Phone:  [phone]  
  insulin glargine 100 unit/mL injection Information on where to get these meds will be given to you by the nurse or doctor. ! Ask your nurse or doctor about these medications  
  ertapenem 1 gram 1 g IVPB

## 2018-06-15 NOTE — ED TRIAGE NOTES
Pt states that he was seen here last night for similar symptoms and also constipation;   Pt states he has 2 bedsores on left buttocks and 1 bedsore on right buttocks that are hurting

## 2018-06-15 NOTE — IP AVS SNAPSHOT
[address] [address] 
[phone] Patient: Jones Barnes Sr. 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), Severe Protein-Calorie Malnutrition (Hcc), Paf (Paroxysmal Atrial Fibrillation) (Hcc), Pad (Peripheral Artery Disease) (Hcc), Dnr No Code (Do Not Resuscitate), Diabetic Ulcer Of Right Midfoot Associated With Type 2 Diabetes Mellitus, With Fat Layer Exposed (Hcc), 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 with Dr. Cole Mahajan. Please arrive 15 minutes prior to f/u appointment time for check in.  (first available appointment). [facility] [address] [facility] [address] 
[phone] Care Mgmt contacted MD's office ([facility] - [phone]) Dr. Colton Velasco, first available with Dr. Colton Velasco will be October 2018. Patient able to see Dr. Cole Mahajan for July 2, 2018 as scheduled above. [address]  SNF is responsible for making arrangements for the PCP visit while in-house. Chosen to continue managing your healthcare needs [address] [facility] [place], [address] [employer] Phone [phone] Your Scheduled Appointments Wednesday June 27, 2018  2:00 PM EDT  
WOUND CARE FOLLOW UP with Jt Baldwin MD  
THE [facility] OP WOUND CARE [facility] [address] [place] [id number] [phone] Patients scheduled for OP Wound Care visits should enter the [facility], 2nd floor, [address] for check in. Discharge Orders None A check jasson indicates which time of day the medication should be taken. My Medications START taking these medications Instructions Each Dose to Equal  
 Morning Noon Evening Bedtime  
 ertapenem 1 gram 1 g IVPB Start taking on:  6/21/2018 Your last dose was: Your next dose is:    
   
   
 1 g by IntraVENous route every twenty-four (24) hours for 8 days. 1 g  
    
   
   
   
  
 insulin glargine 100 unit/mL injection Commonly known as:  LANTUS Replaces:  LANTUS SOLOSTAR U-100 INSULIN 100 unit/mL (3 mL) Inpn Your last dose was: Your next dose is:    
   
   
 30 Units by SubCUTAneous route nightly. 30 Units CHANGE how you take these medications Instructions Each Dose to Equal  
 Morning Noon Evening Bedtime NovoLOG Flexpen U-100 Insulin 100 unit/mL Inpn Generic drug:  insulin aspart U-100 What changed:  Another medication with the same name was removed. Continue taking this medication, and follow the directions you see here. Your last dose was: Your next dose is:    
   
   
 5 Units by SubCUTAneous route daily. Sliding scale  Plus the above dose 5 Units CONTINUE taking these medications Instructions Each Dose to Equal  
 Morning Noon Evening Bedtime ADVAIR DISKUS 250-50 mcg/dose diskus inhaler Generic drug:  fluticasone-salmeterol Your last dose was: Your next dose is: Take 2 Puffs by inhalation every twelve (12) hours. 2 Puff albuterol sulfate 90 mcg/actuation Aepb Your last dose was: Your next dose is: Take 2 Puffs by inhalation every four (4) hours as needed. Indications: Chronic Obstructive Pulmonary Disease 2 Puff  
    
   
   
   
  
 aspirin 81 mg tablet Your last dose was: Your next dose is: Take 81 mg by mouth nightly. 81 mg  
    
   
   
   
  
 atorvastatin 20 mg tablet Commonly known as:  LIPITOR Your last dose was: Your next dose is: Take  by mouth nightly. cilostazol 100 mg tablet Commonly known as:  PLETAL Your last dose was: Your next dose is: Take 100 mg by mouth Before breakfast and dinner. 100 mg CLARITIN 10 mg tablet Generic drug:  loratadine Your last dose was: Your next dose is: Take 10 mg by mouth daily. 10 mg  
    
   
   
   
  
 iron aspgly,ga-[id number]-V02-[id number]-Ca-suc 772-23-10-1 mg-mg-mcg-mg Cap cap Commonly known as:  FERREX [address] Your last dose was: Your next dose is: Take 1 Cap by mouth two (2) times a day. Indications: anemia 1 Cap LASIX 40 mg tablet Generic drug:  furosemide Your last dose was: Your next dose is: Take 40 mg by mouth daily. Indications: hypertension 40 mg  
    
   
   
   
  
 levothyroxine 100 mcg tablet Commonly known as:  SYNTHROID Your last dose was: Your next dose is: Take 100 mcg by mouth Daily (before breakfast). 100 mcg  
    
   
   
   
  
 lisinopril 2.5 mg tablet Commonly known as:  Mollie Ripa Your last dose was: Your next dose is: Take  by mouth daily. LYRICA 50 mg capsule Generic drug:  pregabalin Your last dose was: Your next dose is: Take 75 mg by mouth two (2) times a day. 75 mg  
    
   
   
   
  
 metFORMIN 1,000 mg tablet Commonly known as:  GLUCOPHAGE Your last dose was: Your next dose is: Take 1,000 mg by mouth two (2) times daily (with meals). 1000 mg  
    
   
   
   
  
 multivitamin, tx-iron-ca-min 9 mg iron-400 mcg Tab tablet Commonly known as:  THERA-M w/ IRON Your last dose was: Your next dose is: Take 1 Tab by mouth daily. 1 Tab Omeprazole delayed release 20 mg tablet Commonly known as:  PRILOSEC D/R Your last dose was: Your next dose is: Take 20 mg by mouth daily. 20 mg  
    
   
   
   
  
 PLAVIX 75 mg Tab Generic drug:  clopidogrel Your last dose was: Your next dose is: Take 75 mg by mouth daily. Indications: circulation problem 75 mg  
    
   
   
   
  
 potassium citrate 10 mEq (1,080 mg) Zita Patria Commonly known as:  [place] Your last dose was: Your next dose is: Take 10 mEq by mouth two (2) times a day. 10 mEq SPIRIVA WITH HANDIHALER 18 mcg inhalation capsule Generic drug:  tiotropium Your last dose was: Your next dose is: Take 1 Cap by inhalation daily. 1 Cap TIAZAC 360 mg SR capsule Generic drug:  dilTIAZem Your last dose was: Your next dose is: Take 360 mg by mouth daily. 360 mg  
    
   
   
   
  
 VITAMIN D3 1,000 unit Cap Generic drug:  cholecalciferol Your last dose was: Your next dose is: Take 5,000 Units by mouth daily. 5000 Units STOP taking these medications FLONASE 50 mcg/actuation nasal spray Generic drug:  fluticasone LANTUS SOLOSTAR U-100 INSULIN 100 unit/mL (3 mL) Inpn Generic drug:  insulin glargine Replaced by:  insulin glargine 100 unit/mL injection  
   
  
 oxyCODONE-acetaminophen 5-325 mg per tablet Commonly known as:  PERCOCET Where to Get Your Medications These medications were sent to [address] [place] [address] [address] Hours:  M-F 930am-6pm Phone:  [phone]  
  insulin glargine 100 unit/mL injection Information on where to get these meds will be given to you by the nurse or doctor. ! Ask your nurse or doctor about these medications  
  ertapenem 1 gram 1 g IVPB Discharge Instructions Urinary Tract Infections in Men: Care Instructions Your Care Instructions A urinary tract infection, or UTI, is a general term for an infection anywhere between the kidneys and the tip of the penis. UTIs can also be a result of a prostate problem. Most cause pain or burning when you urinate. Most UTIs are caused by bacteria and can be cured with antibiotics. It is important to complete your treatment so that the infection does not get worse. Follow-up care is a key part of your treatment and safety. Be sure to make and go to all appointments, and call your doctor if you are having problems. It's also a good idea to know your test results and keep a list of the medicines you take. How can you care for yourself at home? · Take your antibiotics as prescribed. Do not stop taking them just because you feel better. You need to take the full course of antibiotics. · Take your medicines exactly as prescribed. Your doctor may have prescribed a medicine, such as phenazopyridine (Pyridium), to help relieve pain when you urinate. This turns your urine orange. You may stop taking it when your symptoms get better. But be sure to take all of your antibiotics, which treat the infection. · Drink extra water for the next day or two.  This will help make the urine less concentrated and help wash out the bacteria causing the infection. (If you have kidney, heart, or liver disease and have to limit your fluids, talk with your doctor before you increase your fluid intake.) · Avoid drinks that are carbonated or have caffeine. They can irritate the bladder. · Urinate often. Try to empty your bladder each time. · To relieve pain, take a hot bath or lay a heating pad (set on low) over your lower belly or genital area. Never go to sleep with a heating pad in place. To help prevent UTIs · Drink plenty of fluids, enough so that your urine is light yellow or clear like water. If you have kidney, heart, or liver disease and have to limit fluids, talk with your doctor before you increase the amount of fluids you drink. · Urinate when you have the urge. Do not hold your urine for a long time. Urinate before you go to sleep. · Keep your penis clean. Catheter care If you have a drainage tube (catheter) in place, the following steps will help you care for it. · Always wash your hands before and after touching your catheter. · Check the area around the urethra for inflammation or signs of infection. Signs of infection include irritated, swollen, red, or tender skin, or pus around the catheter. · Clean the area around the catheter with soap and water two times a day. Dry with a clean towel afterward. · Do not apply powder or lotion to the skin around the catheter. To empty the urine collection bag · Wash your hands with soap and water. · Without touching the drain spout, remove the spout from its sleeve at the bottom of the collection bag. Open the valve on the spout. · Let the urine flow out of the bag and into the toilet or a container. Do not let the tubing or drain spout touch anything. · After you empty the bag, clean the end of the drain spout with tissue and water. Close the valve and put the drain spout back into its sleeve at the bottom of the collection bag. · Wash your hands with soap and water. When should you call for help? Call your doctor now or seek immediate medical care if: 
? · Symptoms such as a fever, chills, nausea, or vomiting get worse or happen for the first time. ? · You have new pain in your back just below your rib cage. This is called flank pain. ? · There is new blood or pus in your urine. ? · You are not able to take or keep down your antibiotics. ? Watch closely for changes in your health, and be sure to contact your doctor if: 
? · You are not getting better after taking an antibiotic for 2 days. ? · Your symptoms go away but then come back. Where can you learn more? Go to http://mariella-kenyon.info/. Enter Y252 in the search box to learn more about \"Urinary Tract Infections in Men: Care Instructions. \" Current as of: May 12, 2017 Content Version: 11.4 © 2104-6641 [employer]. Care instructions adapted under license by [employer] (which disclaims liability or warranty for this information). If you have questions about a medical condition or this instruction, always ask your healthcare professional. [place] [address] any warranty or liability for your use of this information. 6Scan Announcement We are excited to announce that we are making your provider's discharge notes available to you in 6Scan. You will see these notes when they are completed and signed by the physician that discharged you from your recent hospital stay. If you have any questions or concerns about any information you see in 6Scan, please call the Health Information Department where you were seen or reach out to your Primary Care Provider for more information about your plan of care. Introducing \[facility]\"" & HEALTH SERVICES! Dear Alejandro Arellano: Thank you for requesting a 6Scan account. Our records indicate that you already have an active 6Scan account.   You can access your account anytime at [email or web address] Mobile Did you know that you can access your hospital and ER discharge instructions at any time in [employer]? You can also review all of your test results from your hospital stay or ER visit. Additional Information If you have questions, please visit the Frequently Asked Questions section of the [employer] website at [email or web address] Remember, [employer] is NOT to be used for urgent needs. For medical emergencies, dial 911. Now available from your iPhone and Android! Introducing Shorty Fletcher As a [employer] patient, I wanted to make you aware of our electronic visit tool called Shorty Fletcher. [employer] 24/7 allows you to connect within minutes with a medical provider 24 hours a day, seven days a week via a mobile device or tablet or logging into a secure website from your computer. You can access Shorty Fletcher from anywhere in the [place]. A virtual visit might be right for you when you have a simple condition and feel like you just dont want to get out of bed, or cant get away from work for an appointment, when your regular [employer] provider is not available (evenings, weekends or holidays), or when youre out of town and need minor care. Electronic visits cost only $49 and if the [employer] 24/7 provider determines a prescription is needed to treat your condition, one can be electronically transmitted to a nearby pharmacy*. Please take a moment to enroll today if you have not already done so. The enrollment process is free and takes just a few minutes. To enroll, please download the [employer] 24/7 becki to your tablet or phone, or visit [email or web address] to enroll on your computer.    
And, as an [address] [place] [address] patient with a [employer] account, the results of your visits will be scanned into your electronic medical record and your primary care provider will be able to view the scanned results. We urge you to continue to see your regular [facility] provider for your ongoing medical care. And while your primary care provider may not be the one available when you seek a Shorty Fletcher virtual visit, the peace of mind you get from getting a real diagnosis real time can be priceless. For more information on [employer], view our Frequently Asked Questions (FAQs) at [email or web address]. Sincerely, 
 
Britt Kauffman MD 
Chief Medical Officer Leanna Coburn *:  certain medications cannot be prescribed via [employer] Unresulted tests-please follow up with your PCP on these results Procedure/Test Authorizing Provider CARDIAC PANEL,(CK, CKMB & TROPONIN) Kyree Avila NP  
 CBC W/O DIFF Brenda Arevalo MD  
 CBC WITH AUTOMATED DIFF Leotis Gagan, DO  
 CBC WITH AUTOMATED DIFF Leotis [place], DO  
 CBC WITH AUTOMATED DIFF Leotis [place], DO  
 CBC WITH AUTOMATED DIFF Kyree Avila NP  
 CULTURE, BLOOD Kyree Avila NP  
 CULTURE, BLOOD Kyree Avila NP  
 CULTURE, URINE Kyree Avila NP  
 EKG, 12 LEAD, INITIAL Kyree Avila NP  
 IR FLUORO GUIDE PICC INSERTION Jon Velazquez MD  
 LACTIC ACID ELSA Pizano, ELSA  
 MAGNESIUM Virals [place], DO  
 MAGNESIUM Brenda Arevalo MD  
 METABOLIC PANEL, BASIC Leotis Gagan, DO  
 METABOLIC PANEL, COMPREHENSIVE Brenda Arevalo MD  
 METABOLIC PANEL, [address], NP  
 PROTHROMBIN TIME + INR Kyree Avila NP  
 URINALYSIS W/ RFLX MICROSCOPIC Kyree Avila NP  
 URINE MICROSCOPIC ONLY Kyree Avila NP  
 XR CHEST PORT Kyree Avila NP Providers Seen During Your Hospitalization Provider Specialty Primary office phone Kenan Villalobos MD Emergency Medicine [phone] Baljeet Tsai MD Internal Medicine [phone] Your Primary Care Physician (PCP) Primary Care Physician Office Phone Office Fax Terese Olivas [phone] You are allergic to the following Allergen Reactions Neosporin (Neomycin-Bacitracin-Polymyxin) Rash Recent Documentation Weight BMI Smoking Status 62.1 kg 17.58 kg/m2 Former Smoker Emergency Contacts Name Discharge Info Relation Home Work Mobile Batsheva Hough DISCHARGE CAREGIVER [3] Spouse [3] [id number] CAREGIVER [3] Son [22] [phone] Patient Belongings The following personal items are in your possession at time of discharge: 
  Dental Appliances: Uppers  Visual Aid: Glasses, With patient   Hearing Aids/Status: Bilateral, With patient, Functioning (pt reports not working, but able to verbally confirm hearing)  Home Medications: None   Jewelry: Watch, With patient  Clothing: None    Other Valuables: Eyeglasses, With patient Please provide this summary of care documentation to your next provider. Signatures-by signing, you are acknowledging that this After Visit Summary has been reviewed with you and you have received a copy. Patient Signature:  ____________________________________________________________ Date:  ____________________________________________________________  
  
Mauricio Leonardo Provider Signature:  ____________________________________________________________ Date:  ____________________________________________________________

## 2018-06-16 LAB
ALBUMIN SERPL-MCNC: 2.1 G/DL (ref 3.4–5)
ALBUMIN/GLOB SERPL: 0.6 {RATIO} (ref 0.8–1.7)
ALP SERPL-CCNC: 72 U/L (ref 45–117)
ALT SERPL-CCNC: 16 U/L (ref 16–61)
ANION GAP SERPL CALC-SCNC: 5 MMOL/L (ref 3–18)
AST SERPL-CCNC: 16 U/L (ref 15–37)
ATRIAL RATE: 113 BPM
BILIRUB SERPL-MCNC: 0.2 MG/DL (ref 0.2–1)
BUN SERPL-MCNC: 5 MG/DL (ref 7–18)
BUN/CREAT SERPL: 9 (ref 12–20)
CALCIUM SERPL-MCNC: 7.7 MG/DL (ref 8.5–10.1)
CALCULATED P AXIS, ECG09: 67 DEGREES
CALCULATED R AXIS, ECG10: 1 DEGREES
CALCULATED T AXIS, ECG11: 103 DEGREES
CHLORIDE SERPL-SCNC: 105 MMOL/L (ref 100–108)
CO2 SERPL-SCNC: 29 MMOL/L (ref 21–32)
CREAT SERPL-MCNC: 0.54 MG/DL (ref 0.6–1.3)
DIAGNOSIS, 93000: NORMAL
ERYTHROCYTE [DISTWIDTH] IN BLOOD BY AUTOMATED COUNT: 16.1 % (ref 11.6–14.5)
GLOBULIN SER CALC-MCNC: 3.7 G/DL (ref 2–4)
GLUCOSE BLD STRIP.AUTO-MCNC: 155 MG/DL (ref 70–110)
GLUCOSE BLD STRIP.AUTO-MCNC: 162 MG/DL (ref 70–110)
GLUCOSE BLD STRIP.AUTO-MCNC: 212 MG/DL (ref 70–110)
GLUCOSE BLD STRIP.AUTO-MCNC: 258 MG/DL (ref 70–110)
GLUCOSE BLD STRIP.AUTO-MCNC: 286 MG/DL (ref 70–110)
GLUCOSE SERPL-MCNC: 230 MG/DL (ref 74–99)
HCT VFR BLD AUTO: 30.2 % (ref 36–48)
HGB BLD-MCNC: 9.6 G/DL (ref 13–16)
MAGNESIUM SERPL-MCNC: 1.6 MG/DL (ref 1.6–2.6)
MCH RBC QN AUTO: 27.6 PG (ref 24–34)
MCHC RBC AUTO-ENTMCNC: 31.8 G/DL (ref 31–37)
MCV RBC AUTO: 86.8 FL (ref 74–97)
P-R INTERVAL, ECG05: 162 MS
PLATELET # BLD AUTO: 133 K/UL (ref 135–420)
PMV BLD AUTO: 9.4 FL (ref 9.2–11.8)
POTASSIUM SERPL-SCNC: 3.1 MMOL/L (ref 3.5–5.5)
PROT SERPL-MCNC: 5.8 G/DL (ref 6.4–8.2)
Q-T INTERVAL, ECG07: 366 MS
QRS DURATION, ECG06: 112 MS
QTC CALCULATION (BEZET), ECG08: 502 MS
RBC # BLD AUTO: 3.48 M/UL (ref 4.7–5.5)
SODIUM SERPL-SCNC: 139 MMOL/L (ref 136–145)
VENTRICULAR RATE, ECG03: 113 BPM
WBC # BLD AUTO: 3.1 K/UL (ref 4.6–13.2)

## 2018-06-16 PROCEDURE — 74011636637 HC RX REV CODE- 636/637: Performed by: INTERNAL MEDICINE

## 2018-06-16 PROCEDURE — 74011000258 HC RX REV CODE- 258: Performed by: INTERNAL MEDICINE

## 2018-06-16 PROCEDURE — 80053 COMPREHEN METABOLIC PANEL: CPT | Performed by: INTERNAL MEDICINE

## 2018-06-16 PROCEDURE — 65270000029 HC RM PRIVATE

## 2018-06-16 PROCEDURE — 85027 COMPLETE CBC AUTOMATED: CPT | Performed by: INTERNAL MEDICINE

## 2018-06-16 PROCEDURE — 74011250637 HC RX REV CODE- 250/637: Performed by: INTERNAL MEDICINE

## 2018-06-16 PROCEDURE — 74011250636 HC RX REV CODE- 250/636: Performed by: INTERNAL MEDICINE

## 2018-06-16 PROCEDURE — 74011250636 HC RX REV CODE- 250/636: Performed by: HOSPITALIST

## 2018-06-16 PROCEDURE — 82962 GLUCOSE BLOOD TEST: CPT

## 2018-06-16 PROCEDURE — 36415 COLL VENOUS BLD VENIPUNCTURE: CPT | Performed by: INTERNAL MEDICINE

## 2018-06-16 PROCEDURE — 83735 ASSAY OF MAGNESIUM: CPT | Performed by: INTERNAL MEDICINE

## 2018-06-16 RX ORDER — FLUTICASONE FUROATE AND VILANTEROL 100; 25 UG/1; UG/1
1 POWDER RESPIRATORY (INHALATION) DAILY
Status: DISCONTINUED | OUTPATIENT
Start: 2018-06-16 | End: 2018-06-20 | Stop reason: HOSPADM

## 2018-06-16 RX ORDER — FLUTICASONE PROPIONATE 50 MCG
2 SPRAY, SUSPENSION (ML) NASAL DAILY
Status: DISCONTINUED | OUTPATIENT
Start: 2018-06-16 | End: 2018-06-20 | Stop reason: HOSPADM

## 2018-06-16 RX ORDER — DEXTROSE MONOHYDRATE AND SODIUM CHLORIDE 5; .9 G/100ML; G/100ML
75 INJECTION, SOLUTION INTRAVENOUS CONTINUOUS
Status: DISCONTINUED | OUTPATIENT
Start: 2018-06-16 | End: 2018-06-16

## 2018-06-16 RX ORDER — LEVOTHYROXINE SODIUM 100 UG/1
100 TABLET ORAL
Status: DISCONTINUED | OUTPATIENT
Start: 2018-06-16 | End: 2018-06-20 | Stop reason: HOSPADM

## 2018-06-16 RX ORDER — CLOPIDOGREL BISULFATE 75 MG/1
75 TABLET ORAL DAILY
Status: DISCONTINUED | OUTPATIENT
Start: 2018-06-16 | End: 2018-06-20 | Stop reason: HOSPADM

## 2018-06-16 RX ORDER — ACETAMINOPHEN 325 MG/1
650 TABLET ORAL
Status: DISCONTINUED | OUTPATIENT
Start: 2018-06-16 | End: 2018-06-20 | Stop reason: HOSPADM

## 2018-06-16 RX ORDER — FUROSEMIDE 40 MG/1
40 TABLET ORAL DAILY
Status: DISCONTINUED | OUTPATIENT
Start: 2018-06-16 | End: 2018-06-20 | Stop reason: HOSPADM

## 2018-06-16 RX ORDER — MELATONIN
5000 DAILY
Status: DISCONTINUED | OUTPATIENT
Start: 2018-06-16 | End: 2018-06-20 | Stop reason: HOSPADM

## 2018-06-16 RX ORDER — ATORVASTATIN CALCIUM 20 MG/1
20 TABLET, FILM COATED ORAL
Status: DISCONTINUED | OUTPATIENT
Start: 2018-06-16 | End: 2018-06-20 | Stop reason: HOSPADM

## 2018-06-16 RX ORDER — IRON POLYSACCHARIDE COMPLEX 150 MG
1 CAPSULE ORAL 2 TIMES DAILY
Status: DISCONTINUED | OUTPATIENT
Start: 2018-06-16 | End: 2018-06-20 | Stop reason: HOSPADM

## 2018-06-16 RX ORDER — LANOLIN ALCOHOL/MO/W.PET/CERES
400 CREAM (GRAM) TOPICAL 2 TIMES DAILY
Status: COMPLETED | OUTPATIENT
Start: 2018-06-16 | End: 2018-06-16

## 2018-06-16 RX ORDER — POTASSIUM CITRATE 10 MEQ/1
10 TABLET, EXTENDED RELEASE ORAL 2 TIMES DAILY
Status: DISCONTINUED | OUTPATIENT
Start: 2018-06-16 | End: 2018-06-20 | Stop reason: HOSPADM

## 2018-06-16 RX ORDER — POTASSIUM CHLORIDE 20 MEQ/1
40 TABLET, EXTENDED RELEASE ORAL
Status: COMPLETED | OUTPATIENT
Start: 2018-06-16 | End: 2018-06-16

## 2018-06-16 RX ORDER — ASPIRIN 81 MG/1
81 TABLET ORAL
Status: DISCONTINUED | OUTPATIENT
Start: 2018-06-16 | End: 2018-06-20 | Stop reason: HOSPADM

## 2018-06-16 RX ORDER — CILOSTAZOL 50 MG/1
100 TABLET ORAL
Status: DISCONTINUED | OUTPATIENT
Start: 2018-06-16 | End: 2018-06-20 | Stop reason: HOSPADM

## 2018-06-16 RX ORDER — DILTIAZEM HYDROCHLORIDE 180 MG/1
360 CAPSULE, COATED, EXTENDED RELEASE ORAL DAILY
Status: DISCONTINUED | OUTPATIENT
Start: 2018-06-16 | End: 2018-06-20 | Stop reason: HOSPADM

## 2018-06-16 RX ORDER — PREGABALIN 75 MG/1
75 CAPSULE ORAL 2 TIMES DAILY
Status: DISCONTINUED | OUTPATIENT
Start: 2018-06-16 | End: 2018-06-20 | Stop reason: HOSPADM

## 2018-06-16 RX ORDER — INSULIN LISPRO 100 [IU]/ML
INJECTION, SOLUTION INTRAVENOUS; SUBCUTANEOUS
Status: DISCONTINUED | OUTPATIENT
Start: 2018-06-16 | End: 2018-06-18

## 2018-06-16 RX ORDER — SODIUM CHLORIDE 9 MG/ML
75 INJECTION, SOLUTION INTRAVENOUS CONTINUOUS
Status: DISCONTINUED | OUTPATIENT
Start: 2018-06-16 | End: 2018-06-17

## 2018-06-16 RX ORDER — DOCUSATE SODIUM 100 MG/1
100 CAPSULE, LIQUID FILLED ORAL
Status: DISCONTINUED | OUTPATIENT
Start: 2018-06-16 | End: 2018-06-20 | Stop reason: HOSPADM

## 2018-06-16 RX ORDER — ALBUTEROL SULFATE 90 UG/1
2 AEROSOL, METERED RESPIRATORY (INHALATION)
Status: DISCONTINUED | OUTPATIENT
Start: 2018-06-16 | End: 2018-06-20 | Stop reason: HOSPADM

## 2018-06-16 RX ORDER — ONDANSETRON 2 MG/ML
4 INJECTION INTRAMUSCULAR; INTRAVENOUS
Status: DISCONTINUED | OUTPATIENT
Start: 2018-06-16 | End: 2018-06-20 | Stop reason: HOSPADM

## 2018-06-16 RX ORDER — OMEPRAZOLE 20 MG/1
20 CAPSULE, DELAYED RELEASE ORAL DAILY
Status: DISCONTINUED | OUTPATIENT
Start: 2018-06-16 | End: 2018-06-20 | Stop reason: HOSPADM

## 2018-06-16 RX ORDER — LORATADINE 10 MG/1
10 TABLET ORAL DAILY
Status: DISCONTINUED | OUTPATIENT
Start: 2018-06-16 | End: 2018-06-20 | Stop reason: HOSPADM

## 2018-06-16 RX ORDER — INSULIN GLARGINE 100 [IU]/ML
20 INJECTION, SOLUTION SUBCUTANEOUS
Status: DISCONTINUED | OUTPATIENT
Start: 2018-06-16 | End: 2018-06-17

## 2018-06-16 RX ADMIN — UMECLIDINIUM 1 PUFF: 62.5 AEROSOL, POWDER ORAL at 09:16

## 2018-06-16 RX ADMIN — POTASSIUM CITRATE 10 MEQ: 10 TABLET ORAL at 22:17

## 2018-06-16 RX ADMIN — OMEPRAZOLE 20 MG: 20 CAPSULE, DELAYED RELEASE ORAL at 09:03

## 2018-06-16 RX ADMIN — PREGABALIN 75 MG: 75 CAPSULE ORAL at 22:05

## 2018-06-16 RX ADMIN — CILOSTAZOL 100 MG: 50 TABLET ORAL at 19:43

## 2018-06-16 RX ADMIN — DEXTROSE MONOHYDRATE AND SODIUM CHLORIDE 75 ML/HR: 5; .9 INJECTION, SOLUTION INTRAVENOUS at 00:36

## 2018-06-16 RX ADMIN — PIPERACILLIN SODIUM,TAZOBACTAM SODIUM 3.38 G: 3; .375 INJECTION, POWDER, FOR SOLUTION INTRAVENOUS at 17:10

## 2018-06-16 RX ADMIN — CLOPIDOGREL BISULFATE 75 MG: 75 TABLET ORAL at 09:03

## 2018-06-16 RX ADMIN — Medication 400 MG: at 09:03

## 2018-06-16 RX ADMIN — INSULIN GLARGINE 20 UNITS: 100 INJECTION, SOLUTION SUBCUTANEOUS at 00:48

## 2018-06-16 RX ADMIN — INSULIN LISPRO 2 UNITS: 100 INJECTION, SOLUTION INTRAVENOUS; SUBCUTANEOUS at 22:05

## 2018-06-16 RX ADMIN — FUROSEMIDE 40 MG: 40 TABLET ORAL at 09:03

## 2018-06-16 RX ADMIN — INSULIN LISPRO 6 UNITS: 100 INJECTION, SOLUTION INTRAVENOUS; SUBCUTANEOUS at 13:35

## 2018-06-16 RX ADMIN — DILTIAZEM HYDROCHLORIDE 360 MG: 180 CAPSULE, COATED, EXTENDED RELEASE ORAL at 09:03

## 2018-06-16 RX ADMIN — FLUTICASONE FUROATE AND VILANTEROL TRIFENATATE 1 PUFF: 100; 25 POWDER RESPIRATORY (INHALATION) at 09:16

## 2018-06-16 RX ADMIN — POTASSIUM CITRATE 10 MEQ: 10 TABLET ORAL at 09:17

## 2018-06-16 RX ADMIN — ATORVASTATIN CALCIUM 20 MG: 20 TABLET, FILM COATED ORAL at 00:37

## 2018-06-16 RX ADMIN — POTASSIUM CHLORIDE 40 MEQ: 20 TABLET, EXTENDED RELEASE ORAL at 07:38

## 2018-06-16 RX ADMIN — OXYCODONE HYDROCHLORIDE AND ACETAMINOPHEN 2 TABLET: 5; 325 TABLET ORAL at 06:17

## 2018-06-16 RX ADMIN — OXYCODONE HYDROCHLORIDE AND ACETAMINOPHEN 2 TABLET: 5; 325 TABLET ORAL at 13:34

## 2018-06-16 RX ADMIN — VITAMIN D, TAB 1000IU (100/BT) 5000 UNITS: 25 TAB at 10:21

## 2018-06-16 RX ADMIN — PIPERACILLIN SODIUM,TAZOBACTAM SODIUM 3.38 G: 3; .375 INJECTION, POWDER, FOR SOLUTION INTRAVENOUS at 09:04

## 2018-06-16 RX ADMIN — PIPERACILLIN SODIUM,TAZOBACTAM SODIUM 3.38 G: 3; .375 INJECTION, POWDER, FOR SOLUTION INTRAVENOUS at 00:37

## 2018-06-16 RX ADMIN — ATORVASTATIN CALCIUM 20 MG: 20 TABLET, FILM COATED ORAL at 22:05

## 2018-06-16 RX ADMIN — Medication 150 MG: at 22:18

## 2018-06-16 RX ADMIN — INSULIN LISPRO 6 UNITS: 100 INJECTION, SOLUTION INTRAVENOUS; SUBCUTANEOUS at 17:10

## 2018-06-16 RX ADMIN — PREGABALIN 75 MG: 75 CAPSULE ORAL at 09:03

## 2018-06-16 RX ADMIN — ASPIRIN 81 MG: 81 TABLET, COATED ORAL at 22:05

## 2018-06-16 RX ADMIN — ASPIRIN 81 MG: 81 TABLET, COATED ORAL at 00:37

## 2018-06-16 RX ADMIN — LORATADINE 10 MG: 10 TABLET ORAL at 09:03

## 2018-06-16 RX ADMIN — INSULIN GLARGINE 20 UNITS: 100 INJECTION, SOLUTION SUBCUTANEOUS at 22:05

## 2018-06-16 RX ADMIN — SODIUM CHLORIDE 75 ML/HR: 900 INJECTION, SOLUTION INTRAVENOUS at 14:07

## 2018-06-16 RX ADMIN — LEVOTHYROXINE SODIUM 100 MCG: 100 TABLET ORAL at 09:03

## 2018-06-16 RX ADMIN — Medication 400 MG: at 22:05

## 2018-06-16 RX ADMIN — MULTIPLE VITAMINS W/ MINERALS TAB 1 TABLET: TAB at 09:03

## 2018-06-16 RX ADMIN — FLUTICASONE PROPIONATE 2 SPRAY: 50 SPRAY, METERED NASAL at 13:37

## 2018-06-16 NOTE — ED NOTES
Pt adjusted for comfort. Pt requesting something for pain in buttocks. PA notified. Awaiting bed for admission. VSS except for tachycardia. RR even and unlabored. Pt resting quietly in bed.  Pt alert and oriented

## 2018-06-16 NOTE — ED NOTES
Attempted to call report, RN stated that RN who was taking pt is currently unavailable and to call back in 10 minutes

## 2018-06-16 NOTE — PROGRESS NOTES
Problem: Falls - Risk of  Goal: *Absence of Falls  Document Donald Fall Risk and appropriate interventions in the flowsheet.    Outcome: Progressing Towards Goal  Fall Risk Interventions:

## 2018-06-16 NOTE — ROUTINE PROCESS
TRANSFER - OUT REPORT:    Verbal report given to Maurice Lisa RN (name) on [facility]  being transferred to Medical room 310 (unit) for routine progression of care       Report consisted of patients Situation, Background, Assessment and   Recommendations(SBAR). Information from the following report(s) SBAR and ED Summary was reviewed with the receiving nurse. Lines:   Peripheral IV 06/15/18 Right Arm (Active)   Site Assessment Clean, dry, & intact 6/15/2018  8:54 PM   Dressing Status Clean, dry, & intact 6/15/2018  8:54 PM   Dressing Type Transparent 6/15/2018  8:54 PM        Opportunity for questions and clarification was provided.       Patient transported with:   [employer]

## 2018-06-16 NOTE — ROUTINE PROCESS
00:01: Received pt as a transfer from ED at this time. Pt arrived on unit via gurney with transport staff, spouse and adult daughter in attendance. Pt was able to transfer from gurney to bed with the aid of slide board and staff. Pt tolerated this activity fairly well. Pt presents with two dressed decubitus ulcers to buttocks as well as dressings to legs bilaterally placed by Dr. Suma Martínez. Pt is routinely seen by THE [facility] wound care. IVF infusing to 20 gauge IV, LFA. Dual skin assessment performed with William Paulson RN. Pt denies C/O physical discomfort at this time but stated he was exhausted after this days proceedings, verbalizing \"I was in the ED all evening and have nato awake for 20 hours, I really need a good nights sleep. \"  Suprapubic catheter in place and draining clear yellow urine. Telemetry box 38 assigned and attached, transmitting SR.     06:22: Labs drawn at 04:22 reflect a Potassium level of 3.1. Klor-Con 40 meq ordered  PO STAT.

## 2018-06-16 NOTE — PROGRESS NOTES
Hospitalist Progress Note    Patient: Aquilino Emmanuel Sr. MRN: [id number]  CSN: [id number]    YOB: 1945  Age: 74 y.o. Sex: male    DOA: 6/15/2018 LOS:  LOS: 0 days          Chief Complaint:  Abd pain         Assessment/Plan        1. 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 Mild dehydration   3. SInus tachycardia, likely from #2; improving with hydration   4. UTI, has chronic indwelling suprapubic catheter >2 yrs now   5. CHronic sacral Decub ulcers, Stage I-III  6. DM2  7. HTN  8. PAD with LE chronic ulcers   9. Mild intermittent COPD, stable   10. Hypokalemia and HypoMg  DNR    -cont clear liquid diet and adv as tolerated   -cont zosyn IV, contact precaution.   -Stool studies pending at this time   -tachycardia improving with hydration   -pain control as needed, holding metformin.   -antiemetics prn.  -ISS, accuchecks  -lyte repletion ordered.    -supportive care     Anticipate he will still be here for another 2 days     Disposition :  Patient Active Problem List   Diagnosis Code    PAD (peripheral artery disease) ([facility]) I73.9    Venous reflux I87.2    Leg ulcer, left ([address] L97.929    Leg ulcer ([address] L97.909    Diabetic ulcer of right midfoot associated with type 2 diabetes mellitus, with fat layer exposed ([address] E11.621, L97.412    Sepsis ([address] A41.9    CAD (coronary artery disease) I25.10    COPD (chronic obstructive pulmonary disease) ([facility]) J44.9    HTN (hypertension) I10    UTI (urinary tract infection) due to urinary indwelling Sher catheter ([facility]) T83.511A, N39.0    PAF (paroxysmal atrial fibrillation) ([facility]) I48.0    UTI (urinary tract infection) N39.0    Diabetic ulcer of both lower extremities ([facility]) E11.622, L97.919, L97.929    Hyponatremia E87.1    Hypomagnesemia E83.42    Hypokalemia E87.6    Severe protein-calorie malnutrition ([facility]) E43    Ulcer of right leg, limited to breakdown of skin ([address] L97.911    Type 2 diabetes mellitus with diabetic neuropathy ([facility] E11.40    Chronic anticoagulation Z79.01    Gross hematuria R31.0    Mechanical complication of suprapubic catheter (HCC) T83.090A    DNR no code (do not resuscitate) Z66    Colitis K52.9    Weakness R53.1    Urinary tract infection N39.0    Sacral decubitus ulcer, stage III (HCC) L89.153       Subjective:  States his abd pain is little better this morning. Had an episode of nausea last night. No other complaints.        Review of systems:    Constitutional: denies fevers, chills, myalgias  Respiratory: denies SOB, cough  Cardiovascular: denies chest pain, palpitations  Gastrointestinal: denies  vomiting, diarrhea      Vital signs/Intake and Output:  Visit Vitals    /54 (BP 1 Location: Left arm, BP Patient Position: At rest)    Pulse (!) 104    Temp 98.4 °F (36.9 °C)    Resp 18    Wt 61.9 kg (136 lb 6.4 oz)    SpO2 96%    BMI 17.51 kg/m2     Current Shift:  06/15 1901 - 06/16 0700  In: -   Out: 950 [Urine:950]  Last three shifts:       Exam:    General: Well developed, alert, NAD, OX3  Head/Neck: NCAT, supple, No masses, No lymphadenopathy  CVS:Regular rate and rhythm, no M/R/G, S1/S2 heard, no thrill  Lungs:Clear to auscultation bilaterally, no wheezes, rhonchi, or rales  Abdomen: Soft, Nontender, No distention, Normal Bowel sounds, No hepatomegaly  Extremities: No C/C/E, pulses palpable 2+  Skin:normal texture and turgor, no rashes, no lesions  Neuro:grossly normal , follows commands  Psych:appropriate                Labs: Results:       Chemistry Recent Labs      06/16/18   0422  06/15/18   2020  06/14/18   1750   GLU  230*  202*  190*   NA  139  136  134*   K  3.1*  3.5  4.6   CL  105  100  97*   CO2  29  28  29   BUN  5*  9  8   CREA  0.54*  0.56*  0.70   CA  7.7*  8.0*  8.7   AGAP  5  8  8   BUCR  9*  16  11*   AP  72  82  98   TP  5.8*  6.5  7.3   ALB  2.1*  2.4*  2.7*   GLOB  3.7  4.1*  4.6*   AGRAT  0.6*  0.6*  0.6*      CBC w/Diff Recent Labs      06/16/18 0422  06/15/18   2020 06/14/18   1750   WBC  3.1*  4.3*  6.5   RBC  3.48*  3.66*  3.91*   HGB  9.6*  10.2*  10.7*   HCT  30.2*  31.4*  33.8*   PLT  133*  169  176   GRANS   --   82*  71   LYMPH   --   12*  11*   EOS   --   1  8*      Cardiac Enzymes Recent Labs      06/15/18   2020  06/14/18   1750   CPK  27*  48   CKND1  CALCULATION NOT PERFORMED WHEN RESULT IS BELOW LINEAR LIMIT  2.3      Coagulation Recent Labs      06/15/18   2020  06/14/18   1750   PTP  13.4  13.3   INR  1.1  1.1   APTT   --   43.4*       Lipid Panel No results found for: CHOL, CHOLPOCT, CHOLX, CHLST, CHOLV, 099656, HDL, LDL, LDLC, DLDLP, 722392, VLDLC, VLDL, TGLX, TRIGL, TRIGP, TGLPOCT, CHHD, CHHDX   BNP No results for input(s): BNPP in the last 72 hours.    Liver Enzymes Recent Labs      06/16/18   0422   TP  5.8*   ALB  2.1*   AP  72   SGOT  16      Thyroid Studies Lab Results   Component Value Date/Time    TSH 0.90 11/04/2017 06:23 PM        Procedures/imaging: see electronic medical records for all procedures/Xrays and details which were not copied into this note but were reviewed prior to creation of Beatris Barbosa MD

## 2018-06-16 NOTE — ED NOTES
Pt resting quietly in bed. IV fluids completed. Pt denies needs at this moment. Pt continues to be tachycardic. Family at bedside.

## 2018-06-16 NOTE — ROUTINE PROCESS
Bedside, Verbal and Written shift change report given to ANA Doll (oncoming nurse) by Veronica Martinez. Nissa Porter (offgoing nurse). Report included the following information SBAR, Kardex, MAR and Recent Results.

## 2018-06-16 NOTE — H&P
History & Physical    Patient: Jesus Alberto Ramirez Sr. MRN: [id number]  CSN: [id number]    YOB: 1945  Age: 74 y.o. Sex: male      DOA: 6/15/2018  Primary Care Provider:  Hector Melendez MD      Assessment/Plan     Patient Active Problem List   Diagnosis Code    PAD (peripheral artery disease) ([facility]) I73.9    Venous reflux I87.2    Leg ulcer, left ([address] L97.929    Leg ulcer ([address] L97.909    Diabetic ulcer of right midfoot associated with type 2 diabetes mellitus, with fat layer exposed ([address] E11.621, L97.412    Sepsis ([address] A41.9    CAD (coronary artery disease) I25.10    COPD (chronic obstructive pulmonary disease) ([address] J44.9    HTN (hypertension) I10    UTI (urinary tract infection) due to urinary indwelling Sher catheter ([facility]) T83.511A, N39.0    PAF (paroxysmal atrial fibrillation) ([facility]) I48.0    UTI (urinary tract infection) N39.0    Diabetic ulcer of both lower extremities ([address] E11.622, L97.919, L97.929    Hyponatremia E87.1    Hypomagnesemia E83.42    Hypokalemia E87.6    Severe protein-calorie malnutrition ([facility]) E43    Ulcer of right leg, limited to breakdown of skin ([address] L97.911    Type 2 diabetes mellitus with diabetic neuropathy ([facility]) E11.40    Chronic anticoagulation Z79.01    Gross hematuria R31.0    Mechanical complication of suprapubic catheter ([facility]) T83.090A    DNR no code (do not resuscitate) Z66    Colitis K52.9    Weakness R53.1    Urinary tract infection N39.0    Sacral decubitus ulcer, stage III ([facility]) L89.153       1. 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 Mild dehydration   3. SInus tachycardia, likely from #2   4. UTI, has chronic indwelling suprapubic catheter >2 yrs now   5. CHronic sacral Decub ulcers, Stage I-III  6. DM2  7. HTN  8. PAD with LE chronic ulcers   9. Mild intermittent COPD, stable   DNR    -admit for further care. Failed outpatient therapy.   -clear liquid diet and adv as tolerated.  Cultures ordered  -stool studies including rule out C diff   -will order zosyn - should cover if there is any infectious process in the abd and UTI  -IVF- gentle hydration, monitor urine output   -pain control, bowel regimen. Antiemetics prn   -hold home metformin, accuchecks, ISS  -wound care consult.   -cont home medications   -supportive care     Estimated length of stay : 3 days     CC: abd pain        HPI:     Jonathan Echevarria is a 74 y.o. male who came to the ed with complaints of abd pain. Patient states he has been having abd pain for the past 4-5 days which is nonspecific and generalized in nature. He came to the ED here yesterday with similar complaints and CT scan showed Pancolitis. There were no signs of decompensation therefore he was discharged. After going home he ate from [address] last night but unable to eat any food since due to pain and nausea. His oral intake has been poor, unable to keep any water down either. He admits of feeling constipated on most of the days and has to strain a lot. During this time he has some chills as well at home. He has developed some chronic sacral wound ranging from Stage I-III over last several months. At first family thought it was from trauma from his wheelchair but it has progressed to skin breakdown. In the ED His labs are unremarkable but his vitals showed mild tachycardia. GI was consulted who recommended clear liquid diet and adv diet otherwise no further recs.        Past Medical History:   Diagnosis Date    Acid reflux     Anemia     Arthritis     Atrial fibrillation (HCC)     CAD (coronary artery disease)     Chronic obstructive pulmonary disease (HCC)     Coagulation disorder (HCC)     anemia    Diabetes ([address] 1997    GERD (gastroesophageal reflux disease)     H/O seasonal allergies     Hypercholesterolemia     Hypertension 1997    Ill-defined condition     hand tremors    PAF (paroxysmal atrial fibrillation) ([facility] 11/5/2017    Peripheral arterial disease ([facility]  Sleep apnea     pt states he has not used bipap \"in years\".  Thyroid disease        Past Surgical History:   Procedure Laterality Date    ABDOMEN SURGERY PROC UNLISTED  1997    Vikas-en-Y    ABDOMEN SURGERY PROC UNLISTED  2000    abdominal hernia repair/mesh    HX APPENDECTOMY      HX CERVICAL LAMINECTOMY      HX COLONOSCOPY      HX HEENT  2004    cataracts removed right eye    HX ORTHOPAEDIC  1982    cervical laminectomy    HX ORTHOPAEDIC Right 1962    knee torn cartilage    HX TONSILLECTOMY      HX UROLOGICAL  12/2015    suprapubic catheter in place    VASCULAR SURGERY PROCEDURE UNLIST Bilateral 2015 2017    angio plasty legs  x2    VASCULAR SURGERY PROCEDURE UNLIST Bilateral 2017    legs venogram  stent right leg       Family History   Problem Relation Age of Onset    Cancer Mother     Diabetes Father     Heart Disease Sister     Cancer Brother     Diabetes Brother     Hypertension Brother        Social History     Social History    Marital status:      Spouse name: N/A    Number of children: N/A    Years of education: N/A     Social History Main Topics    Smoking status: Former Smoker     Types: Cigarettes     Quit date: 1/1/1997    Smokeless tobacco: Never Used    Alcohol use 0.6 oz/week     1 Glasses of wine per week    Drug use: No    Sexual activity: Not on file     Other Topics Concern    Not on file     Social History Narrative       Prior to Admission medications    Medication Sig Start Date End Date Taking? Authorizing Provider   albuterol sulfate 90 mcg/actuation aepb Take 2 Puffs by inhalation every four (4) hours as needed. Indications: Chronic Obstructive Pulmonary Disease    Historical Provider   cholecalciferol (VITAMIN D3) 1,000 unit cap Take 5,000 Units by mouth daily. Phys Oswald, MD   oxyCODONE-acetaminophen (PERCOCET) 5-325 mg per tablet Take 2 Tabs by mouth every six (6) hours as needed for Pain. Max Daily Amount: 8 Tabs.  4/27/18   Liv Somers MD   insulin aspart U-100 (NOVOLOG FLEXPEN U-100 INSULIN) 100 unit/mL inpn 9 Units by SubCUTAneous route two (2) times a day. With lunch and dinner in addition the sliding scale    Historical Provider   insulin glargine (LANTUS SOLOSTAR U-100 INSULIN) 100 unit/mL (3 mL) inpn 20 Units by SubCUTAneous route nightly. Historical Provider   levothyroxine (SYNTHROID) 100 mcg tablet Take 100 mcg by mouth Daily (before breakfast). Historical Provider   cilostazol (PLETAL) 100 mg tablet Take 100 mg by mouth Before breakfast and dinner. Historical Provider   pregabalin (LYRICA) 50 mg capsule Take 75 mg by mouth two (2) times a day. Historical Provider   Omeprazole delayed release (PRILOSEC D/R) 20 mg tablet Take 20 mg by mouth daily. Historical Provider   potassium citrate (UROCIT-K10) 10 mEq (1,080 mg) TbER Take 10 mEq by mouth two (2) times a day. Historical Provider   multivitamin, tx-iron-ca-min (THERA-M W/ IRON) 9 mg iron-400 mcg tab tablet Take 1 Tab by mouth daily. Historical Provider   iron aspgly,nc-X-N73-FA-Ca-suc (FERREX 150 FORTE PLUS) 423-15-64-6 mg-mg-mcg-mg cap cap Take 1 Cap by mouth two (2) times a day. Indications: anemia    Phys Other, MD   insulin aspart (NOVOLOG FLEXPEN) 100 unit/mL inpn 5 Units by SubCUTAneous route daily. Sliding scale  Plus the above dose    Historical Provider   metFORMIN (GLUCOPHAGE) 1,000 mg tablet Take 1,000 mg by mouth two (2) times daily (with meals). Historical Provider   atorvastatin (LIPITOR) 20 mg tablet Take  by mouth nightly. Historical Provider   lisinopril (PRINIVIL, ZESTRIL) 2.5 mg tablet Take  by mouth daily. Historical Provider   clopidogrel (PLAVIX) 75 mg tab Take 75 mg by mouth daily. Indications: circulation problem    Historical Provider   aspirin 81 mg tablet Take 81 mg by mouth nightly. Historical Provider   furosemide (LASIX) 40 mg tablet Take 40 mg by mouth daily.  Indications: hypertension    Historical Provider   diltiazem HARDY [facility]) 360 mg SR capsule Take 360 mg by mouth daily. Historical Provider   tiotropium (SPIRIVA WITH HANDIHALER) 18 mcg inhalation capsule Take 1 Cap by inhalation daily. Historical Provider   loratadine (CLARITIN) 10 mg tablet Take 10 mg by mouth daily. Historical Provider   fluticasone (FLONASE) 50 mcg/actuation nasal spray 2 Sprays by Both Nostrils route daily. Historical Provider   fluticasone-salmeterol (ADVAIR DISKUS) 250-50 mcg/dose diskus inhaler Take 2 Puffs by inhalation every twelve (12) hours. Historical Provider       Allergies   Allergen Reactions    Neosporin [Neomycin-Bacitracin-Polymyxin] Rash       Review of Systems  Gen: No fever,  malaise, weight loss/gain. Heent: No headache, rhinorrhea, epistaxis, ear pain, hearing loss, sinus pain, neck pain/stiffness, sore throat. Heart: No chest pain, palpitations, ISABEL, pnd, or orthopnea. Resp: No cough, hemoptysis, wheezing and shortness of breath. GI: No diarrhea, constipation, melena or hematochezia. : No urinary obstruction, dysuria or hematuria. Derm: No rash, new skin lesion or pruritis. Musc/skeletal: no bone or joint complains. Vasc: No edema, cyanosis or claudication. Endo: No heat/cold intolerance, no polyuria,polydipsia or polyphagia. Neuro: No unilateral weakness, numbness, tingling. No seizures. Heme: No easy bruising or bleeding. Physical Exam:     Physical Exam:  Visit Vitals    /46    Pulse (!) 114    Temp 98.6 °F (37 °C)    Resp 20    SpO2 95%      O2 Device: Room air    Temp (24hrs), Av.5 °F (36.9 °C), Min:98 °F (36.7 °C), Max:98.8 °F (37.1 °C)             General:  Awake, cooperative, cachectic ill appearing. Head:  Normocephalic, without obvious abnormality, atraumatic. Eyes:  Conjunctivae/corneas clear, sclera anicteric, PERRL, EOMs intact. Nose: Nares normal. No drainage or sinus tenderness.    Throat: Lips, mucosa, and tongue normal.    Neck: Supple, symmetrical, trachea midline, no adenopathy. Lungs:   Clear to auscultation bilaterally. Heart:  Regular rate and rhythm, S1, S2 normal, no murmur, click, rub or gallop. Abdomen: Soft, non-tender. Bowel sounds normal. No masses,  No organomegaly. Extremities: Extremities normal, atraumatic, no cyanosis or edema. Capillary refill normal.   Pulses: 2+ and symmetric all extremities. Skin: Skin color pale, turgor normal. No rashes or lesions   Neurologic: CNII-XII intact. No focal motor or sensory deficit.   + supra pubic catheter in place     Labs Reviewed:      Recent Results (from the past 24 hour(s))   CBC WITH AUTOMATED DIFF    Collection Time: 06/15/18  8:20 PM   Result Value Ref Range    WBC 4.3 (L) 4.6 - 13.2 K/uL    RBC 3.66 (L) 4.70 - 5.50 M/uL    HGB 10.2 (L) 13.0 - 16.0 g/dL    HCT 31.4 (L) 36.0 - 48.0 %    MCV 85.8 74.0 - 97.0 FL    MCH 27.9 24.0 - 34.0 PG    MCHC 32.5 31.0 - 37.0 g/dL    RDW 15.9 (H) 11.6 - 14.5 %    PLATELET 270 658 - 191 K/uL    MPV 9.6 9.2 - 11.8 FL    NEUTROPHILS 82 (H) 40 - 73 %    LYMPHOCYTES 12 (L) 21 - 52 %    MONOCYTES 5 3 - 10 %    EOSINOPHILS 1 0 - 5 %    BASOPHILS 0 0 - 2 %    ABS. NEUTROPHILS 3.5 1.8 - 8.0 K/UL    ABS. LYMPHOCYTES 0.5 (L) 0.9 - 3.6 K/UL    ABS. MONOCYTES 0.2 0.05 - 1.2 K/UL    ABS. EOSINOPHILS 0.0 0.0 - 0.4 K/UL    ABS.  BASOPHILS 0.0 0.0 - 0.06 K/UL    DF AUTOMATED     METABOLIC PANEL, COMPREHENSIVE    Collection Time: 06/15/18  8:20 PM   Result Value Ref Range    Sodium 136 136 - 145 mmol/L    Potassium 3.5 3.5 - 5.5 mmol/L    Chloride 100 100 - 108 mmol/L    CO2 28 21 - 32 mmol/L    Anion gap 8 3.0 - 18 mmol/L    Glucose 202 (H) 74 - 99 mg/dL    BUN 9 7.0 - 18 MG/DL    Creatinine 0.56 (L) 0.6 - 1.3 MG/DL    BUN/Creatinine ratio 16 12 - 20      GFR est AA >60 >60 ml/min/1.73m2    GFR est non-AA >60 >60 ml/min/1.73m2    Calcium 8.0 (L) 8.5 - 10.1 MG/DL    Bilirubin, total 0.3 0.2 - 1.0 MG/DL    ALT (SGPT) 17 16 - 61 U/L    AST (SGOT) 14 (L) 15 - 37 U/L Alk. phosphatase 82 45 - 117 U/L    Protein, total 6.5 6.4 - 8.2 g/dL    Albumin 2.4 (L) 3.4 - 5.0 g/dL    Globulin 4.1 (H) 2.0 - 4.0 g/dL    A-G Ratio 0.6 (L) 0.8 - 1.7     LIPASE    Collection Time: 06/15/18  8:20 PM   Result Value Ref Range    Lipase 63 (L) 73 - 393 U/L   CARDIAC PANEL,(CK, CKMB & TROPONIN)    Collection Time: 06/15/18  8:20 PM   Result Value Ref Range    CK 27 (L) 39 - 308 U/L    CK - MB <1.0 <3.6 ng/ml    CK-MB Index  0.0 - 4.0 %     CALCULATION NOT PERFORMED WHEN RESULT IS BELOW LINEAR LIMIT    Troponin-I, Qt. <0.02 0.00 - 0.06 NG/ML   PROTHROMBIN TIME + INR    Collection Time: 06/15/18  8:20 PM   Result Value Ref Range    Prothrombin time 13.4 11.5 - 15.2 sec    INR 1.1 0.8 - 1.2     LACTIC ACID    Collection Time: 06/15/18  8:20 PM   Result Value Ref Range    Lactic acid 1.1 0.4 - 2.0 MMOL/L   EKG, 12 LEAD, INITIAL    Collection Time: 06/15/18  9:00 PM   Result Value Ref Range    Ventricular Rate 113 BPM    Atrial Rate 113 BPM    P-R Interval 162 ms    QRS Duration 112 ms    Q-T Interval 366 ms    QTC Calculation (Bezet) 502 ms    Calculated P Axis 67 degrees    Calculated R Axis 1 degrees    Calculated T Axis 103 degrees    Diagnosis       Sinus tachycardia  ST & T wave abnormality, consider lateral ischemia  Abnormal ECG  When compared with ECG of 14-JUN-2018 20:18,  fusion complexes are no longer present     URINALYSIS W/ RFLX MICROSCOPIC    Collection Time: 06/15/18  9:30 PM   Result Value Ref Range    Color YELLOW      Appearance TURBID      Specific gravity 1.012 1.005 - 1.030      pH (UA) >8.5 (H) 5.0 - 8.0    Protein 100 (A) NEG mg/dL    Glucose NEGATIVE  NEG mg/dL    Ketone 15 (A) NEG mg/dL    Bilirubin NEGATIVE  NEG      Blood MODERATE (A) NEG      Urobilinogen 0.2 0.2 - 1.0 EU/dL    Nitrites POSITIVE (A) NEG      Leukocyte Esterase LARGE (A) NEG     URINE MICROSCOPIC ONLY    Collection Time: 06/15/18  9:30 PM   Result Value Ref Range    WBC TOO NUMEROUS TO COUNT 0 - 5 /hpf RBC 4 to 10 0 - 5 /hpf    Bacteria 4+ (A) NEG /hpf    Amorphous Crystals 3+ (A) NEG    Triple Phosphate crystals 4 to 10 NEG       Procedures/imaging: see electronic medical records for all procedures/Xrays and details which were not copied into this note but were reviewed prior to creation of Plan        CC: Jesus King MD

## 2018-06-16 NOTE — PROGRESS NOTES
Bedside shift change report given to Nichole Marlow (oncoming nurse) by Wade Villasenor RN   (offgoing nurse). Report included the following information SBAR, Kardex, Intake/Output, MAR and Recent Results.   Primary Nurse Wade Villasenor RN and Isis Kay RN performed a dual skin assessment on this patient Impairment noted- see wound doc flow sheet  Chapito score is 18

## 2018-06-17 LAB
ANION GAP SERPL CALC-SCNC: 9 MMOL/L (ref 3–18)
BASOPHILS # BLD: 0 K/UL (ref 0–0.06)
BASOPHILS NFR BLD: 1 % (ref 0–2)
BUN SERPL-MCNC: 3 MG/DL (ref 7–18)
BUN/CREAT SERPL: 5 (ref 12–20)
CALCIUM SERPL-MCNC: 7.9 MG/DL (ref 8.5–10.1)
CHLORIDE SERPL-SCNC: 105 MMOL/L (ref 100–108)
CO2 SERPL-SCNC: 25 MMOL/L (ref 21–32)
CREAT SERPL-MCNC: 0.65 MG/DL (ref 0.6–1.3)
DIFFERENTIAL METHOD BLD: ABNORMAL
EOSINOPHIL # BLD: 0.8 K/UL (ref 0–0.4)
EOSINOPHIL NFR BLD: 20 % (ref 0–5)
ERYTHROCYTE [DISTWIDTH] IN BLOOD BY AUTOMATED COUNT: 16.3 % (ref 11.6–14.5)
GLUCOSE BLD STRIP.AUTO-MCNC: 257 MG/DL (ref 70–110)
GLUCOSE BLD STRIP.AUTO-MCNC: 281 MG/DL (ref 70–110)
GLUCOSE BLD STRIP.AUTO-MCNC: 355 MG/DL (ref 70–110)
GLUCOSE BLD STRIP.AUTO-MCNC: 389 MG/DL (ref 70–110)
GLUCOSE SERPL-MCNC: 180 MG/DL (ref 74–99)
HCT VFR BLD AUTO: 30.6 % (ref 36–48)
HGB BLD-MCNC: 9.6 G/DL (ref 13–16)
LYMPHOCYTES # BLD: 0.7 K/UL (ref 0.9–3.6)
LYMPHOCYTES NFR BLD: 18 % (ref 21–52)
MAGNESIUM SERPL-MCNC: 1.9 MG/DL (ref 1.6–2.6)
MCH RBC QN AUTO: 27.3 PG (ref 24–34)
MCHC RBC AUTO-ENTMCNC: 31.4 G/DL (ref 31–37)
MCV RBC AUTO: 86.9 FL (ref 74–97)
MONOCYTES # BLD: 0.3 K/UL (ref 0.05–1.2)
MONOCYTES NFR BLD: 8 % (ref 3–10)
NEUTS SEG # BLD: 2.1 K/UL (ref 1.8–8)
NEUTS SEG NFR BLD: 53 % (ref 40–73)
PLATELET # BLD AUTO: 162 K/UL (ref 135–420)
PMV BLD AUTO: 9.3 FL (ref 9.2–11.8)
POTASSIUM SERPL-SCNC: 4 MMOL/L (ref 3.5–5.5)
RBC # BLD AUTO: 3.52 M/UL (ref 4.7–5.5)
SODIUM SERPL-SCNC: 139 MMOL/L (ref 136–145)
WBC # BLD AUTO: 4 K/UL (ref 4.6–13.2)

## 2018-06-17 PROCEDURE — 80048 BASIC METABOLIC PNL TOTAL CA: CPT | Performed by: INTERNAL MEDICINE

## 2018-06-17 PROCEDURE — 74011636637 HC RX REV CODE- 636/637: Performed by: INTERNAL MEDICINE

## 2018-06-17 PROCEDURE — 85025 COMPLETE CBC W/AUTO DIFF WBC: CPT | Performed by: INTERNAL MEDICINE

## 2018-06-17 PROCEDURE — 65270000029 HC RM PRIVATE

## 2018-06-17 PROCEDURE — 97110 THERAPEUTIC EXERCISES: CPT

## 2018-06-17 PROCEDURE — 82962 GLUCOSE BLOOD TEST: CPT

## 2018-06-17 PROCEDURE — 83735 ASSAY OF MAGNESIUM: CPT | Performed by: INTERNAL MEDICINE

## 2018-06-17 PROCEDURE — 74011250637 HC RX REV CODE- 250/637: Performed by: INTERNAL MEDICINE

## 2018-06-17 PROCEDURE — 74011250636 HC RX REV CODE- 250/636: Performed by: HOSPITALIST

## 2018-06-17 PROCEDURE — 97530 THERAPEUTIC ACTIVITIES: CPT

## 2018-06-17 PROCEDURE — 97162 PT EVAL MOD COMPLEX 30 MIN: CPT

## 2018-06-17 PROCEDURE — 74011250636 HC RX REV CODE- 250/636: Performed by: INTERNAL MEDICINE

## 2018-06-17 PROCEDURE — 36415 COLL VENOUS BLD VENIPUNCTURE: CPT | Performed by: INTERNAL MEDICINE

## 2018-06-17 PROCEDURE — 74011000258 HC RX REV CODE- 258: Performed by: INTERNAL MEDICINE

## 2018-06-17 RX ORDER — INSULIN GLARGINE 100 [IU]/ML
28 INJECTION, SOLUTION SUBCUTANEOUS
Status: DISCONTINUED | OUTPATIENT
Start: 2018-06-17 | End: 2018-06-20 | Stop reason: HOSPADM

## 2018-06-17 RX ADMIN — INSULIN LISPRO 10 UNITS: 100 INJECTION, SOLUTION INTRAVENOUS; SUBCUTANEOUS at 22:05

## 2018-06-17 RX ADMIN — OMEPRAZOLE 20 MG: 20 CAPSULE, DELAYED RELEASE ORAL at 09:37

## 2018-06-17 RX ADMIN — OXYCODONE HYDROCHLORIDE AND ACETAMINOPHEN 2 TABLET: 5; 325 TABLET ORAL at 17:03

## 2018-06-17 RX ADMIN — VITAMIN D, TAB 1000IU (100/BT) 5000 UNITS: 25 TAB at 09:37

## 2018-06-17 RX ADMIN — ASPIRIN 81 MG: 81 TABLET, COATED ORAL at 22:04

## 2018-06-17 RX ADMIN — CILOSTAZOL 100 MG: 50 TABLET ORAL at 06:10

## 2018-06-17 RX ADMIN — INSULIN LISPRO 6 UNITS: 100 INJECTION, SOLUTION INTRAVENOUS; SUBCUTANEOUS at 12:41

## 2018-06-17 RX ADMIN — PIPERACILLIN SODIUM,TAZOBACTAM SODIUM 3.38 G: 3; .375 INJECTION, POWDER, FOR SOLUTION INTRAVENOUS at 16:57

## 2018-06-17 RX ADMIN — OXYCODONE HYDROCHLORIDE AND ACETAMINOPHEN 2 TABLET: 5; 325 TABLET ORAL at 09:45

## 2018-06-17 RX ADMIN — MULTIPLE VITAMINS W/ MINERALS TAB 1 TABLET: TAB at 09:37

## 2018-06-17 RX ADMIN — ALBUTEROL SULFATE 2 PUFF: 90 AEROSOL, METERED RESPIRATORY (INHALATION) at 10:53

## 2018-06-17 RX ADMIN — INSULIN LISPRO 10 UNITS: 100 INJECTION, SOLUTION INTRAVENOUS; SUBCUTANEOUS at 16:57

## 2018-06-17 RX ADMIN — POTASSIUM CITRATE 10 MEQ: 10 TABLET ORAL at 09:45

## 2018-06-17 RX ADMIN — PIPERACILLIN SODIUM,TAZOBACTAM SODIUM 3.38 G: 3; .375 INJECTION, POWDER, FOR SOLUTION INTRAVENOUS at 00:56

## 2018-06-17 RX ADMIN — LORATADINE 10 MG: 10 TABLET ORAL at 09:37

## 2018-06-17 RX ADMIN — INSULIN LISPRO 6 UNITS: 100 INJECTION, SOLUTION INTRAVENOUS; SUBCUTANEOUS at 09:37

## 2018-06-17 RX ADMIN — LEVOTHYROXINE SODIUM 100 MCG: 100 TABLET ORAL at 06:10

## 2018-06-17 RX ADMIN — SODIUM CHLORIDE 75 ML/HR: 900 INJECTION, SOLUTION INTRAVENOUS at 03:20

## 2018-06-17 RX ADMIN — DILTIAZEM HYDROCHLORIDE 360 MG: 180 CAPSULE, COATED, EXTENDED RELEASE ORAL at 09:37

## 2018-06-17 RX ADMIN — FLUTICASONE FUROATE AND VILANTEROL TRIFENATATE 1 PUFF: 100; 25 POWDER RESPIRATORY (INHALATION) at 09:39

## 2018-06-17 RX ADMIN — PREGABALIN 75 MG: 75 CAPSULE ORAL at 22:05

## 2018-06-17 RX ADMIN — CILOSTAZOL 100 MG: 50 TABLET ORAL at 16:57

## 2018-06-17 RX ADMIN — PREGABALIN 75 MG: 75 CAPSULE ORAL at 09:37

## 2018-06-17 RX ADMIN — FUROSEMIDE 40 MG: 40 TABLET ORAL at 09:37

## 2018-06-17 RX ADMIN — ATORVASTATIN CALCIUM 20 MG: 20 TABLET, FILM COATED ORAL at 22:04

## 2018-06-17 RX ADMIN — INSULIN GLARGINE 28 UNITS: 100 INJECTION, SOLUTION SUBCUTANEOUS at 22:05

## 2018-06-17 RX ADMIN — Medication 150 MG: at 22:12

## 2018-06-17 RX ADMIN — PIPERACILLIN SODIUM,TAZOBACTAM SODIUM 3.38 G: 3; .375 INJECTION, POWDER, FOR SOLUTION INTRAVENOUS at 07:12

## 2018-06-17 RX ADMIN — CLOPIDOGREL BISULFATE 75 MG: 75 TABLET ORAL at 09:37

## 2018-06-17 RX ADMIN — OXYCODONE HYDROCHLORIDE AND ACETAMINOPHEN 2 TABLET: 5; 325 TABLET ORAL at 03:19

## 2018-06-17 RX ADMIN — UMECLIDINIUM 1 PUFF: 62.5 AEROSOL, POWDER ORAL at 09:39

## 2018-06-17 RX ADMIN — FLUTICASONE PROPIONATE 2 SPRAY: 50 SPRAY, METERED NASAL at 09:00

## 2018-06-17 RX ADMIN — Medication 150 MG: at 09:46

## 2018-06-17 RX ADMIN — POTASSIUM CITRATE 10 MEQ: 10 TABLET ORAL at 22:12

## 2018-06-17 NOTE — ROUTINE PROCESS
Bedside and Verbal shift change report given to Dorothy FLORES RN (oncoming nurse) by Cristal Ansari RN (offgoing nurse). Report included the following information SBAR, Kardex, Procedure Summary, Intake/Output, MAR, Accordion, Recent Results and Med Rec Status.

## 2018-06-17 NOTE — WOUND CARE
Wound Care Progress Note       New consult placed by [facility] MD for sacral ulcers    Assessment:   Communication: A&O x 4 communicative  Wearing briefs for incontinence has a Sher. Requires partial assists in repositioning. Diet: diabetic  Patient reports no pain. Dressings to legs left intact, not to be changed per previous orders from vascular. 1. POA sacral wound stage II (wound location & etiology) = 1 x 1 x 0.1 cm    Base is 50% granulation and 50% fibrinous tissue. scant exudate. Periwound with blanching erythema. Treatment: Offloaded using mepilex border  2. POA left ischial stage II (wound location & etiology) = 0.5 x 0.5 x 0.1 cm    Base is 100% of granulation tissue. scant exudate. Periwound with blanching erythema. Treatment: offloaded using mepilex border    Patient repositioned on right side with pillow the knees. Heels offloaded on pillow. Wound Recommendations:    Santyl to wounds # 1 and 2    Skin Care & Pressure Relief Recommendations:  Minimize layers of linen/pads under patient to optimize support surface. Turn/reposition approximately every 2 hours and offload heels pillows or Prevalon boots. Manage incontinence / promote continence; Proshield to buttocks and sacrum daily and as needed with incontinence care    Discussed above plan with patient and electronic notification sent to hospitalist, awaiting response. Nurse driven treatments in place, will continue to follow.      Transition of Care: Plan to follow weekly and per product recommendations while admitted to hospital.      Sacrum      L ischium

## 2018-06-17 NOTE — PROGRESS NOTES
Hospitalist Progress Note    Patient: Vera Cuellar Sr. MRN: [id number]  CSN: [id number]    YOB: 1945  Age: 74 y.o. Sex: male    DOA: 6/15/2018 LOS:  LOS: 1 day            Assessment/Plan    1. Pancolitis- improving  2. Proteus UTI  3. Dehydration- resolved  4. PAD w LE ulcerations  5. Stage 2 decub ulcerations  6. DM2  7.  HTN      Plan:  - contineu zosyn  - stop IVF  - increase lantus  - continue home medication regimen   - physical therapy  - anticpate DC next 1-2 days      Patient Active Problem List   Diagnosis Code    PAD (peripheral artery disease) ([facility]) I73.9    Venous reflux I87.2    Leg ulcer, left ([address] L97.929    Leg ulcer ([address] L97.909    Diabetic ulcer of right midfoot associated with type 2 diabetes mellitus, with fat layer exposed ([address] E11.621, L97.412    Sepsis ([facility] A41.9    CAD (coronary artery disease) I25.10    COPD (chronic obstructive pulmonary disease) ([facility]) J44.9    HTN (hypertension) I10    UTI (urinary tract infection) due to urinary indwelling Sher catheter ([facility]) T83.511A, N39.0    PAF (paroxysmal atrial fibrillation) ([facility]) I48.0    UTI (urinary tract infection) N39.0    Diabetic ulcer of both lower extremities ([facility]) E11.622, L97.919, L97.929    Hyponatremia E87.1    Hypomagnesemia E83.42    Hypokalemia E87.6    Severe protein-calorie malnutrition ([facility]) E43    Ulcer of right leg, limited to breakdown of skin ([facility] L97.911    Type 2 diabetes mellitus with diabetic neuropathy ([facility]) E11.40    Chronic anticoagulation Z79.01    Gross hematuria R31.0    Mechanical complication of suprapubic catheter ([facility]) T83.090A    DNR no code (do not resuscitate) Z66    Colitis K52.9    Weakness R53.1    Urinary tract infection N39.0    Sacral decubitus ulcer, stage III ([facility]) L89.153               Subjective:    cc: \" I feel much better\"  Pt has no further diarrhea  Denies fever, chills, nausea, vomiting, abdominal pain      REVIEW OF SYSTEMS:  General: No fevers or chills. Cardiovascular: No chest pain or pressure. No palpitations. Pulmonary: No shortness of breath. Gastrointestinal: No nausea, vomiting. Objective:        Vital signs/Intake and Output:  Visit Vitals    /41 (BP 1 Location: Left arm, BP Patient Position: At rest)    Pulse 100    Temp 98.5 °F (36.9 °C)    Resp 18    Wt 63.4 kg (139 lb 12.4 oz)    SpO2 95%    BMI 17.95 kg/m2     Current Shift:  06/17 0701 - 06/17 1900  In: 580 [P.O.:480; I.V.:100]  Out: 550 [Urine:550]  Last three shifts:  06/15 1901 - 06/17 0700  In: 1422.5 [P.O.:220; I.V.:1202.5]  Out: 4490 [Urine:4490]    Body mass index is 17.95 kg/(m^2).     Physical Exam:  GEN: Alert and oriented times three NAD, eating breakfast  EYES: conjunctiva normal, lids with out lesions  HEENT: MMM, No thyromegaly, no lymphadenopathy  HEART: RRR +S1 +S2, no JVD, pulses 2+ distally  LUNGS: CTA B/L no wheezes, rales or rhonchi  ABDOMEN: + BS, soft NT/ND no organomegaly,  No rebound, suprapubic catheter cdi  EXTREMITIES: No edema cyanosis, cap refill normal   SKIN: no rashes or skin breakdown, no nodules, normal turgor  Current Facility-Administered Medications   Medication Dose Route Frequency    fluticasone-vilanterol (BREO ELLIPTA) 100mcg-25mcg/puff  1 Puff Inhalation DAILY    aspirin delayed-release tablet 81 mg  81 mg Oral QHS    furosemide (LASIX) tablet 40 mg  40 mg Oral DAILY    dilTIAZem CD (CARDIZEM CD) capsule 360 mg  360 mg Oral DAILY    loratadine (CLARITIN) tablet 10 mg  10 mg Oral DAILY    fluticasone (FLONASE) 50 mcg/actuation nasal spray 2 Spray  2 Spray Both Nostrils DAILY    atorvastatin (LIPITOR) tablet 20 mg  20 mg Oral QHS    clopidogrel (PLAVIX) tablet 75 mg  75 mg Oral DAILY    potassium citrate (UROCIT-K10) tablet 10 mEq  10 mEq Oral BID    multivitamin, tx-iron-ca-min (THERA-M w/ IRON) tablet 1 Tab  1 Tab Oral DAILY    omeprazole (PRILOSEC) capsule 20 mg  20 mg Oral DAILY    pregabalin (LYRICA) capsule 75 mg  75 mg Oral BID    insulin glargine (LANTUS) injection 20 Units  20 Units SubCUTAneous QHS    levothyroxine (SYNTHROID) tablet 100 mcg  100 mcg Oral ACB    cilostazol (PLETAL) tablet 100 mg  100 mg Oral ACB&D    cholecalciferol (VITAMIN D3) tablet 5,000 Units  5,000 Units Oral DAILY    albuterol (PROVENTIL HFA, VENTOLIN HFA, PROAIR HFA) inhaler 2 Puff  2 Puff Inhalation Q4H PRN    umeclidinium (INCRUSE ELLIPTA) 62.5 mcg/actuation  1 Puff Inhalation DAILY    acetaminophen (TYLENOL) tablet 650 mg  650 mg Oral Q4H PRN    ondansetron (ZOFRAN) injection 4 mg  4 mg IntraVENous Q4H PRN    docusate sodium (COLACE) capsule 100 mg  100 mg Oral BID PRN    piperacillin-tazobactam (ZOSYN) 3.375 g in 0.9% sodium chloride (MBP/ADV) 100 mL MBP  3.375 g IntraVENous Q8H    insulin lispro (HUMALOG) injection   SubCUTAneous AC&HS    0.9% sodium chloride infusion  75 mL/hr IntraVENous CONTINUOUS    iron polysaccharides (NIFEREX) capsule 150 mg  1 Cap Oral BID    oxyCODONE-acetaminophen (PERCOCET) 5-325 mg per tablet 2 Tab  2 Tab Oral Q6H PRN         All the patient's labs over the past 24 hours were reviewed both during my initial daily workflow process and at the time notated as \"note time\" in [facility]. (It is not time stamped separately in this workflow.)  Select labs are listed below.         Labs: Results:       Chemistry Recent Labs      06/17/18   0804  06/16/18   0422  06/15/18   2020  06/14/18   1750   GLU  180*  230*  202*  190*   NA  139  139  136  134*   K  4.0  3.1*  3.5  4.6   CL  105  105  100  97*   CO2  25  29  28  29   BUN  3*  5*  9  8   CREA  0.65  0.54*  0.56*  0.70   CA  7.9*  7.7*  8.0*  8.7   AGAP  9  5  8  8   BUCR  5*  9*  16  11*   AP   --   72  82  98   TP   --   5.8*  6.5  7.3   ALB   --   2.1*  2.4*  2.7*   GLOB   --   3.7  4.1*  4.6*   AGRAT   --   0.6*  0.6*  0.6*      CBC w/Diff Recent Labs      06/17/18   0804  06/16/18   0422  06/15/18   2020 06/14/18   1750   WBC  4.0*  3.1*  4.3*  6.5   RBC  3.52*  3.48*  3.66*  3.91*   HGB  9.6*  9.6*  10.2*  10.7*   HCT  30.6*  30.2*  31.4*  33.8*   PLT  162  133*  169  176   GRANS  53   --   82*  71   LYMPH  18*   --   12*  11*   EOS  20*   --   1  8*      Cardiac Enzymes Recent Labs      06/15/18   2020  06/14/18   1750   CPK  27*  48   CKND1  CALCULATION NOT PERFORMED WHEN RESULT IS BELOW LINEAR LIMIT  2.3      Coagulation Recent Labs      06/15/18   2020  06/14/18   1750   PTP  13.4  13.3   INR  1.1  1.1   APTT   --   43.4*               Liver Enzymes Recent Labs      06/16/18   0422   TP  5.8*   ALB  2.1*   AP  72   SGOT  16      Thyroid Studies Lab Results   Component Value Date/Time    TSH 0.90 11/04/2017 06:23 PM        Procedures/imaging: see electronic medical records for all procedures/Xrays and details which were not copied into this note but were reviewed prior to creation of [address] [place] [address] Rd, DO  Internal Medicine/Geriatrics

## 2018-06-17 NOTE — PROGRESS NOTES
Shift summary: Pt pleasant and cooperative. A&Ox4. Bowel sounds active. No BM. Suprapubic cath drained clear yellow urine. Pt's dressings to BLE wounds, clean and dry. Pt has prevalone boots in place. Pt stated he sees the wound clinic for wounds to sacrum and BLE. Pt stated he had a debridement done and skin grafts were placed last Wednesday by Dr. Bueno Deasheldon. Pt c/o pain to sacrum, percocet given and pt repositioned, pt rested afterwards. Pt stated he was tolerating full liquids fine yesterday and is wanting solid foods. Advanced to diabetic diet per written order.      Patient Vitals for the past 12 hrs:   Temp Pulse Resp BP SpO2   06/17/18 0323 98.3 °F (36.8 °C) 88 18 107/45 97 %   06/16/18 2256 98.6 °F (37 °C) 86 18 128/58 98 %

## 2018-06-17 NOTE — ROUTINE PROCESS
Bedside and Verbal shift change report given to Angelina Varghese (oncoming nurse) by Daniel Contreras (offgoing nurse). Report included the following information SBAR, Kardex and MAR.

## 2018-06-17 NOTE — PROGRESS NOTES
Problem: Mobility Impaired (Adult and Pediatric)  Goal: *Acute Goals and Plan of Care (Insert Text)  Physical Therapy Goals  Initiated 6/17/2018 and to be accomplished within 3-7 day(s)  1. Patient will move from supine to sit and sit to supine  in bed with supervision/set-up. 2.  Patient will transfer from bed to chair and chair to bed with minimal assistance/contact guard assist using the least restrictive device. 3.  Patient will perform sit to stand with minimal assistance/contact guard assist.  4.  Patient will ambulate with minimal assistance/contact guard assist for 30 feet with the least restrictive device. Outcome: Progressing Towards Goal  physical Therapy EVALUATION    Patient: Chris Kim Sr. (67 y.o. male)  Date: 6/17/2018  Primary Diagnosis: Colitis  Weakness  Urinary tract infection  Precautions:   Fall    ASSESSMENT :  Based on the objective data described below, the patient presents with lower extremity weakness, decreased gait quality and endurance, impaired bed mobility and transfers, and overall limitations in functional mobility. Pt lives at home with wife to assist. PT has w/c, rollator, RW, stair lift and grab bars throughout home. Pt performed supine to sit with Elsy, sit to stand with MaxAx1-2. Attempted amb however pt unable to take a step due to knee buckling. Pt with hamstring contractures and unable to reach full extension. Performed PROM/stretching to B hamstrings and gastroc/soleus complex. Patient would benefit from skilled inpatient physical therapy to address deficits, progress as tolerated to achieve long term goals and allow safe discharge. At this time pt requires heavy assistance for mobility. Pt only has wife available for physical assist at home and occasional help from son. Pt declines rehab however at this time recommending SNF placement as safest option. Will continue to assess as pt tolerates.     Patient will benefit from skilled intervention to address the above impairments. Patients rehabilitation potential is considered to be Fair  Factors which may influence rehabilitation potential include:   []         None noted  []         Mental ability/status  [x]         Medical condition  [x]         Home/family situation and support systems  []         Safety awareness  [x]         Pain tolerance/management  []         Other:      PLAN :  Recommendations and Planned Interventions:  [x]           Bed Mobility Training             [x]    Neuromuscular Re-Education  [x]           Transfer Training                   []    Orthotic/Prosthetic Training  [x]           Gait Training                          []    Modalities  [x]           Therapeutic Exercises          []    Edema Management/Control  [x]           Therapeutic Activities            [x]    Patient and Family Training/Education  []           Other (comment):    Frequency/Duration: Patient will be followed by physical therapy 1-2 times per day to address goals. Discharge Recommendations: Rehab  Further Equipment Recommendations for Discharge: N/A     SUBJECTIVE:   Patient stated I am feeling a little better today.     OBJECTIVE DATA SUMMARY:     Past Medical History:   Diagnosis Date    Acid reflux     Anemia     Arthritis     Atrial fibrillation ([facility]     CAD (coronary artery disease)     Chronic obstructive pulmonary disease (HCC)     Coagulation disorder (HCC)     anemia    Diabetes ([facility] 1997    GERD (gastroesophageal reflux disease)     H/O seasonal allergies     Hypercholesterolemia     Hypertension 1997    Ill-defined condition     hand tremors    PAF (paroxysmal atrial fibrillation) ([facility] 11/5/2017    Peripheral arterial disease ([facility]     Sleep apnea     pt states he has not used bipap \"in years\".     Thyroid disease      Past Surgical History:   Procedure Laterality Date    ABDOMEN SURGERY PROC UNLISTED  1997    Vikas-en-Y    ABDOMEN SURGERY PROC UNLISTED  2000    abdominal hernia repair/mesh    HX APPENDECTOMY      HX CERVICAL LAMINECTOMY      HX COLONOSCOPY      HX HEENT  2004    cataracts removed right eye    HX ORTHOPAEDIC  1982    cervical laminectomy    HX ORTHOPAEDIC Right 1962    knee torn cartilage    HX TONSILLECTOMY      HX UROLOGICAL  12/2015    suprapubic catheter in place    VASCULAR SURGERY PROCEDURE UNLIST Bilateral 2015 2017    angio plasty legs  x2    VASCULAR SURGERY PROCEDURE UNLIST Bilateral 2017    legs venogram  stent right leg     Barriers to Learning/Limitations: None  Compensate with: N/A  Prior Level of Function/Home Situation: Independent amb for short distance and independent transfers to/from w/c and stair lift  Home Situation  Home Environment: Private residence  # Steps to Enter: 0  One/Two Story Residence: Two story  Living Alone: No  Support Systems: Spouse/Significant Other/Partner  Patient Expects to be Discharged to[de-identified] Private residence  Current DME Used/Available at Home: Connee Simmering, rollator, Walker, rolling, Wheelchair  Critical Behavior:  Neurologic State: Appropriate for age  Psychosocial  Purposeful Interaction: Yes  Pt Identified Daily Priority: Clinical issues (comment)  Caritas Process: Nurture loving kindness;Establish trust;Enable leola/hope; Teaching/learning  Caring Interventions: Reassure; Therapeutic modalities  Reassure: Therapeutic listening;Caring rounds  Therapeutic Modalities: Humor; Intentional therapeutic touch  Skin Condition/Temp: Warm;Dry  Skin Integrity: Wound (add Wound LDA) (BLE, sacrum)  Skin Integumentary  Skin Color: Appropriate for ethnicity  Skin Condition/Temp: Warm;Dry  Skin Integrity: Wound (add Wound LDA) (BLE, sacrum)  Turgor: Non-tenting  Hair Growth: Absent  Varicosities: Absent  Strength:    Strength: Generally decreased, functional  Tone & Sensation:   Tone: Abnormal  Sensation: Impaired  Range Of Motion:  AROM: Generally decreased, functional  PROM: Generally decreased, functional  Functional Mobility:  Bed Mobility:  Supine to Sit: Minimum assistance  Sit to Supine: Moderate assistance  Transfers:  Sit to Stand: Maximum assistance;Assist x2  Stand to Sit: Maximum assistance  Balance:   Sitting: Intact; With support  Standing: Impaired; With support  Standing - Static: Poor  Standing - Dynamic : Poor  Pain:  Pain Scale 1: Numeric (0 - 10)  Pain Intensity 1: 0  Pain Location 1: Back  Pain Orientation 1: Posterior  Pain Description 1: Aching  Pain Intervention(s) 1: Medication (see MAR)  Activity Tolerance:   Good  Please refer to the flowsheet for vital signs taken during this treatment. After treatment:   []         Patient left in no apparent distress sitting up in chair  [x]         Patient left in no apparent distress in bed  [x]         Call bell left within reach  [x]         Nursing notified  []         Caregiver present  []         Bed alarm activated    COMMUNICATION/EDUCATION:   [x]         Fall prevention education was provided and the patient/caregiver indicated understanding. [x]         Patient/family have participated as able in goal setting and plan of care. [x]         Patient/family agree to work toward stated goals and plan of care. []         Patient understands intent and goals of therapy, but is neutral about his/her participation. []         Patient is unable to participate in goal setting and plan of care.     Thank you for this referral.  Irais Valladares   Time Calculation: 44 mins   Eval Complexity: History: MEDIUM  Complexity : 1-2 comorbidities / personal factors will impact the outcome/ POC Exam:MEDIUM Complexity : 3 Standardized tests and measures addressing body structure, function, activity limitation and / or participation in recreation  Presentation: MEDIUM Complexity : Evolving with changing characteristics  Clinical Decision Making:Medium Complexity requires MaxA for mobility, balance impaired, unable to amb at this time Overall Complexity:MEDIUM Mobility  Current  CK= 40-59%  Goal  CI= 1-19%. The severity rating is based on the Level of Assistance required for Functional Mobility and ADLs.

## 2018-06-17 NOTE — ROUTINE PROCESS
Bedside and Verbal shift change report given to Linda Torres RN (oncoming nurse) by Dianna Almaraz RN   (offgoing nurse). Report included the following information SBAR, Kardex, Intake/Output, MAR and Cardiac Rhythm NSR.

## 2018-06-18 LAB
ANION GAP SERPL CALC-SCNC: 5 MMOL/L (ref 3–18)
BASOPHILS # BLD: 0 K/UL (ref 0–0.06)
BASOPHILS NFR BLD: 0 % (ref 0–2)
BUN SERPL-MCNC: 5 MG/DL (ref 7–18)
BUN/CREAT SERPL: 7 (ref 12–20)
CALCIUM SERPL-MCNC: 8.1 MG/DL (ref 8.5–10.1)
CHLORIDE SERPL-SCNC: 103 MMOL/L (ref 100–108)
CO2 SERPL-SCNC: 29 MMOL/L (ref 21–32)
CREAT SERPL-MCNC: 0.71 MG/DL (ref 0.6–1.3)
DIFFERENTIAL METHOD BLD: ABNORMAL
EOSINOPHIL # BLD: 0.6 K/UL (ref 0–0.4)
EOSINOPHIL NFR BLD: 18 % (ref 0–5)
ERYTHROCYTE [DISTWIDTH] IN BLOOD BY AUTOMATED COUNT: 16 % (ref 11.6–14.5)
GLUCOSE BLD STRIP.AUTO-MCNC: 231 MG/DL (ref 70–110)
GLUCOSE BLD STRIP.AUTO-MCNC: 255 MG/DL (ref 70–110)
GLUCOSE BLD STRIP.AUTO-MCNC: 291 MG/DL (ref 70–110)
GLUCOSE BLD STRIP.AUTO-MCNC: 323 MG/DL (ref 70–110)
GLUCOSE SERPL-MCNC: 230 MG/DL (ref 74–99)
HCT VFR BLD AUTO: 29.4 % (ref 36–48)
HGB BLD-MCNC: 9.3 G/DL (ref 13–16)
LYMPHOCYTES # BLD: 0.6 K/UL (ref 0.9–3.6)
LYMPHOCYTES NFR BLD: 19 % (ref 21–52)
MCH RBC QN AUTO: 27.4 PG (ref 24–34)
MCHC RBC AUTO-ENTMCNC: 31.6 G/DL (ref 31–37)
MCV RBC AUTO: 86.5 FL (ref 74–97)
MONOCYTES # BLD: 0.3 K/UL (ref 0.05–1.2)
MONOCYTES NFR BLD: 8 % (ref 3–10)
NEUTS SEG # BLD: 1.7 K/UL (ref 1.8–8)
NEUTS SEG NFR BLD: 55 % (ref 40–73)
PLATELET # BLD AUTO: 152 K/UL (ref 135–420)
PMV BLD AUTO: 9.3 FL (ref 9.2–11.8)
POTASSIUM SERPL-SCNC: 4.4 MMOL/L (ref 3.5–5.5)
RBC # BLD AUTO: 3.4 M/UL (ref 4.7–5.5)
SODIUM SERPL-SCNC: 137 MMOL/L (ref 136–145)
WBC # BLD AUTO: 3.1 K/UL (ref 4.6–13.2)

## 2018-06-18 PROCEDURE — 97110 THERAPEUTIC EXERCISES: CPT

## 2018-06-18 PROCEDURE — 74011250637 HC RX REV CODE- 250/637: Performed by: INTERNAL MEDICINE

## 2018-06-18 PROCEDURE — 65270000029 HC RM PRIVATE

## 2018-06-18 PROCEDURE — 97530 THERAPEUTIC ACTIVITIES: CPT

## 2018-06-18 PROCEDURE — 80048 BASIC METABOLIC PNL TOTAL CA: CPT | Performed by: INTERNAL MEDICINE

## 2018-06-18 PROCEDURE — 82962 GLUCOSE BLOOD TEST: CPT

## 2018-06-18 PROCEDURE — 74011250637 HC RX REV CODE- 250/637: Performed by: HOSPITALIST

## 2018-06-18 PROCEDURE — 97167 OT EVAL HIGH COMPLEX 60 MIN: CPT

## 2018-06-18 PROCEDURE — 97535 SELF CARE MNGMENT TRAINING: CPT

## 2018-06-18 PROCEDURE — 36415 COLL VENOUS BLD VENIPUNCTURE: CPT | Performed by: INTERNAL MEDICINE

## 2018-06-18 PROCEDURE — 74011250636 HC RX REV CODE- 250/636: Performed by: INTERNAL MEDICINE

## 2018-06-18 PROCEDURE — 74011000258 HC RX REV CODE- 258: Performed by: INTERNAL MEDICINE

## 2018-06-18 PROCEDURE — 74011636637 HC RX REV CODE- 636/637: Performed by: INTERNAL MEDICINE

## 2018-06-18 PROCEDURE — 85025 COMPLETE CBC W/AUTO DIFF WBC: CPT | Performed by: INTERNAL MEDICINE

## 2018-06-18 RX ORDER — ASCORBIC ACID 250 MG
500 TABLET ORAL DAILY
Status: DISCONTINUED | OUTPATIENT
Start: 2018-06-18 | End: 2018-06-20 | Stop reason: HOSPADM

## 2018-06-18 RX ORDER — INSULIN LISPRO 100 [IU]/ML
INJECTION, SOLUTION INTRAVENOUS; SUBCUTANEOUS
Status: DISCONTINUED | OUTPATIENT
Start: 2018-06-18 | End: 2018-06-20 | Stop reason: HOSPADM

## 2018-06-18 RX ADMIN — PREGABALIN 75 MG: 75 CAPSULE ORAL at 21:37

## 2018-06-18 RX ADMIN — OXYCODONE HYDROCHLORIDE AND ACETAMINOPHEN 2 TABLET: 5; 325 TABLET ORAL at 15:51

## 2018-06-18 RX ADMIN — PIPERACILLIN SODIUM,TAZOBACTAM SODIUM 3.38 G: 3; .375 INJECTION, POWDER, FOR SOLUTION INTRAVENOUS at 15:55

## 2018-06-18 RX ADMIN — PIPERACILLIN SODIUM,TAZOBACTAM SODIUM 3.38 G: 3; .375 INJECTION, POWDER, FOR SOLUTION INTRAVENOUS at 08:26

## 2018-06-18 RX ADMIN — POTASSIUM CITRATE 10 MEQ: 10 TABLET ORAL at 21:37

## 2018-06-18 RX ADMIN — UMECLIDINIUM 1 PUFF: 62.5 AEROSOL, POWDER ORAL at 08:27

## 2018-06-18 RX ADMIN — LEVOTHYROXINE SODIUM 100 MCG: 100 TABLET ORAL at 06:59

## 2018-06-18 RX ADMIN — Medication 150 MG: at 21:37

## 2018-06-18 RX ADMIN — COLLAGENASE SANTYL: 250 OINTMENT TOPICAL at 15:27

## 2018-06-18 RX ADMIN — OXYCODONE HYDROCHLORIDE AND ACETAMINOPHEN 2 TABLET: 5; 325 TABLET ORAL at 22:07

## 2018-06-18 RX ADMIN — POTASSIUM CITRATE 10 MEQ: 10 TABLET ORAL at 08:37

## 2018-06-18 RX ADMIN — ASPIRIN 81 MG: 81 TABLET, COATED ORAL at 21:37

## 2018-06-18 RX ADMIN — Medication 150 MG: at 08:36

## 2018-06-18 RX ADMIN — PREGABALIN 75 MG: 75 CAPSULE ORAL at 08:26

## 2018-06-18 RX ADMIN — PIPERACILLIN SODIUM,TAZOBACTAM SODIUM 3.38 G: 3; .375 INJECTION, POWDER, FOR SOLUTION INTRAVENOUS at 00:19

## 2018-06-18 RX ADMIN — CILOSTAZOL 100 MG: 50 TABLET ORAL at 15:51

## 2018-06-18 RX ADMIN — INSULIN LISPRO 9 UNITS: 100 INJECTION, SOLUTION INTRAVENOUS; SUBCUTANEOUS at 21:37

## 2018-06-18 RX ADMIN — VITAMIN D, TAB 1000IU (100/BT) 5000 UNITS: 25 TAB at 08:25

## 2018-06-18 RX ADMIN — INSULIN GLARGINE 28 UNITS: 100 INJECTION, SOLUTION SUBCUTANEOUS at 21:37

## 2018-06-18 RX ADMIN — INSULIN LISPRO 8 UNITS: 100 INJECTION, SOLUTION INTRAVENOUS; SUBCUTANEOUS at 17:00

## 2018-06-18 RX ADMIN — ALBUTEROL SULFATE 2 PUFF: 90 AEROSOL, METERED RESPIRATORY (INHALATION) at 08:27

## 2018-06-18 RX ADMIN — OMEPRAZOLE 20 MG: 20 CAPSULE, DELAYED RELEASE ORAL at 08:25

## 2018-06-18 RX ADMIN — FLUTICASONE PROPIONATE 2 SPRAY: 50 SPRAY, METERED NASAL at 08:37

## 2018-06-18 RX ADMIN — Medication 500 MG: at 10:41

## 2018-06-18 RX ADMIN — ATORVASTATIN CALCIUM 20 MG: 20 TABLET, FILM COATED ORAL at 21:37

## 2018-06-18 RX ADMIN — CLOPIDOGREL BISULFATE 75 MG: 75 TABLET ORAL at 08:25

## 2018-06-18 RX ADMIN — LORATADINE 10 MG: 10 TABLET ORAL at 08:26

## 2018-06-18 RX ADMIN — FUROSEMIDE 40 MG: 40 TABLET ORAL at 08:26

## 2018-06-18 RX ADMIN — INSULIN LISPRO 6 UNITS: 100 INJECTION, SOLUTION INTRAVENOUS; SUBCUTANEOUS at 11:22

## 2018-06-18 RX ADMIN — DOCUSATE SODIUM 100 MG: 100 CAPSULE, LIQUID FILLED ORAL at 10:49

## 2018-06-18 RX ADMIN — FLUTICASONE FUROATE AND VILANTEROL TRIFENATATE 1 PUFF: 100; 25 POWDER RESPIRATORY (INHALATION) at 08:27

## 2018-06-18 RX ADMIN — INSULIN LISPRO 4 UNITS: 100 INJECTION, SOLUTION INTRAVENOUS; SUBCUTANEOUS at 08:32

## 2018-06-18 RX ADMIN — OXYCODONE HYDROCHLORIDE AND ACETAMINOPHEN 2 TABLET: 5; 325 TABLET ORAL at 00:19

## 2018-06-18 RX ADMIN — MULTIPLE VITAMINS W/ MINERALS TAB 1 TABLET: TAB at 08:26

## 2018-06-18 RX ADMIN — OXYCODONE HYDROCHLORIDE AND ACETAMINOPHEN 2 TABLET: 5; 325 TABLET ORAL at 06:59

## 2018-06-18 RX ADMIN — DILTIAZEM HYDROCHLORIDE 360 MG: 180 CAPSULE, COATED, EXTENDED RELEASE ORAL at 08:26

## 2018-06-18 RX ADMIN — CILOSTAZOL 100 MG: 50 TABLET ORAL at 06:59

## 2018-06-18 NOTE — PROGRESS NOTES
Hospitalist Progress Note    Patient: Zulma Bear Sr. MRN: [id number]  [facility]: [id number]    YOB: 1945  Age: 74 y.o. Sex: male    DOA: 6/15/2018 LOS:  LOS: 2 days                Assessment/Plan     Patient Active Problem List   Diagnosis Code    PAD (peripheral artery disease) ([facility]) I73.9    Venous reflux I87.2    Leg ulcer, left ([address] L97.929    Leg ulcer ([address] L97.909    Diabetic ulcer of right midfoot associated with type 2 diabetes mellitus, with fat layer exposed ([address] E11.621, L97.412    Sepsis ([address] A41.9    CAD (coronary artery disease) I25.10    COPD (chronic obstructive pulmonary disease) ([address] J44.9    HTN (hypertension) I10    UTI (urinary tract infection) due to urinary indwelling Sher catheter ([facility]) T83.511A, N39.0    PAF (paroxysmal atrial fibrillation) ([facility]) I48.0    UTI (urinary tract infection) N39.0    Diabetic ulcer of both lower extremities ([address] E11.622, L97.919, L97.929    Hyponatremia E87.1    Hypomagnesemia E83.42    Hypokalemia E87.6    Severe protein-calorie malnutrition ([facility]) E43    Ulcer of right leg, limited to breakdown of skin ([facility] L97.911    Type 2 diabetes mellitus with diabetic neuropathy ([facility]) E11.40    Chronic anticoagulation Z79.01    Gross hematuria R31.0    Mechanical complication of suprapubic catheter ([facility]) T83.090A    DNR no code (do not resuscitate) Z66    Colitis K52.9    Weakness R53.1    Urinary tract infection N39.0    Sacral decubitus ulcer, stage III [facility]) L89.153            73 yo male with chronic suprapubic catheter admitted for abdominal pain. pancolitis - improving    UTI - secondary to chronic indwelling suprapubic catheter. urine cultures growing proteus. Antibiotics, zosyn    PAD - with LE ulcers. DM - controlled, continue SSI. HTN - controlled    Sacral ulcer stage 2 - local wound care    DVT prophylaxis    Disposition : 1-2 days    Review of systems  General: No fevers or chills.   Cardiovascular: No chest pain or pressure. No palpitations. Pulmonary: No shortness of breath. Gastrointestinal: No nausea, vomiting. Physical Exam:  General: Awake, cooperative, no acute distress    HEENT: NC, Atraumatic. PERRLA, anicteric sclerae. Lungs: CTA Bilaterally. No Wheezing/Rhonchi/Rales. Heart:  Regular  rhythm,  No murmur, No Rubs, No Gallops  Abdomen: Soft, Non distended, Non tender.  +Bowel sounds, supra pubic catheter. Extremities: No c/c/e  Psych:   Not anxious or agitated. Neurologic:  No acute neurological deficit.            Vital signs/Intake and Output:  Visit Vitals    /55 (BP 1 Location: Right arm, BP Patient Position: At rest)    Pulse 94    Temp 97.6 °F (36.4 °C)    Resp 17    Wt 62.2 kg (137 lb 1.6 oz)    SpO2 94%    BMI 17.6 kg/m2     Current Shift:  06/18 0701 - 06/18 1900  In: 480 [P.O.:480]  Out: 600 [Urine:600]  Last three shifts:  06/16 1901 - 06/18 0700  In: 2900 [P.O.:1960; I.V.:940]  Out: 7450 [Urine:7450]            Labs: Results:       Chemistry Recent Labs      06/18/18   0602  06/17/18   0804  06/16/18   0422  06/15/18   2020   GLU  230*  180*  230*  202*   NA  137  139  139  136   K  4.4  4.0  3.1*  3.5   CL  103  105  105  100   CO2  29  25  29  28   BUN  5*  3*  5*  9   CREA  0.71  0.65  0.54*  0.56*   CA  8.1*  7.9*  7.7*  8.0*   AGAP  5  9  5  8   BUCR  7*  5*  9*  16   AP   --    --   72  82   TP   --    --   5.8*  6.5   ALB   --    --   2.1*  2.4*   GLOB   --    --   3.7  4.1*   AGRAT   --    --   0.6*  0.6*      CBC w/Diff Recent Labs      06/18/18   0602  06/17/18   0804  06/16/18   0422  06/15/18   2020   WBC  3.1*  4.0*  3.1*  4.3*   RBC  3.40*  3.52*  3.48*  3.66*   HGB  9.3*  9.6*  9.6*  10.2*   HCT  29.4*  30.6*  30.2*  31.4*   PLT  152  162  133*  169   GRANS  55  53   --   82*   LYMPH  19*  18*   --   12*   EOS  18*  20*   --   1      Cardiac Enzymes Recent Labs      06/15/18   2020   CPK  27*   CKND1  CALCULATION NOT PERFORMED WHEN RESULT IS BELOW LINEAR LIMIT      Coagulation Recent Labs      06/15/18   2020   PTP  13.4   INR  1.1       Lipid Panel No results found for: CHOL, CHOLPOCT, CHOLX, CHLST, CHOLV, 383019, HDL, LDL, LDLC, DLDLP, 247130, VLDLC, VLDL, TGLX, TRIGL, TRIGP, TGLPOCT, CHHD, CHHDX   BNP No results for input(s): BNPP in the last 72 hours.    Liver Enzymes Recent Labs      06/16/18   0422   TP  5.8*   ALB  2.1*   AP  72   SGOT  16      Thyroid Studies Lab Results   Component Value Date/Time    TSH 0.90 11/04/2017 06:23 PM        Procedures/imaging: see electronic medical records for all procedures/Xrays and details which were not copied into this note but were reviewed prior to creation of Plan

## 2018-06-18 NOTE — PROGRESS NOTES
Bedside shift change report Received From ALLI magallanes RN. Report included the following information SBAR, Kardex, Intake/Output, MAR and Recent Results.

## 2018-06-18 NOTE — CDMP QUERY
Please clarify if the severe protein calorie malnutrition and uti secondary to sierra catheter as documented in active problem list is present on this admission .     Thank-you   Simone Hayward RN [facility] [phone]

## 2018-06-18 NOTE — PROGRESS NOTES
No changes on this shift    Bedside shift change report given to Ayad Regalado RN (oncoming nurse) by Kendell Duque Rn (offgoing nurse). Report included the following information SBAR, ED Summary, Intake/Output, MAR and Recent Results.

## 2018-06-18 NOTE — ACP (ADVANCE CARE PLANNING)
AMD  At [facility] addressed [address] with the patient. Patient stated that he has one at home. Patient was encouraged to bring in a copy for scanning.  completed visit with patient and offered Pastoral care. Chaplains will continue to follow and will provide pastoral care as needed or requested. Sister Chema Claudia [place], [address]  [phone] one at home.

## 2018-06-18 NOTE — PROGRESS NOTES
SHIFT SUMMARY:   Pt has been stable day shift. Insulin changed to very resistant. Drsg change completed as ordered,Bedside shift change report given to Lety Sharma. Report included the following information SBAR, Kardex, Intake/Output, MAR and Recent Results.

## 2018-06-18 NOTE — PROGRESS NOTES
Problem: Mobility Impaired (Adult and Pediatric)  Goal: *Acute Goals and Plan of Care (Insert Text)  Physical Therapy Goals  Initiated 6/17/2018 and to be accomplished within 3-7 day(s)  1. Patient will move from supine to sit and sit to supine  in bed with supervision/set-up. 2.  Patient will transfer from bed to chair and chair to bed with minimal assistance/contact guard assist using the least restrictive device. 3.  Patient will perform sit to stand with minimal assistance/contact guard assist.  4.  Patient will ambulate with minimal assistance/contact guard assist for 30 feet with the least restrictive device. Outcome: Progressing Towards Goal  physical Therapy TREATMENT    Patient: Jones Barnes Sr. (67 y.o. male)  Date: 6/18/2018  Diagnosis: Colitis  Weakness  Urinary tract infection Colitis       Precautions: Fall   Chart, physical therapy assessment, plan of care and goals were reviewed. ASSESSMENT:  Pt is able to transition to EOB with use of railing. Fair stretch of Hamstrings, but pt would benfit for dynamic splinting, to increase standing quality. Pt is resistant to ambulation trials, but completes standing march and side steps. Occasional R knee buckle, that pt associates with Foot/ankle pain. Progression toward goals:  []      Improving appropriately and progressing toward goals  [x]      Improving slowly and progressing toward goals  []      Not making progress toward goals and plan of care will be adjusted     PLAN:  Patient continues to benefit from skilled intervention to address the above impairments. Continue treatment per established plan of care. Discharge Recommendations: To Be Determined  Further Equipment Recommendations for Discharge:  bedside commode and rolling walker     SUBJECTIVE:   Patient stated I'm not trying to walk.     OBJECTIVE DATA SUMMARY:   Critical Behavior:  Neurologic State: Appropriate for age  Functional Mobility Training:  Bed Mobility:  Rolling: Supervision; Additional time  Supine to Sit: Supervision; Additional time  Scooting: Supervision  Transfers:  Sit to Stand: Maximum assistance  Stand to Sit: Maximum assistance  Balance:  Sitting: Intact  Standing: Intact; With support  Standing - Static: Good  Standing - Dynamic : Fair  Ambulation/Gait Training:  Distance (ft): 3 Feet (ft) (+ 5 standing march)  Assistive Device: Gait belt  Ambulation - Level of Assistance: Moderate assistance  Gait Abnormalities:  (B knee flexion contracture )  Base of Support: Widened  Speed/Rachele: Shuffled  Therapeutic Exercises:   Passive ROM and stretch of knees (emphasis on ext)  Pain:  Pain Scale 1: Numeric (0 - 10)  Pain Intensity 1: 0  Activity Tolerance:   Fair  Please refer to the flowsheet for vital signs taken during this treatment.   After treatment:   [] Patient left in no apparent distress sitting up in chair  [x] Patient left in no apparent distress in bed  [x] Call bell left within reach  [x] Nursing notified  [] Caregiver present  [] Bed alarm activated      Donna Kelley PTA   Time Calculation: 35 mins

## 2018-06-18 NOTE — PROGRESS NOTES
Problem: Pressure Injury - Risk of  Goal: *Prevention of pressure injury  Document Chapito Scale and appropriate interventions in the flowsheet.    Pressure Injury Interventions:  Sensory Interventions: Check visual cues for pain    Moisture Interventions: Absorbent underpads, Apply protective barrier, creams and emollients    Activity Interventions: Pressure redistribution bed/mattress(bed type)    Mobility Interventions: Pressure redistribution bed/mattress (bed type)    Nutrition Interventions: Document food/fluid/supplement intake

## 2018-06-18 NOTE — PROGRESS NOTES
INITIAL NUTRITION ASSESSMENT     RECOMMENDATIONS/PLAN:   Vit C 500mg x 10d  Lance BID  Monitor labs/lytes, PO intakes, skin integrity, wt, fluid status, BM  Adult Malnutrition Criteria:      Nutrition assessment was completed by RDN and the patient was found to meet the following malnutrition criteria established by ASPEN/AND:    Adult Malnutrition Guidelines:  SEVERE PROTEIN CALORIE MALNUTRITION IN THE CONTEXT OF CHRONIC ILLNESS  Body Fat:  Severe Depletion  Muscle Mass: Severe Depletion  Genesis Lambert  06/18/18      REASON FOR ASSESSMENT:     []  RN Referral:    [x] MST score >/=2  Malnutrition Screening Tool (MST):  Recently Lost Weight Without Trying: Yes  If Yes, How Much Weight Loss: 14 - 23 lbs (in the last 2 months)  Eating Poorly Due to Decreased Appetite: Yes  MST Score: 3      NUTRITION ASSESSMENT:   Client History: 74 yrs old Male admitted with pancolitis, UTI, dehydration, PAD w/ LE ulcerations, Stage II PU.       PMHx: acid reflux, anemia, arthritis, a-fib, CAD, COPD, anemia, DM, GERD, h/o seasonal allergies, hypercholesterolemia, HTN,  Hand tremors, PAF, PAD, sleep apnea, thyroid disease   Cultural/Islam Food Preferences: None Identified    FOOD/NUTRITION HISTORY  Diet History: pt reported poor appetite to nurse in nursing screen    Food Allergies: Latex     Pertinent PTA Medications: vit d3, oxycodone, lantus, novolog, synthroid, potassium citrate, MVI,  Iron aspgly, metformin, plavix, lasix,     NUTRITION INTAKE   Diet Order:  Ricki 1800      Average PO Intake:       Patient Vitals for the past 100 hrs:   % Diet Eaten   06/18/18 0814 100 %   06/18/18 0630 0 %   06/17/18 1905 100 %   06/17/18 1355 100 %   06/17/18 0822 100 %   06/16/18 1650 100 %   06/16/18 0659 0 %      Pertinent Medications:  [x] Reviewed; vit d3, plavix, colace, lasix, MVI, oxycodone, zosyn, potassium citrate,   Electrolyte Replacement Protocol: []K  []Mg  []PO4    Insulin:  [] SSI  [x] Pre-meal   []  Basal   [] Drip  [] None  Pt expected to meet estimated nutrient needs through next review:          [x]  Yes     [] No;  ANTHROPOMETRICS  Height:  6'2       Weight: 62.2 kg (137 lb 1.6 oz)    BMI:  17.6 kg/m^2  -  underweight (Less than or = to 18.5% BMI)        Weight change: pt was 144# back on 2/2/18 not a significant wt loss; however pt BMI is underweight                                   Comparison to Reference Standards:  IBW: 190 lbs      %IBW: 72%      AdjBW: n/a    NUTRITION-FOCUSED PHYSICAL ASSESSMENT  Skin: Stage II-sacral & left ischial POA     GI: No BM    BIOCHEMICAL DATA & MEDICAL TESTS  Pertinent Labs:  [x] Reviewed; glucose-230     NUTRITION PRESCRIPTION  Calories: 5022-5030 kcal/day based on 30-35kcal/kg  Protein: 74-93 g/day based on 1.2-1.5 g/kg  CHO:233-271 g/day based on 50% of total energy  Fluid: 2859-0795 ml/day based on 1 kcal/ml      NUTRITION DIAGNOSES:   1. Malnutrition related to inadequate oral intake as evidence by unhealing Stage II PU, BMI 17.6    NUTRITION INTERVENTIONS:   INTERVENTIONS:        GOALS:  1. Commercial Beverage-Lance BID 1. Encourage PO intake >50% at most meals by next review 3 days   2. Vit C          LEARNING NEEDS (Diet, Supplementation, Food/Nutrient-Drug Interaction):   [x] None Identified  [] Inpatient education provided/documented    [] Identified and patient:  [] Declined     [] Was not appropriate/indicated  NUTRITION MONITORING /EVALUATION:   Follow PO intake  Monitor wt  Monitor renal labs, electrolytes, fluid status  Monitor for additional supplement needs    [] Participated in Interdisciplinary Rounds  [x] Interdisciplinary Care Plan Reviewed/Documented  DISCHARGE NUTRITION RECOMMENDATIONS ADDRESSED:      [x] To be determined closer to discharge    NUTRITION RISK:     [x]  At risk                     []  Not currently at risk     Will follow-up per policy.   Alma Delia Goodson, [id number] [address]

## 2018-06-18 NOTE — PROGRESS NOTES
Reason for Admission:   pancolitis               RRAT Score:     36             Resources/supports as identified by patient/family:   Lives with wife, has son and daughter who assist with care, uses [facility] wound clinic for pressure ulcer care                Top Challenges facing patient (as identified by patient/family and CM): Finances/Medication cost?                    Transportation? Support system or lack thereof? Living arrangements? Self-care/ADLs/Cognition? Current Advanced Directive/Advance Care Plan: On file                          Plan for utilizing home health:    [facility]                      Likelihood of readmission:  yellow                 Transition of Care Plan:        Chart reviewed, met with pt at bedside. Pt plans discharge home where he lives with his wife and his children assist with care. Pt uses [facility] wound clinic for pressure ulcer care and vascular is seeing pt for monitoring wounds to legs. Pt has DME at home, and would like to use [facility] at discharge for PT if needed; referral placed with CMS. CM following for additional needs. Care Management Interventions  PCP Verified by CM: Yes  Mode of Transport at Discharge:  Other (see comment) (Spouse)  Transition of Care Consult (CM Consult): [address]: No  Reason Outside [facility]: Patient already serviced by other home care/hospice agency [facility])  Health Maintenance Reviewed: Yes  Physical Therapy Consult: Yes  Occupational Therapy Consult: Yes  Current Support Network: Lives with Spouse  Confirm Follow Up Transport: Family  Freedom of Choice Offered: Yes  Discharge Location  Discharge Placement: Home with home health

## 2018-06-18 NOTE — PROGRESS NOTES
Problem: Self Care Deficits Care Plan (Adult)  Goal: *Acute Goals and Plan of Care (Insert Text)  Initial Occupational Therapy Goals (6/18/2018) Within 7 day(s):    1. Patient will perform grooming seated EOB with setup x 20 minutes for increased independence with ADLs. 2. Patient will perform bed mobility to EOB w/ Supervision/mod I for increased independence with ADLs. 3. Patient will perform bathing of frontal plane to knee level with setup seated EOB for increased independence with ADLs. 4. Patient will perform BUE HEP for strengthening with SBA for increased independence in ADLs  5. Patient will independently apply energy conservation techniques with 1 verbal cue(s) for increased independence with ADLs. 6. Patient will perform BSC/chair transfer with minimal assist in preparation for ADLs. Outcome: Progressing Towards Goal  Occupational Therapy EVALUATION    Patient: Berhane Master Leon (67 y.o. male)  Date: 6/18/2018  Primary Diagnosis: Colitis  Weakness  Urinary tract infection        Precautions:  Contact, DNR, Fall, Skin (R heel wound)    ASSESSMENT :  Based on the objective data described below, the patient presents with decreased functional strength, decreased functional balance, decreased overall activity tolerance limiting independence with ADLs. Pt well-known to this OT from previous admission. Pt has significant physical assist from spouse and now from son. Pt reports spouse has been physically assisting pt w/ mobility and LE ADLs x 10years. Spouse now receiving therapy for BLE lymphedema. On last admission, pt refused Rehab putting great burden on spouse and son to assist pt. Pt is a minimal Ambulator and requires ~ modA for ADLs (assist for LE's). Pt currently mod/max w/ great motivation for return to PLOF. With education and problem solving, pt able to identify great amount of physical assist for sit/stand and h/o falls with burden on aging spouse and son.  Instructed on need for [employer] at this time d/t inability to stand on own and multiple pressure sores and need for frequent repositioning. Pt mainly sits in computer chair with 2 \"neck pillow\" for offloading L sacral & ischial area. Pt would benefit from continued OT services to maximize independence and decrease caregiver burden and increase caregiver safety. Education: Patient instructed on home safety, body mechanics for optimal respiratory effort, Energy Conservation/Work Simplification Techniques, adaptive strategies and adaptive dressing techniques including clothing modifications with patient verbalizing understanding at this time. Patient will benefit from skilled intervention to address the above impairments. Patients rehabilitation potential is considered to be Good  Factors which may influence rehabilitation potential include:   []             None noted  [x]             Mental ability/status  [x]             Medical condition  [x]             Home/family situation and support systems  [x]             Safety awareness  []             Pain tolerance/management  []             Other:      PLAN :  Recommendations and Planned Interventions:  [x]               Self Care Training                  [x]        Therapeutic Activities  [x]               Functional Mobility Training    [x]        Cognitive Retraining  [x]               Therapeutic Exercises           [x]        Endurance Activities  [x]               Balance Training                   [x]        Neuromuscular Re-Education  []               Visual/Perceptual Training     [x]   Home Safety Training  [x]               Patient Education                 [x]        Family Training/Education  []               Other (comment):    Frequency/Duration: Patient will be followed by occupational therapy 1-2 times per day/3-5 days per week to address goals.   Discharge Recommendations: Rehab vs Home Health pending ability to get [facility] or Family to safely assist pt to w/c  Further Equipment Recommendations for Discharge: mechanical lift     SUBJECTIVE:   Patient stated I've been in the bed too long. I've gotten weak.     OBJECTIVE DATA SUMMARY:     Past Medical History:   Diagnosis Date    Acid reflux     Anemia     Arthritis     Atrial fibrillation ([facility]     CAD (coronary artery disease)     Chronic obstructive pulmonary disease (HCC)     Coagulation disorder (HCC)     anemia    Diabetes ([facility] 1997    GERD (gastroesophageal reflux disease)     H/O seasonal allergies     Hypercholesterolemia     Hypertension 1997    Ill-defined condition     hand tremors    PAF (paroxysmal atrial fibrillation) ([facility] 11/5/2017    Peripheral arterial disease ([facility]     Sleep apnea     pt states he has not used bipap \"in years\".  Thyroid disease      Past Surgical History:   Procedure Laterality Date    ABDOMEN SURGERY PROC UNLISTED  1997    Vikas-en-Y    ABDOMEN SURGERY PROC UNLISTED  2000    abdominal hernia repair/mesh    HX APPENDECTOMY      HX CERVICAL LAMINECTOMY      HX COLONOSCOPY      HX HEENT  2004    cataracts removed right eye    HX ORTHOPAEDIC  1982    cervical laminectomy    HX ORTHOPAEDIC Right 1962    knee torn cartilage    HX TONSILLECTOMY      HX UROLOGICAL  12/2015    suprapubic catheter in place    VASCULAR SURGERY PROCEDURE UNLIST Bilateral 2015 2017    angio plasty legs  x2    VASCULAR SURGERY PROCEDURE UNLIST Bilateral 2017    legs venogram  stent right leg     Barriers to Learning/Limitations: yes;  cognitive  Compensate with: visual, verbal, tactile, kinesthetic cues/model    Prior Level of Function/Home Situation: Pt w/ supportive wife who dresses LE's and physically assists pt into standing & assists with toileting; son has assisted in many home modifications and DME and now physically assists pt in/out of shower as well as showering task.   Home Situation  Home Environment: Private residence  # Steps to Enter: 1  Wheelchair Ramp: Yes  One/Two Story Residence: Two story  Lift Chair Available: Yes  Living Alone: No  Support Systems: Spouse/Significant Other/Partner, Child(ivy) (son)  Patient Expects to be Discharged to[de-identified] Private residence (Pt refuses Rehab/SNF)  Current DME Used/Available at Home: Grab bars, Lift chair, Raised toilet seat, Shower chair, Walker, rollator, Walker, rolling, Wheelchair (2 w/c)  Tub or Shower Type: Tub/Shower combination (but it is a \"walk-in\" style)  [x]  Right hand dominant   []  Left hand dominant    Cognitive/Behavioral Status:  Neurologic State: Alert  Orientation Level: Oriented X4  Cognition: Decreased attention/concentration; Follows commands  Safety/Judgement: Awareness of environment;Lack of insight into deficits    Skin: multiple BLE wounds tx'd by Wound care for several months (dressed & covered with Tubigrip/Spandage); on admission decubitus ulcers of L ischium & sacrum & R heel. Edema: no edema noted    Vision/Perceptual:     appears WFL w/ glasses      Coordination:  Coordination: Within functional limits  Fine Motor Skills-Upper: Left Intact; Right Intact    Gross Motor Skills-Upper: Left Intact; Right Intact    Balance:  Sitting: Intact  Standing: Impaired; With support  Standing - Static: Good  Standing - Dynamic : Fair    Strength:  Strength: Generally decreased, functional  Tone & Sensation:  Tone: Normal  Sensation: Impaired  Range of Motion:  AROM: Generally decreased, functional  PROM: Generally decreased, functional    Functional Mobility and Transfers for ADLs:  Bed Mobility:  Rolling: Supervision  Supine to Sit: Supervision  Sit to Supine: Stand-by assistance  Scooting: Supervision  Transfers:  Sit to Stand: Moderate assistance;Maximum assistance; Additional time; Adaptive equipment (using momentum & bed raised to heigh at home)   (Pt requires significant momentum for sit/stand and elevated height as pt has elevated seat height on chair (& using lift chair) and elevated arm rests to compensate for weakness & hamstring tightness)    ADL Assessment:   Feeding: Setup    Oral Facial Hygiene/Grooming: Contact guard assistance (seated EOB)    Bathing: Maximum assistance    Upper Body Dressing: Minimum assistance    Lower Body Dressing: Maximum assistance; Total assistance    Toileting: Maximum assistance; Total assistance    ADL Intervention:  Grooming  Washing Face: Stand-by assistance  Washing Hands: Stand-by assistance    Upper Body Dressing Assistance  Pullover Shirt: Supervision/set-up    Lower Body Dressing Assistance  Protective Undergarmet: Maximum assistance; Total assistance (dependent)  Socks: Total assistance (dependent)  Position Performed: Long sitting on bed    Cognitive Retraining  Orientation Retraining: Reorienting  Problem Solving: Inductive reason; Identifying the task; Identifying the problem;General alternative solution;Deductive reason  Executive Functions: Executing cognitive plans  Organizing/Sequencing: Breaking task down;Prioritizing  Attention to Task: Distractibility; Single task  Following Commands: Awareness of environment; Follows one step commands/directions  Safety/Judgement: Awareness of environment;Lack of insight into deficits  Cues: Tactile cues provided;Verbal cues provided;Visual cues provided    Therapeutic Exercise: Instructed on tricep strengthening in bed and using B armrests of chair    Pain:  Pre-treatment: 3/10  Post-treatment: 3/10    Activity Tolerance:   Patient able to stand <1 minute(s). Patient able to complete ADLs with frequent rest breaks. Patient limited by cognition/pain/BLE wounds/weakness. Patient unsteady     Please refer to the flowsheet for vital signs taken during this treatment.   After treatment:   [] Patient left in no apparent distress sitting up in chair  [x] Patient left in no apparent distress in bed  [x] Call bell left within reach  [x] Nursing notified  [] Caregiver present  [] Bed alarm activated    COMMUNICATION/EDUCATION:   [x] Home safety education was provided and the patient/caregiver indicated understanding. [x] Patient/family have participated as able in goal setting and plan of care. [x] Patient/family agree to work toward stated goals and plan of care. [] Patient understands intent and goals of therapy, but is neutral about his/her participation. [] Patient is unable to participate in goal setting and plan of care. Thank you for this referral.  Angie Mckenna, OTR/L  Time Calculation: 44 mins    G-Codes (GP)  Self Care   Current  CL= 60-79%   Goal  CK= 40-59%  The severity rating is based on the professional judgement & direct observation of Level of Assistance required for Functional Mobility and ADLs. Eval Complexity: History: HIGH Complexity : Extensive review of history including physical, cognitive and psychosocial history ; Examination: HIGH Complexity : 5 or more performance deficits relating to physical, cognitive , or psychosocial skils that result in activity limitations and / or participation restrictions; Decision Making:HIGH Complexity : Patient presents with comorbidities that affect occupational performance.  Signifigant modification of tasks or assistance (eg, physical or verbal) with assessment (s) is necessary to enable patient to complete evaluation

## 2018-06-19 ENCOUNTER — APPOINTMENT (OUTPATIENT)
Dept: INTERVENTIONAL RADIOLOGY/VASCULAR | Age: 73
DRG: 385 | End: 2018-06-19
Attending: HOSPITALIST
Payer: MEDICARE

## 2018-06-19 LAB
ANION GAP SERPL CALC-SCNC: 6 MMOL/L (ref 3–18)
BACTERIA SPEC CULT: ABNORMAL
BASOPHILS # BLD: 0 K/UL (ref 0–0.06)
BASOPHILS NFR BLD: 0 % (ref 0–2)
BUN SERPL-MCNC: 13 MG/DL (ref 7–18)
BUN/CREAT SERPL: 22 (ref 12–20)
CALCIUM SERPL-MCNC: 8.1 MG/DL (ref 8.5–10.1)
CHLORIDE SERPL-SCNC: 101 MMOL/L (ref 100–108)
CO2 SERPL-SCNC: 32 MMOL/L (ref 21–32)
CREAT SERPL-MCNC: 0.6 MG/DL (ref 0.6–1.3)
DIFFERENTIAL METHOD BLD: ABNORMAL
EOSINOPHIL # BLD: 0.8 K/UL (ref 0–0.4)
EOSINOPHIL NFR BLD: 16 % (ref 0–5)
ERYTHROCYTE [DISTWIDTH] IN BLOOD BY AUTOMATED COUNT: 16 % (ref 11.6–14.5)
GLUCOSE BLD STRIP.AUTO-MCNC: 188 MG/DL (ref 70–110)
GLUCOSE BLD STRIP.AUTO-MCNC: 252 MG/DL (ref 70–110)
GLUCOSE BLD STRIP.AUTO-MCNC: 309 MG/DL (ref 70–110)
GLUCOSE BLD STRIP.AUTO-MCNC: 318 MG/DL (ref 70–110)
GLUCOSE SERPL-MCNC: 197 MG/DL (ref 74–99)
HCT VFR BLD AUTO: 30.4 % (ref 36–48)
HGB BLD-MCNC: 9.7 G/DL (ref 13–16)
LYMPHOCYTES # BLD: 0.7 K/UL (ref 0.9–3.6)
LYMPHOCYTES NFR BLD: 15 % (ref 21–52)
MCH RBC QN AUTO: 27.6 PG (ref 24–34)
MCHC RBC AUTO-ENTMCNC: 31.9 G/DL (ref 31–37)
MCV RBC AUTO: 86.4 FL (ref 74–97)
MONOCYTES # BLD: 0.3 K/UL (ref 0.05–1.2)
MONOCYTES NFR BLD: 6 % (ref 3–10)
NEUTS SEG # BLD: 3.1 K/UL (ref 1.8–8)
NEUTS SEG NFR BLD: 63 % (ref 40–73)
PLATELET # BLD AUTO: 178 K/UL (ref 135–420)
PMV BLD AUTO: 9.3 FL (ref 9.2–11.8)
POTASSIUM SERPL-SCNC: 4.3 MMOL/L (ref 3.5–5.5)
RBC # BLD AUTO: 3.52 M/UL (ref 4.7–5.5)
SERVICE CMNT-IMP: ABNORMAL
SODIUM SERPL-SCNC: 139 MMOL/L (ref 136–145)
WBC # BLD AUTO: 4.9 K/UL (ref 4.6–13.2)

## 2018-06-19 PROCEDURE — 74011250636 HC RX REV CODE- 250/636: Performed by: HOSPITALIST

## 2018-06-19 PROCEDURE — 85025 COMPLETE CBC W/AUTO DIFF WBC: CPT | Performed by: INTERNAL MEDICINE

## 2018-06-19 PROCEDURE — 74011250636 HC RX REV CODE- 250/636: Performed by: RADIOLOGY

## 2018-06-19 PROCEDURE — 77030034849

## 2018-06-19 PROCEDURE — 74011000258 HC RX REV CODE- 258: Performed by: INTERNAL MEDICINE

## 2018-06-19 PROCEDURE — 82962 GLUCOSE BLOOD TEST: CPT

## 2018-06-19 PROCEDURE — 97110 THERAPEUTIC EXERCISES: CPT

## 2018-06-19 PROCEDURE — 74011000258 HC RX REV CODE- 258: Performed by: HOSPITALIST

## 2018-06-19 PROCEDURE — 65270000029 HC RM PRIVATE

## 2018-06-19 PROCEDURE — C1751 CATH, INF, PER/CENT/MIDLINE: HCPCS

## 2018-06-19 PROCEDURE — 74011250636 HC RX REV CODE- 250/636: Performed by: INTERNAL MEDICINE

## 2018-06-19 PROCEDURE — 74011250637 HC RX REV CODE- 250/637: Performed by: INTERNAL MEDICINE

## 2018-06-19 PROCEDURE — 02HV33Z INSERTION OF INFUSION DEVICE INTO SUPERIOR VENA CAVA, PERCUTANEOUS APPROACH: ICD-10-PCS | Performed by: RADIOLOGY

## 2018-06-19 PROCEDURE — 74011636637 HC RX REV CODE- 636/637: Performed by: INTERNAL MEDICINE

## 2018-06-19 PROCEDURE — 77030037878 HC DRSG MEPILEX >48IN BORD MOLN -B

## 2018-06-19 PROCEDURE — 80048 BASIC METABOLIC PNL TOTAL CA: CPT | Performed by: INTERNAL MEDICINE

## 2018-06-19 PROCEDURE — 97530 THERAPEUTIC ACTIVITIES: CPT

## 2018-06-19 PROCEDURE — 36415 COLL VENOUS BLD VENIPUNCTURE: CPT | Performed by: INTERNAL MEDICINE

## 2018-06-19 PROCEDURE — 0T2BX0Z CHANGE DRAINAGE DEVICE IN BLADDER, EXTERNAL APPROACH: ICD-10-PCS | Performed by: UROLOGY

## 2018-06-19 PROCEDURE — 97112 NEUROMUSCULAR REEDUCATION: CPT

## 2018-06-19 PROCEDURE — 74011000250 HC RX REV CODE- 250: Performed by: RADIOLOGY

## 2018-06-19 RX ORDER — HEPARIN SODIUM 200 [USP'U]/100ML
INJECTION, SOLUTION INTRAVENOUS
Status: DISPENSED
Start: 2018-06-19 | End: 2018-06-20

## 2018-06-19 RX ORDER — HEPARIN SODIUM 200 [USP'U]/100ML
500 INJECTION, SOLUTION INTRAVENOUS
Status: COMPLETED | OUTPATIENT
Start: 2018-06-19 | End: 2018-06-19

## 2018-06-19 RX ORDER — LIDOCAINE HYDROCHLORIDE 10 MG/ML
1-20 INJECTION INFILTRATION; PERINEURAL ONCE
Status: COMPLETED | OUTPATIENT
Start: 2018-06-19 | End: 2018-06-19

## 2018-06-19 RX ORDER — HEPARIN SODIUM (PORCINE) LOCK FLUSH IV SOLN 100 UNIT/ML 100 UNIT/ML
500 SOLUTION INTRAVENOUS
Status: COMPLETED | OUTPATIENT
Start: 2018-06-19 | End: 2018-06-19

## 2018-06-19 RX ADMIN — FLUTICASONE FUROATE AND VILANTEROL TRIFENATATE 1 PUFF: 100; 25 POWDER RESPIRATORY (INHALATION) at 08:21

## 2018-06-19 RX ADMIN — Medication 1000 UNITS: at 15:39

## 2018-06-19 RX ADMIN — INSULIN LISPRO 3 UNITS: 100 INJECTION, SOLUTION INTRAVENOUS; SUBCUTANEOUS at 08:19

## 2018-06-19 RX ADMIN — CILOSTAZOL 100 MG: 50 TABLET ORAL at 17:45

## 2018-06-19 RX ADMIN — UMECLIDINIUM 1 PUFF: 62.5 AEROSOL, POWDER ORAL at 08:20

## 2018-06-19 RX ADMIN — ATORVASTATIN CALCIUM 20 MG: 20 TABLET, FILM COATED ORAL at 22:37

## 2018-06-19 RX ADMIN — COLLAGENASE SANTYL: 250 OINTMENT TOPICAL at 08:30

## 2018-06-19 RX ADMIN — POTASSIUM CITRATE 10 MEQ: 10 TABLET ORAL at 20:46

## 2018-06-19 RX ADMIN — Medication 150 MG: at 20:46

## 2018-06-19 RX ADMIN — LEVOTHYROXINE SODIUM 100 MCG: 100 TABLET ORAL at 06:30

## 2018-06-19 RX ADMIN — DILTIAZEM HYDROCHLORIDE 360 MG: 180 CAPSULE, COATED, EXTENDED RELEASE ORAL at 08:18

## 2018-06-19 RX ADMIN — OXYCODONE HYDROCHLORIDE AND ACETAMINOPHEN 2 TABLET: 5; 325 TABLET ORAL at 14:20

## 2018-06-19 RX ADMIN — PREGABALIN 75 MG: 75 CAPSULE ORAL at 08:18

## 2018-06-19 RX ADMIN — OMEPRAZOLE 20 MG: 20 CAPSULE, DELAYED RELEASE ORAL at 08:18

## 2018-06-19 RX ADMIN — MULTIPLE VITAMINS W/ MINERALS TAB 1 TABLET: TAB at 08:19

## 2018-06-19 RX ADMIN — CLOPIDOGREL BISULFATE 75 MG: 75 TABLET ORAL at 08:18

## 2018-06-19 RX ADMIN — FUROSEMIDE 40 MG: 40 TABLET ORAL at 08:19

## 2018-06-19 RX ADMIN — Medication 150 MG: at 08:19

## 2018-06-19 RX ADMIN — DOCUSATE SODIUM 100 MG: 100 CAPSULE, LIQUID FILLED ORAL at 13:02

## 2018-06-19 RX ADMIN — CILOSTAZOL 100 MG: 50 TABLET ORAL at 06:30

## 2018-06-19 RX ADMIN — HEPARIN SODIUM (PORCINE) LOCK FLUSH IV SOLN 100 UNIT/ML 500 UNITS: 100 SOLUTION at 15:40

## 2018-06-19 RX ADMIN — OXYCODONE HYDROCHLORIDE AND ACETAMINOPHEN 2 TABLET: 5; 325 TABLET ORAL at 22:37

## 2018-06-19 RX ADMIN — VITAMIN D, TAB 1000IU (100/BT) 5000 UNITS: 25 TAB at 08:18

## 2018-06-19 RX ADMIN — OXYCODONE HYDROCHLORIDE AND ACETAMINOPHEN 2 TABLET: 5; 325 TABLET ORAL at 04:41

## 2018-06-19 RX ADMIN — FLUTICASONE PROPIONATE 2 SPRAY: 50 SPRAY, METERED NASAL at 09:06

## 2018-06-19 RX ADMIN — ALBUTEROL SULFATE 2 PUFF: 90 AEROSOL, METERED RESPIRATORY (INHALATION) at 12:57

## 2018-06-19 RX ADMIN — LIDOCAINE HYDROCHLORIDE 2 ML: 10 INJECTION, SOLUTION INFILTRATION; PERINEURAL at 15:40

## 2018-06-19 RX ADMIN — INSULIN LISPRO 12 UNITS: 100 INJECTION, SOLUTION INTRAVENOUS; SUBCUTANEOUS at 12:55

## 2018-06-19 RX ADMIN — PREGABALIN 75 MG: 75 CAPSULE ORAL at 20:46

## 2018-06-19 RX ADMIN — INSULIN LISPRO 12 UNITS: 100 INJECTION, SOLUTION INTRAVENOUS; SUBCUTANEOUS at 17:44

## 2018-06-19 RX ADMIN — INSULIN GLARGINE 28 UNITS: 100 INJECTION, SOLUTION SUBCUTANEOUS at 22:38

## 2018-06-19 RX ADMIN — INSULIN LISPRO 9 UNITS: 100 INJECTION, SOLUTION INTRAVENOUS; SUBCUTANEOUS at 22:38

## 2018-06-19 RX ADMIN — POTASSIUM CITRATE 10 MEQ: 10 TABLET ORAL at 08:19

## 2018-06-19 RX ADMIN — PIPERACILLIN SODIUM,TAZOBACTAM SODIUM 3.38 G: 3; .375 INJECTION, POWDER, FOR SOLUTION INTRAVENOUS at 08:22

## 2018-06-19 RX ADMIN — PIPERACILLIN SODIUM,TAZOBACTAM SODIUM 3.38 G: 3; .375 INJECTION, POWDER, FOR SOLUTION INTRAVENOUS at 00:13

## 2018-06-19 RX ADMIN — ASPIRIN 81 MG: 81 TABLET, COATED ORAL at 22:37

## 2018-06-19 RX ADMIN — SODIUM CHLORIDE 1 G: 900 INJECTION INTRAVENOUS at 12:54

## 2018-06-19 RX ADMIN — LORATADINE 10 MG: 10 TABLET ORAL at 08:19

## 2018-06-19 RX ADMIN — Medication 500 MG: at 08:18

## 2018-06-19 NOTE — PROGRESS NOTES
Problem: Falls - Risk of  Goal: *Absence of Falls  Document Donald Fall Risk and appropriate interventions in the flowsheet.    Outcome: Progressing Towards Goal  Fall Risk Interventions:  Mobility Interventions: Assess mobility with egress test, PT Consult for mobility concerns         Medication Interventions: Evaluate medications/consider consulting pharmacy    Elimination Interventions: Call light in reach, Patient to call for help with toileting needs

## 2018-06-19 NOTE — PROGRESS NOTES
Urology Progress Note    Subjective:     Daily Progress Note: 2018 5:31 PM    Linda MARIN [facility] is okay. He had an e coli UTI. Of course the specimen was not obtained via a fresh SP tube. He is doing okay. He requires SP tube change    Objective:     Visit Vitals    /48 (BP 1 Location: Left arm, BP Patient Position: At rest)    Pulse 87    Temp 98.4 °F (36.9 °C)    Resp 16    Wt 62.1 kg (136 lb 14.5 oz)    SpO2 96%    BMI 17.58 kg/m2        Temp (24hrs), Av.1 °F (36.7 °C), Min:97.5 °F (36.4 °C), Max:98.7 °F (37.1 °C)      Intake and Output:   190 -  0700  In: 4120 [P.O.:960; I.V.:100]  Out: 8950 [Urine:8950]   0701 -  1900  In: 480 [P.O.:480]  Out: 1850 [Urine:1850]    Physical Exam:   General appearance: alert, cooperative, no distress, appears stated age  Abdomen: soft, non-tender.  Bowel sounds normal. No masses,  no organomegaly  SP site = clear with minimal granulation tissue  Male genital:  normal    UOP - clear    Lab/Data Review:  BMP:   Lab Results   Component Value Date/Time     2018 04:36 AM    K 4.3 2018 04:36 AM     2018 04:36 AM    CO2 32 2018 04:36 AM    AGAP 6 2018 04:36 AM     (H) 2018 04:36 AM    BUN 13 2018 04:36 AM    CREA 0.60 2018 04:36 AM    GFRAA >60 2018 04:36 AM    GFRNA >60 2018 04:36 AM     CBC:   Lab Results   Component Value Date/Time    WBC 4.9 2018 04:36 AM    HGB 9.7 (L) 2018 04:36 AM    HCT 30.4 (L) 2018 04:36 AM     2018 04:36 AM       Assessment/Plan:     Principal Problem:    Colitis (6/15/2018)    Active Problems:    PAD (peripheral artery disease) ([facility] (2017)      Diabetic ulcer of right midfoot associated with type 2 diabetes mellitus, with fat layer exposed ([facility] (10/4/2017)      CAD (coronary artery disease) (2017)      COPD (chronic obstructive pulmonary disease) ([facility] (2017)      PAF (paroxysmal atrial fibrillation) ([address] (11/5/2017)      Diabetic ulcer of both lower extremities ([address] (2/1/2018)      Severe protein-calorie malnutrition ([address] (2/2/2018)      Ulcer of right leg, limited to breakdown of skin ([address] (2/27/2018)      Type 2 diabetes mellitus with diabetic neuropathy ([address] (3/16/2018)      DNR no code (do not resuscitate) (5/16/2018)      Weakness (6/15/2018)      Urinary tract infection (6/15/2018)      Sacral decubitus ulcer, stage III ([address] (6/15/2018)        Plan:      Under sterile conditions his SP tube was changed to  A 24-fr catheter with clear urine obtained. 10 ml in balloon. Recommendations on discharge -- SP tube changes at [place] every 2 weeks to try and minimize his UTI's.       Signed By: Queen Emily MD                         June 19, 2018

## 2018-06-19 NOTE — PROCEDURES
Vascular & Interventional Radiology Brief Procedure Note    Interventional Radiologist: Ashley High MD    Pre-operative Diagnosis:  Long term venous access     Post-operative Diagnosis: Same as pre-op dx    Procedure(s) Performed:  Ultrasound/fluoroscopic guided PICC line placement    Anesthesia:  Local      Findings:  Successful right arm dual lumen 5F PICC line placement. Ready for immediate use.       Complications: None    Estimated Blood Loss:  minimal    Tubes and Drains: as above    Specimens: None    Condition: Stable       Ashley High MD  [facility]  Vascular & Interventional Radiology  6/19/2018

## 2018-06-19 NOTE — H&P
The patient is an appropriate candidate to undergo PICC. Patient assessed immediately prior to induction. Anesthesia plan as follows: Local/No Anesthesia. History and Physical update:  H&P was reviewed and the patient was examined. No changes have occurred in the patient's condition since the H&P was completed.     Anaya Reagan MD  Vascular & Interventional Radiology  [facility]  6/19/2018

## 2018-06-19 NOTE — PROGRESS NOTES
Problem: Mobility Impaired (Adult and Pediatric)  Goal: *Acute Goals and Plan of Care (Insert Text)  Physical Therapy Goals  Initiated 6/17/2018 and to be accomplished within 3-7 day(s)  1. Patient will move from supine to sit and sit to supine  in bed with supervision/set-up. 2.  Patient will transfer from bed to chair and chair to bed with minimal assistance/contact guard assist using the least restrictive device. 3.  Patient will perform sit to stand with minimal assistance/contact guard assist.  4.  Patient will ambulate with minimal assistance/contact guard assist for 30 feet with the least restrictive device. Outcome: Not Progressing Towards Goal  physical Therapy TREATMENT    Patient: Merri Cockayne Sr. (67 y.o. male)  Date: 6/19/2018  Diagnosis: Colitis  Weakness  Urinary tract infection Colitis       Precautions: Contact, DNR, Fall, Skin (R heel wound)   Chart, physical therapy assessment, plan of care and goals were reviewed. ASSESSMENT:  Pt is agreeable to bed ex and sitting EOB. During exercise, transporter enter to take pt for PICC. Pt will be skilled for ABX, which will assist determining and acquiring home equipment. Progression toward goals:  []      Improving appropriately and progressing toward goals  [x]      Improving slowly and progressing toward goals  []      Not making progress toward goals and plan of care will be adjusted     PLAN:  Patient continues to benefit from skilled intervention to address the above impairments. Continue treatment per established plan of care.   Discharge Recommendations:  Ortega Cohn  Further Equipment Recommendations for Discharge:  gait belt and rolling walker     SUBJECTIVE:   Patient stated .    OBJECTIVE DATA SUMMARY:   Critical Behavior:  Neurologic State: Alert  Orientation Level: Oriented X4  Cognition: Follows commands  Safety/Judgement: Awareness of environment, Lack of insight into deficits  Functional Mobility Training:  Bed Mobility:  Rolling: Supervision  Supine to Sit: Supervision  Sit to Supine: Contact guard assistance  Scooting: Supervision  Balance:  Sitting: Intact   Therapeutic Exercises:       EXERCISE   Sets   Reps   Active Active Assist   Passive Self ROM   Comments   Ankle Pumps 1 15  [x] [] [] []    Quad Sets/Glut Sets 1 10 [x] [] [] []    Hamstring Sets   [] [] [] []    Short Arc Quads   [] [] [] []    Heel Slides   [] [] [] []    Straight Leg Raises   [] [] [] []    Hip Abd/Add   [] [] [] []    Long Arc Quads 1 10 [x] [] [] []    Seated Marching   [] [] [] []    Standing Marching   [] [] [] []    Knee stretching  3 90sec [] [] [x] []      Pain:  Pain Scale 1: Numeric (0 - 10)  Pain Intensity 1: 0  Pain Location 1: Back;Buttocks  Pain Intervention(s) 1: Medication (see MAR)  Activity Tolerance:   Good  Please refer to the flowsheet for vital signs taken during this treatment.   After treatment:   [] Patient left in no apparent distress sitting up in chair  [x] Patient left in no apparent distress in bed  [x] Call bell left within reach  [x] Nursing notified  [] Caregiver present  [] Bed alarm activated      Stephie Valentine PTA   Time Calculation: 23 mins

## 2018-06-19 NOTE — PROGRESS NOTES
Hospitalist Progress Note    Patient: Ila Rodríguez Sr. MRN: [id number]  CSN: [id number]    YOB: 1945  Age: 74 y.o. Sex: male    DOA: 6/15/2018 LOS:  LOS: 3 days                Assessment/Plan     Patient Active Problem List   Diagnosis Code    PAD (peripheral artery disease) ([facility]) I73.9    Venous reflux I87.2    Leg ulcer, left ([facility] [address] L97.929    Leg ulcer ([address] L97.909    Diabetic ulcer of right midfoot associated with type 2 diabetes mellitus, with fat layer exposed ([facility] [address] E11.621, L97.412    Sepsis ([facility] [facility] A41.9    CAD (coronary artery disease) I25.10    COPD (chronic obstructive pulmonary disease) ([facility] [address] J44.9    HTN (hypertension) I10    UTI (urinary tract infection) due to urinary indwelling Sher catheter ([facility]) T83.511A, N39.0    PAF (paroxysmal atrial fibrillation) ([facility]) I48.0    UTI (urinary tract infection) N39.0    Diabetic ulcer of both lower extremities ([facility] [address] E11.622, L97.919, L97.929    Hyponatremia E87.1    Hypomagnesemia E83.42    Hypokalemia E87.6    Severe protein-calorie malnutrition ([facility]) E43    Ulcer of right leg, limited to breakdown of skin ([facility] [address] L97.911    Type 2 diabetes mellitus with diabetic neuropathy ([facility]) E11.40    Chronic anticoagulation Z79.01    Gross hematuria R31.0    Mechanical complication of suprapubic catheter ([facility]) T83.090A    DNR no code (do not resuscitate) Z66    Colitis K52.9    Weakness R53.1    Urinary tract infection N39.0    Sacral decubitus ulcer, stage III [facility]) L89.153            75 yo male with chronic suprapubic catheter admitted for abdominal pain. pancolitis - improving, no BM since hospitalization for sample. UTI - secondary to chronic indwelling suprapubic catheter. urine cultures growing proteus, E-Coli ESBL. Antibiotics changed to ertapenem, complete 10 day course. Change suprapubic catheter. Urology consulted. PICC line      PAD - with LE ulcers.  Followed by vascular     DM - controlled, continue SSI.    HTN - controlled    Sacral ulcer stage 2 - local wound care    DVT prophylaxis    Disposition : 1-2 days    Review of systems  General: No fevers or chills. Cardiovascular: No chest pain or pressure. No palpitations. Pulmonary: No shortness of breath. Gastrointestinal: No nausea, vomiting. Physical Exam:  General: Awake, cooperative, no acute distress    HEENT: NC, Atraumatic. PERRLA, anicteric sclerae. Lungs: CTA Bilaterally. No Wheezing/Rhonchi/Rales. Heart:  Regular  rhythm,  No murmur, No Rubs, No Gallops  Abdomen: Soft, Non distended, Non tender.  +Bowel sounds, supra pubic catheter. Extremities: No c/c/e  Psych:   Not anxious or agitated. Neurologic:  No acute neurological deficit. Vital signs/Intake and Output:  Visit Vitals    /45 (BP 1 Location: Left arm, BP Patient Position: At rest)    Pulse 98    Temp 98 °F (36.7 °C)    Resp 16    Wt 62.1 kg (136 lb 14.5 oz)    SpO2 97%    BMI 17.58 kg/m2     Current Shift:  06/19 0701 - 06/19 1900  In: 480 [P.O.:480]  Out: 950 [Urine:950]  Last three shifts:  06/17 1901 - 06/19 0700  In: 1060 [P.O.:960; I.V.:100]  Out: 8950 [Urine:8950]            Labs: Results:       Chemistry Recent Labs      06/19/18   0436  06/18/18   0602  06/17/18   0804   GLU  197*  230*  180*   NA  139  137  139   K  4.3  4.4  4.0   CL  101  103  105   CO2  32  29  25   BUN  13  5*  3*   CREA  0.60  0.71  0.65   CA  8.1*  8.1*  7.9*   AGAP  6  5  9   BUCR  22*  7*  5*      CBC w/Diff Recent Labs      06/19/18   0436  06/18/18   0602  06/17/18   0804   WBC  4.9  3.1*  4.0*   RBC  3.52*  3.40*  3.52*   HGB  9.7*  9.3*  9.6*   HCT  30.4*  29.4*  30.6*   PLT  178  152  162   GRANS  63  55  53   LYMPH  15*  19*  18*   EOS  16*  18*  20*      Cardiac Enzymes No results for input(s): CPK, CKND1, SUNNY in the last 72 hours. No lab exists for component: CKRMB, TROIP   Coagulation No results for input(s): PTP, INR, APTT in the last 72 hours.     No lab exists for component: INREXT, INREXT    Lipid Panel No results found for: CHOL, CHOLPOCT, CHOLX, CHLST, CHOLV, 098374, HDL, LDL, LDLC, DLDLP, 512109, VLDLC, VLDL, TGLX, TRIGL, TRIGP, TGLPOCT, CHHD, CHHDX   BNP No results for input(s): BNPP in the last 72 hours. Liver Enzymes No results for input(s): TP, ALB, TBIL, AP, SGOT, GPT in the last 72 hours.     No lab exists for component: DBIL   Thyroid Studies Lab Results   Component Value Date/Time    TSH 0.90 11/04/2017 06:23 PM        Procedures/imaging: see electronic medical records for all procedures/Xrays and details which were not copied into this note but were reviewed prior to creation of Plan

## 2018-06-19 NOTE — PROGRESS NOTES
Culture result:  (A)    Preliminary      >100,000 COLONIES/mL GRAM NEGATIVE RODS     Culture result:  (A)    Preliminary     25464 COLONIES/mL ESCHERICHIA COLI ** (EXTENDED SPECTRUM BETA LACTAMASE ) **     Culture result:  (A)    Preliminary     63533 COLONIES/mL Kristopher Matias paged      [id number] Dressing changed at this time. 1302hrs Patient complained of constipation and requested medication. Colace 100mg given at this time. 1420hrs Patient complained of pain to the legs of 8/10 in the pain scale. Percocet 5-325mg PO x2 tabs at this time    1942hrs Bedside, Verbal and Written shift change report given to Merle Kimble RN (oncoming nurse) by Ciera Carrera RN (offgoing nurse). Report included the following information SBAR, Kardex, Procedure Summary, Intake/Output, MAR, Accordion, Recent Results, Med Rec Status and Cardiac Rhythm NSR with BBB. All questions answered.

## 2018-06-19 NOTE — ROUTINE PROCESS
Bedside shift change report given to Estelle Lugo RN (oncoming nurse) by Karan Quevedo RN (offgoing nurse). Report included the following information SBAR, Kardex, MAR, Med Rec Status, Cardiac Rhythm NSR with BBB and Alarm Parameters .

## 2018-06-19 NOTE — PROGRESS NOTES
Shift Summary- No acute changed to pt status. Pt medicated for pain.       Patient Vitals for the past 12 hrs:   Temp Pulse Resp BP SpO2   06/19/18 0331 97.9 °F (36.6 °C) 85 16 101/48 99 %   06/19/18 0039 98.3 °F (36.8 °C) 85 16 117/46 94 %   06/18/18 2039 98.7 °F (37.1 °C) 93 18 107/46 96 %

## 2018-06-19 NOTE — PROGRESS NOTES
Problem: Pressure Injury - Risk of  Goal: *Prevention of pressure injury  Document Chapito Scale and appropriate interventions in the flowsheet.    Outcome: Progressing Towards Goal  Pressure Injury Interventions:  Sensory Interventions: Assess need for specialty bed, Float heels    Moisture Interventions: Absorbent underpads, Contain wound drainage, Limit adult briefs    Activity Interventions: Pressure redistribution bed/mattress(bed type)    Mobility Interventions: Pressure redistribution bed/mattress (bed type)    Nutrition Interventions: Document food/fluid/supplement intake, Discuss nutritional consult with provider

## 2018-06-19 NOTE — PROGRESS NOTES
Met with pt at bedside to discuss plan of care. Pt will require 10 days of IVABX. Given this pt is agreeable to SNF placement. Offered pt a list of area facilities to review and offered FOC. Pt has selected [facility]. CMS has been notified to assist.  CM continuing to follow and assist.  Pt will need medical transport upon discharge to SNF. Care Management Interventions  PCP Verified by CM:  Yes  Mode of Transport at Discharge: BLS  Transition of Care Consult (CM Consult): SNF  [address]: No  Reason Outside [place]: Patient already serviced by other home care/hospice agency [facility])  Partner SNF: Yes  Health Maintenance Reviewed: Yes  Physical Therapy Consult: Yes  Occupational Therapy Consult: Yes  Current Support Network: Lives with Spouse  Confirm Follow Up Transport: Family  Plan discussed with Pt/Family/Caregiver: Yes  Freedom of Choice Offered: Yes  Discharge Location  Discharge Placement: Skilled nursing facility

## 2018-06-19 NOTE — PROGRESS NOTES
Reason for Readmission:     pancolitis             RRAT Score and Risk Level:      High; 36     Level of Readmission:    2      Care Conference scheduled:   TBD       Resources/supports as identified by patient/family:   N/A       Top Challenges facing patient (as identified by patient/family and CM): Finances/Medication cost?     N/A  Transportation      N/A  Support system or lack thereof? N/A  Living arrangements? N/A  Self-care/ADLs/Cognition? N/A       Current Advanced Directive/Advance Care Plan: On file           Plan for utilizing home health:   [facility]             Likelihood of additional readmission:   Yellow             Transition of Care Plan:    Based on readmission, the patient's previous Plan of Care   has been evaluated and/or modified. The current Transition of Care Plan is:           Per previous CM note (6/18/18):     Chart reviewed, met with pt at bedside. Pt plans discharge home where he lives with his wife and his children assist with care. Pt uses [facility] wound clinic for pressure ulcer care and vascular is seeing pt for monitoring wounds to legs. Pt has DME at home, and would like to use [facility] at discharge for PT if needed; referral placed with CMS. CM following for additional needs. Care Management Interventions  PCP Verified by CM: Yes  Mode of Transport at Discharge:  Other (see comment) (Spouse)  Transition of Care Consult (CM Consult): [address]: No  Reason Outside [place]: Patient already serviced by other home care/hospice agency [facility])  Health Maintenance Reviewed: Yes  Physical Therapy Consult: Yes  Occupational Therapy Consult: Yes  Current Support Network: Lives with Spouse  Confirm Follow Up Transport: Family  Freedom of Choice Offered: Yes  Discharge Location  Discharge Placement: Home with home health

## 2018-06-20 ENCOUNTER — APPOINTMENT (OUTPATIENT)
Dept: WOUND CARE | Age: 73
End: 2018-06-20
Payer: MEDICARE

## 2018-06-20 VITALS
BODY MASS INDEX: 17.58 KG/M2 | HEART RATE: 97 BPM | DIASTOLIC BLOOD PRESSURE: 51 MMHG | WEIGHT: 136.91 LBS | SYSTOLIC BLOOD PRESSURE: 106 MMHG | RESPIRATION RATE: 16 BRPM | TEMPERATURE: 98 F | OXYGEN SATURATION: 98 %

## 2018-06-20 PROBLEM — K52.9 COLITIS: Status: RESOLVED | Noted: 2018-06-15 | Resolved: 2018-06-20

## 2018-06-20 LAB
GLUCOSE BLD STRIP.AUTO-MCNC: 213 MG/DL (ref 70–110)
GLUCOSE BLD STRIP.AUTO-MCNC: 233 MG/DL (ref 70–110)
GLUCOSE BLD STRIP.AUTO-MCNC: 269 MG/DL (ref 70–110)

## 2018-06-20 PROCEDURE — 74011250636 HC RX REV CODE- 250/636: Performed by: HOSPITALIST

## 2018-06-20 PROCEDURE — 74011000258 HC RX REV CODE- 258: Performed by: HOSPITALIST

## 2018-06-20 PROCEDURE — 74011636637 HC RX REV CODE- 636/637: Performed by: INTERNAL MEDICINE

## 2018-06-20 PROCEDURE — 82962 GLUCOSE BLOOD TEST: CPT

## 2018-06-20 PROCEDURE — 74011250637 HC RX REV CODE- 250/637: Performed by: INTERNAL MEDICINE

## 2018-06-20 RX ORDER — INSULIN GLARGINE 100 [IU]/ML
30 INJECTION, SOLUTION SUBCUTANEOUS
Qty: 1 VIAL | Refills: 0 | Status: SHIPPED | OUTPATIENT
Start: 2018-06-20

## 2018-06-20 RX ADMIN — UMECLIDINIUM 1 PUFF: 62.5 AEROSOL, POWDER ORAL at 08:36

## 2018-06-20 RX ADMIN — SODIUM CHLORIDE 1 G: 900 INJECTION INTRAVENOUS at 13:00

## 2018-06-20 RX ADMIN — Medication 500 MG: at 08:33

## 2018-06-20 RX ADMIN — MULTIPLE VITAMINS W/ MINERALS TAB 1 TABLET: TAB at 08:33

## 2018-06-20 RX ADMIN — PREGABALIN 75 MG: 75 CAPSULE ORAL at 08:33

## 2018-06-20 RX ADMIN — LEVOTHYROXINE SODIUM 100 MCG: 100 TABLET ORAL at 08:53

## 2018-06-20 RX ADMIN — FLUTICASONE FUROATE AND VILANTEROL TRIFENATATE 1 PUFF: 100; 25 POWDER RESPIRATORY (INHALATION) at 08:36

## 2018-06-20 RX ADMIN — FUROSEMIDE 40 MG: 40 TABLET ORAL at 08:33

## 2018-06-20 RX ADMIN — LORATADINE 10 MG: 10 TABLET ORAL at 08:33

## 2018-06-20 RX ADMIN — Medication 150 MG: at 08:33

## 2018-06-20 RX ADMIN — INSULIN LISPRO 6 UNITS: 100 INJECTION, SOLUTION INTRAVENOUS; SUBCUTANEOUS at 08:35

## 2018-06-20 RX ADMIN — DILTIAZEM HYDROCHLORIDE 360 MG: 180 CAPSULE, COATED, EXTENDED RELEASE ORAL at 08:32

## 2018-06-20 RX ADMIN — CILOSTAZOL 100 MG: 50 TABLET ORAL at 08:53

## 2018-06-20 RX ADMIN — COLLAGENASE SANTYL: 250 OINTMENT TOPICAL at 08:41

## 2018-06-20 RX ADMIN — INSULIN LISPRO 6 UNITS: 100 INJECTION, SOLUTION INTRAVENOUS; SUBCUTANEOUS at 13:00

## 2018-06-20 RX ADMIN — OMEPRAZOLE 20 MG: 20 CAPSULE, DELAYED RELEASE ORAL at 08:33

## 2018-06-20 RX ADMIN — CILOSTAZOL 100 MG: 50 TABLET ORAL at 15:32

## 2018-06-20 RX ADMIN — CLOPIDOGREL BISULFATE 75 MG: 75 TABLET ORAL at 08:33

## 2018-06-20 RX ADMIN — POTASSIUM CITRATE 10 MEQ: 10 TABLET ORAL at 08:33

## 2018-06-20 RX ADMIN — OXYCODONE HYDROCHLORIDE AND ACETAMINOPHEN 2 TABLET: 5; 325 TABLET ORAL at 15:32

## 2018-06-20 RX ADMIN — VITAMIN D, TAB 1000IU (100/BT) 5000 UNITS: 25 TAB at 08:32

## 2018-06-20 RX ADMIN — OXYCODONE HYDROCHLORIDE AND ACETAMINOPHEN 2 TABLET: 5; 325 TABLET ORAL at 07:02

## 2018-06-20 RX ADMIN — FLUTICASONE PROPIONATE 2 SPRAY: 50 SPRAY, METERED NASAL at 08:36

## 2018-06-20 NOTE — DISCHARGE SUMMARY
Discharge Summary    Patient: Holland Macdonald Sr. MRN: [id number]  CSN: [id number]    YOB: 1945  Age: 74 y.o.   Sex: male    DOA: 6/15/2018 LOS:  LOS: 4 days   Discharge Date:      Primary Care Provider:  Massie Hodgkins, MD    Admission Diagnoses: Colitis  Weakness  Urinary tract infection    Discharge Diagnoses:    Problem List as of 6/20/2018  Date Reviewed: 6/15/2018          Codes Class Noted - Resolved    Weakness ICD-10-CM: R53.1  ICD-9-CM: 780.79  6/15/2018 - Present        Sacral decubitus ulcer, stage III ([facility] ICD-10-CM: L89.153  ICD-9-CM: 707.03, 707.23  6/15/2018 - Present        Chronic anticoagulation ICD-10-CM: Z79.01  ICD-9-CM: V58.61  5/16/2018 - Present        DNR no code (do not resuscitate) ICD-10-CM: Z66  ICD-9-CM: V49.86  5/16/2018 - Present        Type 2 diabetes mellitus with diabetic neuropathy ([facility] ICD-10-CM: E11.40  ICD-9-CM: 250.60, 357.2  3/16/2018 - Present        Ulcer of right leg, limited to breakdown of skin ([facility] ICD-10-CM: L97.911  ICD-9-CM: 707.10  2/27/2018 - Present        Severe protein-calorie malnutrition ([facility] ICD-10-CM: R83  ICD-9-CM: 262  2/2/2018 - Present        Diabetic ulcer of both lower extremities ([facility] ICD-10-CM: E11.622, L97.919, L97.929  ICD-9-CM: 250.80, 707.10  2/1/2018 - Present        PAF (paroxysmal atrial fibrillation) ([facility] ICD-10-CM: I48.0  ICD-9-CM: 427.31  11/5/2017 - Present        CAD (coronary artery disease) ICD-10-CM: I25.10  ICD-9-CM: 414.00  11/4/2017 - Present        COPD (chronic obstructive pulmonary disease) ([facility] ICD-10-CM: J44.9  ICD-9-CM: 048  11/4/2017 - Present        HTN (hypertension) ICD-10-CM: I10  ICD-9-CM: 401.9  11/4/2017 - Present        UTI (urinary tract infection) due to urinary indwelling Sher catheter [facility]) ICD-10-CM: T83.511A, N39.0  ICD-9-CM: 996.64, 599.0  11/4/2017 - Present        Diabetic ulcer of right midfoot associated with type 2 diabetes mellitus, with fat layer exposed ([facility] ICD-10-CM: N09.929, U86.105  ICD-9-CM: 250.80, 707.14  10/4/2017 - Present        PAD (peripheral artery disease) ([facility] ICD-10-CM: I73.9  ICD-9-CM: 443.9  5/9/2017 - Present        Venous reflux ICD-10-CM: I87.2  ICD-9-CM: 459.81  5/9/2017 - Present        Leg ulcer, left ([facility] ICD-10-CM: K92.213  ICD-9-CM: 707.10  5/9/2017 - Present        Leg ulcer ([facility] ICD-10-CM: L97.909  ICD-9-CM: 707.10  5/9/2017 - Present        * (Principal)RESOLVED: Colitis ICD-10-CM: K52.9  ICD-9-CM: 558.9  6/15/2018 - 6/20/2018              Discharge Medications:     Current Discharge Medication List      START taking these medications    Details   insulin glargine (LANTUS) 100 unit/mL injection 30 Units by SubCUTAneous route nightly. Qty: 1 Vial, Refills: 0      ertapenem 1 gram 1 g IVPB 1 g by IntraVENous route every twenty-four (24) hours for 8 days. Qty: 8 Dose, Refills: 0         CONTINUE these medications which have NOT CHANGED    Details   albuterol sulfate 90 mcg/actuation aepb Take 2 Puffs by inhalation every four (4) hours as needed. Indications: Chronic Obstructive Pulmonary Disease      cholecalciferol (VITAMIN D3) 1,000 unit cap Take 5,000 Units by mouth daily. levothyroxine (SYNTHROID) 100 mcg tablet Take 100 mcg by mouth Daily (before breakfast). cilostazol (PLETAL) 100 mg tablet Take 100 mg by mouth Before breakfast and dinner. pregabalin (LYRICA) 50 mg capsule Take 75 mg by mouth two (2) times a day. Omeprazole delayed release (PRILOSEC D/R) 20 mg tablet Take 20 mg by mouth daily. potassium citrate (UROCIT-K10) 10 mEq (1,080 mg) TbER Take 10 mEq by mouth two (2) times a day. multivitamin, tx-iron-ca-min (THERA-M W/ IRON) 9 mg iron-400 mcg tab tablet Take 1 Tab by mouth daily. iron aspgly,jl-I-W19-FA-Ca-suc (FERREX 150 FORTE PLUS) 084-12-93-7 mg-mg-mcg-mg cap cap Take 1 Cap by mouth two (2) times a day.  Indications: anemia      insulin aspart (NOVOLOG FLEXPEN) 100 unit/mL inpn 5 Units by SubCUTAneous route daily. Sliding scale  Plus the above dose      metFORMIN (GLUCOPHAGE) 1,000 mg tablet Take 1,000 mg by mouth two (2) times daily (with meals). atorvastatin (LIPITOR) 20 mg tablet Take  by mouth nightly. lisinopril (PRINIVIL, ZESTRIL) 2.5 mg tablet Take  by mouth daily. clopidogrel (PLAVIX) 75 mg tab Take 75 mg by mouth daily. Indications: circulation problem      aspirin 81 mg tablet Take 81 mg by mouth nightly. furosemide (LASIX) 40 mg tablet Take 40 mg by mouth daily. Indications: hypertension      diltiazem (TIAZAC) 360 mg SR capsule Take 360 mg by mouth daily. tiotropium (SPIRIVA WITH HANDIHALER) 18 mcg inhalation capsule Take 1 Cap by inhalation daily. loratadine (CLARITIN) 10 mg tablet Take 10 mg by mouth daily. fluticasone-salmeterol (ADVAIR DISKUS) 250-50 mcg/dose diskus inhaler Take 2 Puffs by inhalation every twelve (12) hours. STOP taking these medications       oxyCODONE-acetaminophen (PERCOCET) 5-325 mg per tablet Comments:   Reason for Stopping:         insulin glargine (LANTUS SOLOSTAR U-100 INSULIN) 100 unit/mL (3 mL) inpn Comments:   Reason for Stopping:         fluticasone (FLONASE) 50 mcg/actuation nasal spray Comments:   Reason for Stopping:               Discharge Condition: Good    Procedures : change of suprapubic catheter. Consults: Urology      PHYSICAL EXAM   Visit Vitals    /51    Pulse 97    Temp 98 °F (36.7 °C)    Resp 16    Wt 62.1 kg (136 lb 14.5 oz)    SpO2 98%    BMI 17.58 kg/m2     General: Awake, cooperative, no acute distress    HEENT: NC, Atraumatic. PERRLA, EOMI. Anicteric sclerae. Lungs:  CTA Bilaterally. No Wheezing/Rhonchi/Rales. Heart:  Regular  rhythm,  No murmur, No Rubs, No Gallops  Abdomen: Soft, Non distended, Non tender. +Bowel sounds,   Extremities: No c/c/e  Psych:   Not anxious or agitated. Neurologic:  No acute neurological deficits.                                      Admission HPI :   Vann Brittle. is a 74 y.o. male who came to the ed with complaints of abd pain. Patient states he has been having abd pain for the past 4-5 days which is nonspecific and generalized in nature. He came to the ED here yesterday with similar complaints and CT scan showed Pancolitis. There were no signs of decompensation therefore he was discharged. After going home he ate from [address] last night but unable to eat any food since due to pain and nausea. His oral intake has been poor, unable to keep any water down either. He admits of feeling constipated on most of the days and has to strain a lot. During this time he has some chills as well at home. He has developed some chronic sacral wound ranging from Stage I-III over last several months. At first family thought it was from trauma from his wheelchair but it has progressed to skin breakdown. In the ED His labs are unremarkable but his vitals showed mild tachycardia. GI was consulted who recommended clear liquid diet and adv diet otherwise no further recs. Hospital Course :   Patient admitted to medical floor. Pancolitis - ischemic vs infectious. He had significant improvement in his abdominal pain. He did not had BM to provide sample.      UTI - secondary to chronic indwelling suprapubic catheter. Initially started on zosyn. urine cultures growing proteus, E-Coli ESBL. ID recommended Antibiotics changed to ertapenem, complete 10 day course. Urology Changed suprapubic catheter. PICC line placed. He will complete 10 day course of antibiotics        PAD - with LE ulcers. Follows vascular as outpatient. Continued local wound care.     DM - continued on lantus and SSI.     HTN - controlled     Sacral ulcer stage 2 - local wound care.     Patient worked with PT/OT and rehab recommended.       Activity: Activity as tolerated    Diet: Diabetic Diet    Follow-up: PCP    Disposition: rehab    Minutes spent on discharge: 45       Labs: Results: Chemistry Recent Labs      06/19/18   0436  06/18/18   0602   GLU  197*  230*   NA  139  137   K  4.3  4.4   CL  101  103   CO2  32  29   BUN  13  5*   CREA  0.60  0.71   CA  8.1*  8.1*   AGAP  6  5   BUCR  22*  7*      CBC w/Diff Recent Labs      06/19/18   0436  06/18/18   0602   WBC  4.9  3.1*   RBC  3.52*  3.40*   HGB  9.7*  9.3*   HCT  30.4*  29.4*   PLT  178  152   GRANS  63  55   LYMPH  15*  19*   EOS  16*  18*      Cardiac Enzymes No results for input(s): CPK, CKND1, SUNNY in the last 72 hours. No lab exists for component: CKRMB, TROIP   Coagulation No results for input(s): PTP, INR, APTT in the last 72 hours. No lab exists for component: INREXT, INREXT    Lipid Panel No results found for: CHOL, CHOLPOCT, CHOLX, CHLST, CHOLV, 878944, HDL, LDL, LDLC, DLDLP, 428683, VLDLC, VLDL, TGLX, TRIGL, TRIGP, TGLPOCT, CHHD, CHHDX   BNP No results for input(s): BNPP in the last 72 hours. Liver Enzymes No results for input(s): TP, ALB, TBIL, AP, SGOT, GPT in the last 72 hours. No lab exists for component: DBIL   Thyroid Studies Lab Results   Component Value Date/Time    TSH 0.90 11/04/2017 06:23 PM            Significant Diagnostic Studies: Xr Chest Pa Lat    Result Date: 5/24/2018  CHEST PA AND LATERAL Indication:  Preop exam for right lower extremity debriding. Comparison: 02/01/2018. Findings: Again noted is elevation of the left diaphragm with left basilar compressive subsegmental atelectasis. The lungs appear clear of infiltrates. No pneumothorax. Cardiac silhouette and pulmonary vascularity are within normal limits. Aortic atherosclerosis noted. Costophrenic angles are sharp. IMPRESSION: No acute cardiopulmonary disease. Elevation left hemidiaphragm and compressive left basilar subsegmental atelectasis.     Xr Abd Acute W 1 V Chest    Result Date: 6/14/2018  EXAM: AP chest x-ray with flat and upright views of the abdomen INDICATION: Pain constipation COMPARISON: May 23, 2018 _______________ FINDINGS: Chest: Heart and mediastinal contours are normal. There is an elevated left hemidiaphragm. Lungs are clear. There are no effusions. There is no free air under the diaphragm. ABDOMEN: No free air seen on the decubitus view. There are dilated small bowel loops in the lower abdomen measuring up to 3 cm suggesting ileus versus small bowel obstruction. Follow-up recommended. Large amount of stool present in the rectum. There is a catheter projecting over the pelvis. Right iliac stent present. _______________     IMPRESSION: Chest: No acute process ABDOMEN: No free air. Dilated bowel loops likely representing small bowel. Findings suggestive of ileus versus partial small bowel obstruction. Follow-up recommended. If clinically warranted consider CT     Ct Abd Pelv W Cont    Result Date: 6/14/2018  EXAM: CT of the abdomen and pelvis INDICATION: Abdominal pain COMPARISON: None. TECHNIQUE: Axial CT imaging of the abdomen and pelvis was performed with intravenous contrast. Multiplanar reformats were generated. DOSE REDUCTION:  One or more dose reduction techniques were used on this CT: automated exposure control, adjustment of the mAs and/or kVp according to patient's size, and iterative reconstruction techniques. The specific techniques utilized on this CT exam have been documented in the patient's electronic medical record. _______________ FINDINGS: LOWER CHEST: There is scarring and interstitial lung changes at the right lung base. There is an elevated left hemidiaphragm. LIVER, BILIARY: There is a liver granuloma at the tip of the liver. There is a 15 x 8 mm low-attenuation in the posterior segment of the right hepatic lobe suggesting a hepatic cyst. Mild hepatic steatosis present. There is intrahepatic bile duct dilatation common bile duct however is not dilated. Exact cause of the intrahepatic bile duct dilatations is unclear. Advise MRCP for further evaluation on a nonemergent basis.  There is a 4 mm polyp in the fundus of the gallbladder. PANCREAS: There is atrophy of the pancreatic body and tail. There appears to be pancreatic duct dilatation measuring up to 16 mm. This raises the possibility of an occult pancreatic mass. I would advise MRI for further evaluation on a nonemergent basis. SPLEEN: Spleen is enlarged measuring 15.6 cm ADRENALS: There is thickening of the adrenals. KIDNEYS/URETERS: Cortical cysts seen in the midpole the left kidney posteriorly. Right kidney is unremarkable. VASCULATURE: Severe calcific atherosclerosis present. 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 There is a right iliac vein stent present. LYMPH NODES: . There are borderline size left para-aortic lymph nodes measuring up to 8 mm. GASTRO INTESTINAL TRACT: There is diffuse colonic wall thickening of the entire colon with pericolonic inflammation suggesting pan colitis. There is fluid in the rectum with an air-fluid level. Appendectomy. Fluid seen along the right paracolic gutter. No pneumatosis or portal venous gas. No free air. There is a ventral hernia measuring 9 x 2.7 x 6.7 cm containing loop of small bowel without obstruction. PELVIC ORGANS/BLADDER: There is a suprapubic catheter with balloon in the stomach. There is a bladder calculus measuring 8 mm. Evaluation the bladder is limited. No free fluid seen. BONES: No acute or aggressive osseous abnormalities identified. There is osteopenia and multilevel degenerative disc disease. OTHER: None. _______________     IMPRESSION: 1. There is severe pan colitis suggesting infectious or inflammatory etiology. There is intrahepatic bile duct dilatation of the right and left hepatic ducts without dilatation of the common bile duct. Exact etiology of this is unclear. In addition there appears to be atrophy of the pancreas with pancreatic duct dilatation.  I would advise MRI of the pancreas and biliary tree with MRCP for further evaluation on a nonemergent basis with and without contrast exclude any pancreatic mass. Probable 4 mm polyp in the gallbladder Splenomegaly     Xr Chest Port    Result Date: 6/15/2018  EXAM: AP portable upright chest INDICATION: Weakness COMPARISON: None. _______________ FINDINGS: Heart and mediastinal contours are normal. Lungs are clear. There are no effusions or pneumothorax. _______________     IMPRESSION: No acute process     Ir Fluoro Guide Picc Insertion    Result Date: 6/19/2018  EXAM: Right arm PICC placement CLINICAL HISTORY: Long-term venous access needed for antibiotics COMPARISON: None GUIDANCE: Ultrasound and fluoroscopic guidance was used to position (and confirm the position of) the needle / catheter. Image(s) saved in PACS: Ultrasound and fluoroscopic TECHNIQUE/FINDINGS: Informed consent was obtained. Time was performed. The right arm was prepped and draped in sterile fashion. Maximal sterile barrier technique was used for the procedure including sterile cap, sterile mask, sterile gown, sterile gloves and a large sterile sheet. Proper hand hygiene along with proper skin antiseptic was followed, utilizing 2% chlorhexidine solution for skin preparation. With respect to ultrasound guidance, sterile gel and sterile probe covers were used. The basilic vein was compressed to exclude thrombus. A permanent US recording of the vein was created for the patients file. The vein was then accessed with a 21-gauge needle and ultrasound guidance. Guidewire was passed centrally using fluoroscopic guidance. A peel-away sheath was inserted over the guidewire. The intravascular distance was measured. The PICC was trimmed to the appropriate length, 44cm. The double lumen midline PICC was placed into the vein with its tip in the SVC/RA junction the both ports aspirated and flushed easily. The PICC was secured to the skin, a sterile dressing was applied. Fluoroscopic time: 0.1 minutes Fluoroscopic dose (reference air kerma): 1 mGy     IMPRESSION: 1.  Successful right arm PICC placement. Ready for use. No results found for this or any previous visit.         CC: Sujata Schwab MD

## 2018-06-20 NOTE — PROGRESS NOTES
Patient alert and oriented x4. Dressing changed, santyl applied to the the sacrum and right buttock area. Mepilex applied. 1000hrs Per Kelle Booth RN her recommendation note is to:    *Lantus 30 units daily                        *Humalog 8 units qac                        *Humalog Normal Insulin Sensitivity Corrective Coverage     1530hrs Patient complained of pain to the legs. Percocet 5-325mg PO given at this time. Dressing changed at this time after patient had a large bowel movement. 1630hrs SBAR report given to [address], spoke with Vega RN, all questions answered. Prescription for Ertapenem 1g IVP script in tranport envelope. All questions answered. 1700hrs Ambulance servises at bedside, all questions answered.

## 2018-06-20 NOTE — PROGRESS NOTES
DC Plan:  Discharge to [facility] rehab ([address]: [address] [place] [address], [facility], [address] [facility], #[phone]). Please include all hard scripts for controlled substances, med rec and dc summary in packet. Please medicate for pain prior to dc if possible and needed to help offset delay when patient first arrives to facility. Pt will need medical transport. MD Rylee Tijerina pt can be dc today now that he has an accepting facility. Unit secretary has arranged transport. Weston Johana @ [place] ([phone]) aware pts transport time will be 4p-430p. Pt aware of transport time. Care Management Interventions  PCP Verified by CM:  Yes  Mode of Transport at Discharge: BLS  Transition of Care Consult (CM Consult): SNF  [address]: No  Reason Outside [place]: Patient already serviced by other home care/hospice agency Deanna Redman)  Partner SNF: Yes  Health Maintenance Reviewed: Yes  Physical Therapy Consult: Yes  Occupational Therapy Consult: Yes  Current Support Network: Lives with Spouse  Confirm Follow Up Transport: Family  Plan discussed with Pt/Family/Caregiver: Yes  Freedom of Choice Offered: Yes  Discharge Location  Discharge Placement: Skilled nursing facility

## 2018-06-20 NOTE — PROGRESS NOTES
Chart reviewed. Per previous cm notes, referral has been placed for [facility]. This CM spoke with [facility] and they are unable to accept pt. CM spoke with pt on phone and informed him [facility] unable to accept. Pt again offered [facility] and pt chose [facility] [address] Will place referral with CMS for assistance. Provider, please advise which IV abx pt will be on at discharge. Will need hard script. CM will cont to follow. 1200  Received call from [facility] that they are able to accept patient. Provider, will need dc order/summary and script for IV abx. Spoke with pt about dc plan. He verbalized understanding. 1635  Received call from unit secretary that transport has been delayed by approx 15 min. Cassandra Figueroa @ [facility] called and made aware. Okay to cont with transport.

## 2018-06-20 NOTE — DIABETES MGMT
GLYCEMIC CONTROL SCREENING INITIATED:  -known h/o T2DM, HbA1C within recommended range for age + comorbids on basal/bolus home regimen  -TDD = 64 (28 Lantus + 36 - Humalog Very Insulin Resistant Corrective Coverage)  -recommend increase basal insulin + initiate mealtime insulin   *Lantus 30 units daily   *Humalog 8 units qac   *- Humalog Normal Insulin Sensitivity Corrective Coverage    Lab Results   Component Value Date/Time    Hemoglobin A1c 6.9 (H) 05/16/2018 04:15 AM      Lab Results   Component Value Date/Time    Glucose 197 (H) 06/19/2018 04:36 AM         [x]  Glucose values exceeding inpatient target range: -180mg/dl            non-ICU 70-140mg/dl      []  Patient meets criteria for pre-diabetes   HbA1c = 5.7-6.4%      [x]  Patient has h/o diabetes HbA1c ? 6.5%      []  Recommend discontinuing oral anti-diabetic medications until discharge. [x]  Recommend increase basal insulin per IP Insulin order set. *Lantus 30 units daily      [x]  Recommend initiate meal time insulin per IP Insulin order set. *Humalog 8 units qac    [x]  Recommend continue corrective insulin per IP Insulin order set. [x]  Standard insulin scale  i[x]  Very insuln resistant scale       []  Patient meets criteria for IV Insulin Infusion/GlucoStabilizer order set. []  Patient has had a hypoglycemic event, recommend      [x]  Recommend continue blood glucose monitoring. []  Patient will need prescription for glucometer, test strips and lancets. [x]  Recommend continue carbohydrate controlled diabetic diet. []  Diabetes Self-Management class schedule provided and patient encouraged to attend.     [] Other  Selma Kirkpatrick RN, MS  Glycemic Control Team  Pager [phone])  *After Hours pager [phone]

## 2018-06-20 NOTE — DISCHARGE INSTRUCTIONS

## 2018-06-20 NOTE — ROUTINE PROCESS
1930 Bedside verbal shift change report received from Samuel patrick RN(offgoing nurse) given to Bebe Salmeron RN(oncoming nurse).  Report included SBAR,MAR,KARDEX,TELE,I/O  5104 Report given to Samuel patrick RN(oncoming nurse)

## 2018-06-21 LAB
BACTERIA SPEC CULT: NORMAL
BACTERIA SPEC CULT: NORMAL
SERVICE CMNT-IMP: NORMAL
SERVICE CMNT-IMP: NORMAL

## 2018-06-25 ENCOUNTER — TELEPHONE (OUTPATIENT)
Dept: VASCULAR SURGERY | Age: 73
End: 2018-06-25

## 2018-06-25 NOTE — TELEPHONE ENCOUNTER
Patients wife called and said he needs a order sent to [facility] for his legs (I'm guessing for wound care). Please give her a call back in regards to this, she may be reached back at [phone]. She did not know their phone number when I asked for it.

## 2018-06-27 ENCOUNTER — APPOINTMENT (OUTPATIENT)
Dept: WOUND CARE | Age: 73
End: 2018-06-27
Payer: MEDICARE

## 2018-07-16 ENCOUNTER — OFFICE VISIT (OUTPATIENT)
Dept: VASCULAR SURGERY | Age: 73
End: 2018-07-16

## 2018-07-16 ENCOUNTER — HOSPITAL ENCOUNTER (OUTPATIENT)
Dept: WOUND CARE | Age: 73
Discharge: HOME | End: 2018-07-16
Payer: MEDICARE

## 2018-07-16 VITALS
HEIGHT: 74 IN | WEIGHT: 136 LBS | RESPIRATION RATE: 18 BRPM | HEART RATE: 98 BPM | BODY MASS INDEX: 17.45 KG/M2 | DIASTOLIC BLOOD PRESSURE: 64 MMHG | SYSTOLIC BLOOD PRESSURE: 110 MMHG

## 2018-07-16 DIAGNOSIS — L97.911 ULCER OF RIGHT LEG, LIMITED TO BREAKDOWN OF SKIN (HCC): Primary | ICD-10-CM

## 2018-07-16 DIAGNOSIS — I87.2 VENOUS REFLUX: ICD-10-CM

## 2018-07-16 DIAGNOSIS — L97.921 LEG ULCER, LEFT, LIMITED TO BREAKDOWN OF SKIN (HCC): ICD-10-CM

## 2018-07-16 DIAGNOSIS — M79.604 PAIN IN BOTH LOWER EXTREMITIES: ICD-10-CM

## 2018-07-16 DIAGNOSIS — M79.605 PAIN IN BOTH LOWER EXTREMITIES: ICD-10-CM

## 2018-07-16 PROCEDURE — 97602 WOUND(S) CARE NON-SELECTIVE: CPT

## 2018-07-16 RX ORDER — OXYCODONE AND ACETAMINOPHEN 5; 325 MG/1; MG/1
1-2 TABLET ORAL
Qty: 112 TAB | Refills: 0 | Status: SHIPPED | OUTPATIENT
Start: 2018-07-16

## 2018-07-16 NOTE — WOUND CARE
Home Care Instructions  Wound Cleansing & Dressings  Remove: Old dressings    Clean wound with Normal Saline/Dermal Wound Cleanser:    Apply to wound: To sacrum- mesalt, foam border to be changed twice weekly and PRN saturated/soiled  To bilateral lower legs and right heel wounds- Aquacel, absorbent dressing, soft gauze and/or soft roll, Tubi- size F to be changed twice weekly     Apply to gavino-wound to bilateral legs: Bacitracin and Proshield    Change Dressing: Twice weekly     Return Appointment: One month    PLEASE CONTACT OFFICE AS SOON AS POSSIBLE IF UNABLE TO MAKE THIS APPOINTMENT. Inspect your wounds, looking for signs of infection which may include the following:  Increase in redness  Red \"streaks\" from wound  Increase in swelling  Fever  Unusual odor  Change in the amount of wound drainage. Should you experience any significant changes in your wound(s) or have any questions regarding your home care instructions please contact the wound center or your home health company. If after regular business hours, please call your family doctor or local emergency room. Edema Control:   Elevate legs as much as possible. Avoid standing in one position for more than 10 minutes. Avoid setting with legs down. Do not cross legs while sitting. Off-Loading:     Frequent position changes. Do not cross legs while sitting. Shift weight every 20 minutes or more when sitting for prolonged periods of time. TIFFANIE Sanabria NP/Pauline Loyola

## 2018-07-16 NOTE — PROGRESS NOTES
Karyna Brar . Chief Complaint   Patient presents with    Wound Check       History and Physical    Mr. Nya Johnson is a 74year old male who returns to our office with his wife for follow up of his bilateral leg wounds. He had Thera Skin graft placement to his right medial ankle and left lateral ankle on 6/6. He was at rehab where he developed UTI and CDiff after surgery. He states his legs look great and his ankle wounds are nearly closed. He still has a right heel ulcer and breakdown to his sacrum. He is having pain in his right heel ulcer. He is wearing pressure relieving boots when he is at home. Mr. Nya Johnson has no further complaints today. Past Medical History:   Diagnosis Date    Acid reflux     Anemia     Arthritis     Atrial fibrillation ([facility])     CAD (coronary artery disease)     Chronic obstructive pulmonary disease ([facility])     Coagulation disorder ([facility])     anemia    Diabetes ([address] 1997    GERD (gastroesophageal reflux disease)     H/O seasonal allergies     Hypercholesterolemia     Hypertension 1997    Ill-defined condition     hand tremors    PAF (paroxysmal atrial fibrillation) ([address] 11/5/2017    Peripheral arterial disease ([address]     Sleep apnea     pt states he has not used bipap \"in years\".     Thyroid disease      Patient Active Problem List   Diagnosis Code    PAD (peripheral artery disease) ([facility]) I73.9    Venous reflux I87.2    Leg ulcer, left ([address] L97.929    Leg ulcer ([address] L97.909    Diabetic ulcer of right midfoot associated with type 2 diabetes mellitus, with fat layer exposed ([address] E11.621, L97.412    CAD (coronary artery disease) I25.10    COPD (chronic obstructive pulmonary disease) ([address] J44.9    HTN (hypertension) I10    UTI (urinary tract infection) due to urinary indwelling Sher catheter ([facility]) T83.511A, N39.0    PAF (paroxysmal atrial fibrillation) ([facility]) I48.0    Diabetic ulcer of both lower extremities ([address] E11.622, L97.919, L97.929    Severe protein-calorie malnutrition ([facility] E43    Ulcer of right leg, limited to breakdown of skin ([facility] L97.911    Type 2 diabetes mellitus with diabetic neuropathy ([facility] E11.40    Chronic anticoagulation Z79.01    DNR no code (do not resuscitate) Z66    Weakness R53.1    Sacral decubitus ulcer, stage III ([facility] L89.153     Past Surgical History:   Procedure Laterality Date    ABDOMEN SURGERY [facility]    Vikas-en-Y    ABDOMEN SURGERY PROC UNLISTED  2000    abdominal hernia repair/mesh    HX APPENDECTOMY      HX CERVICAL LAMINECTOMY      HX COLONOSCOPY      HX HEENT  2004    cataracts removed right eye    HX ORTHOPAEDIC  1982    cervical laminectomy    HX ORTHOPAEDIC Right 1962    knee torn cartilage    HX TONSILLECTOMY      HX UROLOGICAL  12/2015    suprapubic catheter in place    VASCULAR SURGERY PROCEDURE UNLIST Bilateral 2015 2017    angio plasty legs  x2    VASCULAR SURGERY PROCEDURE UNLIST Bilateral 2017    legs venogram  stent right leg     Current Outpatient Prescriptions   Medication Sig Dispense Refill    oxyCODONE-acetaminophen (PERCOCET) 5-325 mg per tablet Take 1-2 Tabs by mouth every six (6) hours as needed for Pain. Max Daily Amount: 8 Tabs. 112 Tab 0    insulin glargine (LANTUS) 100 unit/mL injection 30 Units by SubCUTAneous route nightly. 1 Vial 0    albuterol sulfate 90 mcg/actuation aepb Take 2 Puffs by inhalation every four (4) hours as needed. Indications: Chronic Obstructive Pulmonary Disease      cholecalciferol (VITAMIN D3) 1,000 unit cap Take 5,000 Units by mouth daily.  levothyroxine (SYNTHROID) 100 mcg tablet Take 100 mcg by mouth Daily (before breakfast).  cilostazol (PLETAL) 100 mg tablet Take 100 mg by mouth Before breakfast and dinner.  pregabalin (LYRICA) 50 mg capsule Take 75 mg by mouth two (2) times a day.  Omeprazole delayed release (PRILOSEC D/R) 20 mg tablet Take 20 mg by mouth daily.       potassium citrate (UROCIT-K10) 10 mEq (1,080 mg) TbER Take 10 mEq by mouth two (2) times a day.  multivitamin, tx-iron-ca-min (THERA-M W/ IRON) 9 mg iron-400 mcg tab tablet Take 1 Tab by mouth daily.  iron aspgly,zi-W-D89-FA-Ca-suc (FERREX 150 FORTE PLUS) 777-24-82-5 mg-mg-mcg-mg cap cap Take 1 Cap by mouth two (2) times a day. Indications: anemia      insulin aspart (NOVOLOG FLEXPEN) 100 unit/mL inpn 5 Units by SubCUTAneous route daily. Sliding scale  Plus the above dose      metFORMIN (GLUCOPHAGE) 1,000 mg tablet Take 1,000 mg by mouth two (2) times daily (with meals).  atorvastatin (LIPITOR) 20 mg tablet Take  by mouth nightly.  lisinopril (PRINIVIL, ZESTRIL) 2.5 mg tablet Take  by mouth daily.  clopidogrel (PLAVIX) 75 mg tab Take 75 mg by mouth daily. Indications: circulation problem      aspirin 81 mg tablet Take 81 mg by mouth nightly.  furosemide (LASIX) 40 mg tablet Take 40 mg by mouth daily. Indications: hypertension      diltiazem (TIAZAC) 360 mg SR capsule Take 360 mg by mouth daily.  tiotropium (SPIRIVA WITH HANDIHALER) 18 mcg inhalation capsule Take 1 Cap by inhalation daily.  loratadine (CLARITIN) 10 mg tablet Take 10 mg by mouth daily.  fluticasone-salmeterol (ADVAIR DISKUS) 250-50 mcg/dose diskus inhaler Take 2 Puffs by inhalation every twelve (12) hours. Allergies   Allergen Reactions    Latex Contact Dermatitis and Rash     Pt states he is NOT allergic to latex.  Neosporin [Neomycin-Bacitracin-Polymyxin] Rash     Social History     Social History    Marital status:      Spouse name: N/A    Number of children: N/A    Years of education: N/A     Occupational History    Not on file.      Social History Main Topics    Smoking status: Former Smoker     Types: Cigarettes     Quit date: 1/1/1997    Smokeless tobacco: Never Used    Alcohol use 0.6 oz/week     1 Glasses of wine per week    Drug use: No    Sexual activity: Not on file     Other Topics Concern    Not on file     Social History Narrative      Family History   Problem Relation Age of Onset    Cancer Mother     Diabetes Father     Heart Disease Sister     Cancer Brother     Diabetes Brother     Hypertension Brother        Review of Systems    Review of Systems   Constitutional: Negative for chills, fever, malaise/fatigue and weight loss. HENT: Negative for ear pain and hearing loss. Eyes: Negative for blurred vision, pain and discharge. Respiratory: Negative for cough, hemoptysis, sputum production and shortness of breath. Cardiovascular: Negative for chest pain, palpitations and orthopnea. Gastrointestinal: Negative for heartburn, nausea and vomiting. Genitourinary: Negative for dysuria and urgency. Musculoskeletal: Positive for myalgias. Skin: Negative for itching and rash. +right heel ulcer, sacral breakdown   Neurological: Negative for dizziness, tingling, weakness and headaches. Endo/Heme/Allergies: Does not bruise/bleed easily. Psychiatric/Behavioral: Negative for depression.        Physical Exam:    Visit Vitals    /64 (BP 1 Location: Left arm, BP Patient Position: Sitting)    Pulse 98    Resp 18    Ht 6' 2\" (1.88 m)    Wt 136 lb (61.7 kg)    BMI 17.46 kg/m2      Physical Examination: General appearance - alert, in no distress, and chronically ill appearing  Mental status - alert, oriented to person, place, and time, normal mood, behavior, speech, dress, motor activity, and thought processes  Eyes - left eye normal, right eye normal  Ears - right ear normal, left ear normal  Mouth - mucous membranes moist  Chest - clear to auscultation, no wheezes  Heart - normal rate and regular rhythm, +murmur  Abdomen - soft, nontender, nondistended  Musculoskeletal - no obvious deformity  Extremities - warm and well-perfused  Skin - scattered skin tears to bilateral lower legs, right heel ulcer, left lateral ankle ulcer mostly closed, right medial ankle ulcer nearly closed with some remaining drainage    Impression and Plan:    ICD-10-CM ICD-9-CM    1. Ulcer of right leg, limited to breakdown of skin ([address] L97.911 707.10 oxyCODONE-acetaminophen (PERCOCET) 5-325 mg per tablet   2. Leg ulcer, left, limited to breakdown of skin ([address] L97.921 707.10    3. Venous reflux I87.2 459.81    4. Pain in both lower extremities M79.604 729.5     M79.605       Orders Placed This Encounter    oxyCODONE-acetaminophen (PERCOCET) 5-325 mg per tablet     I told Mr. Alejandrina Wong that I agree, his ankles look great. We will have [place] see him 3x per week for wound care to the right heel and sacral areas. He will also see wound care in their office monthly. I gave Mr. Alejandrina Wong some pain medication and I will see him again in one month. Follow-up Disposition:  Return in about 1 month (around 8/16/2018). The treatment plan was reviewed with the patient in detail. The patient voiced understanding of this plan and all questions and concerns were addressed. The patient agrees with this plan. We discussed the signs and symptoms that would require earlier attention or intervention. The patient was given educational material related to his/her visit and the patient has voiced understanding of the material.     I appreciate the opportunity to participate in the care of your patient. I will be sure to keep you informed of any subsequent changes in the treatment plan. If you have any questions or concerns, please feel free to contact me. Colton Dean NP    PLEASE NOTE:  This document has been produced using voice recognition software. Unrecognized errors in transcription may be present.

## 2018-07-16 NOTE — MR AVS SNAPSHOT
[facility] 
 
 
 [facility] [address] [facility] 
[phone] Patient: Glenys Young Sr. MRN: [id number] BSW:[id number] Visit Information Date & Time Provider Department Dept. Phone Encounter #  
 7/16/2018  9:00 AM Britni Orozco NP [facility] [address] [id number] Follow-up Instructions Return in about 1 month (around 8/16/2018). Follow-up and Disposition History Your Appointments 8/15/2018  9:00 AM  
Follow Up with Octavia Dennis MD  
[facility] ([address]) Appt Note: 1month follow up wound check [facility] [address] [facility], [place], operated by [facility]  
  
   
 [facility] [address] Upcoming Health Maintenance Date Due  
 LIPID PANEL Q1 1945 FOOT EXAM Q1 5/16/1953 MICROALBUMIN Q1 5/16/1953 EYE EXAM RETINAL OR DILATED Q1 5/16/1953 DTaP/Tdap/Td series (1 - Tdap) 5/16/1964 ZOSTER VACCINE AGE 60> 3/16/2003 GLAUCOMA SCREENING Q2Y 5/16/2008 Pneumococcal 65+ Low/Medium Risk (1 of 2 - PCV13) 5/16/2008 MEDICARE YEARLY EXAM 3/14/2018 Influenza Age 5 to Adult 8/1/2018 HEMOGLOBIN A1C Q6M 11/16/2018 Allergies as of 7/16/2018  Review Complete On: 7/16/2018 By: Concetta Rincon RN Severity Noted Reaction Type Reactions Latex  05/28/2013    Contact Dermatitis, Rash Pt states he is NOT allergic to latex. Neosporin [Neomycin-bacitracin-polymyxin] High 05/09/2017   Systemic Rash Current Immunizations  Reviewed on 6/17/2018 Name Date Influenza Vaccine 9/15/2017 Not reviewed this visit You Were Diagnosed With   
  
 Codes Comments Ulcer of right leg, limited to breakdown of skin ([facility]    -  Primary ICD-10-CM: M24.440 ICD-9-CM: 707.10 Leg ulcer, left, limited to breakdown of skin ([facility]     ICD-10-CM: T64.063 ICD-9-CM: 707.10 Venous reflux     ICD-10-CM: I87.2 ICD-9-CM: 459.81 Pain in both lower extremities     ICD-10-CM: M79.604, M79.605 ICD-9-CM: 729.5 Vitals BP Pulse Resp Height(growth percentile) Weight(growth percentile) BMI  
 110/64 (BP 1 Location: Left arm, BP Patient Position: Sitting) 98 18 6' 2\" (1.88 m) 136 lb (61.7 kg) 17.46 kg/m2 Smoking Status Former Smoker BMI and BSA Data Body Mass Index Body Surface Area  
 17.46 kg/m 2 1.79 m 2 Preferred Pharmacy Pharmacy Name Phone [address], [address] [phone] Your Updated Medication List  
  
   
This list is accurate as of 7/16/18 10:50 AM.  Always use your most recent med list.  
  
  
  
  
 Earnest Sovereign 250-50 mcg/dose diskus inhaler Generic drug:  fluticasone-salmeterol Take 2 Puffs by inhalation every twelve (12) hours. albuterol sulfate 90 mcg/actuation Aepb Take 2 Puffs by inhalation every four (4) hours as needed. Indications: Chronic Obstructive Pulmonary Disease  
  
 aspirin 81 mg tablet Take 81 mg by mouth nightly. atorvastatin 20 mg tablet Commonly known as:  LIPITOR Take  by mouth nightly. cilostazol 100 mg tablet Commonly known as:  PLETAL Take 100 mg by mouth Before breakfast and dinner. CLARITIN 10 mg tablet Generic drug:  loratadine Take 10 mg by mouth daily. insulin glargine 100 unit/mL injection Commonly known as:  LANTUS  
30 Units by SubCUTAneous route nightly. iron aspgly,ju-C-N20-FA-Ca-suc 504-59-23-6 mg-mg-mcg-mg Cap cap Commonly known as:  FERREX [address] Take 1 Cap by mouth two (2) times a day. Indications: anemia LASIX 40 mg tablet Generic drug:  furosemide Take 40 mg by mouth daily. Indications: hypertension  
  
 levothyroxine 100 mcg tablet Commonly known as:  SYNTHROID Take 100 mcg by mouth Daily (before breakfast). lisinopril 2.5 mg tablet Commonly known as:  Thersia Kubas Take  by mouth daily. LYRICA 50 mg capsule Generic drug:  pregabalin Take 75 mg by mouth two (2) times a day. metFORMIN 1,000 mg tablet Commonly known as:  GLUCOPHAGE Take 1,000 mg by mouth two (2) times daily (with meals). multivitamin, tx-iron-ca-min 9 mg iron-400 mcg Tab tablet Commonly known as:  THERA-M w/ IRON Take 1 Tab by mouth daily. NovoLOG Flexpen U-100 Insulin 100 unit/mL Inpn Generic drug:  insulin aspart U-100  
5 Units by SubCUTAneous route daily. Sliding scale  Plus the above dose Omeprazole delayed release 20 mg tablet Commonly known as:  PRILOSEC D/R Take 20 mg by mouth daily. oxyCODONE-acetaminophen 5-325 mg per tablet Commonly known as:  PERCOCET Take 1-2 Tabs by mouth every six (6) hours as needed for Pain. Max Daily Amount: 8 Tabs. PLAVIX 75 mg Tab Generic drug:  clopidogrel Take 75 mg by mouth daily. Indications: circulation problem  
  
 potassium citrate 10 mEq (1,080 mg) Zita Patria Commonly known as:  [place] Take 10 mEq by mouth two (2) times a day. SPIRIVA WITH HANDIHALER 18 mcg inhalation capsule Generic drug:  tiotropium Take 1 Cap by inhalation daily. TIAZAC 360 mg SR capsule Generic drug:  dilTIAZem Take 360 mg by mouth daily. VITAMIN D3 1,000 unit Cap Generic drug:  cholecalciferol Take 5,000 Units by mouth daily. Prescriptions Printed Refills  
 oxyCODONE-acetaminophen (PERCOCET) 5-325 mg per tablet 0 Sig: Take 1-2 Tabs by mouth every six (6) hours as needed for Pain. Max Daily Amount: 8 Tabs. Class: Print Route: Oral  
  
Follow-up Instructions Return in about 1 month (around 8/16/2018). To-Do List   
 08/15/2018 10:30 AM  
  Appointment with WOUND NURSE at [facility] ([phone]) [facility]! Dear Li Díaz: Thank you for requesting a tadoÂ°t account.   Our records indicate that you already have an active [employer] account. You can access your account anytime at [email or web address] Did you know that you can access your hospital and ER discharge instructions at any time in [employer]? You can also review all of your test results from your hospital stay or ER visit. Additional Information If you have questions, please visit the Frequently Asked Questions section of the [employer] website at [email or web address] Remember, [employer] is NOT to be used for urgent needs. For medical emergencies, dial 911. Now available from your iPhone and Android! Please provide this summary of care documentation to your next provider. Your primary care clinician is listed as Danielle Araiza. If you have any questions after today's visit, please call [phone].

## 2018-08-15 ENCOUNTER — HOSPITAL ENCOUNTER (OUTPATIENT)
Dept: WOUND CARE | Age: 73
Discharge: HOME | End: 2018-08-15
Payer: MEDICARE

## 2018-08-15 ENCOUNTER — OFFICE VISIT (OUTPATIENT)
Dept: VASCULAR SURGERY | Age: 73
End: 2018-08-15

## 2018-08-15 VITALS
SYSTOLIC BLOOD PRESSURE: 107 MMHG | TEMPERATURE: 97.9 F | DIASTOLIC BLOOD PRESSURE: 42 MMHG | RESPIRATION RATE: 17 BRPM | HEART RATE: 100 BPM | OXYGEN SATURATION: 99 %

## 2018-08-15 VITALS
HEART RATE: 70 BPM | SYSTOLIC BLOOD PRESSURE: 126 MMHG | DIASTOLIC BLOOD PRESSURE: 64 MMHG | HEIGHT: 74 IN | BODY MASS INDEX: 17.45 KG/M2 | RESPIRATION RATE: 18 BRPM | WEIGHT: 136 LBS

## 2018-08-15 DIAGNOSIS — L97.911 ULCER OF RIGHT LOWER EXTREMITY, LIMITED TO BREAKDOWN OF SKIN (HCC): Primary | ICD-10-CM

## 2018-08-15 DIAGNOSIS — L97.921 ULCER OF LEFT LOWER EXTREMITY, LIMITED TO BREAKDOWN OF SKIN (HCC): ICD-10-CM

## 2018-08-15 PROCEDURE — 97602 WOUND(S) CARE NON-SELECTIVE: CPT

## 2018-08-15 NOTE — PROGRESS NOTES
Sonam Huang [facility] Chief Complaint   Patient presents with    Wound Check       History and Physical    Mr. Maria De Jesus Rose returns to our office for follow and continued management of his bilateral lower extremity ulcers. Mr. Maria De Jesus Rose was recently hospitalized and was off of his feet for an extended period of time. After this hospitalization it was noted that his leg wounds improved. He does, however, complain of a decubitus ulcer at this time as well. He denies any fevers or chills and admits to a low appetite. Past Medical History:   Diagnosis Date    Acid reflux     Anemia     Arthritis     Atrial fibrillation ([facility])     CAD (coronary artery disease)     Chronic obstructive pulmonary disease ([facility])     Coagulation disorder ([facility])     anemia    Diabetes ([address] 1997    GERD (gastroesophageal reflux disease)     H/O seasonal allergies     Hypercholesterolemia     Hypertension 1997    Ill-defined condition     hand tremors    PAF (paroxysmal atrial fibrillation) ([address] 11/5/2017    Peripheral arterial disease ([address]     Sleep apnea     pt states he has not used bipap \"in years\".     Thyroid disease      Patient Active Problem List   Diagnosis Code    PAD (peripheral artery disease) ([facility]) I73.9    Venous reflux I87.2    Leg ulcer, left ([address] L97.929    Leg ulcer ([address] L97.909    Diabetic ulcer of right midfoot associated with type 2 diabetes mellitus, with fat layer exposed ([address] E11.621, L97.412    CAD (coronary artery disease) I25.10    COPD (chronic obstructive pulmonary disease) ([address] J44.9    HTN (hypertension) I10    UTI (urinary tract infection) due to urinary indwelling Sher catheter ([facility]) T83.511A, N39.0    PAF (paroxysmal atrial fibrillation) ([facility]) I48.0    Diabetic ulcer of both lower extremities ([address] E11.622, L97.919, L97.929    Severe protein-calorie malnutrition ([facility]) E43    Ulcer of right leg, limited to breakdown of skin ([address] L97.911    Type 2 diabetes mellitus with diabetic neuropathy ([facility] E11.40    Chronic anticoagulation Z79.01    DNR no code (do not resuscitate) Z66    Weakness R53.1    Sacral decubitus ulcer, stage III ([facility] L89.153     Past Surgical History:   Procedure Laterality Date    ABDOMEN SURGERY PROC UNLISTED  1997    Vikas-en-Y    ABDOMEN SURGERY PROC UNLISTED  2000    abdominal hernia repair/mesh    HX APPENDECTOMY      HX CERVICAL LAMINECTOMY      HX COLONOSCOPY      HX HEENT  2004    cataracts removed right eye    HX ORTHOPAEDIC  1982    cervical laminectomy    HX ORTHOPAEDIC Right 1962    knee torn cartilage    HX TONSILLECTOMY      HX UROLOGICAL  12/2015    suprapubic catheter in place    VASCULAR SURGERY PROCEDURE UNLIST Bilateral 2015 2017    angio plasty legs  x2    VASCULAR SURGERY PROCEDURE UNLIST Bilateral 2017    legs venogram  stent right leg     Current Outpatient Prescriptions   Medication Sig Dispense Refill    oxyCODONE-acetaminophen (PERCOCET) 5-325 mg per tablet Take 1-2 Tabs by mouth every six (6) hours as needed for Pain. Max Daily Amount: 8 Tabs. 112 Tab 0    insulin glargine (LANTUS) 100 unit/mL injection 30 Units by SubCUTAneous route nightly. 1 Vial 0    albuterol sulfate 90 mcg/actuation aepb Take 2 Puffs by inhalation every four (4) hours as needed. Indications: Chronic Obstructive Pulmonary Disease      cholecalciferol (VITAMIN D3) 1,000 unit cap Take 5,000 Units by mouth daily.  levothyroxine (SYNTHROID) 100 mcg tablet Take 100 mcg by mouth Daily (before breakfast).  cilostazol (PLETAL) 100 mg tablet Take 100 mg by mouth Before breakfast and dinner.  pregabalin (LYRICA) 50 mg capsule Take 75 mg by mouth two (2) times a day.  Omeprazole delayed release (PRILOSEC D/R) 20 mg tablet Take 20 mg by mouth daily.  potassium citrate (UROCIT-K10) 10 mEq (1,080 mg) TbER Take 10 mEq by mouth two (2) times a day.       multivitamin, tx-iron-ca-min (THERA-M W/ IRON) 9 mg iron-400 mcg tab tablet Take 1 Tab by mouth daily.  iron aspgly,jn-Q-P11-FA-Ca-suc (FERREX 150 FORTE PLUS) 169-05-07-2 mg-mg-mcg-mg cap cap Take 1 Cap by mouth two (2) times a day. Indications: anemia      insulin aspart (NOVOLOG FLEXPEN) 100 unit/mL inpn 5 Units by SubCUTAneous route daily. Sliding scale  Plus the above dose      metFORMIN (GLUCOPHAGE) 1,000 mg tablet Take 1,000 mg by mouth two (2) times daily (with meals).  atorvastatin (LIPITOR) 20 mg tablet Take  by mouth nightly.  lisinopril (PRINIVIL, ZESTRIL) 2.5 mg tablet Take  by mouth daily.  clopidogrel (PLAVIX) 75 mg tab Take 75 mg by mouth daily. Indications: circulation problem      aspirin 81 mg tablet Take 81 mg by mouth nightly.  furosemide (LASIX) 40 mg tablet Take 40 mg by mouth daily. Indications: hypertension      diltiazem (TIAZAC) 360 mg SR capsule Take 360 mg by mouth daily.  tiotropium (SPIRIVA WITH HANDIHALER) 18 mcg inhalation capsule Take 1 Cap by inhalation daily.  loratadine (CLARITIN) 10 mg tablet Take 10 mg by mouth daily.  fluticasone-salmeterol (ADVAIR DISKUS) 250-50 mcg/dose diskus inhaler Take 2 Puffs by inhalation every twelve (12) hours. Allergies   Allergen Reactions    Latex Contact Dermatitis and Rash     Pt states he is NOT allergic to latex.  Neosporin [Neomycin-Bacitracin-Polymyxin] Rash     Social History     Social History    Marital status:      Spouse name: N/A    Number of children: N/A    Years of education: N/A     Occupational History    Not on file.      Social History Main Topics    Smoking status: Former Smoker     Types: Cigarettes     Quit date: 1/1/1997    Smokeless tobacco: Never Used    Alcohol use 0.6 oz/week     1 Glasses of wine per week    Drug use: No    Sexual activity: Not on file     Other Topics Concern    Not on file     Social History Narrative      Family History   Problem Relation Age of Onset    Cancer Mother     Diabetes Father     Heart Disease Sister     Cancer Brother     Diabetes Brother     Hypertension Brother        Review of Systems    ROS         Physical Exam:    Visit Vitals    /64 (BP 1 Location: Left arm, BP Patient Position: Sitting)    Pulse 70    Resp 18    Ht 6' 2\" (1.88 m)    Wt 136 lb (61.7 kg)    BMI 17.46 kg/m2      Physical Examination: General appearance - alert, well appearing, and in no distress and chronically ill appearing  Mental status - alert, oriented to person, place, and time  Eyes - sclera anicteric, left eye normal, right eye normal  Ears - right ear normal, left ear normal  Nose - normal and patent, no erythema, discharge or polyps  Mouth - mucous membranes moist, pharynx normal without lesions  Musculoskeletal - Bilateral shins without wounds. Medial right ankle with decreased ulcer size and good granulation tissue at the base. Left heel ulcer button sized with good granulation tissue. No signs of infection. Impression and Plan:    ICD-10-CM ICD-9-CM    1. Ulcer of right lower extremity, limited to breakdown of skin ([facility] L97.911 707.10    2. Ulcer of left lower extremity, limited to breakdown of skin [facility]) L97.921 707.10      I told Mr. Hough that his wounds are doing well and I attribute much of the recent improvement to him being off of his feet. I told Mr. Robbie Cardozo that this emphasizes the importance of him staying off of his feet while at home. Mr. Johnathan Rivera states he understands this recommendation. We will complete his last home health visit Friday and then transition him to the wound care office. We will continue to assess his need for debridement or graft placement. Follow-up Disposition:  Return for Wound check. The treatment plan was reviewed with the patient in detail. The patient voiced understanding of this plan and all questions and concerns were addressed. The patient agrees with this plan.   We discussed the signs and symptoms that would require earlier attention or intervention. The patient was given educational material related to his/her visit and the patient has voiced understanding of the material.     I appreciate the opportunity to participate in the care of your patient. I will be sure to keep you informed of any subsequent changes in the treatment plan. If you have any questions or concerns, please feel free to contact me. Deuce Hayden MD    PLEASE NOTE:  This document has been produced using voice recognition software. Unrecognized errors in transcription may be present.

## 2018-08-21 ENCOUNTER — HOSPITAL ENCOUNTER (OUTPATIENT)
Dept: WOUND CARE | Age: 73
Discharge: HOME | End: 2018-08-21
Payer: MEDICARE

## 2018-08-21 VITALS
OXYGEN SATURATION: 97 % | TEMPERATURE: 97.8 F | HEART RATE: 100 BPM | DIASTOLIC BLOOD PRESSURE: 85 MMHG | SYSTOLIC BLOOD PRESSURE: 112 MMHG | RESPIRATION RATE: 18 BRPM

## 2018-08-21 PROCEDURE — 97602 WOUND(S) CARE NON-SELECTIVE: CPT

## 2018-08-21 NOTE — WOUND CARE
Physical Exam   sacrum    Right superior and inferior    Right heel 100% epithelialized     RLE     LLE

## 2018-08-28 ENCOUNTER — HOSPITAL ENCOUNTER (OUTPATIENT)
Dept: WOUND CARE | Age: 73
Discharge: HOME | End: 2018-08-28
Payer: MEDICARE

## 2018-08-28 VITALS
SYSTOLIC BLOOD PRESSURE: 108 MMHG | TEMPERATURE: 98.1 F | RESPIRATION RATE: 15 BRPM | OXYGEN SATURATION: 98 % | DIASTOLIC BLOOD PRESSURE: 46 MMHG | HEART RATE: 101 BPM

## 2018-08-28 PROCEDURE — 97602 WOUND(S) CARE NON-SELECTIVE: CPT

## 2018-09-05 ENCOUNTER — HOSPITAL ENCOUNTER (OUTPATIENT)
Dept: WOUND CARE | Age: 73
Discharge: HOME | End: 2018-09-05
Payer: MEDICARE

## 2018-09-05 PROCEDURE — 97602 WOUND(S) CARE NON-SELECTIVE: CPT

## 2018-09-12 ENCOUNTER — HOSPITAL ENCOUNTER (OUTPATIENT)
Dept: WOUND CARE | Age: 73
Discharge: HOME | End: 2018-09-12
Payer: MEDICARE

## 2018-09-12 ENCOUNTER — OFFICE VISIT (OUTPATIENT)
Dept: VASCULAR SURGERY | Age: 73
End: 2018-09-12

## 2018-09-12 VITALS
RESPIRATION RATE: 18 BRPM | WEIGHT: 136 LBS | DIASTOLIC BLOOD PRESSURE: 70 MMHG | HEART RATE: 92 BPM | HEIGHT: 74 IN | BODY MASS INDEX: 17.45 KG/M2 | SYSTOLIC BLOOD PRESSURE: 110 MMHG

## 2018-09-12 DIAGNOSIS — L97.911 ULCER OF RIGHT LOWER EXTREMITY, LIMITED TO BREAKDOWN OF SKIN (HCC): Primary | ICD-10-CM

## 2018-09-12 DIAGNOSIS — I87.2 VENOUS REFLUX: ICD-10-CM

## 2018-09-12 PROCEDURE — 97602 WOUND(S) CARE NON-SELECTIVE: CPT

## 2018-09-12 PROCEDURE — 29581 APPL MULTLAYER CMPRN SYS LEG: CPT

## 2018-09-12 NOTE — PROGRESS NOTES
Shaniqua Carrion . Chief Complaint   Patient presents with    Wound Check       History and Physical    Mr.St Ike Segovia is a 74year old male who returns to our office with his wife for follow up of his venous reflux and lower extremity wounds. He has a new skin tear to his right lower leg due to his wife holding his leg too tightly during his dressing change. He goes to wound care weekly and his wife changes dressings once per week. Overall, he feels like his legs are improving. He has no new complaints today. Past Medical History:   Diagnosis Date    Acid reflux     Anemia     Arthritis     Atrial fibrillation ([facility])     CAD (coronary artery disease)     Chronic obstructive pulmonary disease ([facility])     Coagulation disorder ([facility])     anemia    Diabetes ([address] 1997    GERD (gastroesophageal reflux disease)     H/O seasonal allergies     Hypercholesterolemia     Hypertension 1997    Ill-defined condition     hand tremors    PAF (paroxysmal atrial fibrillation) ([address] 11/5/2017    Peripheral arterial disease ([address]     Sleep apnea     pt states he has not used bipap \"in years\".     Thyroid disease      Patient Active Problem List   Diagnosis Code    PAD (peripheral artery disease) ([facility]) I73.9    Venous reflux I87.2    Leg ulcer, left ([address] L97.929    Leg ulcer ([address] L97.909    Diabetic ulcer of right midfoot associated with type 2 diabetes mellitus, with fat layer exposed ([address] E11.621, L97.412    CAD (coronary artery disease) I25.10    COPD (chronic obstructive pulmonary disease) ([address] J44.9    HTN (hypertension) I10    UTI (urinary tract infection) due to urinary indwelling Sher catheter ([facility]) T83.511A, N39.0    PAF (paroxysmal atrial fibrillation) ([facility]) I48.0    Diabetic ulcer of both lower extremities ([address] E11.622, L97.919, L97.929    Severe protein-calorie malnutrition ([facility]) E43    Ulcer of right leg, limited to breakdown of skin ([address] L97.911    Type 2 diabetes mellitus with diabetic neuropathy ([facility] E11.40    Chronic anticoagulation Z79.01    DNR no code (do not resuscitate) Z66    Weakness R53.1    Sacral decubitus ulcer, stage III ([facility] L89.153     Past Surgical History:   Procedure Laterality Date    ABDOMEN SURGERY PROC UNLISTED  1997    Vikas-en-Y    ABDOMEN SURGERY PROC UNLISTED  2000    abdominal hernia repair/mesh    HX APPENDECTOMY      HX CERVICAL LAMINECTOMY      HX COLONOSCOPY      HX HEENT  2004    cataracts removed right eye    HX ORTHOPAEDIC  1982    cervical laminectomy    HX ORTHOPAEDIC Right 1962    knee torn cartilage    HX TONSILLECTOMY      HX UROLOGICAL  12/2015    suprapubic catheter in place    VASCULAR SURGERY PROCEDURE UNLIST Bilateral 2015 2017    angio plasty legs  x2    VASCULAR SURGERY PROCEDURE UNLIST Bilateral 2017    legs venogram  stent right leg     Current Outpatient Prescriptions   Medication Sig Dispense Refill    oxyCODONE-acetaminophen (PERCOCET) 5-325 mg per tablet Take 1-2 Tabs by mouth every six (6) hours as needed for Pain. Max Daily Amount: 8 Tabs. 112 Tab 0    insulin glargine (LANTUS) 100 unit/mL injection 30 Units by SubCUTAneous route nightly. 1 Vial 0    albuterol sulfate 90 mcg/actuation aepb Take 2 Puffs by inhalation every four (4) hours as needed. Indications: Chronic Obstructive Pulmonary Disease      cholecalciferol (VITAMIN D3) 1,000 unit cap Take 5,000 Units by mouth daily.  levothyroxine (SYNTHROID) 100 mcg tablet Take 100 mcg by mouth Daily (before breakfast).  cilostazol (PLETAL) 100 mg tablet Take 100 mg by mouth Before breakfast and dinner.  pregabalin (LYRICA) 50 mg capsule Take 75 mg by mouth two (2) times a day.  Omeprazole delayed release (PRILOSEC D/R) 20 mg tablet Take 20 mg by mouth daily.  potassium citrate (UROCIT-K10) 10 mEq (1,080 mg) TbER Take 10 mEq by mouth two (2) times a day.       multivitamin, tx-iron-ca-min (THERA-M W/ IRON) 9 mg iron-400 mcg tab tablet Take 1 Tab by mouth daily.  iron aspgly,mt-V-F32-FA-Ca-suc (FERREX 150 FORTE PLUS) 074-57-48-3 mg-mg-mcg-mg cap cap Take 1 Cap by mouth two (2) times a day. Indications: anemia      insulin aspart (NOVOLOG FLEXPEN) 100 unit/mL inpn 5 Units by SubCUTAneous route daily. Sliding scale  Plus the above dose      metFORMIN (GLUCOPHAGE) 1,000 mg tablet Take 1,000 mg by mouth two (2) times daily (with meals).  atorvastatin (LIPITOR) 20 mg tablet Take  by mouth nightly.  lisinopril (PRINIVIL, ZESTRIL) 2.5 mg tablet Take  by mouth daily.  clopidogrel (PLAVIX) 75 mg tab Take 75 mg by mouth daily. Indications: circulation problem      aspirin 81 mg tablet Take 81 mg by mouth nightly.  furosemide (LASIX) 40 mg tablet Take 40 mg by mouth daily. Indications: hypertension      diltiazem (TIAZAC) 360 mg SR capsule Take 360 mg by mouth daily.  tiotropium (SPIRIVA WITH HANDIHALER) 18 mcg inhalation capsule Take 1 Cap by inhalation daily.  loratadine (CLARITIN) 10 mg tablet Take 10 mg by mouth daily.  fluticasone-salmeterol (ADVAIR DISKUS) 250-50 mcg/dose diskus inhaler Take 2 Puffs by inhalation every twelve (12) hours. Allergies   Allergen Reactions    Latex Contact Dermatitis and Rash     Pt states he is NOT allergic to latex.  Neosporin [Neomycin-Bacitracin-Polymyxin] Rash     Social History     Social History    Marital status:      Spouse name: N/A    Number of children: N/A    Years of education: N/A     Occupational History    Not on file.      Social History Main Topics    Smoking status: Former Smoker     Types: Cigarettes     Quit date: 1/1/1997    Smokeless tobacco: Never Used    Alcohol use 0.6 oz/week     1 Glasses of wine per week    Drug use: No    Sexual activity: Not on file     Other Topics Concern    Not on file     Social History Narrative      Family History   Problem Relation Age of Onset    Cancer Mother     Diabetes Father     Heart Disease Sister     Cancer Brother     Diabetes Brother     Hypertension Brother        Review of Systems    Review of Systems   Constitutional: Negative for chills, fever, malaise/fatigue and weight loss. HENT: Negative for ear pain and hearing loss. Eyes: Negative for blurred vision, pain and discharge. Respiratory: Negative for cough, hemoptysis, sputum production and shortness of breath. Cardiovascular: Negative for chest pain, palpitations and orthopnea. Gastrointestinal: Negative for heartburn, nausea and vomiting. Genitourinary: Negative for dysuria and urgency. Musculoskeletal: Negative for myalgias. Skin: Negative for itching and rash. +multiple wounds and skin tears to bilateral lower extremities   Neurological: Negative for dizziness, tingling, weakness and headaches. Endo/Heme/Allergies: Does not bruise/bleed easily. Psychiatric/Behavioral: Negative for depression. Physical Exam:    Visit Vitals    /70 (BP 1 Location: Left arm, BP Patient Position: Sitting)    Pulse 92    Resp 18    Ht 6' 2\" (1.88 m)    Wt 136 lb (61.7 kg)    BMI 17.46 kg/m2      Physical Examination: General appearance - alert, well appearing, and in no distress  Mental status - alert, oriented to person, place, and time, normal mood, behavior, speech, dress, motor activity, and thought processes  Eyes - left eye normal, right eye normal  Ears - right ear normal, left ear normal  Mouth - mucous membranes moist  Chest - clear to auscultation, no wheezes  Heart - normal rate and regular rhythm  Abdomen - soft, nontender, nondistended  Musculoskeletal - no obvious deformity  Extremities - warm and well perfused, no edema  Skin - normal coloration and turgor, no rashes, scattered skin tears to lower extremities, right medial ankle ulceration with layer of biofilm, skin tear to posterior left lower leg    Impression and Plan:    ICD-10-CM ICD-9-CM    1.  Ulcer of right lower extremity, limited to breakdown of skin ([facility] L97.911 707.10    2. Venous reflux I87.2 459.81      No orders of the defined types were placed in this encounter. I told Mr. Hough that I agree, his legs do look better. His left lateral ankle wound has healed completely. I removed the layer of biofilm from the right medial ankle wound, the underlying tissue was healthy. Hopefully this will continue to heal.  He should continue to wear his pressure-relieving boots and be seen by wound care. I had a discussion with Mr. Olive Romero about our office closing and I presented the options of being seen by us at our [facility] office or referral to [facility] or [facility]. They would like to be referred to Chan Vickers. We will send his paperwork to their office. Follow-up Disposition:  Return in about 3 months (around 12/12/2018). The treatment plan was reviewed with the patient in detail. The patient voiced understanding of this plan and all questions and concerns were addressed. The patient agrees with this plan. We discussed the signs and symptoms that would require earlier attention or intervention. The patient was given educational material related to his/her visit and the patient has voiced understanding of the material.     I appreciate the opportunity to participate in the care of your patient. I will be sure to keep you informed of any subsequent changes in the treatment plan. If you have any questions or concerns, please feel free to contact me. Chase Hewitt, ELSA    PLEASE NOTE:  This document has been produced using voice recognition software. Unrecognized errors in transcription may be present.

## 2018-09-19 ENCOUNTER — HOSPITAL ENCOUNTER (OUTPATIENT)
Dept: WOUND CARE | Age: 73
Discharge: HOME | End: 2018-09-19
Payer: MEDICARE

## 2018-09-19 VITALS
RESPIRATION RATE: 17 BRPM | HEART RATE: 97 BPM | DIASTOLIC BLOOD PRESSURE: 48 MMHG | SYSTOLIC BLOOD PRESSURE: 108 MMHG | OXYGEN SATURATION: 96 % | TEMPERATURE: 97.7 F

## 2018-09-19 PROCEDURE — 97602 WOUND(S) CARE NON-SELECTIVE: CPT

## 2018-09-19 NOTE — WOUND CARE
09/19/18 1101 Wound Ankle Right;Medial  
Date First Assessed/Time First Assessed: 08/21/18 1033   POA: Yes  Wound Type: Vascular  Location: Ankle  Orientation: Right;Medial  
DRESSING STATUS Clean, dry, and intact DRESSING TYPE Absorptive Non-Pressure Injury Full thickness (subcut/muscle) Wound Length (cm) 1.4 cm Wound Width (cm) 0.6 cm Wound Depth (cm) 0.1 Wound Surface area (cm^2) 0.84 cm^2 Change in Wound Size % -236 Condition of Base Pink Condition of Edges Open Tissue Type Pink;Yellow Drainage Amount  Moderate Drainage Color Serosanguinous; Tan  
Wound Odor None Periwound Skin Condition Intact Cleansing and Cleansing Agents  Other (comment); Soap and water (vashe) Dressing Type Applied Other (Comment) (Mesalt, 4x4, exu-dry, kaley, tubi- size F) Procedure Tolerated Well Wound Ankle Medial;Right;Superior Date First Assessed/Time First Assessed: 07/16/18 1145   POA: Yes  Wound Type: Venous  Location: Ankle  Orientation: Medial;Right;Superior DRESSING STATUS Clean, dry, and intact DRESSING TYPE Absorptive Non-Pressure Injury Full thickness (subcut/muscle) Wound Length (cm) 1.4 cm Wound Width (cm) 2.6 cm Wound Depth (cm) 0.1 Wound Surface area (cm^2) 3.64 cm^2 Change in Wound Size % -102.22 Condition of Base Pink;Slough Condition of Edges Open Tissue Type Pink;Yellow Drainage Amount  Moderate Drainage Color Serosanguinous; Tan  
Wound Odor None Periwound Skin Condition Intact Cleansing and Cleansing Agents  Other (comment); Soap and water (vashe) Dressing Type Applied Other (Comment) (mesalt, 4x4, exu-dry, kaley, tubi- size F) Procedure Tolerated Well Wound Sacral/coccyx Right Date First Assessed/Time First Assessed: 07/16/18 1147   POA: Yes  Wound Type: Pressure injury  Location: Sacral/coccyx  Orientation: Right DRESSING STATUS Breakthrough drainage DRESSING TYPE Absorptive Non-Pressure Injury Full thickness (subcut/muscle) Wound Length (cm) 1.5 cm Wound Width (cm) 1.2 cm Wound Depth (cm) 0.1 Wound Surface area (cm^2) 1.8 cm^2 Change in Wound Size % -87.5 Condition of Base Pink Condition of Edges Open Tissue Type Yellow;Pink Drainage Amount  Moderate Drainage Color Tan Wound Odor None Periwound Skin Condition Intact Cleansing and Cleansing Agents  Other (comment); Soap and water (vashe) Dressing Type Applied Other (Comment) (Mesalt, 4 x4, exu-dry, mepilex border) Procedure Tolerated Well

## 2018-09-26 ENCOUNTER — HOSPITAL ENCOUNTER (OUTPATIENT)
Dept: WOUND CARE | Age: 73
Discharge: HOME | End: 2018-09-26
Payer: MEDICARE

## 2018-09-26 VITALS
DIASTOLIC BLOOD PRESSURE: 78 MMHG | TEMPERATURE: 98 F | SYSTOLIC BLOOD PRESSURE: 116 MMHG | HEART RATE: 92 BPM | OXYGEN SATURATION: 99 %

## 2018-09-26 PROCEDURE — 99211 OFF/OP EST MAY X REQ PHY/QHP: CPT

## 2018-09-26 NOTE — WOUND CARE
09/26/18 1108 Wound Ankle Right;Medial  
Date First Assessed/Time First Assessed: 08/21/18 1033   POA: Yes  Wound Type: Vascular  Location: Ankle  Orientation: Right;Medial  
DRESSING STATUS Clean, dry, and intact DRESSING TYPE Absorptive Non-Pressure Injury Full thickness (subcut/muscle) Wound Length (cm) 1.4 cm Wound Width (cm) 0.5 cm Wound Depth (cm) 0.1 Wound Surface area (cm^2) 0.7 cm^2 Change in Wound Size % -180 Condition of Base Pink Condition of Edges Open Tissue Type Pink;Red Tissue Type Percent Red 100 Drainage Amount  Small Drainage Color Clear Wound Odor None Periwound Skin Condition Intact Cleansing and Cleansing Agents  Other (comment) Dressing Type Applied (rula, mepitel one, mepilex heel border) Procedure Tolerated Well Wound Ankle Medial;Right;Superior Date First Assessed/Time First Assessed: 07/16/18 1145   POA: Yes  Wound Type: Venous  Location: Ankle  Orientation: Medial;Right;Superior DRESSING STATUS Intact; Old drainage DRESSING TYPE Absorptive Non-Pressure Injury Full thickness (subcut/muscle) Wound Length (cm) 1 cm Wound Width (cm) 2 cm Wound Depth (cm) 0.1 Wound Surface area (cm^2) 2 cm^2 Change in Wound Size % -11.11 Condition of Base Pink Condition of Edges Open Tissue Type Pink Drainage Amount  Small Drainage Color Clear Wound Odor None Periwound Skin Condition Intact Cleansing and Cleansing Agents  Other (comment) (dermal wound cleanser) Dressing Type Applied Other (Comment) 
(rula, mepitel one,mepilex heel border) Procedure Tolerated Well Wound Sacral/coccyx Right Date First Assessed/Time First Assessed: 07/16/18 1147   POA: Yes  Wound Type: Pressure injury  Location: Sacral/coccyx  Orientation: Right DRESSING STATUS Breakthrough drainage DRESSING TYPE Absorptive Non-Pressure Injury Full thickness (subcut/muscle) Wound Length (cm) 1.5 cm Wound Width (cm) 1.2 cm Wound Depth (cm) 0.1 Wound Surface area (cm^2) 1.8 cm^2 Change in Wound Size % -87.5 Condition of Base Pink Condition of Edges Open Tissue Type Pink Drainage Amount  Small Drainage Color Clear Wound Odor None Periwound Skin Condition Intact Cleansing and Cleansing Agents  Other (comment) (dermal wound cleanser) Dressing Type Applied Other (Comment) (mesalt, mepitel one,sacrun border) Procedure Tolerated Well

## 2018-10-03 ENCOUNTER — HOSPITAL ENCOUNTER (OUTPATIENT)
Dept: WOUND CARE | Age: 73
Discharge: HOME | End: 2018-10-03
Payer: MEDICARE

## 2018-10-03 PROCEDURE — 99211 OFF/OP EST MAY X REQ PHY/QHP: CPT

## 2018-10-09 ENCOUNTER — HOSPITAL ENCOUNTER (OUTPATIENT)
Dept: WOUND CARE | Age: 73
Discharge: HOME | End: 2018-10-09
Payer: MEDICARE

## 2018-10-09 VITALS
OXYGEN SATURATION: 98 % | DIASTOLIC BLOOD PRESSURE: 49 MMHG | TEMPERATURE: 98.1 F | SYSTOLIC BLOOD PRESSURE: 115 MMHG | HEART RATE: 96 BPM | RESPIRATION RATE: 16 BRPM

## 2018-10-09 PROBLEM — L89.152 PRESSURE ULCER OF SACRAL REGION, STAGE 2 (HCC): Status: ACTIVE | Noted: 2018-10-09

## 2018-10-09 PROCEDURE — 99211 OFF/OP EST MAY X REQ PHY/QHP: CPT

## 2018-10-09 NOTE — WOUND CARE
[facility] Chief Complaint: 
Jocelyn Manjarrez Sr. is a 74 y.o.  male who presents with left buttock pressure ulcer stage 2 Present since July 2018. HPI:    
He first noticed this in July 2018. He has complex medical history with hospitalizations. He has PAD, DM, Wound caused by: chronic pressure/irritation due to Current wound care: 
Offloading wound: yes Appetite: fair Wound associated pain: none Diabetic: Yes Smoker: former smoker, quit 1997. Past Medical History:  
Diagnosis Date  Acid reflux  Anemia  Arthritis  Atrial fibrillation ([address]  CAD (coronary artery disease)  Chronic obstructive pulmonary disease ([address]  Coagulation disorder (HCC)   
 anemia  Diabetes ([facility] 1997  GERD (gastroesophageal reflux disease)  H/O seasonal allergies  Hypercholesterolemia  Hypertension 1997  Ill-defined condition   
 hand tremors  PAF (paroxysmal atrial fibrillation) ([address] 11/5/2017  Peripheral arterial disease ([facility]  Sleep apnea   
 pt states he has not used bipap \"in years\".  Thyroid disease Past Surgical History:  
Procedure Laterality Date [address] [address], Dr [id number]-Y  
 ABDOMEN SURGERY PROC UNLISTED  2000  
 abdominal hernia repair/mesh  HX APPENDECTOMY  HX CERVICAL LAMINECTOMY  HX COLONOSCOPY    
 HX HEENT  2004  
 cataracts removed right eye  HX ORTHOPAEDIC  1982  
 cervical laminectomy  HX ORTHOPAEDIC Right 1962  
 knee torn cartilage  HX TONSILLECTOMY  HX UROLOGICAL  12/2015  
 suprapubic catheter in place  VASCULAR SURGERY PROCEDURE UNLIST Bilateral 2015 2017  
 angio plasty legs  x2  VASCULAR SURGERY PROCEDURE UNLIST Bilateral 2017  
 legs venogram  stent right leg Family History Problem Relation Age of Onset  Cancer Mother  Diabetes Father  Heart Disease Sister  Cancer Brother  Diabetes Brother  Hypertension Brother Social History Substance Use Topics  Smoking status: Former Smoker Types: Cigarettes Quit date: 1/1/1997  Smokeless tobacco: Never Used  Alcohol use 0.6 oz/week 1 Glasses of wine per week Prior to Admission medications Medication Sig Start Date End Date Taking? Authorizing Provider  
oxyCODONE-acetaminophen (PERCOCET) 5-325 mg per tablet Take 1-2 Tabs by mouth every six (6) hours as needed for Pain. Max Daily Amount: 8 Tabs. 7/16/18   Humphrey Dsouza NP  
insulin glargine (LANTUS) 100 unit/mL injection 30 Units by SubCUTAneous route nightly. 6/20/18   Antoine Griffin MD  
albuterol sulfate 90 mcg/actuation aepb Take 2 Puffs by inhalation every four (4) hours as needed. Indications: Chronic Obstructive Pulmonary Disease    Historical Provider  
cholecalciferol (VITAMIN D3) 1,000 unit cap Take 5,000 Units by mouth daily. Tera Akers MD  
levothyroxine (SYNTHROID) 100 mcg tablet Take 100 mcg by mouth Daily (before breakfast). Historical Provider  
cilostazol (PLETAL) 100 mg tablet Take 100 mg by mouth Before breakfast and dinner. Historical Provider  
pregabalin (LYRICA) 50 mg capsule Take 75 mg by mouth two (2) times a day. Historical Provider Omeprazole delayed release (PRILOSEC D/R) 20 mg tablet Take 20 mg by mouth daily. Historical Provider  
potassium citrate (UROCIT-K10) 10 mEq (1,080 mg) TbER Take 10 mEq by mouth two (2) times a day. Historical Provider  
multivitamin, tx-iron-ca-min (THERA-M W/ IRON) 9 mg iron-400 mcg tab tablet Take 1 Tab by mouth daily. Historical Provider  
iron aspgly,pi-M-V25-FA-Ca-suc (FERREX 150 FORTE PLUS) 473-79-70-2 mg-mg-mcg-mg cap cap Take 1 Cap by mouth two (2) times a day. Indications: anemia    Tera Akers MD  
insulin aspart (NOVOLOG FLEXPEN) 100 unit/mL inpn 5 Units by SubCUTAneous route daily. Sliding scale  Plus the above dose    Historical Provider metFORMIN (GLUCOPHAGE) 1,000 mg tablet Take 1,000 mg by mouth two (2) times daily (with meals). Historical Provider  
atorvastatin (LIPITOR) 20 mg tablet Take  by mouth nightly. Historical Provider  
lisinopril (PRINIVIL, ZESTRIL) 2.5 mg tablet Take  by mouth daily. Historical Provider  
clopidogrel (PLAVIX) 75 mg tab Take 75 mg by mouth daily. Indications: circulation problem    Historical Provider  
aspirin 81 mg tablet Take 81 mg by mouth nightly. Historical Provider  
furosemide (LASIX) 40 mg tablet Take 40 mg by mouth daily. Indications: hypertension    Historical Provider  
diltiazem (TIAZAC) 360 mg SR capsule Take 360 mg by mouth daily. Historical Provider  
tiotropium (SPIRIVA WITH HANDIHALER) 18 mcg inhalation capsule Take 1 Cap by inhalation daily. Historical Provider  
loratadine (CLARITIN) 10 mg tablet Take 10 mg by mouth daily. Historical Provider  
fluticasone-salmeterol (ADVAIR DISKUS) 250-50 mcg/dose diskus inhaler Take 2 Puffs by inhalation every twelve (12) hours. Historical Provider Allergies Allergen Reactions  Latex Contact Dermatitis and Rash Pt states he is NOT allergic to latex.  Neosporin [Neomycin-Bacitracin-Polymyxin] Rash Review of Systems Gen: No fever, chills, malaise, weight loss/gain. Heent: No headache, rhinorrhea, epistaxis, ear pain, hearing loss, sinus pain, neck pain/stiffness, sore throat. Heart: No chest pain, palpitations, ISABEL, pnd, or orthopnea. Resp: No cough, hemoptysis, wheezing and shortness of breath. GI: No nausea, vomiting, diarrhea, constipation, melena or hematochezia. : No urinary obstruction, dysuria or hematuria. Derm: see above Musc/skeletal: no bone or joint complains. Vasc: No edema, cyanosis or claudication. Endo: No heat/cold intolerance, no polyuria,polydipsia or polyphagia. Neuro: No unilateral weakness, numbness, tingling. No seizures. Heme: No easy bruising or bleeding. Objective:  
 
Physical Exam:  
 
Visit Vitals  /49 (BP 1 Location: Left arm, BP Patient Position: Sitting)  Pulse 96  Temp 98.1 °F (36.7 °C)  Resp 16  SpO2 98% General: well developed, well nourished, pleasant , NAD. Hygiene good Psych: cooperative. Pleasant. No anxiety or depression. Normal mood and affect. Neuro: alert and oriented to person/place/situation. Otherwise nonfocal. 
Derm: Skin turgor for age, dry skin HEENT: Normocephalic, atraumatic. EOMI. Conjunctiva clear. No scleral icterus. Neck: Normal range of motion. No masses. Chest: Good air entry bilaterally. Respirations nonlabored Cardio[de-identified] Normal heart sounds,no rubs, murmurs or gallops Abdomen: Soft, nontender, nondistended, normoactive bowel sounds Lower extremities: color normal; temperature normal. Calves are supple, nontender. Capillary refill <3 sec Pressure ulcer left butock stage 2 Wound Description: Wound Length: 1.5 cm Wound Width :1.2 cm Wound Depth :0.1 Etiology: Pressure Ulcer bed: Fibrotic slough Periwound: Normal 
Exudate: Scant/small amount Bloody exudate Data Review: No results found for this or any previous visit (from the past 24 hour(s)). Assessment:  
 
Patient Active Problem List  
Diagnosis Code  PAD (peripheral artery disease) ([facility]) I73.9  Venous reflux I87.2  Leg ulcer, left ([address] L97.929  Leg ulcer ([address] L97.909  Diabetic ulcer of right midfoot associated with type 2 diabetes mellitus, with fat layer exposed ([address] E11.621, L97.412  
 CAD (coronary artery disease) I25.10  COPD (chronic obstructive pulmonary disease) ([facility]) J44.9  
 HTN (hypertension) I10  
 UTI (urinary tract infection) due to urinary indwelling Sher catheter ([facility]) T83.511A, N39.0  
 PAF (paroxysmal atrial fibrillation) ([facility]) I48.0  Diabetic ulcer of both lower extremities ([address] E11.622, L97.919, L97.929  
 Severe protein-calorie malnutrition ([address] E43  
  Ulcer of right leg, limited to breakdown of skin ([facility] L97.911  Type 2 diabetes mellitus with diabetic neuropathy (HCC) E11.40  Chronic anticoagulation Z79.01  
 DNR no code (do not resuscitate) Z66  Weakness R53.1  Sacral decubitus ulcer, stage III ([facility] B88.104  Pressure ulcer of sacral region, stage 2 L89.152  
 
74 y.o. male with pressure ulcer of left buttock. Needs : 
 
Good local wound care Keep pressure off Nutrition optimization Good Diabetic control Plan: In wound care clinic today: 
Cleanse wound with NS or soap and water or commercial wound cleanser Apply the following topically to wound bed: rula Apply the following to gavino-wound: NA Apply the following dressings: Absorptive dressing For Home Care/Self Care: 
Cleanse wound with NS or soap and water or commercial wound cleanser Keep dressing dry and intact when bathing Apply the following to wound bed: rula Apply the following to skin around wound: NA Apply the following dressings: Absorptive dressing Patient to return for wound care in: 14  Days PLEASE CONTACT OFFICE AS SOON AS POSSIBLE IF UNABLE TO MAKE THIS APPOINTMENT. Inspect your wounds, looking for signs of infection which may include the following:  Increase in redness  Red \"streaks\" from wound  Increase in swelling  Fever  Unusual odor  Change in the amount of wound drainage. Should you experience any significant changes in your wound(s) or have any questions regarding your home care instructions please contact the wound center or your home health company. If after regular business hours, please call your family doctor or local emergency room. Edema Control:   Elevate legs as much as possible. Avoid standing in one position for more than 10 minutes. Avoid setting with legs down. Do not cross legs while sitting. Off-Loading:     Frequent position changes.  Do not cross legs while sitting. Shift weight every 20 minutes or more when sitting for prolonged periods of time. Signed By: Dimitri Marks MD   
 October 9, 2018

## 2018-10-09 NOTE — WOUND CARE
10/09/18 1330 Wound Sacral/coccyx Right Date First Assessed/Time First Assessed: 07/16/18 1147   POA: Yes  Wound Type: Pressure injury  Location: Sacral/coccyx  Orientation: Right DRESSING STATUS Clean, dry, and intact DRESSING TYPE Foam  
Non-Pressure Injury Partial thickness (epider/derm) Wound Length (cm) 1.5 cm Wound Width (cm) 1.2 cm Wound Depth (cm) 0.1 Wound Surface area (cm^2) 1.8 cm^2 Change in Wound Size % -87.5 Condition of [facility]; White Condition of Edges Open Drainage Amount  Small Drainage Color Serous Wound Odor None Periwound Skin Condition Intact Cleansing and Cleansing Agents  Dermal wound cleanser Dressing Type Applied (rula,mepilex border) Procedure Tolerated Well

## 2018-10-09 NOTE — WOUND CARE
10/09/18 1330 Wound Sacral/coccyx Right Date First Assessed/Time First Assessed: 07/16/18 1147   POA: Yes  Wound Type: Pressure injury  Location: Sacral/coccyx  Orientation: Right DRESSING STATUS Clean, dry, and intact DRESSING TYPE Foam  
Non-Pressure Injury Partial thickness (epider/derm) Wound Length (cm) 1.5 cm Wound Width (cm) 1.2 cm Wound Depth (cm) 0.1 Wound Surface area (cm^2) 1.8 cm^2 Change in Wound Size % -87.5 Condition of [facility]; White Condition of Edges Open Drainage Amount  Small Drainage Color Serous Wound Odor None Periwound Skin Condition Intact Cleansing and Cleansing Agents  Dermal wound cleanser Dressing Type Applied (rula,mepilex border) Procedure Tolerated Well

## 2018-10-17 ENCOUNTER — HOSPITAL ENCOUNTER (OUTPATIENT)
Dept: WOUND CARE | Age: 73
Discharge: HOME | End: 2018-10-17
Payer: MEDICARE

## 2018-10-17 PROCEDURE — 99211 OFF/OP EST MAY X REQ PHY/QHP: CPT

## 2018-10-17 NOTE — WOUND CARE
10/17/18 1508 Wound Ankle Right;Medial  
Date First Assessed/Time First Assessed: 08/21/18 1033   POA: Yes  Wound Type: Vascular  Location: Ankle  Orientation: Right;Medial  
DRESSING STATUS Clean, dry, and intact DRESSING TYPE Other (Comment) 
(tubigrip, kerlix, abd, mesalt) Non-Pressure Injury Full thickness (subcut/muscle) Wound Length (cm) 1.5 cm Wound Width (cm) 2 cm Wound Depth (cm) 0.1 Wound Surface area (cm^2) 3 cm^2 Change in Wound Size % -1100 Condition of Base Granulation Condition of Edges Open Tissue Type Pink;Red Tissue Type Percent Red 100 Drainage Amount  Moderate Drainage Color Serosanguinous Wound Odor None Periwound Skin Condition Intact Cleansing and Cleansing Agents  Other (comment) (foam) Dressing Type Applied Other (Comment) 
(tubigrip, kaley, exudry, mesalt) Procedure Tolerated Well Wound Sacral/coccyx Right Date First Assessed/Time First Assessed: 07/16/18 1147   POA: Yes  Wound Type: Pressure injury  Location: Sacral/coccyx  Orientation: Right DRESSING STATUS Clean, dry, and intact 
(refused assessment/dsg change) DRESSING TYPE Foam  
Non-Pressure Injury Full thickness (subcut/muscle) Pressure Injury Stage 3 Wound Length (cm) 1.5 cm Wound Width (cm) 1 cm Wound Depth (cm) 0.1 Wound Surface area (cm^2) 1.5 cm^2 Change in Wound Size % -56.25 Condition of Base Other (comment) (fibrin) Condition of Edges Open Tissue Type Other (comment) (fibrin) Tissue Type Percent Other (comment) 100 Drainage Amount  Moderate Drainage Color Serous Wound Odor None Periwound Skin Condition Erythema, blanchable Cleansing and Cleansing Agents  Dermal wound cleanser Dressing Type Applied Other (Comment) 
(rula, mepilex border) Procedure Tolerated Well

## 2018-10-17 NOTE — WOUND CARE
10/17/18 1508 Wound Ankle Right;Medial  
Date First Assessed/Time First Assessed: 08/21/18 1033   POA: Yes  Wound Type: Vascular  Location: Ankle  Orientation: Right;Medial  
DRESSING STATUS Clean, dry, and intact DRESSING TYPE Other (Comment) 
(tubigrip, kerlix, abd, mesalt) Non-Pressure Injury Full thickness (subcut/muscle) Wound Length (cm) 1.5 cm Wound Width (cm) 2 cm Wound Depth (cm) 0.1 Wound Surface area (cm^2) 3 cm^2 Change in Wound Size % -1100 Condition of Base Granulation Condition of Edges Open Tissue Type Pink;Red Tissue Type Percent Red 100 Drainage Amount  Small Drainage Color Serosanguinous Wound Odor None Periwound Skin Condition Intact Cleansing and Cleansing Agents  Other (comment) (foam) Dressing Type Applied Other (Comment) 
(tubigrip, kaley, exudry, mesalt) Procedure Tolerated Well Wound Sacral/coccyx Right Date First Assessed/Time First Assessed: 07/16/18 1147   POA: Yes  Wound Type: Pressure injury  Location: Sacral/coccyx  Orientation: Right DRESSING STATUS Other (comment) (refused assessment/dsg change)

## 2018-10-23 ENCOUNTER — APPOINTMENT (OUTPATIENT)
Dept: WOUND CARE | Age: 73
End: 2018-10-23
Payer: MEDICARE

## 2018-10-31 ENCOUNTER — APPOINTMENT (OUTPATIENT)
Dept: WOUND CARE | Age: 73
End: 2018-10-31
Payer: MEDICARE

## 2018-11-07 ENCOUNTER — HOSPITAL ENCOUNTER (OUTPATIENT)
Dept: WOUND CARE | Age: 73
Discharge: HOME | End: 2018-11-07
Payer: MEDICARE

## 2018-11-07 VITALS
DIASTOLIC BLOOD PRESSURE: 46 MMHG | OXYGEN SATURATION: 96 % | SYSTOLIC BLOOD PRESSURE: 120 MMHG | TEMPERATURE: 98.6 F | HEART RATE: 80 BPM

## 2018-11-07 PROCEDURE — 99215 OFFICE O/P EST HI 40 MIN: CPT

## 2018-11-07 NOTE — PROGRESS NOTES
[address] [facility] Follow up note. Chief Complaint: 
Mariel Bustos Sr. is a 74 y.o.  male who presents for follow up of left buttock and sacral area pressure ulcer stage 2 Present since July 2018. He first noticed this in July 2018. He has complex medical history with hospitalizations. He has PAD, DM. Review of systems General: No fevers or chills. Cardiovascular: No chest pain or pressure. No palpitations. Pulmonary: No shortness of breath. Gastrointestinal: No nausea, vomiting. Derm: See above Physical Exam:  
 
Visit Vitals /46 (BP 1 Location: Left arm, BP Patient Position: At rest;Sitting) Pulse 80 Temp 98.6 °F (37 °C) SpO2 96% General: well developed, well nourished, pleasant , NAD. Hygiene good Psych: cooperative. Pleasant. No anxiety or depression. Normal mood and affect. Neuro: alert and oriented to person/place/situation. Otherwise nonfocal. 
Derm: Skin turgor normal for age, dry skin HEENT: Normocephalic, atraumatic. EOMI. Conjunctiva clear. No scleral icterus. Chest: Good air entry bilaterally. Respirations non labored Cardio[de-identified] Normal heart sounds,no rubs, murmurs or gallops Abdomen: Soft, nontender, nondistended, normoactive bowel sounds Lower extremities: color normal; temperature normal. Calves are supple, nontender. Capillary refill <3 sec Pressure ulcer left buttock and sacral area healed Venous ulcer right leg Pressure ulcer left hip stage 2 Wound Description: Wound Length: 1.5 cm, 2 cm Wound Width :2 cm, 1.5 cm Wound Depth :0.1, 0.1 Etiology: pressure Ulcer bed: dry Periwound: Normal 
Exudate: None: wound tissue dry Data Review: No results found for this or any previous visit (from the past 24 hour(s)). Assessment:  
 
Patient Active Problem List  
Diagnosis Code  PAD (peripheral artery disease) ([facility]) I73.9  Venous reflux I87.2  Leg ulcer, left ([address] L97.929  Leg ulcer ([facility] L97.901  Diabetic ulcer of right midfoot associated with type 2 diabetes mellitus, with fat layer exposed ([address] E11.621, L97.412  
 CAD (coronary artery disease) I25.10  COPD (chronic obstructive pulmonary disease) ([facility]) J44.9  
 HTN (hypertension) I10  
 UTI (urinary tract infection) due to urinary indwelling Sher catheter ([facility]) T83.511A, N39.0  
 PAF (paroxysmal atrial fibrillation) ([facility]) I48.0  Diabetic ulcer of both lower extremities ([address] E11.622, L97.919, L97.929  
 Severe protein-calorie malnutrition ([address] E43  
 Ulcer of right leg, limited to breakdown of skin ([address] L97.911  Type 2 diabetes mellitus with diabetic neuropathy ([facility]) E11.40  Chronic anticoagulation Z79.01  
 DNR no code (do not resuscitate) Z66  Weakness R53.1  Sacral decubitus ulcer, stage III ([address] Y16.054  Pressure ulcer of sacral region, stage 2 L89.152  
 
74 y.o. male with multiple wounds Needs : 
Keep pressure off  
Good local wound care Edema management Nutrition optimization Good Diabetic control Plan:  
Follow up as needed or if there is open wound. Follow up with vascular for right leg wound In wound care clinic today: 
Cleanse wound with NS or soap and water or commercial wound cleanser Apply the following topically to wound bed: rula Apply the following to gavino-wound: NA Apply the following dressings: Absorptive dressing Off load For Home Care/Self Care: 
Cleanse wound with NS or soap and water or commercial wound cleanser Keep dressing dry and intact when bathing Apply the following to wound bed: rula Apply the following to skin around wound: NA Apply the following dressings: Absorptive dressing Off load PLEASE CONTACT OFFICE AS SOON AS POSSIBLE IF UNABLE TO MAKE THIS APPOINTMENT.  Inspect your wounds, looking for signs of infection which may include the following:  Increase in redness  Red \"streaks\" from wound  Increase in swelling  Fever  Unusual odor  Change in the amount of wound drainage. Should you experience any significant changes in your wound(s) or have any questions regarding your home care instructions please contact the wound center or your home health company. If after regular business hours, please call your family doctor or local emergency room. Off-Loading:     Frequent position changes. Do not cross legs while sitting. Shift weight every 20 minutes or more when sitting for prolonged periods of time. Signed By: Diane Mroin MD   
 November 7, 2018

## 2018-12-12 RX ORDER — SODIUM CHLORIDE 9 MG/ML
25 INJECTION, SOLUTION INTRAVENOUS CONTINUOUS
Status: CANCELLED | OUTPATIENT
Start: 2019-01-04

## 2018-12-12 RX ORDER — NALOXONE HYDROCHLORIDE 0.4 MG/ML
0.2 INJECTION, SOLUTION INTRAMUSCULAR; INTRAVENOUS; SUBCUTANEOUS AS NEEDED
Status: CANCELLED | OUTPATIENT
Start: 2019-01-04

## 2018-12-12 RX ORDER — FENTANYL CITRATE 50 UG/ML
25-200 INJECTION, SOLUTION INTRAMUSCULAR; INTRAVENOUS
Status: CANCELLED | OUTPATIENT
Start: 2019-01-04

## 2018-12-12 RX ORDER — HEPARIN SODIUM 1000 [USP'U]/ML
10000 INJECTION, SOLUTION INTRAVENOUS; SUBCUTANEOUS
Status: CANCELLED | OUTPATIENT
Start: 2019-01-04

## 2018-12-12 RX ORDER — FLUMAZENIL 0.1 MG/ML
0.2 INJECTION INTRAVENOUS
Status: CANCELLED | OUTPATIENT
Start: 2019-01-04

## 2018-12-12 RX ORDER — MIDAZOLAM HYDROCHLORIDE 1 MG/ML
.5-4 INJECTION, SOLUTION INTRAMUSCULAR; INTRAVENOUS
Status: CANCELLED | OUTPATIENT
Start: 2019-01-04

## 2018-12-17 RX ORDER — FLUTICASONE PROPIONATE 50 MCG
2 SPRAY, SUSPENSION (ML) NASAL DAILY
COMMUNITY

## 2018-12-17 RX ORDER — ACETAMINOPHEN 500 MG
1000 TABLET ORAL
COMMUNITY

## 2018-12-17 RX ORDER — ASCORBIC ACID 500 MG
1000 TABLET ORAL DAILY
COMMUNITY

## 2018-12-17 RX ORDER — VITAMIN E 268 MG
CAPSULE ORAL DAILY
COMMUNITY

## 2018-12-17 NOTE — PROGRESS NOTES
PT aware of NPO status. PT aware of need to hold anticoagulants per protocol. Was instructed to continue with asa and plavix. PT aware of potential for sedation administration and need for  at discharge. PT aware of arrival time pre procedure. Patient states he will arrive at 0630   Pt states no questions at this time. Patient has phone number should questions arise.

## 2018-12-20 ENCOUNTER — HOSPITAL ENCOUNTER (OUTPATIENT)
Dept: MRI IMAGING | Age: 73
Discharge: HOME | End: 2018-12-20
Payer: MEDICARE

## 2018-12-20 DIAGNOSIS — S83.512A SPRAIN OF ANTERIOR CRUCIATE LIGAMENT OF LEFT KNEE: ICD-10-CM

## 2018-12-20 PROCEDURE — 73721 MRI JNT OF LWR EXTRE W/O DYE: CPT

## 2018-12-31 ENCOUNTER — HOSPITAL ENCOUNTER (OUTPATIENT)
Dept: LAB | Age: 73
Discharge: HOME | End: 2018-12-31
Payer: MEDICARE

## 2018-12-31 LAB
ANION GAP SERPL CALC-SCNC: 7 MMOL/L (ref 3–18)
APTT PPP: 35 SEC (ref 23–36.4)
BASOPHILS # BLD: 0 K/UL (ref 0–0.1)
BASOPHILS NFR BLD: 1 % (ref 0–2)
BUN SERPL-MCNC: 16 MG/DL (ref 7–18)
BUN/CREAT SERPL: 20
CALCIUM SERPL-MCNC: 8.7 MG/DL (ref 8.5–10.1)
CHLORIDE SERPL-SCNC: 97 MMOL/L (ref 100–108)
CO2 SERPL-SCNC: 30 MMOL/L (ref 21–32)
CREAT SERPL-MCNC: 0.81 MG/DL (ref 0.6–1.3)
DIFFERENTIAL METHOD BLD: ABNORMAL
EOSINOPHIL # BLD: 0.6 K/UL (ref 0–0.4)
EOSINOPHIL NFR BLD: 19 % (ref 0–5)
ERYTHROCYTE [DISTWIDTH] IN BLOOD BY AUTOMATED COUNT: 14.5 % (ref 11.6–14.5)
GLUCOSE SERPL-MCNC: 486 MG/DL (ref 74–99)
HCT VFR BLD AUTO: 37.2 % (ref 36–48)
HGB BLD-MCNC: 12.8 G/DL (ref 13–16)
INR PPP: 1 (ref 0.8–1.2)
LYMPHOCYTES # BLD: 0.8 K/UL (ref 0.9–3.6)
LYMPHOCYTES NFR BLD: 26 % (ref 21–52)
MCH RBC QN AUTO: 32.3 PG (ref 24–34)
MCHC RBC AUTO-ENTMCNC: 34.4 G/DL (ref 31–37)
MCV RBC AUTO: 93.9 FL (ref 74–97)
MONOCYTES # BLD: 0.3 K/UL (ref 0.05–1.2)
MONOCYTES NFR BLD: 11 % (ref 3–10)
NEUTS SEG # BLD: 1.3 K/UL (ref 1.8–8)
NEUTS SEG NFR BLD: 43 % (ref 40–73)
PLATELET # BLD AUTO: 102 K/UL (ref 135–420)
PMV BLD AUTO: 11.6 FL (ref 9.2–11.8)
POTASSIUM SERPL-SCNC: 4 MMOL/L (ref 3.5–5.5)
PROTHROMBIN TIME: 13.3 SEC (ref 11.5–15.2)
RBC # BLD AUTO: 3.96 M/UL (ref 4.7–5.5)
SODIUM SERPL-SCNC: 134 MMOL/L (ref 136–145)
WBC # BLD AUTO: 2.9 K/UL (ref 4.6–13.2)

## 2018-12-31 PROCEDURE — 36415 COLL VENOUS BLD VENIPUNCTURE: CPT

## 2018-12-31 PROCEDURE — 85730 THROMBOPLASTIN TIME PARTIAL: CPT

## 2018-12-31 PROCEDURE — 85025 COMPLETE CBC W/AUTO DIFF WBC: CPT

## 2018-12-31 PROCEDURE — 85610 PROTHROMBIN TIME: CPT

## 2018-12-31 PROCEDURE — 80048 BASIC METABOLIC PNL TOTAL CA: CPT

## 2019-01-04 ENCOUNTER — HOSPITAL ENCOUNTER (OUTPATIENT)
Dept: INTERVENTIONAL RADIOLOGY/VASCULAR | Age: 74
Discharge: HOME | End: 2019-01-04
Attending: SURGERY

## 2019-01-14 ENCOUNTER — HOSPITAL ENCOUNTER (OUTPATIENT)
Dept: LAB | Age: 74
Discharge: HOME | End: 2019-01-14

## 2019-01-14 LAB
ALBUMIN SERPL-MCNC: 3 G/DL (ref 3.4–5)
ALBUMIN/GLOB SERPL: 0.8 {RATIO} (ref 0.8–1.7)
ALP SERPL-CCNC: 73 U/L (ref 45–117)
ALT SERPL-CCNC: 34 U/L (ref 16–61)
ANION GAP SERPL CALC-SCNC: 7 MMOL/L (ref 3–18)
AST SERPL-CCNC: 25 U/L (ref 15–37)
BILIRUB SERPL-MCNC: 0.4 MG/DL (ref 0.2–1)
BUN SERPL-MCNC: 9 MG/DL (ref 7–18)
BUN/CREAT SERPL: 19 (ref 12–20)
CALCIUM SERPL-MCNC: 8.7 MG/DL (ref 8.5–10.1)
CHLORIDE SERPL-SCNC: 102 MMOL/L (ref 100–108)
CO2 SERPL-SCNC: 27 MMOL/L (ref 21–32)
CREAT SERPL-MCNC: 0.47 MG/DL (ref 0.6–1.3)
ERYTHROCYTE [DISTWIDTH] IN BLOOD BY AUTOMATED COUNT: 14.8 % (ref 11.6–14.5)
GLOBULIN SER CALC-MCNC: 3.6 G/DL (ref 2–4)
GLUCOSE SERPL-MCNC: 289 MG/DL (ref 74–99)
HCT VFR BLD AUTO: 37.9 % (ref 36–48)
HGB BLD-MCNC: 12.5 G/DL (ref 13–16)
MCH RBC QN AUTO: 30.6 PG (ref 24–34)
MCHC RBC AUTO-ENTMCNC: 33 G/DL (ref 31–37)
MCV RBC AUTO: 92.7 FL (ref 74–97)
PLATELET # BLD AUTO: 82 K/UL (ref 135–420)
PMV BLD AUTO: 11.1 FL (ref 9.2–11.8)
POTASSIUM SERPL-SCNC: 4.9 MMOL/L (ref 3.5–5.5)
PROT SERPL-MCNC: 6.6 G/DL (ref 6.4–8.2)
RBC # BLD AUTO: 4.09 M/UL (ref 4.7–5.5)
SODIUM SERPL-SCNC: 136 MMOL/L (ref 136–145)
WBC # BLD AUTO: 4.5 K/UL (ref 4.6–13.2)

## 2019-01-14 PROCEDURE — 85027 COMPLETE CBC AUTOMATED: CPT

## 2019-01-14 PROCEDURE — 80053 COMPREHEN METABOLIC PANEL: CPT

## 2019-01-22 ENCOUNTER — HOSPITAL ENCOUNTER (OUTPATIENT)
Dept: LAB | Age: 74
Discharge: HOME | End: 2019-01-22
Payer: MEDICARE

## 2019-01-22 LAB
ANION GAP SERPL CALC-SCNC: 3 MMOL/L (ref 3–18)
BASOPHILS # BLD: 0.1 K/UL (ref 0–0.1)
BASOPHILS NFR BLD: 2 % (ref 0–2)
BUN SERPL-MCNC: 18 MG/DL (ref 7–18)
BUN/CREAT SERPL: 34 (ref 12–20)
CALCIUM SERPL-MCNC: 8.8 MG/DL (ref 8.5–10.1)
CHLORIDE SERPL-SCNC: 101 MMOL/L (ref 100–108)
CO2 SERPL-SCNC: 31 MMOL/L (ref 21–32)
CREAT SERPL-MCNC: 0.53 MG/DL (ref 0.6–1.3)
DIFFERENTIAL METHOD BLD: ABNORMAL
EOSINOPHIL # BLD: 0.6 K/UL (ref 0–0.4)
EOSINOPHIL NFR BLD: 16 % (ref 0–5)
ERYTHROCYTE [DISTWIDTH] IN BLOOD BY AUTOMATED COUNT: 14.7 % (ref 11.6–14.5)
GLUCOSE SERPL-MCNC: 289 MG/DL (ref 74–99)
HCT VFR BLD AUTO: 38.4 % (ref 36–48)
HGB BLD-MCNC: 12.6 G/DL (ref 13–16)
LYMPHOCYTES # BLD: 0.8 K/UL (ref 0.9–3.6)
LYMPHOCYTES NFR BLD: 21 % (ref 21–52)
MCH RBC QN AUTO: 30.6 PG (ref 24–34)
MCHC RBC AUTO-ENTMCNC: 32.8 G/DL (ref 31–37)
MCV RBC AUTO: 93.2 FL (ref 74–97)
MONOCYTES # BLD: 0.5 K/UL (ref 0.05–1.2)
MONOCYTES NFR BLD: 13 % (ref 3–10)
NEUTS SEG # BLD: 1.9 K/UL (ref 1.8–8)
NEUTS SEG NFR BLD: 48 % (ref 40–73)
PLATELET # BLD AUTO: 135 K/UL (ref 135–420)
PMV BLD AUTO: 10.7 FL (ref 9.2–11.8)
POTASSIUM SERPL-SCNC: 4.8 MMOL/L (ref 3.5–5.5)
RBC # BLD AUTO: 4.12 M/UL (ref 4.7–5.5)
SODIUM SERPL-SCNC: 135 MMOL/L (ref 136–145)
WBC # BLD AUTO: 3.8 K/UL (ref 4.6–13.2)

## 2019-01-22 PROCEDURE — 80048 BASIC METABOLIC PNL TOTAL CA: CPT

## 2019-01-22 PROCEDURE — 85025 COMPLETE CBC W/AUTO DIFF WBC: CPT

## 2019-01-29 ENCOUNTER — HOSPITAL ENCOUNTER (OUTPATIENT)
Dept: LAB | Age: 74
Discharge: HOME | End: 2019-01-29
Payer: MEDICARE

## 2019-01-29 LAB
ANION GAP SERPL CALC-SCNC: 4 MMOL/L (ref 3–18)
BASOPHILS # BLD: 0 K/UL (ref 0–0.1)
BASOPHILS NFR BLD: 1 % (ref 0–2)
BUN SERPL-MCNC: 19 MG/DL (ref 7–18)
BUN/CREAT SERPL: 51 (ref 12–20)
CALCIUM SERPL-MCNC: 8.8 MG/DL (ref 8.5–10.1)
CHLORIDE SERPL-SCNC: 101 MMOL/L (ref 100–108)
CO2 SERPL-SCNC: 30 MMOL/L (ref 21–32)
CREAT SERPL-MCNC: 0.37 MG/DL (ref 0.6–1.3)
DIFFERENTIAL METHOD BLD: ABNORMAL
EOSINOPHIL # BLD: 0.5 K/UL (ref 0–0.4)
EOSINOPHIL NFR BLD: 11 % (ref 0–5)
ERYTHROCYTE [DISTWIDTH] IN BLOOD BY AUTOMATED COUNT: 14.8 % (ref 11.6–14.5)
GLUCOSE SERPL-MCNC: 123 MG/DL (ref 74–99)
HCT VFR BLD AUTO: 37.8 % (ref 36–48)
HGB BLD-MCNC: 12.3 G/DL (ref 13–16)
LYMPHOCYTES # BLD: 0.8 K/UL (ref 0.9–3.6)
LYMPHOCYTES NFR BLD: 17 % (ref 21–52)
MCH RBC QN AUTO: 30.4 PG (ref 24–34)
MCHC RBC AUTO-ENTMCNC: 32.5 G/DL (ref 31–37)
MCV RBC AUTO: 93.6 FL (ref 74–97)
MONOCYTES # BLD: 0.5 K/UL (ref 0.05–1.2)
MONOCYTES NFR BLD: 11 % (ref 3–10)
NEUTS SEG # BLD: 3 K/UL (ref 1.8–8)
NEUTS SEG NFR BLD: 60 % (ref 40–73)
PLATELET # BLD AUTO: 126 K/UL (ref 135–420)
PMV BLD AUTO: 10.6 FL (ref 9.2–11.8)
POTASSIUM SERPL-SCNC: 4.7 MMOL/L (ref 3.5–5.5)
RBC # BLD AUTO: 4.04 M/UL (ref 4.7–5.5)
SODIUM SERPL-SCNC: 135 MMOL/L (ref 136–145)
WBC # BLD AUTO: 4.9 K/UL (ref 4.6–13.2)

## 2019-01-29 PROCEDURE — 85025 COMPLETE CBC W/AUTO DIFF WBC: CPT

## 2019-01-29 PROCEDURE — 80048 BASIC METABOLIC PNL TOTAL CA: CPT

## 2019-03-26 RX ORDER — HEPARIN SODIUM 1000 [USP'U]/ML
10000 INJECTION, SOLUTION INTRAVENOUS; SUBCUTANEOUS
Status: CANCELLED | OUTPATIENT
Start: 2019-04-09

## 2019-03-26 RX ORDER — FENTANYL CITRATE 50 UG/ML
25-200 INJECTION, SOLUTION INTRAMUSCULAR; INTRAVENOUS
Status: CANCELLED | OUTPATIENT
Start: 2019-04-09

## 2019-03-26 RX ORDER — SODIUM CHLORIDE 9 MG/ML
25 INJECTION, SOLUTION INTRAVENOUS CONTINUOUS
Status: CANCELLED | OUTPATIENT
Start: 2019-04-09

## 2019-03-26 RX ORDER — MIDAZOLAM HYDROCHLORIDE 1 MG/ML
.5-4 INJECTION, SOLUTION INTRAMUSCULAR; INTRAVENOUS
Status: CANCELLED | OUTPATIENT
Start: 2019-04-09

## 2019-03-26 RX ORDER — FLUMAZENIL 0.1 MG/ML
0.2 INJECTION INTRAVENOUS
Status: CANCELLED | OUTPATIENT
Start: 2019-04-09

## 2019-03-26 RX ORDER — NALOXONE HYDROCHLORIDE 0.4 MG/ML
0.1 INJECTION, SOLUTION INTRAMUSCULAR; INTRAVENOUS; SUBCUTANEOUS AS NEEDED
Status: CANCELLED | OUTPATIENT
Start: 2019-04-09

## 2019-03-26 RX ORDER — CEFAZOLIN SODIUM 2 G/50ML
2 SOLUTION INTRAVENOUS ONCE
Status: CANCELLED | OUTPATIENT
Start: 2019-04-09 | End: 2019-04-09

## 2019-03-26 NOTE — PROGRESS NOTES
PT aware of NPO status. PT holding metformin as instructed. PT aware of potential for sedation administration and need for  at discharge. PT aware of arrival time pre procedure. Will arrive at 0630 a  Pt states no questions at this time.

## 2019-04-08 NOTE — PROGRESS NOTES
PT aware of NPO status. PT aware of need to hold anticoagulants per protocol. Told to hold plavix and ASA the night before and also hold metformin day before procedure per Dr Kelley Bosworth office. PT aware of potential for sedation administration and need for  at discharge. PT aware of arrival time pre procedure. will arrive at 0630 for 0800 procedure. Pt states no questions at this time.

## 2019-04-09 ENCOUNTER — HOSPITAL ENCOUNTER (OUTPATIENT)
Dept: INTERVENTIONAL RADIOLOGY/VASCULAR | Age: 74
Discharge: HOME | End: 2019-04-09
Attending: SURGERY

## 2019-04-23 ENCOUNTER — HOSPITAL ENCOUNTER (OUTPATIENT)
Dept: INTERVENTIONAL RADIOLOGY/VASCULAR | Age: 74
Discharge: HOME | End: 2019-04-23
Attending: SURGERY | Admitting: SURGERY
Payer: MEDICARE

## 2019-04-23 VITALS
SYSTOLIC BLOOD PRESSURE: 116 MMHG | HEIGHT: 74 IN | BODY MASS INDEX: 17.97 KG/M2 | DIASTOLIC BLOOD PRESSURE: 40 MMHG | TEMPERATURE: 97.9 F | OXYGEN SATURATION: 96 % | RESPIRATION RATE: 18 BRPM | WEIGHT: 140 LBS | HEART RATE: 90 BPM

## 2019-04-23 DIAGNOSIS — I73.9 PAD (PERIPHERAL ARTERY DISEASE) (HCC): ICD-10-CM

## 2019-04-23 LAB
ANION GAP SERPL CALC-SCNC: 7 MMOL/L (ref 3–18)
APTT PPP: 34.5 SEC (ref 23–36.4)
BASOPHILS # BLD: 0 K/UL (ref 0–0.1)
BASOPHILS NFR BLD: 0 % (ref 0–2)
BUN SERPL-MCNC: 14 MG/DL (ref 7–18)
BUN/CREAT SERPL: 33 (ref 12–20)
CALCIUM SERPL-MCNC: 8.6 MG/DL (ref 8.5–10.1)
CHLORIDE SERPL-SCNC: 98 MMOL/L (ref 100–108)
CO2 SERPL-SCNC: 30 MMOL/L (ref 21–32)
CREAT SERPL-MCNC: 0.42 MG/DL (ref 0.6–1.3)
DIFFERENTIAL METHOD BLD: ABNORMAL
EOSINOPHIL # BLD: 0.4 K/UL (ref 0–0.4)
EOSINOPHIL NFR BLD: 6 % (ref 0–5)
ERYTHROCYTE [DISTWIDTH] IN BLOOD BY AUTOMATED COUNT: 15.4 % (ref 11.6–14.5)
GLUCOSE SERPL-MCNC: 193 MG/DL (ref 74–99)
HCT VFR BLD AUTO: 37.9 % (ref 36–48)
HGB BLD-MCNC: 13.2 G/DL (ref 13–16)
INR PPP: 1.1 (ref 0.8–1.2)
LYMPHOCYTES # BLD: 0.5 K/UL (ref 0.9–3.6)
LYMPHOCYTES NFR BLD: 7 % (ref 21–52)
MCH RBC QN AUTO: 31.7 PG (ref 24–34)
MCHC RBC AUTO-ENTMCNC: 34.8 G/DL (ref 31–37)
MCV RBC AUTO: 91.1 FL (ref 74–97)
MONOCYTES # BLD: 0.8 K/UL (ref 0.05–1.2)
MONOCYTES NFR BLD: 13 % (ref 3–10)
NEUTS SEG # BLD: 4.7 K/UL (ref 1.8–8)
NEUTS SEG NFR BLD: 74 % (ref 40–73)
PLATELET # BLD AUTO: 121 K/UL (ref 135–420)
PMV BLD AUTO: 9.7 FL (ref 9.2–11.8)
POTASSIUM SERPL-SCNC: 3.6 MMOL/L (ref 3.5–5.5)
PROTHROMBIN TIME: 13.5 SEC (ref 11.5–15.2)
RBC # BLD AUTO: 4.16 M/UL (ref 4.7–5.5)
SODIUM SERPL-SCNC: 135 MMOL/L (ref 136–145)
WBC # BLD AUTO: 6.4 K/UL (ref 4.6–13.2)

## 2019-04-23 PROCEDURE — 85610 PROTHROMBIN TIME: CPT

## 2019-04-23 PROCEDURE — 37226 HC PLC STNT FEMPOP UNI +/-PTA: CPT

## 2019-04-23 PROCEDURE — 74011636320 HC RX REV CODE- 636/320: Performed by: SURGERY

## 2019-04-23 PROCEDURE — 99152 MOD SED SAME PHYS/QHP 5/>YRS: CPT

## 2019-04-23 PROCEDURE — 74011250636 HC RX REV CODE- 250/636: Performed by: SURGERY

## 2019-04-23 PROCEDURE — 74011250636 HC RX REV CODE- 250/636

## 2019-04-23 PROCEDURE — 99153 MOD SED SAME PHYS/QHP EA: CPT

## 2019-04-23 PROCEDURE — 85025 COMPLETE CBC W/AUTO DIFF WBC: CPT

## 2019-04-23 PROCEDURE — 85730 THROMBOPLASTIN TIME PARTIAL: CPT

## 2019-04-23 PROCEDURE — 80048 BASIC METABOLIC PNL TOTAL CA: CPT

## 2019-04-23 PROCEDURE — 75625 CONTRAST EXAM ABDOMINL AORTA: CPT

## 2019-04-23 PROCEDURE — 75716 ARTERY X-RAYS ARMS/LEGS: CPT

## 2019-04-23 RX ORDER — OXYCODONE AND ACETAMINOPHEN 5; 325 MG/1; MG/1
1 TABLET ORAL
Status: DISCONTINUED | OUTPATIENT
Start: 2019-04-23 | End: 2019-04-23 | Stop reason: HOSPADM

## 2019-04-23 RX ORDER — SODIUM CHLORIDE 0.9 % (FLUSH) 0.9 %
5-40 SYRINGE (ML) INJECTION AS NEEDED
Status: DISCONTINUED | OUTPATIENT
Start: 2019-04-23 | End: 2019-04-23 | Stop reason: HOSPADM

## 2019-04-23 RX ORDER — FENTANYL CITRATE 50 UG/ML
25-200 INJECTION, SOLUTION INTRAMUSCULAR; INTRAVENOUS
Status: DISCONTINUED | OUTPATIENT
Start: 2019-04-23 | End: 2019-04-23 | Stop reason: HOSPADM

## 2019-04-23 RX ORDER — HEPARIN SODIUM 1000 [USP'U]/ML
INJECTION, SOLUTION INTRAVENOUS; SUBCUTANEOUS
Status: COMPLETED
Start: 2019-04-23 | End: 2019-04-23

## 2019-04-23 RX ORDER — FLUMAZENIL 0.1 MG/ML
0.2 INJECTION INTRAVENOUS
Status: DISCONTINUED | OUTPATIENT
Start: 2019-04-23 | End: 2019-04-23 | Stop reason: HOSPADM

## 2019-04-23 RX ORDER — SODIUM CHLORIDE 9 MG/ML
25 INJECTION, SOLUTION INTRAVENOUS CONTINUOUS
Status: DISCONTINUED | OUTPATIENT
Start: 2019-04-23 | End: 2019-04-23 | Stop reason: HOSPADM

## 2019-04-23 RX ORDER — CEFAZOLIN SODIUM 2 G/50ML
2 SOLUTION INTRAVENOUS ONCE
Status: COMPLETED | OUTPATIENT
Start: 2019-04-23 | End: 2019-04-23

## 2019-04-23 RX ORDER — MIDAZOLAM HYDROCHLORIDE 1 MG/ML
.5-4 INJECTION, SOLUTION INTRAMUSCULAR; INTRAVENOUS
Status: DISCONTINUED | OUTPATIENT
Start: 2019-04-23 | End: 2019-04-23 | Stop reason: HOSPADM

## 2019-04-23 RX ORDER — HEPARIN SODIUM 1000 [USP'U]/ML
10000 INJECTION, SOLUTION INTRAVENOUS; SUBCUTANEOUS
Status: DISCONTINUED | OUTPATIENT
Start: 2019-04-23 | End: 2019-04-23 | Stop reason: HOSPADM

## 2019-04-23 RX ORDER — LIDOCAINE HYDROCHLORIDE 10 MG/ML
INJECTION INFILTRATION; PERINEURAL
Status: COMPLETED
Start: 2019-04-23 | End: 2019-04-23

## 2019-04-23 RX ORDER — HEPARIN SODIUM 200 [USP'U]/100ML
1000 INJECTION, SOLUTION INTRAVENOUS
Status: COMPLETED | OUTPATIENT
Start: 2019-04-23 | End: 2019-04-23

## 2019-04-23 RX ORDER — NALOXONE HYDROCHLORIDE 0.4 MG/ML
0.2 INJECTION, SOLUTION INTRAMUSCULAR; INTRAVENOUS; SUBCUTANEOUS AS NEEDED
Status: DISCONTINUED | OUTPATIENT
Start: 2019-04-23 | End: 2019-04-23 | Stop reason: HOSPADM

## 2019-04-23 RX ORDER — HEPARIN SODIUM 200 [USP'U]/100ML
INJECTION, SOLUTION INTRAVENOUS
Status: COMPLETED
Start: 2019-04-23 | End: 2019-04-23

## 2019-04-23 RX ORDER — FLUMAZENIL 0.1 MG/ML
INJECTION INTRAVENOUS
Status: DISCONTINUED
Start: 2019-04-23 | End: 2019-04-23 | Stop reason: WASHOUT

## 2019-04-23 RX ORDER — FENTANYL CITRATE 50 UG/ML
INJECTION, SOLUTION INTRAMUSCULAR; INTRAVENOUS
Status: COMPLETED
Start: 2019-04-23 | End: 2019-04-23

## 2019-04-23 RX ORDER — SODIUM CHLORIDE 0.9 % (FLUSH) 0.9 %
5-40 SYRINGE (ML) INJECTION EVERY 8 HOURS
Status: DISCONTINUED | OUTPATIENT
Start: 2019-04-23 | End: 2019-04-23 | Stop reason: HOSPADM

## 2019-04-23 RX ORDER — NALOXONE HYDROCHLORIDE 0.4 MG/ML
INJECTION, SOLUTION INTRAMUSCULAR; INTRAVENOUS; SUBCUTANEOUS
Status: DISCONTINUED
Start: 2019-04-23 | End: 2019-04-23 | Stop reason: WASHOUT

## 2019-04-23 RX ORDER — LIDOCAINE HYDROCHLORIDE 10 MG/ML
1-20 INJECTION INFILTRATION; PERINEURAL
Status: COMPLETED | OUTPATIENT
Start: 2019-04-23 | End: 2019-04-23

## 2019-04-23 RX ORDER — MIDAZOLAM HYDROCHLORIDE 1 MG/ML
INJECTION, SOLUTION INTRAMUSCULAR; INTRAVENOUS
Status: COMPLETED
Start: 2019-04-23 | End: 2019-04-23

## 2019-04-23 RX ADMIN — MIDAZOLAM HYDROCHLORIDE 0.5 MG: 1 INJECTION, SOLUTION INTRAMUSCULAR; INTRAVENOUS at 08:37

## 2019-04-23 RX ADMIN — LIDOCAINE HYDROCHLORIDE 5 ML: 10 INJECTION, SOLUTION INFILTRATION; PERINEURAL at 09:28

## 2019-04-23 RX ADMIN — HEPARIN SODIUM 6000 UNITS: 1000 INJECTION INTRAVENOUS; SUBCUTANEOUS at 08:51

## 2019-04-23 RX ADMIN — SODIUM CHLORIDE 25 ML/HR: 900 INJECTION, SOLUTION INTRAVENOUS at 07:12

## 2019-04-23 RX ADMIN — FENTANYL CITRATE 25 MCG: 50 INJECTION, SOLUTION INTRAMUSCULAR; INTRAVENOUS at 08:48

## 2019-04-23 RX ADMIN — HEPARIN SODIUM 6000 UNITS: 1000 INJECTION, SOLUTION INTRAVENOUS; SUBCUTANEOUS at 08:51

## 2019-04-23 RX ADMIN — LIDOCAINE HYDROCHLORIDE 5 ML: 10 INJECTION INFILTRATION; PERINEURAL at 09:28

## 2019-04-23 RX ADMIN — FENTANYL CITRATE 25 MCG: 50 INJECTION, SOLUTION INTRAMUSCULAR; INTRAVENOUS at 08:36

## 2019-04-23 RX ADMIN — MIDAZOLAM HYDROCHLORIDE 0.5 MG: 1 INJECTION, SOLUTION INTRAMUSCULAR; INTRAVENOUS at 09:14

## 2019-04-23 RX ADMIN — MIDAZOLAM HYDROCHLORIDE 0.5 MG: 1 INJECTION, SOLUTION INTRAMUSCULAR; INTRAVENOUS at 08:49

## 2019-04-23 RX ADMIN — MIDAZOLAM HYDROCHLORIDE 0.5 MG: 1 INJECTION, SOLUTION INTRAMUSCULAR; INTRAVENOUS at 08:57

## 2019-04-23 RX ADMIN — FENTANYL CITRATE 25 MCG: 50 INJECTION, SOLUTION INTRAMUSCULAR; INTRAVENOUS at 09:14

## 2019-04-23 RX ADMIN — CEFAZOLIN SODIUM 2 G: 2 SOLUTION INTRAVENOUS at 08:15

## 2019-04-23 RX ADMIN — FENTANYL CITRATE 25 MCG: 50 INJECTION, SOLUTION INTRAMUSCULAR; INTRAVENOUS at 08:57

## 2019-04-23 RX ADMIN — HEPARIN SODIUM 2000 UNITS: 200 INJECTION, SOLUTION INTRAVENOUS at 08:38

## 2019-04-23 RX ADMIN — HEPARIN SODIUM IN SODIUM CHLORIDE 2000 UNITS: 200 INJECTION INTRAVENOUS at 08:38

## 2019-04-23 RX ADMIN — IOPAMIDOL 150 ML: 510 INJECTION, SOLUTION INTRAVASCULAR at 09:29

## 2019-04-23 NOTE — PROGRESS NOTES
Pt discharged via wheelchair in car of wife and son,  Pt aaox3 at time of discharge, without complaints, dressing to left groin area, clean, dry and intact.  Arm bands removed and shredded

## 2019-04-23 NOTE — PROGRESS NOTES
TRANSFER - OUT REPORT: 
 
Verbal report given to ***(name) on [place] Sr.  being transferred to care unit(unit) for routine progression of care Report consisted of patients Situation, Background, Assessment and  
Recommendations(SBAR). Information from the following report(s) SBAR, Kardex and MAR was reviewed with the receiving nurse. Lines: PICC Double Lumen 37/97/62 Right;Basilic (Active) Peripheral IV 04/23/19 Right Antecubital (Active) Site Assessment Clean, dry, & intact 4/23/2019  7:04 AM  
Phlebitis Assessment 0 4/23/2019  7:04 AM  
Infiltration Assessment 0 4/23/2019  7:04 AM  
Dressing Status Clean, dry, & intact 4/23/2019  7:04 AM  
Dressing Type Tape;Transparent 4/23/2019  7:04 AM  
Hub Color/Line Status Flushed;Blue; Infusing;Capped 4/23/2019  7:04 AM  
Action Taken Open ports on tubing capped 4/23/2019  7:04 AM  
Alcohol Cap Used Yes 4/23/2019  7:04 AM  
  
 
Opportunity for questions and clarification was provided. Patient transported with: 
 Registered Nurse

## 2019-04-23 NOTE — DISCHARGE INSTRUCTIONS
Patient Education        Angiogram: What to Expect at Home  Your Recovery  An angiogram is an X-ray test that uses dye and a camera to take pictures of the blood flow in an artery or a vein. The doctor inserted a thin, flexible tube (catheter) into a blood vessel in your groin. In some cases, the catheter is placed in a blood vessel in the arm. An angiogram is done for many reasons. For example, you may have an angiogram to find the source of bleeding, such as an ulcer. Or it may be done to look for blocked blood vessels in your lungs. After an angiogram, your groin or arm may have a bruise and feel sore for a day or two. You can do light activities around the house but nothing strenuous for several days. Your doctor may give you specific instructions on when you can do your normal activities again, such as driving and going back to work. This care sheet gives you a general idea about how long it will take for you to recover. But each person recovers at a different pace. Follow the steps below to feel better as quickly as possible. How can you care for yourself at home? Activity    · Do not do strenuous exercise and do not lift, pull, or push anything heavy until your doctor says it is okay. This may be for a day or two. You can walk around the house and do light activity, such as cooking.     · You may shower 24 to 48 hours after the procedure, if your doctor okays it. Pat the incision dry. Do not take a bath for 1 week, or until your doctor tells you it is okay.     · If the catheter was placed in your groin, try not to walk up stairs for the first couple of days.     · If the catheter was placed in your arm near your wrist, do not bend your wrist deeply for the first couple of days. Be careful using your hand to get into and out of a chair or bed.     · If your doctor recommends it, get more exercise. Walking is a good choice. Bit by bit, increase the amount you walk every day.  Try for at least 30 minutes on most days of the week. Diet    · Drink plenty of fluids to help your body flush out the dye. If you have kidney, heart, or liver disease and have to limit fluids, talk with your doctor before you increase the amount of fluids you drink.     · You can eat your normal diet. If your stomach is upset, try bland, low-fat foods like plain rice, broiled chicken, toast, and yogurt. Medicines    · Be safe with medicines. Read and follow all instructions on the label. ? If the doctor gave you a prescription medicine for pain, take it as prescribed. ? If you are not taking a prescription pain medicine, ask your doctor if you can take an over-the-counter medicine.     · If you take blood thinners, such as warfarin (Coumadin), clopidogrel (Plavix), or aspirin, be sure to talk to your doctor. He or she will tell you if and when to start taking those medicines again. Make sure that you understand exactly what your doctor wants you to do.     · Your doctor will tell you if and when you can restart your medicines. He or she will also give you instructions about taking any new medicines.    Care of the catheter site    · You will have a dressing over the cut (incision). A dressing helps the incision heal and protects it. Your doctor will tell you how to take care of this.     · Put ice or a cold pack on the area for 10 to 20 minutes at a time to help with soreness or swelling. Put a thin cloth between the ice and your skin. Follow-up care is a key part of your treatment and safety. Be sure to make and go to all appointments, and call your doctor if you are having problems. It's also a good idea to know your test results and keep a list of the medicines you take. When should you call for help? Call 911 anytime you think you may need emergency care.  For example, call if:    · You passed out (lost consciousness).     · You have severe trouble breathing.     · You have sudden chest pain and shortness of breath, or you cough up blood.    Call your doctor now or seek immediate medical care if:    · You are bleeding from the area where the catheter was put in your artery.     · You have a fast-growing, painful lump at the catheter site.     · You have signs of infection, such as:  ? Increased pain, swelling, warmth, or redness. ? Red streaks leading from the incision. ? Pus draining from the incision. ? A fever.    Watch closely for any changes in your health, and be sure to contact your doctor if:    · You don't get better as expected. Where can you learn more? Go to http://mariella-kenyon.info/. Enter G972 in the search box to learn more about \"Angiogram: What to Expect at Home. \"  Current as of: June 25, 2018  Content Version: 11.9  © 8888-7699 [employer]. Care instructions adapted under license by [employer] (which disclaims liability or warranty for this information). If you have questions about a medical condition or this instruction, always ask your healthcare professional. [place] [address] any warranty or liability for your use of this information. DISCHARGE SUMMARY from Nurse    PATIENT INSTRUCTIONS:    After general anesthesia or intravenous sedation, for 24 hours or while taking prescription Narcotics:  · Limit your activities  · Do not drive and operate hazardous machinery  · Do not make important personal or business decisions  · Do  not drink alcoholic beverages  · If you have not urinated within 8 hours after discharge, please contact your surgeon on call.     Report the following to your surgeon:  · Excessive pain, swelling, redness or odor of or around the surgical area  · Temperature over 100.5  · Nausea and vomiting lasting longer than 4 hours or if unable to take medications  · Any signs of decreased circulation or nerve impairment to extremity: change in color, persistent  numbness, tingling, coldness or increase pain  · Any questions    What to do at Home:  Recommended activity: Activity as tolerated,       *  Please give a list of your current medications to your Primary Care Provider. *  Please update this list whenever your medications are discontinued, doses are      changed, or new medications (including over-the-counter products) are added. *  Please carry medication information at all times in case of emergency situations. These are general instructions for a healthy lifestyle:    No smoking/ No tobacco products/ Avoid exposure to second hand smoke  Surgeon General's Warning:  Quitting smoking now greatly reduces serious risk to your health. Obesity, smoking, and sedentary lifestyle greatly increases your risk for illness    A healthy diet, regular physical exercise & weight monitoring are important for maintaining a healthy lifestyle    You may be retaining fluid if you have a history of heart failure or if you experience any of the following symptoms:  Weight gain of 3 pounds or more overnight or 5 pounds in a week, increased swelling in our hands or feet or shortness of breath while lying flat in bed. Please call your doctor as soon as you notice any of these symptoms; do not wait until your next office visit. Recognize signs and symptoms of STROKE:    F-face looks uneven    A-arms unable to move or move unevenly    S-speech slurred or non-existent    T-time-call 911 as soon as signs and symptoms begin-DO NOT go       Back to bed or wait to see if you get better-TIME IS BRAIN. Warning Signs of HEART ATTACK     Call 911 if you have these symptoms:   Chest discomfort. Most heart attacks involve discomfort in the center of the chest that lasts more than a few minutes, or that goes away and comes back. It can feel like uncomfortable pressure, squeezing, fullness, or pain.  Discomfort in other areas of the upper body. Symptoms can include pain or discomfort in one or both arms, the back, neck, jaw, or stomach.    Shortness of breath with or without chest discomfort.  Other signs may include breaking out in a cold sweat, nausea, or lightheadedness. Don't wait more than five minutes to call 911 - MINUTES MATTER! Fast action can save your life. Calling 911 is almost always the fastest way to get lifesaving treatment. Emergency Medical Services staff can begin treatment when they arrive -- up to an hour sooner than if someone gets to the hospital by car. The discharge information has been reviewed with the patient and caregiver. The patient, spouse and caregiver verbalized understanding. Discharge medications reviewed with the patient, spouse and caregiver and appropriate educational materials and side effects teaching were provided.       Patient armband removed and shredded    ___________________________________________________________________________________________________________________________________

## 2019-04-23 NOTE — H&P
ASSESSMENT:  As detailed in the progress note below, the patient was assessed for:  (I73.9) PAD (peripheral artery disease) ([facility]     (I70.201) Atherosclerosis of artery of right lower extremity ([facility] [facility]     PLAN:  Discussed evaluation, treatment and usual course. Patient, Spouse and Daughter verbalizes understanding and agrees with plan of care. All questions were answered. Scheduled for Right leg angiogram with intervention. . needs PT and physical conditioning     SUBJECTIVE:       Chief Complaint   Patient presents with    VENOUS INSUFFICIENCY NOS       f/u w [facility]         HPI  25-year-old thin and somewhat cachectic white male comes in today with his wife for dual appointment. He primarily complains of bilateral lower extremity ulceration worse on the right compared to the left. His story was very long and he has a complex history. To summarize he has had both venous and arterial interventions in both legs. In 2015 and 2017 he has had bilateral lower extremity angiograms with intervention. Most recently Dr. Ximena Payton from [facility] did a right lower extremity angiogram in 2017 with apparent stent placement in the left femoral artery. He says left lower extremity intervention was also performed but does not remember exactly what was done. On my exam he has palpable dorsalis pedis pulses bilaterally. It is somewhat weaker on the right compared to the left. His SUSANNAH on the right is 0.68 with diminished waveforms and on the left is 1.00 with normal-appearing waveforms. He says he has had venograms in both sides with greater saphenous ablation by Dr. Vance Murry as well in the past.  On my exam today the left lower extremity is a little red with some punctate areas of eschar but no significant ulceration. On the right side he has an ankle superficial ulcer and a heel ulcer that is almost completely healed. There are no other ulcers noted.   His story is further complicated by the fact that he fell on October 17 while getting out of the car on 2 occasions and had to be assisted by paramedics to get up and safely into his vehicle. He was not hospitalized for this. He says he has not been walking since October 17 because he has had significant left knee pain. He is scheduled to get an MRI of this to assess whether there is been any cartilaginous damage. He has a suprapubic cystostomy as well. I told him I do a right lower extremity angiogram first to improve the inflow to that leg. He needs wound care to the ulcers but I do not think these are extensive. He does not want to go back to the [facility] wound care center as he said he has been going there for 10 years and they seem to have exhausted all possibilities for further wound care. I will refer him to [facility] wound care center.   He needs physical therapy as well he is severely deconditioned.        Venous studies negative for insufficiency                                                  Past Medical History:   Diagnosis Date    Arthritis 1985    Asthma      Atrial fibrillation ([address]       recent DX 6/16    BPH (benign prostatic hypertrophy)      Cardiac arrhythmia      Cataracts, bilateral 2004    Diabetes mellitus ([address] 1997    Diabetic leg ulcer ([address]      DJD (degenerative joint disease) 1987     lower back    Elevated cholesterol 1997    Enlarged prostate 2005    Esophageal reflux 1997    Hearing loss 1983     bilat    Hemorrhoids      Hypercholesteremia      Hypertension 1998    Lung disease 1999     COPD    Neuropathy ([address]      Pancreatitis 1997    Peripheral vascular disease ([address] 2015    Thyroid disease 2000    Tinnitus 1978            Past Surgical History:   Procedure Laterality Date    ANGIOPLASTY Bilateral 6/2015 right, 08/2015 left    ANGIOPLASTY Bilateral right 05/2017, left 06/2017    ARTHROSCOPY KNEE Right 1962    CATARACT REMOVAL        CATARACT REMOVAL Right 05/2004    CERVICAL LAMINECTOMY N/A 04/1982    EXPLORATION ARTERY/ VEIN Right 7/17/2015     Procedure: EXPLORATION FEMORAL ARTERY;  Surgeon: Sheeba Ny MD;  Location: [facility]IN OR [facility]; Service: Vascular;  Laterality: Right;  Repair Right Femoral Artery Pseudoaneurysm    HERNIA REPAIR        INCISIONAL/VENTRAL HERNIA N/A 06/2000    PANCREATIC CYST REPAIR        SUPRAPUBIC CATH N/A 12/2015    SUPRAPUBIC TUBE INSERTION        VASCULAR SURGERY   VENOUS CLOSURE      No family history on file. Social History               Social History    Marital status:        Spouse name: N/A    Number of children: N/A    Years of education: N/A          Occupational History    Not on file.             Social History Main Topics    Smoking status: Former Smoker    Smokeless tobacco: Never Used    Alcohol use Yes         Comment: OCCASIONALLY    Drug use: No    Sexual activity: Not on file           Other Topics Concern    Not on file          Social History Narrative    No narrative on file                Current Outpatient Prescriptions   Medication Sig Dispense Refill    acetaminophen (TYLENOL) 500 mg PO TABS Take 500 mg by Mouth Every 4 Hours As Needed for Pain.        albuterol (PROVENTIL, VENTOLIN) 90 mcg/Actuation INH HFAA Take 2 Puffs inhaled by mouth Every 4 Hours As Needed for Wheezing or Shortness of Breath.        aspirin EC 81 mg PO TBEC Take 81 mg by Mouth Every Night at Bedtime.        atorvastatin (LIPITOR) 40 mg PO TABS Take 40 mg by Mouth Every Night at Bedtime.        Cilostazol (PLETAL) 100 mg PO TABS Take 100 mg by Mouth Twice Daily.        clopidogrel (PLAVIX) 75 mg PO TABS Take 75 mg by Mouth Once a Day.        dilTIAZem (CARDIZEM) 30 mg PO TABS diltiazem 30 mg tablet   Take 1 tablet 3 times a day by oral route.        esomeprazole (NEXIUM) 40 mg PO CPDR Take 40 mg by Mouth Once a Day.        fluticasone (FLONASE) 50 mcg/Actuation NA SpSn 1 Spray by each nostril route Once a Day.         fluticasone-salmeterol HFA (ADVAIR) 230-21 mcg/Actuation INH HFAA Take 2 Puffs inhaled by mouth Every 12 Hours.        furosemide (LASIX) 40 mg PO TABS Take 40 mg by Mouth Every Morning.        gabapentin (NEURONTIN) 300 mg PO CAPS Take  by Mouth See Admin Instrs. Take 300mg ([id number] capsules) AM; Take 300mg ([id number] capsules) Lunch; Take 900mg ([id number] capsules) QHS        insulin ASPART (NOVOLOG VIAL) 100 unit/mL SC SOLN Inject  beneath the skin See Admin Instrs. Inject 3 units beneath the skin at breakfast; 5 units beneath the skin at lunch and dinner        insulin glargine (LANTUS VIAL) 100 unit/mL SC SOLN Inject 27 Units beneath the skin Every Night at Bedtime.        Iron AspGly&WJ-S-U01-FA-Ca-Suc 150-60-25-1 mg-mg-mcg-mg PO CAPS Take 1 Cap by Mouth Twice Daily.        lactase (DAIRY EASE) 3,000 unit PO TABS Take 3,000 Units by Mouth Once a Day.        levothyroxine (SYNTHROID) 100 mcg PO TABS Take 100 mcg by Mouth Once a Day.        lisinopril (PRINIVIL) 2.5 mg PO TABS          loratadine (CLARITIN) 10 mg PO TABS Take 10 mg by Mouth Once a Day.        losartan (COZAAR) 25 mg PO TABS Take 25 mg by Mouth Once a Day.        Meloxicam (MOBIC) 15 mg PO TABS Take 15 mg by Mouth Once a Day.        Metformin (GLUCOPHAGE) 1,000 mg PO TABS Take 1,000 mg by Mouth Twice Daily.        Potassium Citrate 10 mEq (1,080 mg) PO TbSR Take 10 mEq by Mouth Twice Daily.        pregabalin (LYRICA) 50 mg PO CAPS          therapeutic multivitamin-minerals (THERAGRAN-M) PO TABS Take 1 Tab by Mouth Every Night at Bedtime.        tiotropium (SPIRIVA) 18 mcg INH CpDv Take 1 Cap inhaled by mouth Once a Day.         tramadol (ULTRAM) 50 mg PO TABS Take 50 mg by Mouth Twice Daily.        vitamin E (AQUASOL E) 400 unit PO CAPS Take  by Mouth.        Zinc 50 mg PO TABS Take  by Mouth.          No current facility-administered medications for this visit.             Allergies   Allergen Reactions    Adhesive Tape-Silicones mild rash/itching       Pt reports he is allergic to some types of tape (silk, paper) but does not get a reaction until the tape has been in contact with his skin for a couple of days.  Neosporin [Neomycin-Bacitracin-Polymyxin] mild rash/itching         Review of Systems   Constitutional: Negative for chills, fever and weight loss. HENT: Negative for hearing loss and nosebleeds. Eyes:        No sudden loss of vision. Respiratory: Negative for cough, hemoptysis, shortness of breath and wheezing. Cardiovascular: Negative for chest pain, palpitations, claudication and leg swelling. No rest pain. Gastrointestinal: Negative for abdominal pain, blood in stool, melena, nausea and vomiting. Musculoskeletal: Negative for back pain. Skin: Negative for itching and rash. No cellulitis, ulcers, or sores. Neurological: Negative for speech change, focal weakness and headaches. Endo/Heme/Allergies: Does not bruise/bleed easily. Psychiatric/Behavioral: Negative for depression. The patient is not nervous/anxious.          OBJECTIVE:  BP 96/50   Ht 6' 2\" (1.88 m)   Wt 63.5 kg (140 lb)   BMI 17.97 kg/m²           BMI: 17.97     Physical Exam   Constitutional: He is oriented to person, place, and time and well-developed, well-nourished, and in no distress. HENT:   Head: Normocephalic. Mouth/Throat: Mucous membranes are normal.   Eyes: Conjunctivae and lids are normal.   Neck: Normal range of motion. Neck supple. Normal carotid pulses and no JVD present. Carotid bruit is not present. Cardiovascular: Normal rate, regular rhythm, normal heart sounds and intact distal pulses. PMI is not displaced. Exam reveals decreased pulses. Pulses:       Carotid pulses are 2+ on the right side, and 2+ on the left side. Radial pulses are 2+ on the right side, and 2+ on the left side. Femoral pulses are 2+ on the right side, and 2+ on the left side.        Popliteal pulses are 2+ on the right side, and 2+ on the left side.        Dorsalis pedis pulses are 1+ on the right side, and 1+ on the left side. Posterior tibial pulses are 0 on the right side, and 0 on the left side. Normal PMI, no palpable thrills; lifts or palpable S3 or S4   Pulmonary/Chest: Effort normal and breath sounds normal. He has no wheezes. He has no rales. Abdominal: Soft. He exhibits no distension, no abdominal bruit, no pulsatile midline mass and no mass. There is no hepatosplenomegaly. There is no tenderness. There is no guarding. Musculoskeletal: Normal range of motion. He exhibits no edema or tenderness. Right shoulder: He exhibits no swelling. Lymphadenopathy:     He has no cervical adenopathy. Neurological: He is alert and oriented to person, place, and time. He displays no weakness, facial symmetry and normal speech. Gait normal. Gait normal.   Skin: Skin is warm and dry. No lesion and no rash noted. No cyanosis or erythema. No pallor.    Psychiatric: Mood and affect normal.   Nursing note and vitals reviewed.     Sheldon Sidhu DO [facility]  [address] [facility]

## 2019-04-23 NOTE — PROGRESS NOTES
Pt is all prepped and ready for procedure. [id number] Pt back to care unit S/P angiogram. Tegaderm with closure device in place. Site dry and intact. Bedrest and legs precautions reinforced.

## 2019-04-23 NOTE — PROCEDURES
[facility]  PROCEDURE NOTE        Date: 4/23/2019, 9:36 AM      Pre-Op Dx : PAD with ulcer    Post-Op Dx: same    Procedure : Aortogram with bilateral lower extremity runoff and angioplasty of the right superficial femoral artery to Above the Knee Popliteal Artery with 5 mm Chocolate balloon using Embolic Protection Device. Ultrasound guided access left common femoral artery    Surgeon : Michele Valdez    Assistant: None    Anesthesia : Moderate Sedation and lidocaine    Findings : Aortoiliac segment was patent with calcification, right leg:  Common femoral artery was patent, profunda femoris artery was patent, SFA through the below-the-knee popliteal artery was patent but there was heavy calcification and areas of scattered disease throughout the SFA to the above-the-knee popliteal artery. There was otherwise three-vessel runoff. Left leg: Common femoral artery profunda femoris artery and superficial femoral artery were widely patent through the below-the-knee popliteal artery. The tibial vessels were underfilled. Comp: none    EBL : Minimal    Contrast : 150 ml. Visipaque    Access : Left femoral artery    Implants : NO     Specimens: None    Blood Product Administered : NO     Sheath Size : 6 Guamanian    Heparin : YES 6 thousand units    Closure Device : YES Mynx    US Guidance : YES     Special Devices : 5 x 120 CHOCOLATE BALLOON    Sedation :    Moderate sedation was administered. The patient was constantly monitored by Hina Palomo from [phone]  until 31-70-28-28 hours. Fentanyl 100 mcg, Versed 2 mg. INDICATION FOR PROCEDURE:  42-year-old white male with nonhealing ulcers of the right lower extremity. He has a known history of peripheral arterial disease and is undergone intervention on both legs in the past.  Because of the nonhealing ulcers on his right lower extremity he is brought for angiography with intervention.   Risks, benefits and potential complications of the procedure were explained to the patient and his family prior to proceeding and they signed a consent allowing me to proceed      TECHNICAL DETAILS OF PROCEDURE:    The patient was brought to the interventional suite and placed in supine position. Left groin was prepped and draped in a sterile fashion. Timeout was taken. Moderate sedation is administered. 5 Mohawk sheath was inserted ultrasound guidance and Seldinger technique and a left common femoral artery. Bentson wire and universal flush catheter were passed into the aorta. Aortoiliac angiogram was performed. The catheter was pulled down to the aortic bifurcation and a bilateral lower extremity runoff was performed. The findings are as described above. I passed a stiff wire to the right common femoral artery and exchanged for a 6 x 45 Cook sheath. I systemically heparinized the patient. I passed an embolic protection device through a trailblazer catheter to the below-the-knee popliteal artery. This is a 7 mm spider filter. I then angioplastied the entire SFA to above-knee popliteal artery with a 5 x 120 chocolate balloon. Completion angiography revealed improved luminal caliber and good flow but still several areas of luminal irregularity were noted. Because the flow was brisk I opted not to place a stent. I retrieved the filter. There was some debris within it. Tibial vessel angiography at completion revealed no distal embolic event. This sheath was then exchanged for a short 6 Mohawk sheath and the arteriotomy was closed successfully with a minx device. Hemostasis was good. The patient tolerated the procedure well and went to the recovery in stable condition. I was present and scrubbed throughout.         [address] [place] [address] [facility]  [phone]  Pager [phone]

## 2023-11-02 NOTE — ED NOTES
Pt resting quietly in bed. Wife at bedside with him. VSS on RA. No complaints other than generalized malaise at this time. Call bell within reach. Neuro

## 2024-02-22 NOTE — DISCHARGE SUMMARY
.see dictation [Home] : at home, [unfilled] , at the time of the visit. [Medical Office: ([facility])___] : at the medical office located in  [Patient] : the patient [Follow-Up] : a follow-up visit [Other: _____] : [unfilled]

## (undated) DEVICE — DRAPE TWL SURG 16X26IN BLU ORB04] ALLCARE INC]

## (undated) DEVICE — SPONGE GZ W4XL4IN COT 12 PLY TYP VII WVN C FLD DSGN

## (undated) DEVICE — DEVON™ KNEE AND BODY STRAP 60" X 3" (1.5 M X 7.6 CM): Brand: DEVON

## (undated) DEVICE — PADDING CAST W4INXL4YD ST COT COHESIVE HND TEARABLE SPEC

## (undated) DEVICE — REM POLYHESIVE ADULT PATIENT RETURN ELECTRODE: Brand: VALLEYLAB

## (undated) DEVICE — SYRINGE BLB 50CC IRRIG PLIABLE FNGR FLNG GRAD FLSK DISP

## (undated) DEVICE — GOWN,BREATHABLE,IMP SLV 3XL AURORA: Brand: MEDLINE

## (undated) DEVICE — INTENDED FOR TISSUE SEPARATION, AND OTHER PROCEDURES THAT REQUIRE A SHARP SURGICAL BLADE TO PUNCTURE OR CUT.: Brand: BARD-PARKER ® CARBON RIB-BACK BLADES

## (undated) DEVICE — MEDI-VAC NON-CONDUCTIVE SUCTION TUBING: Brand: CARDINAL HEALTH

## (undated) DEVICE — BANDAGE COMPR EXSANGUATION SGL LAYERED NO CLSR 9FT LEN 4IN W

## (undated) DEVICE — PACK PROCEDURE SURG EXTREMITY CUST

## (undated) DEVICE — ABDOMINAL PAD: Brand: DERMACEA

## (undated) DEVICE — SUT MONOCRYL PLUS UD 4-0 --

## (undated) DEVICE — VERSAJET II HYDROSURGERY SYSTEM HANDSET, 45DEG 14MM EXACT: Brand: VERSAJET

## (undated) DEVICE — Device

## (undated) DEVICE — (D)SYR 10ML 1/5ML GRAD NSAF -- PKGING CHANGE USE ITEM 338027

## (undated) DEVICE — DRESSING,GAUZE,XEROFORM,CURAD,1"X8",ST: Brand: CURAD

## (undated) DEVICE — IMPERVIOUS STOCKINETTE: Brand: DEROYAL

## (undated) DEVICE — KERLIX BANDAGE ROLL: Brand: KERLIX

## (undated) DEVICE — BANDAGE COMPR SGL LAYERED CLP CLSR ELAS 15FT LEN 6IN W

## (undated) DEVICE — NON-ADHERENT STRIPS,OIL EMULSION: Brand: CURITY

## (undated) DEVICE — BANDAGE COMPR SELF ADH 5 YDX6 IN TAN STRL PREMIERPRO LF

## (undated) DEVICE — DRAPE,U/ SHT,SPLIT,PLAS,STERIL: Brand: MEDLINE

## (undated) DEVICE — OCCLUSIVE GAUZE STRIP,3% BISMUTH TRIBROMOPHENATE IN PETROLATUM BLEND: Brand: XEROFORM

## (undated) DEVICE — NEEDLE HYPO 25GA L1.5IN BLU POLYPR HUB S STL REG BVL STR

## (undated) DEVICE — BANDAGE,GAUZE,CONFORMING,4"X75",STRL,LF: Brand: MEDLINE

## (undated) DEVICE — DISPOSABLE TOURNIQUET CUFF SINGLE BLADDER, SINGLE PORT AND QUICK CONNECT CONNECTOR: Brand: COLOR CUFF

## (undated) DEVICE — TRAY PREP DRY W/ PREM GLV 2 APPL 6 SPNG 2 UNDPD 1 OVERWRAP

## (undated) DEVICE — BANDAGE COMPR SELF ADH 5 YDX4 IN TAN STRL PREMIERPRO LF